# Patient Record
Sex: FEMALE | Race: WHITE | NOT HISPANIC OR LATINO
[De-identification: names, ages, dates, MRNs, and addresses within clinical notes are randomized per-mention and may not be internally consistent; named-entity substitution may affect disease eponyms.]

---

## 2021-03-02 RX ADMIN — Medication 2: at 22:02

## 2021-03-12 ENCOUNTER — TRANSCRIPTION ENCOUNTER (OUTPATIENT)
Age: 76
End: 2021-03-12

## 2021-03-13 ENCOUNTER — INPATIENT (INPATIENT)
Facility: HOSPITAL | Age: 76
LOS: 23 days | Discharge: NURSING, REHAB OR LONG-TERM CARE | DRG: 474 | End: 2021-04-06
Attending: SURGERY | Admitting: SURGERY
Payer: MEDICARE

## 2021-03-13 ENCOUNTER — RESULT REVIEW (OUTPATIENT)
Age: 76
End: 2021-03-13

## 2021-03-13 VITALS
OXYGEN SATURATION: 98 % | TEMPERATURE: 97 F | RESPIRATION RATE: 16 BRPM | SYSTOLIC BLOOD PRESSURE: 123 MMHG | WEIGHT: 100.97 LBS | DIASTOLIC BLOOD PRESSURE: 79 MMHG | HEIGHT: 62 IN | HEART RATE: 96 BPM
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
## 2021-03-13 DIAGNOSIS — R73.9 HYPERGLYCEMIA, UNSPECIFIED: ICD-10-CM

## 2021-03-13 DIAGNOSIS — Z93.3 COLOSTOMY STATUS: Chronic | ICD-10-CM

## 2021-03-13 DIAGNOSIS — L03.90 CELLULITIS, UNSPECIFIED: ICD-10-CM

## 2021-03-13 DIAGNOSIS — C18.9 MALIGNANT NEOPLASM OF COLON, UNSPECIFIED: ICD-10-CM

## 2021-03-13 LAB
ALBUMIN SERPL ELPH-MCNC: 2.7 G/DL — LOW (ref 3.3–5)
ALP SERPL-CCNC: 145 U/L — HIGH (ref 40–120)
ALT FLD-CCNC: 25 U/L — SIGNIFICANT CHANGE UP (ref 10–45)
ANION GAP SERPL CALC-SCNC: 18 MMOL/L — HIGH (ref 5–17)
ANION GAP SERPL CALC-SCNC: 19 MMOL/L — HIGH (ref 5–17)
ANION GAP SERPL CALC-SCNC: 25 MMOL/L — HIGH (ref 5–17)
ANISOCYTOSIS BLD QL: SLIGHT — SIGNIFICANT CHANGE UP
APPEARANCE UR: CLEAR — SIGNIFICANT CHANGE UP
APTT BLD: 30.4 SEC — SIGNIFICANT CHANGE UP (ref 27.5–35.5)
AST SERPL-CCNC: 17 U/L — SIGNIFICANT CHANGE UP (ref 10–40)
B-OH-BUTYR SERPL-SCNC: 6.4 MMOL/L — HIGH
BACTERIA # UR AUTO: SIGNIFICANT CHANGE UP /HPF
BASE EXCESS BLDV CALC-SCNC: -6.1 MMOL/L — SIGNIFICANT CHANGE UP
BASOPHILS # BLD AUTO: 0 K/UL — SIGNIFICANT CHANGE UP (ref 0–0.2)
BASOPHILS NFR BLD AUTO: 0 % — SIGNIFICANT CHANGE UP (ref 0–2)
BILIRUB SERPL-MCNC: 0.4 MG/DL — SIGNIFICANT CHANGE UP (ref 0.2–1.2)
BILIRUB UR-MCNC: ABNORMAL
BLD GP AB SCN SERPL QL: NEGATIVE — SIGNIFICANT CHANGE UP
BUN SERPL-MCNC: 15 MG/DL — SIGNIFICANT CHANGE UP (ref 7–23)
BUN SERPL-MCNC: 15 MG/DL — SIGNIFICANT CHANGE UP (ref 7–23)
BUN SERPL-MCNC: 18 MG/DL — SIGNIFICANT CHANGE UP (ref 7–23)
BURR CELLS BLD QL SMEAR: PRESENT — SIGNIFICANT CHANGE UP
CA-I SERPL-SCNC: 1.27 MMOL/L — SIGNIFICANT CHANGE UP (ref 1.12–1.3)
CALCIUM SERPL-MCNC: 8 MG/DL — LOW (ref 8.4–10.5)
CALCIUM SERPL-MCNC: 8.8 MG/DL — SIGNIFICANT CHANGE UP (ref 8.4–10.5)
CALCIUM SERPL-MCNC: 9.7 MG/DL — SIGNIFICANT CHANGE UP (ref 8.4–10.5)
CHLORIDE SERPL-SCNC: 100 MMOL/L — SIGNIFICANT CHANGE UP (ref 96–108)
CHLORIDE SERPL-SCNC: 102 MMOL/L — SIGNIFICANT CHANGE UP (ref 96–108)
CHLORIDE SERPL-SCNC: 94 MMOL/L — LOW (ref 96–108)
CK MB CFR SERPL CALC: 6.9 NG/ML — HIGH (ref 0–6.7)
CK SERPL-CCNC: 190 U/L — HIGH (ref 25–170)
CO2 SERPL-SCNC: 17 MMOL/L — LOW (ref 22–31)
CO2 SERPL-SCNC: 18 MMOL/L — LOW (ref 22–31)
CO2 SERPL-SCNC: 19 MMOL/L — LOW (ref 22–31)
COLOR SPEC: YELLOW — SIGNIFICANT CHANGE UP
CREAT SERPL-MCNC: 0.54 MG/DL — SIGNIFICANT CHANGE UP (ref 0.5–1.3)
CREAT SERPL-MCNC: 0.58 MG/DL — SIGNIFICANT CHANGE UP (ref 0.5–1.3)
CREAT SERPL-MCNC: 0.62 MG/DL — SIGNIFICANT CHANGE UP (ref 0.5–1.3)
DIFF PNL FLD: NEGATIVE — SIGNIFICANT CHANGE UP
EOSINOPHIL # BLD AUTO: 0 K/UL — SIGNIFICANT CHANGE UP (ref 0–0.5)
EOSINOPHIL NFR BLD AUTO: 0 % — SIGNIFICANT CHANGE UP (ref 0–6)
EPI CELLS # UR: SIGNIFICANT CHANGE UP /HPF (ref 0–5)
GAS PNL BLDV: 136 MMOL/L — LOW (ref 138–146)
GAS PNL BLDV: SIGNIFICANT CHANGE UP
GAS PNL BLDV: SIGNIFICANT CHANGE UP
GLUCOSE BLDC GLUCOMTR-MCNC: 312 MG/DL — HIGH (ref 70–99)
GLUCOSE BLDC GLUCOMTR-MCNC: 315 MG/DL — HIGH (ref 70–99)
GLUCOSE SERPL-MCNC: 263 MG/DL — HIGH (ref 70–99)
GLUCOSE SERPL-MCNC: 297 MG/DL — HIGH (ref 70–99)
GLUCOSE SERPL-MCNC: 391 MG/DL — HIGH (ref 70–99)
GLUCOSE UR QL: 500
GRAM STN FLD: SIGNIFICANT CHANGE UP
GRAM STN FLD: SIGNIFICANT CHANGE UP
HCO3 BLDV-SCNC: 20 MMOL/L — SIGNIFICANT CHANGE UP (ref 20–27)
HCT VFR BLD CALC: 43.1 % — SIGNIFICANT CHANGE UP (ref 34.5–45)
HGB BLD-MCNC: 14.2 G/DL — SIGNIFICANT CHANGE UP (ref 11.5–15.5)
INR BLD: 1.24 — HIGH (ref 0.88–1.16)
KETONES UR-MCNC: >=80 MG/DL
LACTATE SERPL-SCNC: 2 MMOL/L — SIGNIFICANT CHANGE UP (ref 0.5–2)
LEUKOCYTE ESTERASE UR-ACNC: NEGATIVE — SIGNIFICANT CHANGE UP
LYMPHOCYTES # BLD AUTO: 0.88 K/UL — LOW (ref 1–3.3)
LYMPHOCYTES # BLD AUTO: 3.4 % — LOW (ref 13–44)
MAGNESIUM SERPL-MCNC: 1.6 MG/DL — SIGNIFICANT CHANGE UP (ref 1.6–2.6)
MANUAL SMEAR VERIFICATION: SIGNIFICANT CHANGE UP
MCHC RBC-ENTMCNC: 29.8 PG — SIGNIFICANT CHANGE UP (ref 27–34)
MCHC RBC-ENTMCNC: 32.9 GM/DL — SIGNIFICANT CHANGE UP (ref 32–36)
MCV RBC AUTO: 90.5 FL — SIGNIFICANT CHANGE UP (ref 80–100)
MICROCYTES BLD QL: SLIGHT — SIGNIFICANT CHANGE UP
MONOCYTES # BLD AUTO: 1.11 K/UL — HIGH (ref 0–0.9)
MONOCYTES NFR BLD AUTO: 4.3 % — SIGNIFICANT CHANGE UP (ref 2–14)
NEUTROPHILS # BLD AUTO: 23.86 K/UL — HIGH (ref 1.8–7.4)
NEUTROPHILS NFR BLD AUTO: 92.3 % — HIGH (ref 43–77)
NITRITE UR-MCNC: NEGATIVE — SIGNIFICANT CHANGE UP
OVALOCYTES BLD QL SMEAR: SLIGHT — SIGNIFICANT CHANGE UP
PCO2 BLDV: 43 MMHG — SIGNIFICANT CHANGE UP (ref 41–51)
PH BLDV: 7.29 — LOW (ref 7.32–7.43)
PH UR: 5.5 — SIGNIFICANT CHANGE UP (ref 5–8)
PHOSPHATE SERPL-MCNC: 3.3 MG/DL — SIGNIFICANT CHANGE UP (ref 2.5–4.5)
PLAT MORPH BLD: ABNORMAL
PLATELET # BLD AUTO: 494 K/UL — HIGH (ref 150–400)
PO2 BLDV: 22 MMHG — SIGNIFICANT CHANGE UP
POIKILOCYTOSIS BLD QL AUTO: SLIGHT — SIGNIFICANT CHANGE UP
POTASSIUM BLDV-SCNC: 4 MMOL/L — SIGNIFICANT CHANGE UP (ref 3.5–4.9)
POTASSIUM SERPL-MCNC: 3.2 MMOL/L — LOW (ref 3.5–5.3)
POTASSIUM SERPL-MCNC: 3.7 MMOL/L — SIGNIFICANT CHANGE UP (ref 3.5–5.3)
POTASSIUM SERPL-MCNC: 4 MMOL/L — SIGNIFICANT CHANGE UP (ref 3.5–5.3)
POTASSIUM SERPL-SCNC: 3.2 MMOL/L — LOW (ref 3.5–5.3)
POTASSIUM SERPL-SCNC: 3.7 MMOL/L — SIGNIFICANT CHANGE UP (ref 3.5–5.3)
POTASSIUM SERPL-SCNC: 4 MMOL/L — SIGNIFICANT CHANGE UP (ref 3.5–5.3)
PROT SERPL-MCNC: 8.4 G/DL — HIGH (ref 6–8.3)
PROT UR-MCNC: ABNORMAL MG/DL
PROTHROM AB SERPL-ACNC: 14.7 SEC — HIGH (ref 10.6–13.6)
RBC # BLD: 4.76 M/UL — SIGNIFICANT CHANGE UP (ref 3.8–5.2)
RBC # FLD: 12.5 % — SIGNIFICANT CHANGE UP (ref 10.3–14.5)
RBC BLD AUTO: ABNORMAL
RBC CASTS # UR COMP ASSIST: < 5 /HPF — SIGNIFICANT CHANGE UP
RH IG SCN BLD-IMP: POSITIVE — SIGNIFICANT CHANGE UP
SAO2 % BLDV: 32 % — SIGNIFICANT CHANGE UP
SARS-COV-2 RNA SPEC QL NAA+PROBE: SIGNIFICANT CHANGE UP
SODIUM SERPL-SCNC: 137 MMOL/L — SIGNIFICANT CHANGE UP (ref 135–145)
SODIUM SERPL-SCNC: 137 MMOL/L — SIGNIFICANT CHANGE UP (ref 135–145)
SODIUM SERPL-SCNC: 138 MMOL/L — SIGNIFICANT CHANGE UP (ref 135–145)
SP GR SPEC: >=1.03 — SIGNIFICANT CHANGE UP (ref 1–1.03)
SPECIMEN SOURCE: SIGNIFICANT CHANGE UP
SPECIMEN SOURCE: SIGNIFICANT CHANGE UP
SPHEROCYTES BLD QL SMEAR: SLIGHT — SIGNIFICANT CHANGE UP
UROBILINOGEN FLD QL: 0.2 E.U./DL — SIGNIFICANT CHANGE UP
WBC # BLD: 25.85 K/UL — HIGH (ref 3.8–10.5)
WBC # FLD AUTO: 25.85 K/UL — HIGH (ref 3.8–10.5)
WBC UR QL: < 5 /HPF — SIGNIFICANT CHANGE UP

## 2021-03-13 PROCEDURE — 70450 CT HEAD/BRAIN W/O DYE: CPT | Mod: 26,MA

## 2021-03-13 PROCEDURE — 72125 CT NECK SPINE W/O DYE: CPT | Mod: 26,MA

## 2021-03-13 PROCEDURE — 73630 X-RAY EXAM OF FOOT: CPT | Mod: 26,50

## 2021-03-13 PROCEDURE — 71045 X-RAY EXAM CHEST 1 VIEW: CPT | Mod: 26

## 2021-03-13 PROCEDURE — 99291 CRITICAL CARE FIRST HOUR: CPT

## 2021-03-13 PROCEDURE — 71275 CT ANGIOGRAPHY CHEST: CPT | Mod: 26,MA

## 2021-03-13 PROCEDURE — 93010 ELECTROCARDIOGRAM REPORT: CPT

## 2021-03-13 PROCEDURE — 70486 CT MAXILLOFACIAL W/O DYE: CPT | Mod: 26,MA

## 2021-03-13 PROCEDURE — 99285 EMERGENCY DEPT VISIT HI MDM: CPT | Mod: 25

## 2021-03-13 PROCEDURE — 99222 1ST HOSP IP/OBS MODERATE 55: CPT | Mod: GC

## 2021-03-13 PROCEDURE — 88304 TISSUE EXAM BY PATHOLOGIST: CPT | Mod: 26

## 2021-03-13 PROCEDURE — 73701 CT LOWER EXTREMITY W/DYE: CPT | Mod: 26,RT

## 2021-03-13 PROCEDURE — 74177 CT ABD & PELVIS W/CONTRAST: CPT | Mod: 26,MA

## 2021-03-13 RX ORDER — HEPARIN SODIUM 5000 [USP'U]/ML
5000 INJECTION INTRAVENOUS; SUBCUTANEOUS EVERY 8 HOURS
Refills: 0 | Status: DISCONTINUED | OUTPATIENT
Start: 2021-03-13 | End: 2021-03-19

## 2021-03-13 RX ORDER — DEXTROSE 50 % IN WATER 50 %
25 SYRINGE (ML) INTRAVENOUS ONCE
Refills: 0 | Status: DISCONTINUED | OUTPATIENT
Start: 2021-03-13 | End: 2021-03-19

## 2021-03-13 RX ORDER — SODIUM CHLORIDE 9 MG/ML
1000 INJECTION INTRAMUSCULAR; INTRAVENOUS; SUBCUTANEOUS ONCE
Refills: 0 | Status: COMPLETED | OUTPATIENT
Start: 2021-03-13 | End: 2021-03-13

## 2021-03-13 RX ORDER — SODIUM CHLORIDE 9 MG/ML
1000 INJECTION, SOLUTION INTRAVENOUS
Refills: 0 | Status: DISCONTINUED | OUTPATIENT
Start: 2021-03-13 | End: 2021-03-19

## 2021-03-13 RX ORDER — SODIUM CHLORIDE 9 MG/ML
1000 INJECTION INTRAMUSCULAR; INTRAVENOUS; SUBCUTANEOUS
Refills: 0 | Status: DISCONTINUED | OUTPATIENT
Start: 2021-03-13 | End: 2021-03-14

## 2021-03-13 RX ORDER — INSULIN LISPRO 100/ML
VIAL (ML) SUBCUTANEOUS
Refills: 0 | Status: DISCONTINUED | OUTPATIENT
Start: 2021-03-13 | End: 2021-03-13

## 2021-03-13 RX ORDER — VANCOMYCIN HCL 1 G
VIAL (EA) INTRAVENOUS
Refills: 0 | Status: DISCONTINUED | OUTPATIENT
Start: 2021-03-13 | End: 2021-03-14

## 2021-03-13 RX ORDER — VANCOMYCIN HCL 1 G
750 VIAL (EA) INTRAVENOUS ONCE
Refills: 0 | Status: COMPLETED | OUTPATIENT
Start: 2021-03-13 | End: 2021-03-13

## 2021-03-13 RX ORDER — MAGNESIUM SULFATE 500 MG/ML
2 VIAL (ML) INJECTION ONCE
Refills: 0 | Status: COMPLETED | OUTPATIENT
Start: 2021-03-13 | End: 2021-03-13

## 2021-03-13 RX ORDER — INSULIN HUMAN 100 [IU]/ML
5 INJECTION, SOLUTION SUBCUTANEOUS
Qty: 100 | Refills: 0 | Status: DISCONTINUED | OUTPATIENT
Start: 2021-03-13 | End: 2021-03-14

## 2021-03-13 RX ORDER — PIPERACILLIN AND TAZOBACTAM 4; .5 G/20ML; G/20ML
3.38 INJECTION, POWDER, LYOPHILIZED, FOR SOLUTION INTRAVENOUS ONCE
Refills: 0 | Status: DISCONTINUED | OUTPATIENT
Start: 2021-03-13 | End: 2021-03-13

## 2021-03-13 RX ORDER — TETANUS TOXOID, REDUCED DIPHTHERIA TOXOID AND ACELLULAR PERTUSSIS VACCINE, ADSORBED 5; 2.5; 8; 8; 2.5 [IU]/.5ML; [IU]/.5ML; UG/.5ML; UG/.5ML; UG/.5ML
0.5 SUSPENSION INTRAMUSCULAR ONCE
Refills: 0 | Status: COMPLETED | OUTPATIENT
Start: 2021-03-13 | End: 2021-03-13

## 2021-03-13 RX ORDER — POTASSIUM CHLORIDE 20 MEQ
40 PACKET (EA) ORAL ONCE
Refills: 0 | Status: COMPLETED | OUTPATIENT
Start: 2021-03-13 | End: 2021-03-13

## 2021-03-13 RX ORDER — PIPERACILLIN AND TAZOBACTAM 4; .5 G/20ML; G/20ML
3.38 INJECTION, POWDER, LYOPHILIZED, FOR SOLUTION INTRAVENOUS EVERY 6 HOURS
Refills: 0 | Status: DISCONTINUED | OUTPATIENT
Start: 2021-03-13 | End: 2021-03-16

## 2021-03-13 RX ORDER — PIPERACILLIN AND TAZOBACTAM 4; .5 G/20ML; G/20ML
3.38 INJECTION, POWDER, LYOPHILIZED, FOR SOLUTION INTRAVENOUS ONCE
Refills: 0 | Status: COMPLETED | OUTPATIENT
Start: 2021-03-13 | End: 2021-03-13

## 2021-03-13 RX ORDER — DEXTROSE 50 % IN WATER 50 %
12.5 SYRINGE (ML) INTRAVENOUS ONCE
Refills: 0 | Status: DISCONTINUED | OUTPATIENT
Start: 2021-03-13 | End: 2021-03-19

## 2021-03-13 RX ORDER — INSULIN HUMAN 100 [IU]/ML
4 INJECTION, SOLUTION SUBCUTANEOUS ONCE
Refills: 0 | Status: COMPLETED | OUTPATIENT
Start: 2021-03-13 | End: 2021-03-13

## 2021-03-13 RX ORDER — GLUCAGON INJECTION, SOLUTION 0.5 MG/.1ML
1 INJECTION, SOLUTION SUBCUTANEOUS ONCE
Refills: 0 | Status: DISCONTINUED | OUTPATIENT
Start: 2021-03-13 | End: 2021-03-19

## 2021-03-13 RX ORDER — SODIUM CHLORIDE 9 MG/ML
1000 INJECTION, SOLUTION INTRAVENOUS
Refills: 0 | Status: DISCONTINUED | OUTPATIENT
Start: 2021-03-13 | End: 2021-03-14

## 2021-03-13 RX ORDER — CHLORHEXIDINE GLUCONATE 213 G/1000ML
1 SOLUTION TOPICAL
Refills: 0 | Status: DISCONTINUED | OUTPATIENT
Start: 2021-03-13 | End: 2021-03-15

## 2021-03-13 RX ORDER — DEXTROSE 50 % IN WATER 50 %
15 SYRINGE (ML) INTRAVENOUS ONCE
Refills: 0 | Status: DISCONTINUED | OUTPATIENT
Start: 2021-03-13 | End: 2021-03-19

## 2021-03-13 RX ADMIN — Medication 50 GRAM(S): at 20:46

## 2021-03-13 RX ADMIN — Medication 250 MILLIGRAM(S): at 14:20

## 2021-03-13 RX ADMIN — Medication 8: at 21:36

## 2021-03-13 RX ADMIN — CHLORHEXIDINE GLUCONATE 1 APPLICATION(S): 213 SOLUTION TOPICAL at 23:15

## 2021-03-13 RX ADMIN — PIPERACILLIN AND TAZOBACTAM 200 GRAM(S): 4; .5 INJECTION, POWDER, LYOPHILIZED, FOR SOLUTION INTRAVENOUS at 22:25

## 2021-03-13 RX ADMIN — INSULIN HUMAN 4 UNIT(S): 100 INJECTION, SOLUTION SUBCUTANEOUS at 14:45

## 2021-03-13 RX ADMIN — SODIUM CHLORIDE 1000 MILLILITER(S): 9 INJECTION INTRAMUSCULAR; INTRAVENOUS; SUBCUTANEOUS at 15:18

## 2021-03-13 RX ADMIN — SODIUM CHLORIDE 1000 MILLILITER(S): 9 INJECTION INTRAMUSCULAR; INTRAVENOUS; SUBCUTANEOUS at 13:41

## 2021-03-13 RX ADMIN — PIPERACILLIN AND TAZOBACTAM 200 GRAM(S): 4; .5 INJECTION, POWDER, LYOPHILIZED, FOR SOLUTION INTRAVENOUS at 13:41

## 2021-03-13 RX ADMIN — Medication 40 MILLIEQUIVALENT(S): at 20:46

## 2021-03-13 RX ADMIN — TETANUS TOXOID, REDUCED DIPHTHERIA TOXOID AND ACELLULAR PERTUSSIS VACCINE, ADSORBED 0.5 MILLILITER(S): 5; 2.5; 8; 8; 2.5 SUSPENSION INTRAMUSCULAR at 13:42

## 2021-03-13 NOTE — ED PROVIDER NOTE - PROGRESS NOTE DETAILS
vasc surgery to eval pt cleared to med floor by icu      740 pm called by RAD res - [facility] attg commented on concern for nec fasc in right foot- no overt gas in soft tissue//  vasc aware podiatry and gen surg aware -pt cleared to med floor by icu --    740 pm-- called by RAD res - SIMON attg commented on concern for nec fasc in right foot- no overt gas in soft tissue//  vasc aware podiatry and gen surg aware pt with borderline, but more likely DKA -- ICU not rec insulin drip at this time---but ok with fluids and prn insulin even in light of elevated BHB and mild acidosis-- pt cleared to med floor by ICU team --    740 pm-- called by RAD res - [facility] attg commented on concern for nec fasc in right foot- no overt gas in soft tissue//  vasc aware podiatry and gen surg aware

## 2021-03-13 NOTE — CONSULT NOTE ADULT - ASSESSMENT
75F with PMHx UC s/p colostomy (s/p multiple abdominal surgeries including colostomy bag in 2005) BIBEMS from home complaining of weakness; presenting likely in DKA with AMS, with bilateral lower extremity open wounds.    Plan:   - No soft tissue emphysema seen on b/l foot xrays  - F/U CT of b/l foot to help r/o gas  - IV ABx  - Agree with Vascular wound care recs: Applied Wet-to-dry dressings on the foot and leg b/l   - F/U deep wound cultures taken by Dr. Nelson in the ED   - Plan pending d/w Attending Dr. Painting  75F with PMHx UC s/p colostomy (s/p multiple abdominal surgeries including colostomy bag in 2005) BIBEMS from home complaining of weakness; presenting likely in DKA with AMS, with bilateral lower extremity open wounds. Of note, pt found to have a hx of DM2: A1C on admission is 16.1.     Plan:   - No soft tissue emphysema seen on b/l foot xrays  - Performed excisional debridement on the plantar aspect of the R foot down to and including the dermis. Subcutaneous tissue is now visible.   - Squeezed out about 5cc's of purulent drainage from the R foot post- debridement.  - F/U CT of b/l foot to help r/o gas  - IV ABx  - Agree with Vascular wound care recs: Applied Wet-to-dry dressings on the foot and leg b/l   - F/U deep wound cultures taken by Dr. Nelson in the ED   - Plan pending d/w Attending Dr. Painting

## 2021-03-13 NOTE — ED PROVIDER NOTE - CLINICAL SUMMARY MEDICAL DECISION MAKING FREE TEXT BOX
cellulitis bilat LE  no abscess   not DKA per ICU  no insulin drip 74 yo F with DM with concern for nec fasciitis right foot vs abscess  mild DKA rx with iv insulin gap closed CT R LE with concern for nec fasc  vasc and podiatry aware- will take pt to OR for washout and debridement- also ? abscess left mid leg

## 2021-03-13 NOTE — H&P ADULT - ASSESSMENT
75F with newly diagnosed DM presenting in DKA with bilateral lower extremity cellulitis and foot infection

## 2021-03-13 NOTE — ED PROVIDER NOTE - CARE PLAN
Principal Discharge DX:	Hyperglycemia  Secondary Diagnosis:	Cellulitis  Secondary Diagnosis:	Fall  Secondary Diagnosis:	Failure to thrive in adult   Principal Discharge DX:	Necrotizing fasciitis of lower leg  Secondary Diagnosis:	Cellulitis  Secondary Diagnosis:	Fall  Secondary Diagnosis:	Failure to thrive in adult   Principal Discharge DX:	Necrotizing fasciitis of lower leg  Secondary Diagnosis:	Cellulitis  Secondary Diagnosis:	Fall  Secondary Diagnosis:	Failure to thrive in adult  Secondary Diagnosis:	Hyperglycemia  Secondary Diagnosis:	DKA (diabetic ketoacidoses)

## 2021-03-13 NOTE — CONSULT NOTE ADULT - SUBJECTIVE AND OBJECTIVE BOX
Vascular Attending: Dr. Leung    HPI:  75F poor historian with PMHx of Ulcerative Colitis (s/p multiple abdominal surgeries including colostomy bag in 2005) BIBEMS from home complaining of weakness. As per EMS, was found in home covered in stool. Pt reports numerous falls and worsening bilateral lower extremity wounds over the past several months. Follows up with Dr. Basilio Claire (GI @ [facility]) but is noncompliant, was told to wear compression stockings for venous stasis. Pt does not report hx of DM or taking any meds. Lives at home without assistance. Ambulatory status unknown.     Vascular Surgery was consulted for bilateral open wounds in lower extremity, r/o necrotizing fasciitis    PAST MEDICAL & SURGICAL HISTORY:  Ulcerative Colitis s/p numerous abdominal surgeries and colostomy bag      MEDICATIONS  (STANDING):  vancomycin  IVPB 750 milliGRAM(s) IV Intermittent once    MEDICATIONS  (PRN):      Allergies    No Known Allergies    Intolerances        SOCIAL HISTORY:  Denies toxic habits    FAMILY HISTORY:      Vital Signs Last 24 Hrs  T(C): 36.3 (13 Mar 2021 12:46), Max: 36.3 (13 Mar 2021 12:46)  T(F): 97.3 (13 Mar 2021 12:46), Max: 97.3 (13 Mar 2021 12:46)  HR: 96 (13 Mar 2021 12:46) (96 - 96)  BP: 123/79 (13 Mar 2021 12:46) (123/79 - 123/79)  BP(mean): --  RR: 16 (13 Mar 2021 12:46) (16 - 16)  SpO2: 98% (13 Mar 2021 12:46) (98% - 98%)    PHYSICAL EXAM:    Constitutional: AAOx3, does not appear to be in acute distress; short responses to interview questions  Respiratory: CTA BL  Cardiovascular: S1 S2 RRR no MRG  Gastrointestinal: Soft, NTND. Colostomy bag site in place LLQ. Numerous well healed abdominal incisions noted    Extremities:   RLE - Significant swelling and erythema from mid-tibia distally. 3-4cm open wound noted over anterior ankle at joint line with significant purulence and drainage. Numerous weeping blisters noted over dorsum of foot. Dorsum of foot significantly erythematous and swollen, but compressible. Large open purulent blister extending from medial to lateral midfoot with communication across the plantar midfoot. Motor 5/5 TA/GS/EHL, SILT SPN/DPN/Sural/Saph/Tibial  LLE - 2+ pitting edema over lower leg/foot. Large 4-5cm open wound over anterior ankle joint line with significant purulence and drainage. Foot mildly swollen without erythema. Small 1-2cm patches of gangrenous ulcers noted on lateral ankle and midfoot. Motor 5/5 TA/GS/EHL, SILT SPN/DPN/Sural/Saph/Tibial    Vascular: RLE - 2+ femoral, 2+ PT, 1+ DP, triphasic DP signal on doppler  LLE - 2+ femoral, 2+ PT, 1+ DP, triphasic DP signal on doppler  RUE - 2+ radial  LUE - 2+ radial        LABS:                        14.2   25.85 )-----------( 494      ( 13 Mar 2021 13:30 )             43.1     03-13    138  |  94<L>  |  18  ----------------------------<  391<H>  4.0   |  19<L>  |  0.62    Ca    9.7      13 Mar 2021 13:30    TPro  8.4<H>  /  Alb  2.7<L>  /  TBili  0.4  /  DBili  x   /  AST  17  /  ALT  25  /  AlkPhos  145<H>  03-13    PT/INR - ( 13 Mar 2021 13:30 )   PT: 14.7 sec;   INR: 1.24          PTT - ( 13 Mar 2021 13:30 )  PTT:30.4 sec      RADIOLOGY & ADDITIONAL STUDIES

## 2021-03-13 NOTE — ED ADULT NURSE REASSESSMENT NOTE - NS ED NURSE REASSESS COMMENT FT1
hygiene care provide. Ostomy site cleansed and new bag placed. External Primofit placed, Pt educated on purpose and use. Vasculature consult at bedside.
Zakia Aman the pts cousin and power of  ([phone]) called. Pt gave verbal consent to give medical information over the phone. Ms. Newton lives in [place] at this time. Ms. Newton confirmed that ms. Mariscal lives alone. Per Ms. Newton "she is watched over by the land lord. He checks in on her to make sure everything is ok with her. Now that she is in the hospital The land lord is at her apartment cleaning because it is a mess in her apartment". Ms. Newton described a desire to start home care services and expressed her fear of the ms. Mariscal not being able to live at her apartment anymore. Ms. Newton unsure of Ms. Mariscal's medical hx stating "I know that she had irritable bowel syndrome and that is why they did multiple surgeries and there where a lot of complications. She has not been to a doctor for a long time because of fear of covid. I think she is prediabetic". Social work and MD Nelson made aware.

## 2021-03-13 NOTE — H&P ADULT - PROBLEM SELECTOR PLAN 1
- Presented with blood sugar 400+  - Elevated anion gap, lactate 2.0, BHB 6.4  - s/p 4u insulin in ED, IVF, Mag, K  - A1c found to be 16.1  - Endocrinology consult

## 2021-03-13 NOTE — H&P ADULT - PROBLEM SELECTOR PLAN 3
- Continue Vanc/Zosyn  - Grossly purulent bilateral cellulitis, R > L  - Significant drainage and open wounds on R ankle and plantar aspect of R foot  - CT suspicious for small foci of nec fasc in R plantar foot  - WBC 25  - S/p 1x vanco and 1x zosyn in ED  - Podiatry consult for I&D  - NPO for OR  - Pre-op labs

## 2021-03-13 NOTE — CONSULT NOTE ADULT - SUBJECTIVE AND OBJECTIVE BOX
Attending:    Patient is a 75y old  Female who presents with a chief complaint of frequent mechanical falls at home (13 Mar 2021 15:10)      HPI: 75F poor historian with PMHx of Ulcerative Colitis (s/p multiple abdominal surgeries including colostomy bag in 2005) BIBEMS from home complaining of weakness. As per EMS, was found in home covered in stool. Pt reports numerous falls and worsening bilateral lower extremity wounds over the past several months. Follows up with Dr. Basilio Claire (GI @ [facility]) but is noncompliant, was told to wear compression stockings for venous stasis. Pt does not report hx of DM or taking any meds. Lives at home without assistance. Ambulatory status unknown.     Vascular Surgery was consulted for bilateral open wounds in lower extremity, r/o necrotizing fasciitis. They then recommended the ED to consult Podiatry.     Review of systems negative except per HPI    PAST MEDICAL & SURGICAL HISTORY:    Home Medications:    Allergies    No Known Allergies    Intolerances      FAMILY HISTORY:    Social History:       LABS                        14.2   25.85 )-----------( 494      ( 13 Mar 2021 13:30 )             43.1     03-13    138  |  94<L>  |  18  ----------------------------<  391<H>  4.0   |  19<L>  |  0.62    Ca    9.7      13 Mar 2021 13:30    TPro  8.4<H>  /  Alb  2.7<L>  /  TBili  0.4  /  DBili  x   /  AST  17  /  ALT  25  /  AlkPhos  145<H>  03-13    PT/INR - ( 13 Mar 2021 13:30 )   PT: 14.7 sec;   INR: 1.24          PTT - ( 13 Mar 2021 13:30 )  PTT:30.4 sec    Vital Signs Last 24 Hrs  T(C): 36.8 (13 Mar 2021 15:26), Max: 36.9 (13 Mar 2021 13:47)  T(F): 98.3 (13 Mar 2021 15:26), Max: 98.5 (13 Mar 2021 13:47)  HR: 90 (13 Mar 2021 15:26) (90 - 96)  BP: 110/67 (13 Mar 2021 15:26) (110/67 - 133/89)  BP(mean): --  RR: 16 (13 Mar 2021 15:26) (16 - 16)  SpO2: 98% (13 Mar 2021 15:26) (98% - 98%)    PHYSICAL EXAM  General: NAD, AA0x3    Lower Extremity Focused:  Vasc: DP: 1/4 and PT 2/4 bilateral. RLE: Significant swelling and erythema from mid-tibia distally., LLE: 2+ pitting edema over lower leg/foot.   Derm: RLE - 3-4cm open wound noted over anterior ankle at joint line with significant purulence and drainage. Numerous weeping blisters noted over dorsum of foot. Dorsum of foot significantly erythematous and swollen, but compressible. Large open purulent blister extending from medial to lateral midfoot with communication across the plantar midfoot.   LLE - Large 4-5cm open wound over anterior ankle joint line with significant purulence and drainage. Foot mildly swollen without erythema. Small 1-2cm patches of gangrenous ulcers noted on lateral ankle and midfoot.  Neuro: Protective sensations intact b/l      RADIOLOGY                       Attending:    Patient is a 75y old  Female who presents with a chief complaint of frequent mechanical falls at home (13 Mar 2021 15:10)      HPI: 75F poor historian with PMHx of Ulcerative Colitis (s/p multiple abdominal surgeries including colostomy bag in 2005) BIBEMS from home complaining of weakness. As per EMS, was found in home covered in stool. Pt reports numerous falls and worsening bilateral lower extremity wounds over the past several months. Follows up with Dr. Basilio Claire (GI @ [facility]) but is noncompliant, was told to wear compression stockings for venous stasis. Pt does not report hx of DM or taking any meds. Lives at home without assistance. Ambulatory status unknown.     Vascular Surgery was consulted for bilateral open wounds in lower extremity, r/o necrotizing fasciitis. They then recommended the ED to consult Podiatry.     Review of systems negative except per HPI    PAST MEDICAL & SURGICAL HISTORY:    Home Medications:    Allergies    No Known Allergies    Intolerances      FAMILY HISTORY:    Social History:       LABS                        14.2   25.85 )-----------( 494      ( 13 Mar 2021 13:30 )             43.1     03-13    138  |  94<L>  |  18  ----------------------------<  391<H>  4.0   |  19<L>  |  0.62    Ca    9.7      13 Mar 2021 13:30    TPro  8.4<H>  /  Alb  2.7<L>  /  TBili  0.4  /  DBili  x   /  AST  17  /  ALT  25  /  AlkPhos  145<H>  03-13    PT/INR - ( 13 Mar 2021 13:30 )   PT: 14.7 sec;   INR: 1.24          PTT - ( 13 Mar 2021 13:30 )  PTT:30.4 sec    Vital Signs Last 24 Hrs  T(C): 36.8 (13 Mar 2021 15:26), Max: 36.9 (13 Mar 2021 13:47)  T(F): 98.3 (13 Mar 2021 15:26), Max: 98.5 (13 Mar 2021 13:47)  HR: 90 (13 Mar 2021 15:26) (90 - 96)  BP: 110/67 (13 Mar 2021 15:26) (110/67 - 133/89)  BP(mean): --  RR: 16 (13 Mar 2021 15:26) (16 - 16)  SpO2: 98% (13 Mar 2021 15:26) (98% - 98%)    PHYSICAL EXAM  General: NAD, AA0x3    Lower Extremity Focused:  Vasc: DP: 1/4 and PT 2/4 bilateral. RLE: Significant swelling and erythema from mid-tibia distally., LLE: 2+ pitting edema over lower leg/foot.   Derm: RLE - Extremely malodorous with purulent and sanguinous discharge. 3-4cm open wound noted over anterior ankle at joint line with significant purulence and drainage. Numerous weeping blisters noted over dorsum of foot. Dorsum of foot significantly erythematous and swollen, but compressible. Large open purulent blister extending from medial to lateral midfoot with communication across the plantar midfoot.   LLE - Large 4-5cm open wound over anterior ankle joint line with significant purulence and drainage. Foot mildly swollen without erythema. Small 1-2cm patches of gangrenous ulcers noted on lateral ankle and midfoot.  Neuro: Protective sensations intact b/l      RADIOLOGY                     Attending:    Patient is a 75y old  Female who presents with a chief complaint of frequent mechanical falls at home (13 Mar 2021 15:10)      HPI: 75F poor historian with PMHx of Ulcerative Colitis (s/p multiple abdominal surgeries including colostomy bag in 2005) BIBEMS from home complaining of weakness. As per EMS, was found in home covered in stool. Pt reports numerous falls and worsening bilateral lower extremity wounds over the past several months. Follows up with Dr. Basilio Claire (GI @ [facility]) but is noncompliant, was told to wear compression stockings for venous stasis. Pt does not report hx of DM or taking any meds. Lives at home without assistance. Ambulatory status unknown.     Vascular Surgery was consulted for bilateral open wounds in lower extremity, r/o necrotizing fasciitis. They then recommended the ED to consult Podiatry.     Review of systems negative except per HPI    PAST MEDICAL & SURGICAL HISTORY:    Home Medications:    Allergies    No Known Allergies    Intolerances      FAMILY HISTORY:    Social History:       LABS                        14.2   25.85 )-----------( 494      ( 13 Mar 2021 13:30 )             43.1     03-13    138  |  94<L>  |  18  ----------------------------<  391<H>  4.0   |  19<L>  |  0.62    Ca    9.7      13 Mar 2021 13:30    TPro  8.4<H>  /  Alb  2.7<L>  /  TBili  0.4  /  DBili  x   /  AST  17  /  ALT  25  /  AlkPhos  145<H>  03-13    PT/INR - ( 13 Mar 2021 13:30 )   PT: 14.7 sec;   INR: 1.24          PTT - ( 13 Mar 2021 13:30 )  PTT:30.4 sec    Vital Signs Last 24 Hrs  T(C): 36.8 (13 Mar 2021 15:26), Max: 36.9 (13 Mar 2021 13:47)  T(F): 98.3 (13 Mar 2021 15:26), Max: 98.5 (13 Mar 2021 13:47)  HR: 90 (13 Mar 2021 15:26) (90 - 96)  BP: 110/67 (13 Mar 2021 15:26) (110/67 - 133/89)  BP(mean): --  RR: 16 (13 Mar 2021 15:26) (16 - 16)  SpO2: 98% (13 Mar 2021 15:26) (98% - 98%)    PHYSICAL EXAM  General: NAD, AA0x3    Lower Extremity Focused:  Vasc: DP: 1/4 and PT 2/4 bilateral. RLE: Significant swelling and erythema from mid-tibia distally., LLE: 2+ pitting edema over lower leg/foot.   Derm: RLE - Extremely malodorous with purulent and sanguinous discharge. 3-4cm open wound noted over anterior ankle at joint line with significant purulence and drainage. Numerous weeping blisters noted over dorsum of foot. Dorsum of foot significantly erythematous and swollen, but compressible. Large open purulent blister extending from medial to lateral midfoot with communication across the plantar midfoot.   LLE - Large 4-5cm open wound over anterior ankle joint line with significant purulence and drainage. Foot mildly swollen without erythema. Small 1-2cm patches of gangrenous ulcers noted on lateral ankle and midfoot.  Neuro: Protective sensations diminshed b/l      RADIOLOGY

## 2021-03-13 NOTE — CONSULT NOTE ADULT - ASSESSMENT
75F with PMHx UC s/p colostomy, presenting likely in DKA with AMS, with bilateral lower extremity open wounds.  - Recommend STAT bilateral lower extremity/foot XR to evaluate for nec fasc  - Bilateral lower extremity duplexes  - IV ABx  - Pt with palpable pulses and good doppler signals in lower extremities  - Recommend podiatry consult  - Wet-to-dry dressings   75F with PMHx UC s/p colostomy, presenting likely in DKA with AMS, with bilateral lower extremity open wounds.  - Recommend STAT bilateral lower extremity/foot XR to evaluate for nec fasc  - Bilateral lower extremity duplexes  - IV ABx  - Pt with palpable pulses and good doppler signals in lower extremities  - Recommend podiatry consult  - Wet-to-dry dressings  - Pt presenting with glucose 400, in DKA with new dx of DM, likely will require MICU admission  - Plan discussed with senior resident and attending   75F with PMHx UC s/p colostomy, presenting likely in DKA with AMS, with bilateral lower extremity open wounds.  - Recommend STAT bilateral lower extremity/foot XR to evaluate for nec fasc  - Bilateral lower extremity duplexes  - IV ABx  - Pt with palpable pulses and good doppler signals in lower extremities  - Recommend podiatry consult  - Vascular will follow with daily Wet-to-dry dressings  - No acute surgical intervention at this time  - Pt presenting with glucose 400, in DKA with new dx of DM, likely will require MICU admission  - A1c 16.1%; elevated BHB  - Plan discussed with senior resident and attending

## 2021-03-13 NOTE — PROGRESS NOTE ADULT - ASSESSMENT
75F with PMHx UC s/p colostomy (s/p multiple abdominal surgeries including colostomy bag in 2005) BIBEMS from home complaining of weakness; presenting likely in DKA with AMS, with bilateral lower extremity open wounds. Of note, pt found to have a hx of DM2: A1C on admission is 16.1 and CT results indicate the following: Small areas of adjacent subcutaneous pockets of air are seen measuring up to 2.4 x 1.3 cm on plantar aspect of the foot and 10 x 5 mm on lateral aspect likely representing small foci of necrotizing fasciitis.  2. Intramuscular abscess collection extending into the deep spaces of the plantar aspect of right foot as described above.  3. Intramuscular abscess collection is seen in left lower leg within the anterior tibialis muscle.      Plan:   - Significant cellulitis bilaterally of foot and leg  - Performed excisional debridement at bedside on the plantar aspect of the R foot down to and including the dermis. Subcutaneous tissue is now visible.   - IV ABx  - Vascular will manage the symptoms/ wounds on the legs  - F/U deep wound cultures taken by Dr. Nelson in the ED   - Podiatry will take Pt to OR tonight of I&D  - Plan d/w Attending Dr. Ha

## 2021-03-13 NOTE — CONSULT NOTE ADULT - SUBJECTIVE AND OBJECTIVE BOX
Attending: Schwab    HPI: 75F PMHx UC, colon cancer s/p colectomy and chemotherapy presents after being found at home down, covered in stool, unclear who found her and called EMS. Per patient and EMS, has refused to leave the house for years. She describes worsening falls and leg wounds over the last few months. Currently complains of leg pain and thirstiness. Otherwise, denies chest pain, dyspnea, cough, nausea or vomiting. Originally seen in the ER by podiatry and vascular surgery, s/p bedside debridement by podiatry; medicine consulted for admission and [facility] bed assigned. CT scan completed and demonstrated concern for subcutaneous air.     GI - noncompliant: Basilio Claire at [facility]    PMH: as above  PSH: as above  Meds: no current medications  Allerg: NKDA  SH: lives alone  FH: father with colon cancer    T(C): 36.8 (03-13-21 @ 16:41), Max: 36.9 (03-13-21 @ 13:47)  HR: 86 (03-13-21 @ 16:41) (86 - 96)  BP: 103/53 (03-13-21 @ 16:41) (103/53 - 133/89)  RR: 16 (03-13-21 @ 16:41) (16 - 16)  SpO2: 98% (03-13-21 @ 16:41) (98% - 98%)    Physical Exam:  General: covered in feces, fatigued, slow to respond to questions  HEENT: MM dry  Pulmonary: nonlabored breathing, normal resp effort  Cardiovascular: RRR  Abdominal: soft, nontender, nondistended  Extremities: Severe pitting edema to the thighs bilaterally. Foul smelling and purulent R plantar, dorsal and lateral foot wounds. 2+ bilateral DP and PT pulses. No palpable fluid collection in calves or thighs bilaterally. Small ulcer on R shin without purulent drainage  Neuro: no focal deficits, slow to respond to questions    LABS:                        14.2   25.85 )-----------( 494      ( 13 Mar 2021 13:30 )             43.1     03-13    137  |  102  |  15  ----------------------------<  263<H>  3.2<L>   |  17<L>  |  0.54    Ca    8.0<L>      13 Mar 2021 17:29  Phos  3.0     03-13  Mg     1.4     03-13    TPro  8.4<H>  /  Alb  2.7<L>  /  TBili  0.4  /  DBili  x   /  AST  17  /  ALT  25  /  AlkPhos  145<H>  03-13    LIVER FUNCTIONS - ( 13 Mar 2021 13:30 )  Alb: 2.7 g/dL / Pro: 8.4 g/dL / ALK PHOS: 145 U/L / ALT: 25 U/L / AST: 17 U/L / GGT: x             RADIOLOGY & ADDITIONAL STUDIES:      < from: CT Lower Extremity w/ IV Cont, Bilateral (03.13.21 @ 17:11) >  Bones/joints: No significant osseous erosion seen on the study to suggest osteomyelitis.  Soft tissues: Mild subcutaneous edema in anterior bilateral upper legs. Moderate amount of  subcutaneous edema in bilateral lower legs. Significant subcutaneous edema in bilateral foot. Findings are consistent with cellulitis. Intramuscular hematoma is seen in lower anterior tibialis muscle measuring 10 x 4 mm in axial dimension and up to 9 cm in craniocaudal dimension. This finding is seen on series 4 images 163-190 and sagittal series 8, image 27. Multiple skin ulcerations are seen in right foot. Small area of subcutaneous pockets of air is seen adjacent to ulceration on plantar aspect of right foot measuring up to 11 mm in depth extending to the musculature. These findings measure up to 2.4 cm in AP dimension and 13 mmin transverse dimension. 10 x 5 mm focus of subcutaneous pockets of air is seen adjacent to right lateral ulceration near proximal 5th metatarsal bone. Intramuscular abscess collection is seen in plantar aspect of right foot within the flexor digitorum muscles measuring up to 15 mm in transverse dimension, 8 mm in greatest depth and 9 cm in greatest AP dimension extending from near the insertion of the calcaneus to proximal phalanges best seen on series 4 images 215-224 and sagittal image 65.    IMPRESSION:    1. Ulcerations are seen on plantar aspect of right foot and on lateral aspect adjacent to proximal 5th metatarsal bone. Small areas of adjacent subcutaneous pockets of air are seen measuring up to 2.4 x 1.3 cm on plantar aspect of the foot and 10 x 5 mm on lateral aspect likely representing small foci of necrotizing fasciitis.  2. Intramuscular abscess collection extending into the deep spaces of the plantar aspect of right foot as described above.  3. Intramuscular abscess collection is seen in left lower leg within the anterior tibialis muscle.  4. Significant cellulitis in bilateral lower legs as described above.    < end of copied text >    Assessment: 75F PMHx UC, colon cancer s/p colectomy, found down at home, was admitted for DKA and R diabetic foot wound. Subcutaneous air read on CT scan at site of ulcerations and likely represents ulcerations, NOT subcutaneous air and not necrotising fasciitis. LRINEC score 7 for elevated CRP, WBC and glucose; however, clinically suggests diabetic foot ulcer and not necrotising fasciitis. DKA improving after 4U regular insulin with closing anion gap.    NEURO: dilaudid PRN  CV: normotensive goals  RESP: room air  FENGI: CLD, NPO at midnight, NS  : Flowers  ID: vancomycin, Zosyn (3/13 - )  ENDO: endocrine consult, ISS, s/p 4U regular insulin  HEME: none  PPx: HSQ  LINES/WOUNDS: R foot ulcer    endocrine and cardiology consults  to OR in morning for operative debridement Attending: Schwab    HPI: 75F PMHx UC, colon cancer s/p colectomy with ostomy and chemotherapy presents after being found at home down, covered in stool, unclear who found her and called EMS. Per patient and EMS, has refused to leave the house for years. She describes worsening falls and leg wounds over the last few months. Currently complains of leg pain and thirstiness. Otherwise, denies chest pain, dyspnea, cough, nausea or vomiting. Originally seen in the ER by podiatry and vascular surgery, s/p bedside debridement by podiatry; medicine consulted for admission and [facility] bed assigned. CT scan completed and demonstrated concern for subcutaneous air.     GI - noncompliant: Basilio Claire at [facility]    PMH: as above  PSH: as above  Meds: no current medications  Allerg: NKDA  SH: lives alone  FH: father with colon cancer    T(C): 36.8 (03-13-21 @ 16:41), Max: 36.9 (03-13-21 @ 13:47)  HR: 86 (03-13-21 @ 16:41) (86 - 96)  BP: 103/53 (03-13-21 @ 16:41) (103/53 - 133/89)  RR: 16 (03-13-21 @ 16:41) (16 - 16)  SpO2: 98% (03-13-21 @ 16:41) (98% - 98%)    Physical Exam:  General: covered in feces, fatigued, slow to respond to questions  HEENT: MM dry  Pulmonary: nonlabored breathing, normal resp effort  Cardiovascular: RRR  Abdominal: soft, nontender, nondistended, ostomy pink and patent with stool in the bag  Extremities: Severe pitting edema to the thighs bilaterally. Foul smelling and purulent R plantar, dorsal and lateral foot wounds. 2+ bilateral DP and PT pulses. No palpable fluid collection in calves or thighs bilaterally. Small ulcer on R shin without purulent drainage  Neuro: no focal deficits, slow to respond to questions    LABS:                        14.2   25.85 )-----------( 494      ( 13 Mar 2021 13:30 )             43.1     03-13    137  |  102  |  15  ----------------------------<  263<H>  3.2<L>   |  17<L>  |  0.54    Ca    8.0<L>      13 Mar 2021 17:29  Phos  3.0     03-13  Mg     1.4     03-13    TPro  8.4<H>  /  Alb  2.7<L>  /  TBili  0.4  /  DBili  x   /  AST  17  /  ALT  25  /  AlkPhos  145<H>  03-13    LIVER FUNCTIONS - ( 13 Mar 2021 13:30 )  Alb: 2.7 g/dL / Pro: 8.4 g/dL / ALK PHOS: 145 U/L / ALT: 25 U/L / AST: 17 U/L / GGT: x             RADIOLOGY & ADDITIONAL STUDIES:      < from: CT Lower Extremity w/ IV Cont, Bilateral (03.13.21 @ 17:11) >  Bones/joints: No significant osseous erosion seen on the study to suggest osteomyelitis.  Soft tissues: Mild subcutaneous edema in anterior bilateral upper legs. Moderate amount of  subcutaneous edema in bilateral lower legs. Significant subcutaneous edema in bilateral foot. Findings are consistent with cellulitis. Intramuscular hematoma is seen in lower anterior tibialis muscle measuring 10 x 4 mm in axial dimension and up to 9 cm in craniocaudal dimension. This finding is seen on series 4 images 163-190 and sagittal series 8, image 27. Multiple skin ulcerations are seen in right foot. Small area of subcutaneous pockets of air is seen adjacent to ulceration on plantar aspect of right foot measuring up to 11 mm in depth extending to the musculature. These findings measure up to 2.4 cm in AP dimension and 13 mmin transverse dimension. 10 x 5 mm focus of subcutaneous pockets of air is seen adjacent to right lateral ulceration near proximal 5th metatarsal bone. Intramuscular abscess collection is seen in plantar aspect of right foot within the flexor digitorum muscles measuring up to 15 mm in transverse dimension, 8 mm in greatest depth and 9 cm in greatest AP dimension extending from near the insertion of the calcaneus to proximal phalanges best seen on series 4 images 215-224 and sagittal image 65.    IMPRESSION:    1. Ulcerations are seen on plantar aspect of right foot and on lateral aspect adjacent to proximal 5th metatarsal bone. Small areas of adjacent subcutaneous pockets of air are seen measuring up to 2.4 x 1.3 cm on plantar aspect of the foot and 10 x 5 mm on lateral aspect likely representing small foci of necrotizing fasciitis.  2. Intramuscular abscess collection extending into the deep spaces of the plantar aspect of right foot as described above.  3. Intramuscular abscess collection is seen in left lower leg within the anterior tibialis muscle.  4. Significant cellulitis in bilateral lower legs as described above.    < end of copied text >    Assessment: 75F PMHx UC, colon cancer s/p colectomy, found down at home, was admitted for DKA and R diabetic foot wound. Subcutaneous air read on CT scan at site of ulcerations and likely represents ulcerations, NOT subcutaneous air and not necrotising fasciitis. LRINEC score 7 for elevated CRP, WBC and glucose; however, clinically suggests diabetic foot ulcer and not necrotising fasciitis. DKA improving after 4U regular insulin with closing anion gap.    NEURO: dilaudid PRN  CV: normotensive goals  RESP: room air  FENGI: CLD, NPO at midnight, NS  : Flowers  ID: vancomycin, Zosyn (3/13 - )  ENDO: endocrine consult, ISS, s/p 4U regular insulin  HEME: none  PPx: HSQ  LINES/WOUNDS: R foot ulcer    endocrine and cardiology consults  to OR in morning for operative debridement Attending: Schwab    HPI: 75F PMHx UC, colon cancer s/p total abdominal colectomy with end ileostomy and chemotherapy presents after being found at home down, covered in stool, unclear who found her and called EMS. Per patient and EMS, has refused to leave the house for years. She describes worsening falls and leg wounds over the last few months. Currently complains of leg pain and thirstiness. Otherwise, denies chest pain, dyspnea, cough, nausea or vomiting. Originally seen in the ER by podiatry and vascular surgery, s/p bedside debridement by podiatry; medicine consulted for admission and [facility] bed assigned. CT scan completed and demonstrated concern for subcutaneous air.     GI - noncompliant: Basilio Claire at [facility]    PMH: as above  PSH: as above  Meds: no current medications  Allerg: NKDA  SH: lives alone  FH: father with colon cancer    T(C): 36.8 (03-13-21 @ 16:41), Max: 36.9 (03-13-21 @ 13:47)  HR: 86 (03-13-21 @ 16:41) (86 - 96)  BP: 103/53 (03-13-21 @ 16:41) (103/53 - 133/89)  RR: 16 (03-13-21 @ 16:41) (16 - 16)  SpO2: 98% (03-13-21 @ 16:41) (98% - 98%)    Physical Exam:  General: covered in feces, fatigued, slow to respond to questions  HEENT: MM dry  Pulmonary: nonlabored breathing, normal resp effort  Cardiovascular: RRR  Abdominal: soft, nontender, nondistended, ostomy pink and patent with stool in the bag  Extremities: Severe pitting edema to the thighs bilaterally. Foul smelling and purulent R plantar, dorsal and lateral foot wounds. 2+ bilateral DP and PT pulses. No palpable fluid collection in calves or thighs bilaterally. Small ulcer on R shin without purulent drainage  Neuro: no focal deficits, slow to respond to questions    LABS:                        14.2   25.85 )-----------( 494      ( 13 Mar 2021 13:30 )             43.1     03-13    137  |  102  |  15  ----------------------------<  263<H>  3.2<L>   |  17<L>  |  0.54    Ca    8.0<L>      13 Mar 2021 17:29  Phos  3.0     03-13  Mg     1.4     03-13    TPro  8.4<H>  /  Alb  2.7<L>  /  TBili  0.4  /  DBili  x   /  AST  17  /  ALT  25  /  AlkPhos  145<H>  03-13    LIVER FUNCTIONS - ( 13 Mar 2021 13:30 )  Alb: 2.7 g/dL / Pro: 8.4 g/dL / ALK PHOS: 145 U/L / ALT: 25 U/L / AST: 17 U/L / GGT: x             RADIOLOGY & ADDITIONAL STUDIES:      < from: CT Lower Extremity w/ IV Cont, Bilateral (03.13.21 @ 17:11) >  Bones/joints: No significant osseous erosion seen on the study to suggest osteomyelitis.  Soft tissues: Mild subcutaneous edema in anterior bilateral upper legs. Moderate amount of  subcutaneous edema in bilateral lower legs. Significant subcutaneous edema in bilateral foot. Findings are consistent with cellulitis. Intramuscular hematoma is seen in lower anterior tibialis muscle measuring 10 x 4 mm in axial dimension and up to 9 cm in craniocaudal dimension. This finding is seen on series 4 images 163-190 and sagittal series 8, image 27. Multiple skin ulcerations are seen in right foot. Small area of subcutaneous pockets of air is seen adjacent to ulceration on plantar aspect of right foot measuring up to 11 mm in depth extending to the musculature. These findings measure up to 2.4 cm in AP dimension and 13 mmin transverse dimension. 10 x 5 mm focus of subcutaneous pockets of air is seen adjacent to right lateral ulceration near proximal 5th metatarsal bone. Intramuscular abscess collection is seen in plantar aspect of right foot within the flexor digitorum muscles measuring up to 15 mm in transverse dimension, 8 mm in greatest depth and 9 cm in greatest AP dimension extending from near the insertion of the calcaneus to proximal phalanges best seen on series 4 images 215-224 and sagittal image 65.    IMPRESSION:    1. Ulcerations are seen on plantar aspect of right foot and on lateral aspect adjacent to proximal 5th metatarsal bone. Small areas of adjacent subcutaneous pockets of air are seen measuring up to 2.4 x 1.3 cm on plantar aspect of the foot and 10 x 5 mm on lateral aspect likely representing small foci of necrotizing fasciitis.  2. Intramuscular abscess collection extending into the deep spaces of the plantar aspect of right foot as described above.  3. Intramuscular abscess collection is seen in left lower leg within the anterior tibialis muscle.  4. Significant cellulitis in bilateral lower legs as described above.    < end of copied text >    Assessment: 75F PMHx UC, colon cancer s/p total abdominal colectomy with end ileostomy, found down at home, was admitted for DKA and R diabetic foot wound. Subcutaneous air read on CT scan at site of ulcerations and likely represents ulcerations, NOT subcutaneous air and not necrotising fasciitis. LRINEC score 7 for elevated CRP, WBC and glucose; however, clinically suggests diabetic foot ulcer and not necrotising fasciitis. DKA improving after 4U regular insulin with closing anion gap.    NEURO: dilaudid PRN  CV: normotensive goals  RESP: room air  FENGI: CLD, NPO at midnight, NS  : Flowers  ID: vancomycin, Zosyn (3/13 - )  ENDO: endocrine consult, ISS, s/p 4U regular insulin  HEME: none  PPx: HSQ  LINES/WOUNDS: R foot ulcer    endocrine and cardiology consults  to OR in morning for operative debridement Attending: Schwab    HPI: 75F PMHx UC, colon cancer s/p total abdominal colectomy with end ileostomy and chemotherapy presents after being found at home down covered in stool by her super, who called EMS. Per her cousin, she has been falling at home daily over the last week, and her cousin regularly asks her super to help her off the ground when she doesn't answer the phone. Per patient and EMS, has refused to leave the house for years. She describes worsening falls and leg wounds over the last few months. Currently complains of leg pain and thirstiness. Otherwise, denies chest pain, dyspnea, cough, nausea or vomiting. Originally seen in the ER by podiatry and vascular surgery, s/p bedside debridement by podiatry; medicine consulted for admission and regional medicine bed assigned. CT scan completed and demonstrated concern for subcutaneous air.     GI - noncompliant: Basilio Claire at [facility]    PMH: as above  PSH: as above  Meds: no current medications  Allerg: NKDA  SH: lives alone  FH: father with colon cancer    T(C): 36.8 (03-13-21 @ 16:41), Max: 36.9 (03-13-21 @ 13:47)  HR: 86 (03-13-21 @ 16:41) (86 - 96)  BP: 103/53 (03-13-21 @ 16:41) (103/53 - 133/89)  RR: 16 (03-13-21 @ 16:41) (16 - 16)  SpO2: 98% (03-13-21 @ 16:41) (98% - 98%)    Physical Exam:  General: covered in feces, fatigued, slow to respond to questions  HEENT: MM dry  Pulmonary: nonlabored breathing, normal resp effort  Cardiovascular: RRR  Abdominal: soft, nontender, nondistended, ostomy pink and patent with stool in the bag  Extremities: Severe pitting edema to the thighs bilaterally. Foul smelling and purulent R plantar, dorsal and lateral foot wounds. 2+ bilateral DP and PT pulses. No palpable fluid collection in calves or thighs bilaterally. Small ulcer on R shin without purulent drainage  Neuro: no focal deficits, slow to respond to questions    LABS:                        14.2   25.85 )-----------( 494      ( 13 Mar 2021 13:30 )             43.1     03-13    137  |  102  |  15  ----------------------------<  263<H>  3.2<L>   |  17<L>  |  0.54    Ca    8.0<L>      13 Mar 2021 17:29  Phos  3.0     03-13  Mg     1.4     03-13    TPro  8.4<H>  /  Alb  2.7<L>  /  TBili  0.4  /  DBili  x   /  AST  17  /  ALT  25  /  AlkPhos  145<H>  03-13    LIVER FUNCTIONS - ( 13 Mar 2021 13:30 )  Alb: 2.7 g/dL / Pro: 8.4 g/dL / ALK PHOS: 145 U/L / ALT: 25 U/L / AST: 17 U/L / GGT: x             RADIOLOGY & ADDITIONAL STUDIES:      < from: CT Lower Extremity w/ IV Cont, Bilateral (03.13.21 @ 17:11) >  Bones/joints: No significant osseous erosion seen on the study to suggest osteomyelitis.  Soft tissues: Mild subcutaneous edema in anterior bilateral upper legs. Moderate amount of  subcutaneous edema in bilateral lower legs. Significant subcutaneous edema in bilateral foot. Findings are consistent with cellulitis. Intramuscular hematoma is seen in lower anterior tibialis muscle measuring 10 x 4 mm in axial dimension and up to 9 cm in craniocaudal dimension. This finding is seen on series 4 images 163-190 and sagittal series 8, image 27. Multiple skin ulcerations are seen in right foot. Small area of subcutaneous pockets of air is seen adjacent to ulceration on plantar aspect of right foot measuring up to 11 mm in depth extending to the musculature. These findings measure up to 2.4 cm in AP dimension and 13 mmin transverse dimension. 10 x 5 mm focus of subcutaneous pockets of air is seen adjacent to right lateral ulceration near proximal 5th metatarsal bone. Intramuscular abscess collection is seen in plantar aspect of right foot within the flexor digitorum muscles measuring up to 15 mm in transverse dimension, 8 mm in greatest depth and 9 cm in greatest AP dimension extending from near the insertion of the calcaneus to proximal phalanges best seen on series 4 images 215-224 and sagittal image 65.    IMPRESSION:    1. Ulcerations are seen on plantar aspect of right foot and on lateral aspect adjacent to proximal 5th metatarsal bone. Small areas of adjacent subcutaneous pockets of air are seen measuring up to 2.4 x 1.3 cm on plantar aspect of the foot and 10 x 5 mm on lateral aspect likely representing small foci of necrotizing fasciitis.  2. Intramuscular abscess collection extending into the deep spaces of the plantar aspect of right foot as described above.  3. Intramuscular abscess collection is seen in left lower leg within the anterior tibialis muscle.  4. Significant cellulitis in bilateral lower legs as described above.    < end of copied text >    Assessment: 75F PMHx UC, colon cancer s/p total abdominal colectomy with end ileostomy, found down at home, was admitted for DKA and R diabetic foot wound. Subcutaneous air read on CT scan at site of ulcerations and likely represents ulcerations, NOT subcutaneous air and not necrotising fasciitis. LRINEC score 7 for elevated CRP, WBC and glucose; however, clinically suggests diabetic foot ulcer and not necrotising fasciitis. DKA improving after 4U regular insulin with closing anion gap.    NEURO: dilaudid PRN  CV: normotensive goals  RESP: room air  FENGI: CLD, NPO at midnight, NS  : Flowers  ID: vancomycin, Zosyn (3/13 - )  ENDO: endocrine consult, ISS, s/p 4U regular insulin  HEME: none  PPx: HSQ  LINES/WOUNDS: R foot ulcer    endocrine and cardiology consults  to OR in morning for operative debridement Attending: Schwab    HPI: 75F PMHx UC, colon cancer s/p total abdominal colectomy with end ileostomy and chemotherapy presents after being found at home down covered in stool by her super, who called EMS. Per her cousin, she has been falling at home daily over the last week, and her cousin regularly asks her super to help her off the ground when she doesn't answer the phone. Per patient and EMS, has refused to leave the house for years. She describes worsening falls and leg wounds over the last few months. Currently complains of leg pain and thirstiness. Otherwise, denies chest pain, dyspnea, cough, nausea or vomiting. Originally seen in the ER by podiatry and vascular surgery, s/p bedside debridement by podiatry; medicine consulted for admission and regional medicine bed assigned. CT scan completed and demonstrated concern for subcutaneous air.     GI - noncompliant: Basilio Claire at [facility]    PMH: as above  PSH: as above  Meds: no current medications  Allerg: NKDA  SH: lives alone  FH: father with colon cancer    T(C): 36.8 (03-13-21 @ 16:41), Max: 36.9 (03-13-21 @ 13:47)  HR: 86 (03-13-21 @ 16:41) (86 - 96)  BP: 103/53 (03-13-21 @ 16:41) (103/53 - 133/89)  RR: 16 (03-13-21 @ 16:41) (16 - 16)  SpO2: 98% (03-13-21 @ 16:41) (98% - 98%)    Physical Exam:  General: covered in feces, fatigued, slow to respond to questions  HEENT: MM dry  Pulmonary: nonlabored breathing, normal resp effort  Cardiovascular: RRR  Abdominal: soft, nontender, nondistended, ostomy pink and patent with stool in the bag  Extremities: Severe pitting edema to the thighs bilaterally. Foul smelling and purulent R plantar, dorsal and lateral foot wounds. 2+ bilateral DP and PT pulses. No palpable fluid collection in calves or thighs bilaterally. Small ulcer on R shin without purulent drainage  Neuro: no focal deficits, slow to respond to questions    LABS:                        14.2   25.85 )-----------( 494      ( 13 Mar 2021 13:30 )             43.1     03-13    137  |  102  |  15  ----------------------------<  263<H>  3.2<L>   |  17<L>  |  0.54    Ca    8.0<L>      13 Mar 2021 17:29  Phos  3.0     03-13  Mg     1.4     03-13    TPro  8.4<H>  /  Alb  2.7<L>  /  TBili  0.4  /  DBili  x   /  AST  17  /  ALT  25  /  AlkPhos  145<H>  03-13    LIVER FUNCTIONS - ( 13 Mar 2021 13:30 )  Alb: 2.7 g/dL / Pro: 8.4 g/dL / ALK PHOS: 145 U/L / ALT: 25 U/L / AST: 17 U/L / GGT: x             RADIOLOGY & ADDITIONAL STUDIES:      < from: CT Lower Extremity w/ IV Cont, Bilateral (03.13.21 @ 17:11) >  Bones/joints: No significant osseous erosion seen on the study to suggest osteomyelitis.  Soft tissues: Mild subcutaneous edema in anterior bilateral upper legs. Moderate amount of  subcutaneous edema in bilateral lower legs. Significant subcutaneous edema in bilateral foot. Findings are consistent with cellulitis. Intramuscular hematoma is seen in lower anterior tibialis muscle measuring 10 x 4 mm in axial dimension and up to 9 cm in craniocaudal dimension. This finding is seen on series 4 images 163-190 and sagittal series 8, image 27. Multiple skin ulcerations are seen in right foot. Small area of subcutaneous pockets of air is seen adjacent to ulceration on plantar aspect of right foot measuring up to 11 mm in depth extending to the musculature. These findings measure up to 2.4 cm in AP dimension and 13 mmin transverse dimension. 10 x 5 mm focus of subcutaneous pockets of air is seen adjacent to right lateral ulceration near proximal 5th metatarsal bone. Intramuscular abscess collection is seen in plantar aspect of right foot within the flexor digitorum muscles measuring up to 15 mm in transverse dimension, 8 mm in greatest depth and 9 cm in greatest AP dimension extending from near the insertion of the calcaneus to proximal phalanges best seen on series 4 images 215-224 and sagittal image 65.    IMPRESSION:    1. Ulcerations are seen on plantar aspect of right foot and on lateral aspect adjacent to proximal 5th metatarsal bone. Small areas of adjacent subcutaneous pockets of air are seen measuring up to 2.4 x 1.3 cm on plantar aspect of the foot and 10 x 5 mm on lateral aspect likely representing small foci of necrotizing fasciitis.  2. Intramuscular abscess collection extending into the deep spaces of the plantar aspect of right foot as described above.  3. Intramuscular abscess collection is seen in left lower leg within the anterior tibialis muscle.  4. Significant cellulitis in bilateral lower legs as described above.    < end of copied text >    Assessment: 75F PMHx UC, colon cancer s/p total abdominal colectomy with end ileostomy, found down at home, was admitted for DKA and R diabetic foot wound. Subcutaneous air read on CT scan at site of ulcerations and likely represents ulcerations, NOT subcutaneous air and not necrotising fasciitis. LRINEC score 7 for elevated CRP, WBC and glucose; however, clinically suggests diabetic foot ulcer and not necrotising fasciitis. DKA improving after 4U regular insulin with closing anion gap.    NEURO: dilaudid PRN  CV: normotensive goals  RESP: room air  FENGI: CLD, NPO at midnight, NS  : isotonic crystalloid, Flowers  ID: vancomycin, Zosyn (3/13 - ), panculture and surgical culture; OR for I&D and debridement  ENDO: endocrine consult, ISS, s/p 4U regular insulin  HEME: none  PPx: HSQ  LINES/WOUNDS: R foot ulcer    endocrine and cardiology consults  to OR in morning for operative debridement

## 2021-03-13 NOTE — ED ADULT TRIAGE NOTE - CHIEF COMPLAINT QUOTE
Pt BIBA from home CO Weakness.  Per EMS, pt has refused to leave home for several years, and pts home is noticeable unkempt.  Pt has known Hx of Colon CA with Colostomy.  EMS endorses wound to Sule[facility].  FSBG in field: 413.  Denies N/V/D, SOB, Fevers, CP and Dizziness.

## 2021-03-13 NOTE — H&P ADULT - NSHPPHYSICALEXAM_GEN_ALL_CORE
PHYSICAL EXAM:    Constitutional: AAOx3, does not appear to be in acute distress; short responses to interview questions  Respiratory: CTA BL  Cardiovascular: S1 S2 RRR no MRG  Gastrointestinal: Soft, NTND. Colostomy bag site in place LLQ. Numerous well healed abdominal incisions noted    Extremities:   RLE - Significant swelling and erythema from mid-tibia distally. 3-4cm open wound noted over anterior ankle at joint line with significant purulence and drainage. Numerous weeping blisters noted over dorsum of foot. Dorsum of foot significantly erythematous and swollen, but compressible. Large open purulent blister extending from medial to lateral midfoot with communication across the plantar midfoot. Motor 5/5 TA/GS/EHL, SILT SPN/DPN/Sural/Saph/Tibial  LLE - 2+ pitting edema over lower leg/foot. Large 4-5cm open wound over anterior ankle joint line with significant purulence and drainage. Foot mildly swollen without erythema. Small 1-2cm patches of gangrenous ulcers noted on lateral ankle and midfoot. Motor 5/5 TA/GS/EHL, SILT SPN/DPN/Sural/Saph/Tibial    Vascular: RLE - 2+ femoral, 2+ PT, 1+ DP, triphasic DP signal on doppler  LLE - 2+ femoral, 2+ PT, 1+ DP, triphasic DP signal on doppler  RUE - 2+ radial  LUE - 2+ radial

## 2021-03-13 NOTE — CONSULT NOTE ADULT - SUBJECTIVE AND OBJECTIVE BOX
INCOMPLETE NOTE  CCM SERVICE CONSULTATION NOTE    CC: Falls mechanical   HPI: Patient is a 74 yo F with hx UC and colon cancer s/p colectomy and chemotherapy, chronic venous stasis w/ chronic LE wounds who presents due to frequent falls over the past several weeks at home. She states that overall she has been more homebound, but recently started having more falls. She follows with her PCP Dr. Basilio Claire. Has an ostomy site in place, patient unable to state when this was placed. She states her father had colon cancer, and has passed away from it and she lives at home alone by herself. She denies any fevers, chills, prior diabetes history, nausea, vomiting. Denies loose output on ostomy site. Does state having drainage at her LE legs.    Chest xray without acute infiltrates, EKG with Qtc 510.   LE xray without free air  pulses located with doppler b/l LE  labs notable for wbc 25k, lactate 2.0, vbg pH 7.29. AG 25, pending beta hydroxy.  s/p vanc/zosyn 1 L NS and 4 units of regular insulin.     PMH:  chronic venous stasis  colon cancer  ulcerative colitis    PSH:  colon resection    FH:  father with colon cancer    SH:  denies tobacco use, illicit substances, or heavy alcohol use.      ALLERGIES:  NKDA    MEDICATIONS:  states taking no medications at home    ICU Vital Signs Last 24 Hrs  T(C): 36.3 (13 Mar 2021 12:46), Max: 36.3 (13 Mar 2021 12:46)  T(F): 97.3 (13 Mar 2021 12:46), Max: 97.3 (13 Mar 2021 12:46)  HR: 96 (13 Mar 2021 12:46) (96 - 96)  BP: 123/79 (13 Mar 2021 12:46) (123/79 - 123/79)  BP(mean): --  ABP: --  ABP(mean): --  RR: 16 (13 Mar 2021 12:46) (16 - 16)  SpO2: 98% (13 Mar 2021 12:46) (98% - 98%)    POCT Blood Glucose.: 358 mg/dL (13 Mar 2021 14:43)    PHYSICAL EXAM:  Constitutional: resting comfortably in bed, NAD  HEENT: NC/AT; PERRL, anicteric sclera; no oropharyngeal erythema or exudates; MMM  Neck: supple, no appreciable JVD  Respiratory: CTA B/L, no W/R/R; respirations appear non-labored, conversive in full sentences  Cardiovascular: +S1/S2, RRR  Gastrointestinal: abdomen soft, NT/ND  Extremities: WWP; no clubbing, cyanosis or edema  Vascular: 2+ radial, femoral, and DP/PT pulses B/L  Dermatologic: skin normal color and turgor; no visible rashes  Neurological: A/O times 3 to person, place, and time.     LABS:                        14.2   25.85 )-----------( 494      ( 13 Mar 2021 13:30 )             43.1     03-13    138  |  94<L>  |  18  ----------------------------<  391<H>  4.0   |  19<L>  |  0.62    Ca    9.7      13 Mar 2021 13:30    TPro  8.4<H>  /  Alb  2.7<L>  /  TBili  0.4  /  DBili  x   /  AST  17  /  ALT  25  /  AlkPhos  145<H>  03-13    PT/INR - ( 13 Mar 2021 13:30 )   PT: 14.7 sec;   INR: 1.24          PTT - ( 13 Mar 2021 13:30 )  PTT:30.4 sec  Lactate, Blood: 2.0 mmol/L (03-13-21 @ 13:38)    CARDIAC MARKERS ( 13 Mar 2021 14:39 )  x     / x     / 190 U/L / x     / 6.9 ng/mL  CARDIAC MARKERS ( 13 Mar 2021 13:30 )  x     / 0.01 ng/mL / 232 U/L / x     / x     INCOMPLETE NOTE  CCM SERVICE CONSULTATION NOTE    CC: Falls mechanical   HPI: Patient is a 76 yo F with hx UC and colon cancer s/p colectomy and chemotherapy, chronic venous stasis w/ chronic LE wounds who presents due to frequent falls over the past several weeks at home. She states that overall she has been more homebound, but recently started having more falls. She follows with her PCP Dr. Basilio Claire. Has an ostomy site in place, patient unable to state when this was placed. She states her father had colon cancer, and has passed away from it and she lives at home alone by herself. She denies any fevers, chills, prior diabetes history, nausea, vomiting. Denies loose output on ostomy site. Does state having drainage at her LE legs.    Chest xray without acute infiltrates, EKG with Qtc 510.   LE xray without free air  pulses located with doppler b/l LE  labs notable for wbc 25k, lactate 2.0, vbg pH 7.29. AG 25, pending beta hydroxy.  s/p vanc/zosyn 1 L NS and 4 units of regular insulin.     PMH:  chronic venous stasis  colon cancer  ulcerative colitis    PSH:  colon resection    FH:  father with colon cancer    SH:  denies tobacco use, illicit substances, or heavy alcohol use.    ALLERGIES:  NKDA    MEDICATIONS:  states taking no medications at home    ICU Vital Signs Last 24 Hrs  T(C): 36.3 (13 Mar 2021 12:46), Max: 36.3 (13 Mar 2021 12:46)  T(F): 97.3 (13 Mar 2021 12:46), Max: 97.3 (13 Mar 2021 12:46)  HR: 96 (13 Mar 2021 12:46) (96 - 96)  BP: 123/79 (13 Mar 2021 12:46) (123/79 - 123/79)  BP(mean): --  ABP: --  ABP(mean): --  RR: 16 (13 Mar 2021 12:46) (16 - 16)  SpO2: 98% (13 Mar 2021 12:46) (98% - 98%)    POCT Blood Glucose.: 358 mg/dL (13 Mar 2021 14:43)    PHYSICAL EXAM:  Constitutional: resting comfortably in bed, NAD  HEENT: NC/AT; PERRL, anicteric sclera; no oropharyngeal erythema or exudates; MMM  Neck: supple, no appreciable JVD  Respiratory: CTA B/L, no W/R/R; respirations appear non-labored, conversive in full sentences  Cardiovascular: +S1/S2, RRR  Gastrointestinal: abdomen soft, NT/ND  Extremities: WWP; no clubbing, cyanosis or edema  Vascular: 2+ radial, femoral, and DP/PT pulses B/L  Dermatologic: skin normal color and turgor; no visible rashes  Neurological: A/O times 2 to person and place, but unable to get sentences fully out  4/5 UE bilateral and LE    LABS:                        14.2   25.85 )-----------( 494      ( 13 Mar 2021 13:30 )             43.1     03-13    138  |  94<L>  |  18  ----------------------------<  391<H>  4.0   |  19<L>  |  0.62    Ca    9.7      13 Mar 2021 13:30    TPro  8.4<H>  /  Alb  2.7<L>  /  TBili  0.4  /  DBili  x   /  AST  17  /  ALT  25  /  AlkPhos  145<H>  03-13    PT/INR - ( 13 Mar 2021 13:30 )   PT: 14.7 sec;   INR: 1.24          PTT - ( 13 Mar 2021 13:30 )  PTT:30.4 sec  Lactate, Blood: 2.0 mmol/L (03-13-21 @ 13:38)    CARDIAC MARKERS ( 13 Mar 2021 14:39 )  x     / x     / 190 U/L / x     / 6.9 ng/mL  CARDIAC MARKERS ( 13 Mar 2021 13:30 )  x     / 0.01 ng/mL / 232 U/L / x     / x     INCOMPLETE NOTE  CCM SERVICE CONSULTATION NOTE    CC: Falls mechanical   HPI: Patient is a 74 yo F with hx UC and colon cancer s/p colectomy and chemotherapy, chronic venous stasis w/ chronic LE wounds who presents due to frequent falls over the past several weeks at home. She states that overall she has been more homebound, but recently started having more falls. She follows with her PCP Dr. Basilio Claire. Has an ostomy site in place, patient unable to state when this was placed. She states her father had colon cancer, and has passed away from it and she lives at home alone by herself. She denies any fevers, chills, prior diabetes history, nausea, vomiting. Denies loose output on ostomy site. Does state having drainage at her LE legs.    Chest xray without acute infiltrates, EKG with Qtc 510.   LE xray without free air  pulses located with doppler b/l LE  labs notable for wbc 25k, lactate 2.0, vbg pH 7.29. AG 25, pending beta hydroxy.  s/p vanc/zosyn 1 L NS and 4 units of regular insulin.     PMH:  chronic venous stasis  colon cancer  ulcerative colitis    PSH:  colon resection    FH:  father with colon cancer    SH:  denies tobacco use, illicit substances, or heavy alcohol use.    ALLERGIES:  NKDA    MEDICATIONS:  states taking no medications at home    ICU Vital Signs Last 24 Hrs  T(C): 36.3 (13 Mar 2021 12:46), Max: 36.3 (13 Mar 2021 12:46)  T(F): 97.3 (13 Mar 2021 12:46), Max: 97.3 (13 Mar 2021 12:46)  HR: 96 (13 Mar 2021 12:46) (96 - 96)  BP: 123/79 (13 Mar 2021 12:46) (123/79 - 123/79)  BP(mean): --  ABP: --  ABP(mean): --  RR: 16 (13 Mar 2021 12:46) (16 - 16)  SpO2: 98% (13 Mar 2021 12:46) (98% - 98%)    POCT Blood Glucose.: 358 mg/dL (13 Mar 2021 14:43)    PHYSICAL EXAM:  Constitutional: resting comfortably in bed, NAD  HEENT: NC/AT; PERRL, anicteric sclera; no oropharyngeal erythema or exudates; MMM  Neck: supple, no appreciable JVD  Respiratory: CTA B/L, no W/R/R; respirations appear non-labored, conversive in full sentences  Cardiovascular: +S1/S2, RRR  Gastrointestinal: abdomen soft, NT/ND  Extremities: WWP; no clubbing, cyanosis or edema  Vascular: 2+ radial, femoral, and DP/PT pulses B/L  Dermatologic: open 3 by 4 cm wounds on bilateral feet, with scant purulent material coming out  Neurological: A/O times 2 to person and place, but unable to get sentences fully out  4/5 UE bilateral and LE    LABS:                        14.2   25.85 )-----------( 494      ( 13 Mar 2021 13:30 )             43.1     03-13    138  |  94<L>  |  18  ----------------------------<  391<H>  4.0   |  19<L>  |  0.62    Ca    9.7      13 Mar 2021 13:30    TPro  8.4<H>  /  Alb  2.7<L>  /  TBili  0.4  /  DBili  x   /  AST  17  /  ALT  25  /  AlkPhos  145<H>  03-13    PT/INR - ( 13 Mar 2021 13:30 )   PT: 14.7 sec;   INR: 1.24          PTT - ( 13 Mar 2021 13:30 )  PTT:30.4 sec  Lactate, Blood: 2.0 mmol/L (03-13-21 @ 13:38)    CARDIAC MARKERS ( 13 Mar 2021 14:39 )  x     / x     / 190 U/L / x     / 6.9 ng/mL  CARDIAC MARKERS ( 13 Mar 2021 13:30 )  x     / 0.01 ng/mL / 232 U/L / x     / x

## 2021-03-13 NOTE — ED PROVIDER NOTE - MUSCULOSKELETAL [+], MLM
bilat LE edema erythema- - unable to palp pulses-- with fluctuance plantar aspect of right foot with serosang foul smelling drainage  also left do bilat LE edema erythema- - 2+ palp R and L DP/PT  pulses-- with fluctuance plantar aspect of right foot with serosang foul smelling drainage  also left do

## 2021-03-13 NOTE — ED PROVIDER NOTE - OBJECTIVE STATEMENT
76 yo F with hx UC -colostomy bibems after found in stool at home ? recent falls-  no prior hx known DM - no N/V  slight sob  tenderness at ostomy site  + mult falls and weakness over the past sev weeks   no prior known hx CVA MI pt not oriented to person place or time BLUNT  x 4  no focal weakness   noted extensive LE swelling - right leg and foot / drainage and erythema ion both lower ext 76 yo F lives alone with hx UC ? colon cancer. prior colectomy with-colostomy bibems after found in stool at home ? recent falls-  no prior hx known DM - no N/V  slight sob  tenderness at ostomy site  + mult falls and weakness over the past sev weeks   no prior known hx CVA MI pt not oriented to person place or time BLUNT  x 4  no focal weakness   noted extensive LE swelling - right leg and foot / drainage and erythema ion both lower ext

## 2021-03-13 NOTE — CONSULT NOTE ADULT - ASSESSMENT
Patient is a 76 yo F with hx UC and colon cancer s/p colectomy and chemotherapy, chronic venous stasis w/ chronic LE wounds who presents due to frequent falls over the past several weeks at home.     #R/O DKA  -currently vbg with pH of 7.29, AG 25, Glucose 350  -s/p 1 L NS and 4 units regular insulin  -please give another 1 L NS  -f/u u/a and beta hydroxybutyrate       #Cellulitis  -b/l lower extremity wounds with pus drainage  -podiatry/vascular consulted in ED plan for CT LE noncontrast  -continue with vancomycin and zosyn  Patient is a 76 yo F with hx UC and colon cancer s/p colectomy and chemotherapy, chronic venous stasis w/ chronic LE wounds who presents due to frequent falls over the past several weeks at home.     #R/O DKA  -currently vbg with pH of 7.29, AG 25, Glucose 350  -s/p 1 L NS and 4 units regular insulin  -please give another 1 L NS  -f/u u/a and beta hydroxybutyrate     #Toxic metabolic encephalopathy  -unable to fully get words out, w/ frequent falls CT head noncontrast ordered f/u results    #Cellulitis  -b/l lower extremity wounds with pus drainage  -podiatry/vascular consulted in ED plan for CT LE noncontrast  -continue with vancomycin and zosyn  Patient is a 74 yo F with hx UC and colon cancer s/p colectomy and chemotherapy, chronic venous stasis w/ chronic LE wounds who presents due to frequent falls over the past several weeks at home.     #DKA  -currently vbg with pH of 7.29, AG 25, Glucose 350  -s/p 1 L NS and 4 units regular insulin  -please give another 1 L NS  -start 0.45% NaCl 150 cc/hr for 4 hours, repeat BMP and reorder fluids based on algorithm  -f/u u/a   -beta hydroxybutyrate elevated  -consult endocrinology A1C 16    #Toxic metabolic encephalopathy  -unable to fully get words out, w/ frequent falls CT head noncontrast without acute findings  -CT PE negative, no cardiomegaly or consolidations    #Cellulitis  -b/l lower extremity wounds with pus drainage  -podiatry/vascular consulted in ED plan for CT LE noncontrast  -continue with vancomycin and zosyn   -CT LE without acute findings, no gas or signs of gangrene Patient is a 74 yo F with hx UC and colon cancer s/p colectomy and chemotherapy, chronic venous stasis w/ chronic LE wounds who presents due to frequent falls over the past several weeks at home.     #DKA  -currently vbg with pH of 7.29, AG 25, Glucose 350  -s/p 1 L NS and 4 units regular insulin  -please give another 1 L NS  -start 0.45% NaCl 150 cc/hr for 4 hours, repeat BMP and reorder fluids based on algorithm  -f/u u/a   -beta hydroxybutyrate elevated  -consult endocrinology A1C 16    #Toxic metabolic encephalopathy  -unable to fully get words out, w/ frequent falls CT head noncontrast without acute findings  -CT PE negative, no cardiomegaly or consolidations    #Cellulitis  -b/l lower extremity wounds with pus drainage  -podiatry/vascular consulted in ED plan for CT LE noncontrast  -continue with vancomycin and zosyn   -CT LE without acute findings, no gas or signs of gangrene    DISPO RMF discussed with attending Patient is a 76 yo F with hx UC and colon cancer s/p colectomy and chemotherapy, chronic venous stasis w/ chronic LE wounds who presents due to frequent falls over the past several weeks at home.     #DKA  -currently vbg with pH of 7.29, AG 25, Glucose 350  -s/p 1 L NS and 4 units regular insulin  -please give another 1 L NS  -start 0.45% NaCl 150 cc/hr for 4 hours, repeat BMP and reorder fluids based on algorithm  -f/u u/a   -beta hydroxybutyrate elevated  -consult endocrinology A1C 16    #Toxic metabolic encephalopathy  -unable to fully get words out, w/ frequent falls CT head noncontrast without acute findings  -CT PE negative, no cardiomegaly or consolidations    #Cellulitis  -b/l lower extremity wounds with pus drainage  -podiatry/vascular consulted in ED plan for CT LE noncontrast, shows necrotizing fasciitis   -continue with vancomycin and zosyn   -CT LE without acute findings, no gas or signs of gangrene    DISPO NOT medical icu please discuss with surgical icu or vascular team Patient is a 76 yo F with hx UC and colon cancer s/p colectomy and chemotherapy, chronic venous stasis w/ chronic LE wounds who presents due to frequent falls over the past several weeks at home.     #DKA  -currently vbg with pH of 7.29, AG 25, Glucose 350  -s/p 1 L NS and 4 units regular insulin  -please give another 1 L NS  -start 0.45% NaCl 150 cc/hr for 4 hours, repeat BMP and reorder fluids based on algorithm  -f/u u/a   -beta hydroxybutyrate elevated  -consult endocrinology A1C 16    #Toxic metabolic encephalopathy  -unable to fully get words out, w/ frequent falls CT head noncontrast without acute findings  -CT PE negative, no cardiomegaly or consolidations    #Necrotizing fasciitis  -b/l lower extremity wounds with pus drainage  -podiatry/vascular consulted in ED plan for CT LE noncontrast, shows necrotizing fasciitis   -continue with vancomycin and zosyn add clinda    DISPO SICU

## 2021-03-13 NOTE — ED ADULT NURSE NOTE - NSIMPLEMENTINTERV_GEN_ALL_ED
Implemented All Fall Risk Interventions:  [place] to call system. Call bell, personal items and telephone within reach. Instruct patient to call for assistance. Room bathroom lighting operational. Non-slip footwear when patient is off stretcher. Physically safe environment: no spills, clutter or unnecessary equipment. Stretcher in lowest position, wheels locked, appropriate side rails in place. Provide visual cue, wrist band, yellow gown, etc. Monitor gait and stability. Monitor for mental status changes and reorient to person, place, and time. Review medications for side effects contributing to fall risk. Reinforce activity limits and safety measures with patient and family.

## 2021-03-13 NOTE — PROGRESS NOTE ADULT - SUBJECTIVE AND OBJECTIVE BOX
PRE-OP NOTE    Pre-Op Diagnosis: Necrotizing Fasciitis  Planned Procedure: Incision and Drainage and debridement of all non- viable soft tissue and bone  Scheduled Date / Time: 03/13/2021- Class 1 (Emergency)  Indication: CT of R foot shows Small areas of adjacent subcutaneous pockets of air are seen measuring up to 2.4 x 1.3 cm on plantar aspect of the foot and 10 x 5 mm on lateral aspect likely representing small foci of necrotizing fasciitis.    Labs:                       14.2   25.85 )-----------( 494      ( 13 Mar 2021 13:30 )             43.1   03-13    137  |  100  |  15  ----------------------------<  297<H>  3.7   |  18<L>  |  0.58    Ca    8.8      13 Mar 2021 20:33  Phos  3.3     03-13  Mg     1.6     03-13    TPro  8.4<H>  /  Alb  2.7<L>  /  TBili  0.4  /  DBili  x   /  AST  17  /  ALT  25  /  AlkPhos  145<H>  03-13  LIVER FUNCTIONS - ( 13 Mar 2021 13:30 )  Alb: 2.7 g/dL / Pro: 8.4 g/dL / ALK PHOS: 145 U/L / ALT: 25 U/L / AST: 17 U/L / GGT: x           Vitals: Vital Signs Last 24 Hrs  T(C): 36.9 (13 Mar 2021 20:30), Max: 36.9 (13 Mar 2021 13:47)  T(F): 98.4 (13 Mar 2021 20:30), Max: 98.5 (13 Mar 2021 13:47)  HR: 85 (13 Mar 2021 20:30) (85 - 96)  BP: 112/68 (13 Mar 2021 20:30) (103/53 - 133/89)  BP(mean): --  RR: 16 (13 Mar 2021 20:30) (16 - 16)  SpO2: 98% (13 Mar 2021 20:30) (98% - 98%)  PE:   Official CXR reading: (On Chart)  Official EKG reading: (On Chart)  Type and Cross/Screen:   NPO after MN  Antibiotics:   Anesthesia evaluation (on chart)  Operative Consent (on chart)

## 2021-03-13 NOTE — ED PROVIDER NOTE - CRANIAL NERVE AND PUPILLARY EXAM
cranial nerves 2-12 intact/corneal reflex intact/central and peripheral vision intact/peripheral vision intact/extra-ocular movements intact/gag reflex intact

## 2021-03-13 NOTE — H&P ADULT - HISTORY OF PRESENT ILLNESS
Patient is a 74 yo F poor historian with hx UC and colon cancer s/p colectomy and chemotherapy, chronic venous stasis w/ chronic LE wounds who presents due to frequent falls over the past several weeks at home. She states that overall she has been more homebound, but recently started having more falls. She follows with her GI DrBrian Claire. Has an ostomy site in place, patient unable to state when this was placed. She states her father had colon cancer, and has passed away from it and she lives at home alone by herself. She denies any fevers, chills, prior diabetes history, nausea, vomiting. Denies loose output on ostomy site. Does state having drainage at her LE legs.    Chest xray without acute infiltrates, EKG with Qtc 510.   LE xray without free air  pulses located with doppler b/l LE  labs notable for wbc 25k, lactate 2.0, vbg pH 7.29. AG 25, A1c 16; Elevated BHB  s/p vanc/zosyn 1 L NS and 4 units of regular insulin.

## 2021-03-13 NOTE — ED PROVIDER NOTE - SKIN COLOR
kevin;at LE  with erythema edema STS  noted open wounds bilat --right foot planta aspect  with fluctuance  open wound with foul smelling drainage/normal for race kevin;at LE  with erythema edema STS  noted open wounds bilat --right foot planta aspect  with fluctuance palp right 2+ PT/DP/ triphasic signals   open wound with foul smelling drainage/normal for race

## 2021-03-13 NOTE — ED ADULT NURSE NOTE - OBJECTIVE STATEMENT
75Y Female A&OX3 BIBA C/O generalized weakness and "multiple recent falls". Pt poor historian unsure of medical hx and denies taking at home medications. Pt unkempt and soiled. Left ostomy noted cherry red in appearance. Pt reports ostomy plashed in 2005. "I have had multiple abdominal surgeries but im unsure for what reason". Ostomy unclean without a bag at time of assessment. Per pt "They usually deliver it to my room but the people at my apartment refused to bring the stuff to me" Pt reports falling multiple times the last week. Last fall yesterday unsure if she hit her head or loc. "I usually walk with a walker and am able to go to the store by myself because no one lives with me but over the last week I have been to week to". no deformities noted to the upper extremity, torso, ecchymoses noted to face (left lateral part of the nose). Pt reports a headache "like a pounding from not drinking enough". Multiple wounds noted to lower extremity. Oozing blood and puss. Pt reports numbness to lower extremity. Able to move toes and palpable lower extremity plus 1 pulses. Pt unsure of how long she has had th wounds. Denies sob, fever, chills, night sweats, n/v, nor dysuria. IAD noted to bilateral inner thighs. 75Y Female A&OX3 BIBA from home C/O generalized weakness and "multiple recent falls". Pt poor historian unsure of medical hx and denies taking at home medications. Pt unkempt and soiled. Left ostomy noted, cherry red in appearance. Pt reports ostomy placed in 2005. "I have had multiple abdominal surgeries but I am unsure for what reason". Ostomy unclean without a bag at time of assessment. Per pt "They usually deliver it to my room but the people at my apartment refused to bring the stuff to me" Pt reports falling multiple times the last week. Last fall "yesterday" unsure if she hit her head or loc. "I usually walk with a walker and I am able to go to the store by myself because no one lives with me but over the last week I have been too week to". no deformities noted to the upper extremity, nor torso, ecchymoses noted to face (left lateral part of the nose). Pt reports a headache "like a pounding from not drinking enough". Multiple wounds noted to lower extremity. side red and Oozing blood and puss. Pt reports numbness to lower extremity. Able to move toes and palpable lower extremity plus 1 pulses and auscultated with doppler. Pt unsure of how long she has had th wounds. Denies sob, fever, chills, night sweats, n/v, nor dysuria. IAD noted to bilateral inner thighs. Plus 1 pitting edema noted to bilateral lower extremity 75Y Female A&OX3 BIBA from home C/O generalized weakness and "multiple recent falls". Pt poor historian unsure of medical hx and denies taking at home medications. Pt unkempt and soiled. Left ostomy noted, cherry red in appearance, excoriation noted to entire circumstance, rigidity noted around stoma. Pt reports ostomy placed in 2005. "I have had multiple abdominal surgeries but I am unsure for what reason". Ostomy unclean without a bag at time of assessment. Per pt "They usually deliver it to my room but the people at my apartment refused to bring the stuff to me" Pt reports falling multiple times the last week. Last fall "yesterday" unsure if she hit her head or loc. "I usually walk with a walker and I am able to go to the store by myself because no one lives with me but over the last week I have been too week to". no deformities noted to the upper extremity, nor torso, ecchymoses noted to face (left lateral part of the nose). Pt reports a headache "like a pounding from not drinking enough". Multiple wounds noted to lower extremity. side red and Oozing blood and puss with purulent smell. Pt reports numbness to lower extremity. Able to move toes and palpable lower extremity plus 1 pulses and auscultated with doppler. Pt unsure of how long she has had th wounds. Denies sob, fever, chills, night sweats, n/v, nor dysuria. IAD noted to bilateral inner thighs. Plus 1 pitting edema noted to bilateral lower extremity

## 2021-03-14 ENCOUNTER — TRANSCRIPTION ENCOUNTER (OUTPATIENT)
Age: 76
End: 2021-03-14

## 2021-03-14 LAB
ANION GAP SERPL CALC-SCNC: 11 MMOL/L — SIGNIFICANT CHANGE UP (ref 5–17)
ANION GAP SERPL CALC-SCNC: 11 MMOL/L — SIGNIFICANT CHANGE UP (ref 5–17)
ANION GAP SERPL CALC-SCNC: 16 MMOL/L — SIGNIFICANT CHANGE UP (ref 5–17)
ANION GAP SERPL CALC-SCNC: 9 MMOL/L — SIGNIFICANT CHANGE UP (ref 5–17)
BASE EXCESS BLDV CALC-SCNC: -10 MMOL/L — SIGNIFICANT CHANGE UP
BUN SERPL-MCNC: 10 MG/DL — SIGNIFICANT CHANGE UP (ref 7–23)
BUN SERPL-MCNC: 11 MG/DL — SIGNIFICANT CHANGE UP (ref 7–23)
BUN SERPL-MCNC: 12 MG/DL — SIGNIFICANT CHANGE UP (ref 7–23)
BUN SERPL-MCNC: 14 MG/DL — SIGNIFICANT CHANGE UP (ref 7–23)
CA-I SERPL-SCNC: 1.19 MMOL/L — SIGNIFICANT CHANGE UP (ref 1.12–1.3)
CALCIUM SERPL-MCNC: 8.3 MG/DL — LOW (ref 8.4–10.5)
CALCIUM SERPL-MCNC: 8.3 MG/DL — LOW (ref 8.4–10.5)
CALCIUM SERPL-MCNC: 8.4 MG/DL — SIGNIFICANT CHANGE UP (ref 8.4–10.5)
CALCIUM SERPL-MCNC: 8.4 MG/DL — SIGNIFICANT CHANGE UP (ref 8.4–10.5)
CHLORIDE SERPL-SCNC: 102 MMOL/L — SIGNIFICANT CHANGE UP (ref 96–108)
CHLORIDE SERPL-SCNC: 103 MMOL/L — SIGNIFICANT CHANGE UP (ref 96–108)
CHLORIDE SERPL-SCNC: 105 MMOL/L — SIGNIFICANT CHANGE UP (ref 96–108)
CHLORIDE SERPL-SCNC: 106 MMOL/L — SIGNIFICANT CHANGE UP (ref 96–108)
CO2 SERPL-SCNC: 17 MMOL/L — LOW (ref 22–31)
CO2 SERPL-SCNC: 23 MMOL/L — SIGNIFICANT CHANGE UP (ref 22–31)
CO2 SERPL-SCNC: 23 MMOL/L — SIGNIFICANT CHANGE UP (ref 22–31)
CO2 SERPL-SCNC: 24 MMOL/L — SIGNIFICANT CHANGE UP (ref 22–31)
CREAT SERPL-MCNC: 0.5 MG/DL — SIGNIFICANT CHANGE UP (ref 0.5–1.3)
CREAT SERPL-MCNC: 0.52 MG/DL — SIGNIFICANT CHANGE UP (ref 0.5–1.3)
CREAT SERPL-MCNC: 0.59 MG/DL — SIGNIFICANT CHANGE UP (ref 0.5–1.3)
CREAT SERPL-MCNC: 0.64 MG/DL — SIGNIFICANT CHANGE UP (ref 0.5–1.3)
GAS PNL BLDV: 135 MMOL/L — LOW (ref 138–146)
GAS PNL BLDV: SIGNIFICANT CHANGE UP
GAS PNL BLDV: SIGNIFICANT CHANGE UP
GLUCOSE BLDC GLUCOMTR-MCNC: 118 MG/DL — HIGH (ref 70–99)
GLUCOSE BLDC GLUCOMTR-MCNC: 121 MG/DL — HIGH (ref 70–99)
GLUCOSE BLDC GLUCOMTR-MCNC: 134 MG/DL — HIGH (ref 70–99)
GLUCOSE BLDC GLUCOMTR-MCNC: 148 MG/DL — HIGH (ref 70–99)
GLUCOSE BLDC GLUCOMTR-MCNC: 150 MG/DL — HIGH (ref 70–99)
GLUCOSE BLDC GLUCOMTR-MCNC: 159 MG/DL — HIGH (ref 70–99)
GLUCOSE BLDC GLUCOMTR-MCNC: 159 MG/DL — HIGH (ref 70–99)
GLUCOSE BLDC GLUCOMTR-MCNC: 167 MG/DL — HIGH (ref 70–99)
GLUCOSE BLDC GLUCOMTR-MCNC: 170 MG/DL — HIGH (ref 70–99)
GLUCOSE BLDC GLUCOMTR-MCNC: 189 MG/DL — HIGH (ref 70–99)
GLUCOSE BLDC GLUCOMTR-MCNC: 199 MG/DL — HIGH (ref 70–99)
GLUCOSE BLDC GLUCOMTR-MCNC: 224 MG/DL — HIGH (ref 70–99)
GLUCOSE BLDC GLUCOMTR-MCNC: 235 MG/DL — HIGH (ref 70–99)
GLUCOSE BLDC GLUCOMTR-MCNC: 252 MG/DL — HIGH (ref 70–99)
GLUCOSE SERPL-MCNC: 124 MG/DL — HIGH (ref 70–99)
GLUCOSE SERPL-MCNC: 152 MG/DL — HIGH (ref 70–99)
GLUCOSE SERPL-MCNC: 209 MG/DL — HIGH (ref 70–99)
GLUCOSE SERPL-MCNC: 248 MG/DL — HIGH (ref 70–99)
GRAM STN FLD: SIGNIFICANT CHANGE UP
HCO3 BLDV-SCNC: 16 MMOL/L — LOW (ref 20–27)
HCT VFR BLD CALC: 34.7 % — SIGNIFICANT CHANGE UP (ref 34.5–45)
HCT VFR BLD CALC: 38.3 % — SIGNIFICANT CHANGE UP (ref 34.5–45)
HCV AB S/CO SERPL IA: 0.11 S/CO — SIGNIFICANT CHANGE UP
HCV AB SERPL-IMP: SIGNIFICANT CHANGE UP
HGB BLD-MCNC: 11.6 G/DL — SIGNIFICANT CHANGE UP (ref 11.5–15.5)
HGB BLD-MCNC: 12.3 G/DL — SIGNIFICANT CHANGE UP (ref 11.5–15.5)
LACTATE SERPL-SCNC: 1.8 MMOL/L — SIGNIFICANT CHANGE UP (ref 0.5–2)
LACTATE SERPL-SCNC: 1.9 MMOL/L — SIGNIFICANT CHANGE UP (ref 0.5–2)
MAGNESIUM SERPL-MCNC: 1.3 MG/DL — LOW (ref 1.6–2.6)
MAGNESIUM SERPL-MCNC: 1.4 MG/DL — LOW (ref 1.6–2.6)
MAGNESIUM SERPL-MCNC: 2.1 MG/DL — SIGNIFICANT CHANGE UP (ref 1.6–2.6)
MAGNESIUM SERPL-MCNC: 2.5 MG/DL — SIGNIFICANT CHANGE UP (ref 1.6–2.6)
MCHC RBC-ENTMCNC: 29.2 PG — SIGNIFICANT CHANGE UP (ref 27–34)
MCHC RBC-ENTMCNC: 29.6 PG — SIGNIFICANT CHANGE UP (ref 27–34)
MCHC RBC-ENTMCNC: 32.1 GM/DL — SIGNIFICANT CHANGE UP (ref 32–36)
MCHC RBC-ENTMCNC: 33.4 GM/DL — SIGNIFICANT CHANGE UP (ref 32–36)
MCV RBC AUTO: 88.5 FL — SIGNIFICANT CHANGE UP (ref 80–100)
MCV RBC AUTO: 91 FL — SIGNIFICANT CHANGE UP (ref 80–100)
NRBC # BLD: 0 /100 WBCS — SIGNIFICANT CHANGE UP (ref 0–0)
NRBC # BLD: 0 /100 WBCS — SIGNIFICANT CHANGE UP (ref 0–0)
PCO2 BLDV: 36 MMHG — LOW (ref 41–51)
PH BLDV: 7.27 — LOW (ref 7.32–7.43)
PHOSPHATE SERPL-MCNC: 2.4 MG/DL — LOW (ref 2.5–4.5)
PHOSPHATE SERPL-MCNC: 2.5 MG/DL — SIGNIFICANT CHANGE UP (ref 2.5–4.5)
PHOSPHATE SERPL-MCNC: 3.1 MG/DL — SIGNIFICANT CHANGE UP (ref 2.5–4.5)
PHOSPHATE SERPL-MCNC: 4.4 MG/DL — SIGNIFICANT CHANGE UP (ref 2.5–4.5)
PLATELET # BLD AUTO: 383 K/UL — SIGNIFICANT CHANGE UP (ref 150–400)
PLATELET # BLD AUTO: 403 K/UL — HIGH (ref 150–400)
PO2 BLDV: 90 MMHG — SIGNIFICANT CHANGE UP
POTASSIUM BLDV-SCNC: 3.1 MMOL/L — LOW (ref 3.5–4.9)
POTASSIUM SERPL-MCNC: 3 MMOL/L — LOW (ref 3.5–5.3)
POTASSIUM SERPL-MCNC: 3.1 MMOL/L — LOW (ref 3.5–5.3)
POTASSIUM SERPL-MCNC: 3.3 MMOL/L — LOW (ref 3.5–5.3)
POTASSIUM SERPL-MCNC: 4 MMOL/L — SIGNIFICANT CHANGE UP (ref 3.5–5.3)
POTASSIUM SERPL-SCNC: 3 MMOL/L — LOW (ref 3.5–5.3)
POTASSIUM SERPL-SCNC: 3.1 MMOL/L — LOW (ref 3.5–5.3)
POTASSIUM SERPL-SCNC: 3.3 MMOL/L — LOW (ref 3.5–5.3)
POTASSIUM SERPL-SCNC: 4 MMOL/L — SIGNIFICANT CHANGE UP (ref 3.5–5.3)
RBC # BLD: 3.92 M/UL — SIGNIFICANT CHANGE UP (ref 3.8–5.2)
RBC # BLD: 4.21 M/UL — SIGNIFICANT CHANGE UP (ref 3.8–5.2)
RBC # FLD: 12.6 % — SIGNIFICANT CHANGE UP (ref 10.3–14.5)
RBC # FLD: 12.7 % — SIGNIFICANT CHANGE UP (ref 10.3–14.5)
SAO2 % BLDV: 96 % — SIGNIFICANT CHANGE UP
SODIUM SERPL-SCNC: 136 MMOL/L — SIGNIFICANT CHANGE UP (ref 135–145)
SODIUM SERPL-SCNC: 136 MMOL/L — SIGNIFICANT CHANGE UP (ref 135–145)
SODIUM SERPL-SCNC: 139 MMOL/L — SIGNIFICANT CHANGE UP (ref 135–145)
SODIUM SERPL-SCNC: 139 MMOL/L — SIGNIFICANT CHANGE UP (ref 135–145)
SPECIMEN SOURCE: SIGNIFICANT CHANGE UP
WBC # BLD: 19.05 K/UL — HIGH (ref 3.8–10.5)
WBC # BLD: 20.05 K/UL — HIGH (ref 3.8–10.5)
WBC # FLD AUTO: 19.05 K/UL — HIGH (ref 3.8–10.5)
WBC # FLD AUTO: 20.05 K/UL — HIGH (ref 3.8–10.5)

## 2021-03-14 PROCEDURE — 99233 SBSQ HOSP IP/OBS HIGH 50: CPT | Mod: GC

## 2021-03-14 PROCEDURE — 99291 CRITICAL CARE FIRST HOUR: CPT

## 2021-03-14 PROCEDURE — 99234 HOSP IP/OBS SM DT SF/LOW 45: CPT | Mod: 57,GC

## 2021-03-14 RX ORDER — ACETAMINOPHEN 500 MG
750 TABLET ORAL ONCE
Refills: 0 | Status: DISCONTINUED | OUTPATIENT
Start: 2021-03-14 | End: 2021-03-16

## 2021-03-14 RX ORDER — POTASSIUM PHOSPHATE, MONOBASIC POTASSIUM PHOSPHATE, DIBASIC 236; 224 MG/ML; MG/ML
30 INJECTION, SOLUTION INTRAVENOUS ONCE
Refills: 0 | Status: COMPLETED | OUTPATIENT
Start: 2021-03-14 | End: 2021-03-14

## 2021-03-14 RX ORDER — SODIUM,POTASSIUM PHOSPHATES 278-250MG
1 POWDER IN PACKET (EA) ORAL
Refills: 0 | Status: COMPLETED | OUTPATIENT
Start: 2021-03-14 | End: 2021-03-14

## 2021-03-14 RX ORDER — HYDROMORPHONE HYDROCHLORIDE 2 MG/ML
1 INJECTION INTRAMUSCULAR; INTRAVENOUS; SUBCUTANEOUS EVERY 6 HOURS
Refills: 0 | Status: DISCONTINUED | OUTPATIENT
Start: 2021-03-14 | End: 2021-03-14

## 2021-03-14 RX ORDER — HYDROMORPHONE HYDROCHLORIDE 2 MG/ML
0.5 INJECTION INTRAMUSCULAR; INTRAVENOUS; SUBCUTANEOUS EVERY 6 HOURS
Refills: 0 | Status: DISCONTINUED | OUTPATIENT
Start: 2021-03-14 | End: 2021-03-14

## 2021-03-14 RX ORDER — ACETAMINOPHEN 500 MG
1000 TABLET ORAL ONCE
Refills: 0 | Status: DISCONTINUED | OUTPATIENT
Start: 2021-03-14 | End: 2021-03-14

## 2021-03-14 RX ORDER — POTASSIUM CHLORIDE 20 MEQ
40 PACKET (EA) ORAL
Refills: 0 | Status: COMPLETED | OUTPATIENT
Start: 2021-03-14 | End: 2021-03-14

## 2021-03-14 RX ORDER — INSULIN GLARGINE 100 [IU]/ML
8 INJECTION, SOLUTION SUBCUTANEOUS AT BEDTIME
Refills: 0 | Status: DISCONTINUED | OUTPATIENT
Start: 2021-03-14 | End: 2021-03-15

## 2021-03-14 RX ORDER — INSULIN LISPRO 100/ML
4 VIAL (ML) SUBCUTANEOUS
Refills: 0 | Status: DISCONTINUED | OUTPATIENT
Start: 2021-03-14 | End: 2021-03-15

## 2021-03-14 RX ORDER — SODIUM CHLORIDE 9 MG/ML
1000 INJECTION, SOLUTION INTRAVENOUS
Refills: 0 | Status: DISCONTINUED | OUTPATIENT
Start: 2021-03-14 | End: 2021-03-15

## 2021-03-14 RX ORDER — POTASSIUM CHLORIDE 20 MEQ
40 PACKET (EA) ORAL ONCE
Refills: 0 | Status: DISCONTINUED | OUTPATIENT
Start: 2021-03-14 | End: 2021-03-14

## 2021-03-14 RX ORDER — POTASSIUM CHLORIDE 20 MEQ
10 PACKET (EA) ORAL
Refills: 0 | Status: COMPLETED | OUTPATIENT
Start: 2021-03-14 | End: 2021-03-14

## 2021-03-14 RX ORDER — INSULIN LISPRO 100/ML
VIAL (ML) SUBCUTANEOUS
Refills: 0 | Status: DISCONTINUED | OUTPATIENT
Start: 2021-03-14 | End: 2021-03-19

## 2021-03-14 RX ORDER — VANCOMYCIN HCL 1 G
750 VIAL (EA) INTRAVENOUS EVERY 12 HOURS
Refills: 0 | Status: DISCONTINUED | OUTPATIENT
Start: 2021-03-14 | End: 2021-03-16

## 2021-03-14 RX ORDER — HYDROMORPHONE HYDROCHLORIDE 2 MG/ML
0.5 INJECTION INTRAMUSCULAR; INTRAVENOUS; SUBCUTANEOUS EVERY 8 HOURS
Refills: 0 | Status: DISCONTINUED | OUTPATIENT
Start: 2021-03-14 | End: 2021-03-16

## 2021-03-14 RX ORDER — ONDANSETRON 8 MG/1
4 TABLET, FILM COATED ORAL EVERY 6 HOURS
Refills: 0 | Status: DISCONTINUED | OUTPATIENT
Start: 2021-03-14 | End: 2021-03-16

## 2021-03-14 RX ORDER — MAGNESIUM SULFATE 500 MG/ML
2 VIAL (ML) INJECTION
Refills: 0 | Status: COMPLETED | OUTPATIENT
Start: 2021-03-14 | End: 2021-03-14

## 2021-03-14 RX ADMIN — Medication 100 MILLIEQUIVALENT(S): at 09:27

## 2021-03-14 RX ADMIN — Medication 250 MILLIGRAM(S): at 14:45

## 2021-03-14 RX ADMIN — Medication 50 GRAM(S): at 05:51

## 2021-03-14 RX ADMIN — PIPERACILLIN AND TAZOBACTAM 200 GRAM(S): 4; .5 INJECTION, POWDER, LYOPHILIZED, FOR SOLUTION INTRAVENOUS at 17:30

## 2021-03-14 RX ADMIN — Medication 100 MILLIEQUIVALENT(S): at 07:42

## 2021-03-14 RX ADMIN — HYDROMORPHONE HYDROCHLORIDE 1 MILLIGRAM(S): 2 INJECTION INTRAMUSCULAR; INTRAVENOUS; SUBCUTANEOUS at 06:22

## 2021-03-14 RX ADMIN — Medication 250 MILLIGRAM(S): at 03:20

## 2021-03-14 RX ADMIN — HEPARIN SODIUM 5000 UNIT(S): 5000 INJECTION INTRAVENOUS; SUBCUTANEOUS at 14:45

## 2021-03-14 RX ADMIN — POTASSIUM PHOSPHATE, MONOBASIC POTASSIUM PHOSPHATE, DIBASIC 83.33 MILLIMOLE(S): 236; 224 INJECTION, SOLUTION INTRAVENOUS at 11:50

## 2021-03-14 RX ADMIN — HEPARIN SODIUM 5000 UNIT(S): 5000 INJECTION INTRAVENOUS; SUBCUTANEOUS at 05:52

## 2021-03-14 RX ADMIN — HEPARIN SODIUM 5000 UNIT(S): 5000 INJECTION INTRAVENOUS; SUBCUTANEOUS at 22:14

## 2021-03-14 RX ADMIN — SODIUM CHLORIDE 100 MILLILITER(S): 9 INJECTION, SOLUTION INTRAVENOUS at 00:24

## 2021-03-14 RX ADMIN — INSULIN HUMAN 5 UNIT(S)/HR: 100 INJECTION, SOLUTION SUBCUTANEOUS at 00:24

## 2021-03-14 RX ADMIN — CHLORHEXIDINE GLUCONATE 1 APPLICATION(S): 213 SOLUTION TOPICAL at 07:21

## 2021-03-14 RX ADMIN — SODIUM CHLORIDE 100 MILLILITER(S): 9 INJECTION, SOLUTION INTRAVENOUS at 11:48

## 2021-03-14 RX ADMIN — HYDROMORPHONE HYDROCHLORIDE 1 MILLIGRAM(S): 2 INJECTION INTRAMUSCULAR; INTRAVENOUS; SUBCUTANEOUS at 05:52

## 2021-03-14 RX ADMIN — PIPERACILLIN AND TAZOBACTAM 200 GRAM(S): 4; .5 INJECTION, POWDER, LYOPHILIZED, FOR SOLUTION INTRAVENOUS at 05:52

## 2021-03-14 RX ADMIN — Medication 4: at 17:30

## 2021-03-14 RX ADMIN — Medication 50 GRAM(S): at 09:26

## 2021-03-14 RX ADMIN — Medication 4 UNIT(S): at 17:30

## 2021-03-14 RX ADMIN — Medication 100 MILLIEQUIVALENT(S): at 05:52

## 2021-03-14 RX ADMIN — PIPERACILLIN AND TAZOBACTAM 200 GRAM(S): 4; .5 INJECTION, POWDER, LYOPHILIZED, FOR SOLUTION INTRAVENOUS at 11:49

## 2021-03-14 RX ADMIN — INSULIN GLARGINE 8 UNIT(S): 100 INJECTION, SOLUTION SUBCUTANEOUS at 22:15

## 2021-03-14 RX ADMIN — PIPERACILLIN AND TAZOBACTAM 200 GRAM(S): 4; .5 INJECTION, POWDER, LYOPHILIZED, FOR SOLUTION INTRAVENOUS at 22:55

## 2021-03-14 RX ADMIN — Medication 4: at 22:17

## 2021-03-14 RX ADMIN — SODIUM CHLORIDE 100 MILLILITER(S): 9 INJECTION, SOLUTION INTRAVENOUS at 23:02

## 2021-03-14 NOTE — PROGRESS NOTE ADULT - SUBJECTIVE AND OBJECTIVE BOX
Subjective: seen and examined with Dr. Leung and team  Has dementia.     ROS:   Denies Headache, blurred vision, Chest Pain, SOB, Abdominal pain, nausea or vomiting     Social   piperacillin/tazobactam IVPB.. 3.375  vancomycin  IVPB 750  heparin   Injectable 5000  piperacillin/tazobactam IVPB.. 3.375  vancomycin  IVPB 750      Allergies    No Known Allergies    Intolerances        Vital Signs Last 24 Hrs  T(C): 36.7 (14 Mar 2021 14:13), Max: 36.9 (13 Mar 2021 20:30)  T(F): 98 (14 Mar 2021 14:13), Max: 98.4 (13 Mar 2021 20:30)  HR: 76 (14 Mar 2021 16:00) (76 - 94)  BP: 115/54 (14 Mar 2021 16:00) (100/52 - 123/57)  BP(mean): 77 (14 Mar 2021 16:00) (72 - 80)  RR: 14 (14 Mar 2021 16:00) (11 - 24)  SpO2: 96% (14 Mar 2021 16:00) (90% - 100%)  I&O's Summary    13 Mar 2021 06:01  -  14 Mar 2021 07:00  --------------------------------------------------------  IN: 1381 mL / OUT: 505 mL / NET: 876 mL    14 Mar 2021 07:01  -  14 Mar 2021 17:18  --------------------------------------------------------  IN: 1679.8 mL / OUT: 275 mL / NET: 1404.8 mL        Physical Exam:   General: NAD  HEENT: NC/AT, EOMI, PERRLA, normal hearing, no oral lesions, neck supple w/o LAD  Pulmonary: Nonlabored breathing, no respiratory distress, CTA-B  Cardiovascular: NSR, no murmurs  Abdominal: soft, NT/ND, +BS, no organomegaly  Extremities: R foot: Dorsal aspect wound still has some necrotic base an fibrin slough, some purulent material was coming from the top wound on squeezing around the shin of tibia, dressing change done by vascular team, left foot in dressing  Neuro: A/O x3, CNs II-XII grossly intact, normal motor/sensation, no focal deficits  Pulses: palpable distal pulses      LABS:                        11.6   20.05 )-----------( 383      ( 14 Mar 2021 06:26 )             34.7     03-14    139  |  106  |  11  ----------------------------<  124<H>  3.3<L>   |  24  |  0.59    Ca    8.4      14 Mar 2021 13:24  Phos  2.5     03-14  Mg     2.5     03-14    TPro  8.4<H>  /  Alb  2.7<L>  /  TBili  0.4  /  DBili  x   /  AST  17  /  ALT  25  /  AlkPhos  145<H>  03-13    PT/INR - ( 13 Mar 2021 13:30 )   PT: 14.7 sec;   INR: 1.24          PTT - ( 13 Mar 2021 13:30 )  PTT:30.4 sec    Radiology and Additional Studies:      Assessment and Plan:   · Assessment	  75F PMHx UC, colon cancer s/p total abdominal colectomy with end ileostomy, found down at home, was admitted for DKA and R diabetic foot wound, s/p operative debridement by podiatry (3/14).    According to the patient she doesn't want amputation, her living will be faxed on 3/14/2021 around 1 PM    Continue daily dressing change  Podiatry is following  f/u wound cultures  remaining plan per primary team

## 2021-03-14 NOTE — PROGRESS NOTE ADULT - ASSESSMENT
75F with PMHx UC s/p colostomy (s/p multiple abdominal surgeries including colostomy bag in 2005) BIBEMS from home complaining of weakness; presenting likely in DKA with AMS, with bilateral lower extremity open wounds. Of note, pt found to have a hx of DM2: A1C on admission is 16.1. Pt is now s/p I&D w/ washout and debridement of non- viable soft tissue.       Plan:   - Significant cellulitis bilaterally of foot and leg  - Performed I&D, washout and excisional debridement of non- viable soft tissue    - IV ABx  - Vascular will manage the symptoms/ wounds on the legs- they decided to hold off on surgical management for now  - F/U deep wound cultures taken in the OR  - Podiatry will continue to follow for management of the foot b/l   - Plan d/w Attending Dr. Ha

## 2021-03-14 NOTE — CONSULT NOTE ADULT - SUBJECTIVE AND OBJECTIVE BOX
HPI:  Patient is a 76 yo F poor historian with hx UC and colon cancer s/p colectomy and chemotherapy, chronic venous stasis w/ chronic LE wounds who presents due to frequent falls over the past several weeks at home. She states that overall she has been more homebound, but recently started having more falls. She follows with her GI DrBrian Claire. Has an ostomy site in place, patient unable to state when this was placed. She states her father had colon cancer, and has passed away from it and she lives at home alone by herself. She denies any fevers, chills, prior diabetes history, nausea, vomiting. Denies loose output on ostomy site. Does state having drainage at her LE legs.    Chest xray without acute infiltrates, EKG with Qtc 510.   LE xray without free air  pulses located with doppler b/l LE  labs notable for wbc 25k, lactate 2.0, vbg pH 7.29. AG 25, A1c 16; Elevated BHB  s/p vanc/zosyn 1 L NS and 4 units of regular insulin.  (13 Mar 2021 20:39)    Pt sp OR last night for debridement of right lower extremity, started on insulin infusion and dextrose.  Insulin requirements reviewed  Pt NPO at time of visit  Pt unable to provide additional history at time of visit.   unknown home meds for DM    PMH & Surgical Hx:  H/O ulcerative colitis  Colon cancer  Necrotizing erysipelas  Necrotizing fasciitis  Cellulitis  Fall  Status post Jag procedure    FH: unable to obtain  DM:  Thyroid:  Autoimmune:  Other:    SH: per chart, denies, lives alone,   Smoking:  Etoh:  Recreational Drugs:  Social Life:    Current Meds:  acetaminophen  IVPB .. 750 milliGRAM(s) IV Intermittent once PRN  chlorhexidine 4% Liquid 1 Application(s) Topical <User Schedule>  dextrose 40% Gel 15 Gram(s) Oral once  dextrose 5% + lactated ringers. 1000 milliLiter(s) IV Continuous <Continuous>  dextrose 5%. 1000 milliLiter(s) IV Continuous <Continuous>  dextrose 5%. 1000 milliLiter(s) IV Continuous <Continuous>  dextrose 50% Injectable 25 Gram(s) IV Push once  dextrose 50% Injectable 12.5 Gram(s) IV Push once  dextrose 50% Injectable 25 Gram(s) IV Push once  glucagon  Injectable 1 milliGRAM(s) IntraMuscular once  heparin   Injectable 5000 Unit(s) SubCutaneous every 8 hours  HYDROmorphone  Injectable 0.5 milliGRAM(s) IV Push every 8 hours PRN  insulin glargine Injectable (LANTUS) 8 Unit(s) SubCutaneous at bedtime  insulin lispro (ADMELOG) corrective regimen sliding scale   SubCutaneous Before meals and at bedtime  insulin lispro Injectable (ADMELOG) 4 Unit(s) SubCutaneous three times a day before meals  ondansetron Injectable 4 milliGRAM(s) IV Push every 6 hours PRN  piperacillin/tazobactam IVPB.. 3.375 Gram(s) IV Intermittent every 6 hours  vancomycin  IVPB 750 milliGRAM(s) IV Intermittent every 12 hours      Allergies:  No Known Allergies      ROS:  Denies the following except as indicated.    General: weight loss/weight gain, decreased appetite, fatigue  Eyes: Blurry vision, double vision, visual changes  ENT: Throat pain, changes in voice,   CV: palpitations, SOB, CP, cough  GI: NVD, difficulty swallowing, abdominal pain  : polyuria, dysuria  Endo: abnormal menses, temperature intolerance, decreased libido  MSK: weakness, joint pain  Skin: rash, dryness, diaphoresis  Heme: Easy bruising,bleeding  Neuro: HA, dizziness, lightheadedness, numbness tingling  Psych: Anxiety, Depression    Vital Signs Last 24 Hrs  T(C): 37 (14 Mar 2021 21:50), Max: 37 (14 Mar 2021 21:50)  T(F): 98.6 (14 Mar 2021 21:50), Max: 98.6 (14 Mar 2021 21:50)  HR: 88 (14 Mar 2021 22:00) (76 - 95)  BP: 120/57 (14 Mar 2021 22:00) (94/48 - 127/58)  BP(mean): 82 (14 Mar 2021 22:00) (68 - 83)  RR: 14 (14 Mar 2021 22:00) (11 - 24)  SpO2: 94% (14 Mar 2021 22:00) (90% - 100%)  Height (cm): 157.5 (03-13 @ 12:46)  Weight (kg): 75 (03-14 @ 14:02)  BMI (kg/m2): 30.2 (03-14 @ 14:02)    Diet:  Diet, Soft:   Consistent Carbohydrate Evening Snack (CSTCHOSN) (03-14-21 @ 17:27) [Active]      Constitutional: wn/wd in NAD.   HEENT: NCAT, MMM, OP clear, EOMI, , no proptosis or lid retraction  Neck: no thyromegaly or palpable thyroid nodules   Respiratory: lungs CTAB.  Cardiovascular: regular rhythm, normal S1 and S2, no audible murmurs  GI: soft, NT/ND, no masses/HSM appreciated.  Neurology: no tremors, DTR 2+  Skin: no visible rashes/lesions  Psychiatric: AAO x 3, normal affect/mood.  Ext: radial pulses intact, DP pulses intact, extremities warm, no cyanosis, clubbing or edema.       LABS:                        11.6   20.05 )-----------( 383      ( 14 Mar 2021 06:26 )             34.7     03-14    136  |  102  |  10  ----------------------------<  209<H>  4.0   |  23  |  0.64    Ca    8.3<L>      14 Mar 2021 19:13  Phos  4.4     03-14  Mg     2.1     03-14    TPro  8.4<H>  /  Alb  2.7<L>  /  TBili  0.4  /  DBili  x   /  AST  17  /  ALT  25  /  AlkPhos  145<H>  03-13    PT/INR - ( 13 Mar 2021 13:30 )   PT: 14.7 sec;   INR: 1.24          PTT - ( 13 Mar 2021 13:30 )  PTT:30.4 sec  Urinalysis Basic - ( 13 Mar 2021 18:17 )    Color: Yellow / Appearance: Clear / SG: >=1.030 / pH: x  Gluc: x / Ketone: >=80 mg/dL  / Bili: Small / Urobili: 0.2 E.U./dL   Blood: x / Protein: Trace mg/dL / Nitrite: NEGATIVE   Leuk Esterase: NEGATIVE / RBC: < 5 /HPF / WBC < 5 /HPF   Sq Epi: x / Non Sq Epi: 0-5 /HPF / Bacteria: None /HPF                A1C with Estimated Average Glucose Result: 16.1 (03-13 @ 13:30)    CAPILLARY BLOOD GLUCOSE      POCT Blood Glucose.: 235 mg/dL (14 Mar 2021 22:12)  POCT Blood Glucose.: 224 mg/dL (14 Mar 2021 17:22)  POCT Blood Glucose.: 121 mg/dL (14 Mar 2021 15:05)  POCT Blood Glucose.: 118 mg/dL (14 Mar 2021 13:01)  POCT Blood Glucose.: 148 mg/dL (14 Mar 2021 10:57)  POCT Blood Glucose.: 150 mg/dL (14 Mar 2021 10:04)  POCT Blood Glucose.: 170 mg/dL (14 Mar 2021 09:02)  POCT Blood Glucose.: 159 mg/dL (14 Mar 2021 08:09)  POCT Blood Glucose.: 159 mg/dL (14 Mar 2021 07:14)  POCT Blood Glucose.: 134 mg/dL (14 Mar 2021 06:09)  POCT Blood Glucose.: 167 mg/dL (14 Mar 2021 05:05)  POCT Blood Glucose.: 199 mg/dL (14 Mar 2021 04:05)  POCT Blood Glucose.: 189 mg/dL (14 Mar 2021 01:09)  POCT Blood Glucose.: 252 mg/dL (14 Mar 2021 00:08)

## 2021-03-14 NOTE — PROGRESS NOTE ADULT - SUBJECTIVE AND OBJECTIVE BOX
Patient is a 75y old  Female who presents with a chief complaint of Weakness (14 Mar 2021 10:27)      INTERVAL HPI/ OVERNIGHT EVENTS: Pt seen and examined at bedside. Pt was very fatigued during today's dressings changes. She admits to pain in her R foot. She was sleeping throughout the encounter today and therefore a proper ROS could not be obtained.       LABS                        11.6   20.05 )-----------( 383      ( 14 Mar 2021 06:26 )             34.7     03-14    139  |  106  |  11  ----------------------------<  124<H>  3.3<L>   |  24  |  0.59    Ca    8.4      14 Mar 2021 13:24  Phos  2.5     03-14  Mg     2.5     03-14    TPro  8.4<H>  /  Alb  2.7<L>  /  TBili  0.4  /  DBili  x   /  AST  17  /  ALT  25  /  AlkPhos  145<H>  03-13    PT/INR - ( 13 Mar 2021 13:30 )   PT: 14.7 sec;   INR: 1.24          PTT - ( 13 Mar 2021 13:30 )  PTT:30.4 sec    ICU Vital Signs Last 24 Hrs  T(C): 36.7 (14 Mar 2021 08:56), Max: 36.9 (13 Mar 2021 20:30)  T(F): 98 (14 Mar 2021 08:56), Max: 98.4 (13 Mar 2021 20:30)  HR: 79 (14 Mar 2021 14:00) (76 - 94)  BP: 113/52 (14 Mar 2021 14:00) (100/52 - 123/57)  BP(mean): 75 (14 Mar 2021 14:00) (72 - 80)  ABP: --  ABP(mean): --  RR: 17 (14 Mar 2021 14:00) (11 - 24)  SpO2: 94% (14 Mar 2021 14:00) (90% - 100%)      RADIOLOGY  < from: CT Lower Extremity w/ IV Cont, Bilateral (03.13.21 @ 17:11) >  RIGHT LOWER EXTREMITY:    Diffuse cellulitis.    Findings concerning for necrotizing infection within the plantar subcutaneous fat of the midfoot. Additional deep plantar wound along the lateral plantar aspect of the fifth tarsometatarsal articulation.    Peripherally enhancing fluid collection within the medial plantar musculature extending from the hindfoot to the distal metatarsals concerning for abscess.    No CT evidence of osteomyelitis.    Left LOWER EXTREMITY:    Diffusecellulitis. No subcutaneous air.    No CT evidence of osteolysis.    Fluid collection within the anterior extensor musculature at the level of the tibial plafond concerning for abscess.    < end of copied text >      MICROBIOLOGY    PHYSICAL EXAM  Lower Extremity Focused  Vasc: DP: 1/4 and PT 2/4 bilateral. RLE: Significant swelling and erythema from mid-tibia distally., LLE: 2+ pitting edema over lower leg/foot.   Derm: RLE - Sanguinous discharge. 3-4cm open wound noted over anterior ankle at joint line with significant purulence and drainage. Incision sites made at the 1st and 4th interspace and is packed with plain packing. Surgical incision made at the plantar aspect is also packed with plain packing.  LLE - Large 4-5cm open wound over anterior ankle joint line with significant purulence and drainage. Foot mildly swollen without erythema. Small 1-2cm patches of gangrenous ulcers noted on lateral ankle and midfoot.  Neuro: Protective sensations intact b/l

## 2021-03-14 NOTE — PROGRESS NOTE ADULT - SUBJECTIVE AND OBJECTIVE BOX
POST OP NOTE    INES CALDERON  MRN-[id number]    Procedure: Incision and drainage and washout of Right foot with debridement of all non- viable soft tissue and bone  Surgeon: Dr. Wendy Ha  Assistants: Dr. Jakob العراقي     Patient tolerated procedure well without incident.  Patient transferred to SICU in stable condition.       PE / Post Op Check:       GEN: NAD, AAOx3, resting comfortably with pain controlled      LE Focused: CFT shows adequate perfusion b/l with no signs of ischemic compromise.  No strikethrough is appreciated on surgical dressings.  Dressings were dry, clean, and intact.  No neuromuscular deficits appreciated.

## 2021-03-14 NOTE — PROGRESS NOTE ADULT - ASSESSMENT
75F with PMHx UC s/p colostomy (s/p multiple abdominal surgeries including colostomy bag in 2005) BIBEMS from home complaining of weakness; presenting likely in DKA with AMS, with bilateral lower extremity open wounds. Of note, pt found to have a hx of DM2: A1C on admission is 16.1. Pt is now s/p I&D w/ washout and debridement of non- viable soft tissue.     Plan:   - Significant cellulitis bilaterally of foot and leg  - Performed I&D, washout and excisional debridement of non- viable soft tissue on 3/13/21  - IV ABx  - Vascular will manage the symptoms/ wounds on the L leg and ankle- they decided to hold off on surgical management for now  - F/U deep wound cultures taken in the OR  - Podiatry will continue to follow for management of the foot b/l   - Plan d/w Attending Dr. Ha

## 2021-03-14 NOTE — PROGRESS NOTE ADULT - ASSESSMENT
75F PMHx UC, colon cancer s/p total abdominal colectomy with end ileostomy, found down at home, was admitted for DKA and R diabetic foot wound, s/p operative debridement by podiatry (3/14).    According to the patient she doesn't want amputation, her living will be faxed on 3/14/2021 around 1 PM    NEURO: tylenol, dilaudid PRN (decreased dose)  CV: Stable, obtain EKG and echo  RESP: NC  FENGI: NPO, D5LR@100/hr, in no surgery plan will giver diet but will give Lantus before  : Kristie, BMP at noon, replace K and mag, goal   ID: vancomycin, Zosyn (3/13 - ) WBC trending up 20 from 19  ENDO: insulin drip, q 2 hr FSS, s/p 4U regular insulin in ED, FU with endocrine regarding further recs (new diagnosed DM)  HEME: Stable  PPx: HSQ

## 2021-03-14 NOTE — PROGRESS NOTE ADULT - SUBJECTIVE AND OBJECTIVE BOX
24 hr events:    ON: anion gap closed, gave 4g mag and 3 runs of K at 4am  3/13: 4U regular insulin, HgbA1c 16. started on insulin drip per protocol. per endocrine, likely starvation and DKA, started D5LR@100 and insulin drip at 5U/hr. cards unable to echo prior to OR. operative debridement, connection from dorsal to plantar aspect of foot, 50cc pus drained. cousin updated, will fax over living will on 03/14/2021    SUBJECTIVE:    The patient was a bit drowsy possible because of Dilaudid, still complaining of some pain in R foot    MEDICATIONS  (STANDING):  chlorhexidine 4% Liquid 1 Application(s) Topical <User Schedule>  dextrose 40% Gel 15 Gram(s) Oral once  dextrose 5% + lactated ringers. 1000 milliLiter(s) (100 mL/Hr) IV Continuous <Continuous>  dextrose 5%. 1000 milliLiter(s) (100 mL/Hr) IV Continuous <Continuous>  dextrose 5%. 1000 milliLiter(s) (50 mL/Hr) IV Continuous <Continuous>  dextrose 50% Injectable 25 Gram(s) IV Push once  dextrose 50% Injectable 12.5 Gram(s) IV Push once  dextrose 50% Injectable 25 Gram(s) IV Push once  glucagon  Injectable 1 milliGRAM(s) IntraMuscular once  heparin   Injectable 5000 Unit(s) SubCutaneous every 8 hours  insulin regular Infusion 5 Unit(s)/Hr (5 mL/Hr) IV Continuous <Continuous>  piperacillin/tazobactam IVPB.. 3.375 Gram(s) IV Intermittent every 6 hours  vancomycin  IVPB 750 milliGRAM(s) IV Intermittent every 12 hours    MEDICATIONS  (PRN):  acetaminophen  IVPB .. 750 milliGRAM(s) IV Intermittent once PRN Temp greater or equal to 38C (100.4F), Mild Pain (1 - 3), Moderate Pain (4 - 6)  HYDROmorphone  Injectable 0.5 milliGRAM(s) IV Push every 8 hours PRN Severe Pain (7 - 10)  ondansetron Injectable 4 milliGRAM(s) IV Push every 6 hours PRN Nausea and/or Vomiting        ICU Vital Signs Last 24 Hrs  T(C): 36.7 (14 Mar 2021 08:56), Max: 36.9 (13 Mar 2021 13:47)  T(F): 98 (14 Mar 2021 08:56), Max: 98.5 (13 Mar 2021 13:47)  HR: 84 (14 Mar 2021 08:00) (84 - 96)  BP: 100/52 (14 Mar 2021 08:00) (100/52 - 133/89)  BP(mean): 72 (14 Mar 2021 08:00) (72 - 80)  ABP: --  ABP(mean): --  RR: 14 (14 Mar 2021 08:00) (11 - 20)  SpO2: 90% (14 Mar 2021 08:00) (90% - 100%)      Physical Exam:  General: NAD  HEENT: NC/AT, EOMI, PERRLA, normal hearing, no oral lesions, neck supple w/o LAD  Pulmonary: Nonlabored breathing, no respiratory distress, CTA-B  Cardiovascular: NSR, no murmurs  Abdominal: soft, NT/ND, +BS, no organomegaly  Extremities: R foot: Dorsal aspect wound still has some necrotic base an fibrin slough, some purulent material was coming from the top wound on squeezing around the shin of tibia, dressing change done by vascular team, left foot in dressing  Neuro: A/O x3, CNs II-XII grossly intact, normal motor/sensation, no focal deficits  Pulses: palpable distal pulses    Lines/tubes/drains:          I&O's Summary    13 Mar 2021 06:01  -  14 Mar 2021 07:00  --------------------------------------------------------  IN: 1381 mL / OUT: 505 mL / NET: 876 mL        LABS:                        11.6   20.05 )-----------( 383      ( 14 Mar 2021 06:26 )             34.7     03-14    139  |  105  |  12  ----------------------------<  152<H>  3.0<L>   |  23  |  0.50    Ca    8.3<L>      14 Mar 2021 06:26  Phos  2.4     03-14  Mg     1.3     03-14    TPro  8.4<H>  /  Alb  2.7<L>  /  TBili  0.4  /  DBili  x   /  AST  17  /  ALT  25  /  AlkPhos  145<H>  03-13    PT/INR - ( 13 Mar 2021 13:30 )   PT: 14.7 sec;   INR: 1.24          PTT - ( 13 Mar 2021 13:30 )  PTT:30.4 sec  Urinalysis Basic - ( 13 Mar 2021 18:17 )    Color: Yellow / Appearance: Clear / SG: >=1.030 / pH: x  Gluc: x / Ketone: >=80 mg/dL  / Bili: Small / Urobili: 0.2 E.U./dL   Blood: x / Protein: Trace mg/dL / Nitrite: NEGATIVE   Leuk Esterase: NEGATIVE / RBC: < 5 /HPF / WBC < 5 /HPF   Sq Epi: x / Non Sq Epi: 0-5 /HPF / Bacteria: None /HPF      CAPILLARY BLOOD GLUCOSE      POCT Blood Glucose.: 170 mg/dL (14 Mar 2021 09:02)  POCT Blood Glucose.: 159 mg/dL (14 Mar 2021 08:09)  POCT Blood Glucose.: 159 mg/dL (14 Mar 2021 07:14)  POCT Blood Glucose.: 134 mg/dL (14 Mar 2021 06:09)  POCT Blood Glucose.: 167 mg/dL (14 Mar 2021 05:05)  POCT Blood Glucose.: 199 mg/dL (14 Mar 2021 04:05)  POCT Blood Glucose.: 189 mg/dL (14 Mar 2021 01:09)  POCT Blood Glucose.: 252 mg/dL (14 Mar 2021 00:08)  POCT Blood Glucose.: 315 mg/dL (13 Mar 2021 22:33)  POCT Blood Glucose.: 312 mg/dL (13 Mar 2021 21:25)  POCT Blood Glucose.: 234 mg/dL (13 Mar 2021 16:28)  POCT Blood Glucose.: 358 mg/dL (13 Mar 2021 14:43)    LIVER FUNCTIONS - ( 13 Mar 2021 13:30 )  Alb: 2.7 g/dL / Pro: 8.4 g/dL / ALK PHOS: 145 U/L / ALT: 25 U/L / AST: 17 U/L / GGT: x             Cultures:Culture Results:   No growth to date (03-13 @ 18:31)  Culture Results:   No growth at 12 hours (03-13 @ 15:09)  Culture Results:   No growth at 12 hours (03-13 @ 15:08)      RADIOLOGY & ADDITIONAL STUDIES:     DISPLAY PLAN FREE TEXT

## 2021-03-15 ENCOUNTER — RESULT REVIEW (OUTPATIENT)
Age: 76
End: 2021-03-15

## 2021-03-15 LAB
-  AMPICILLIN/SULBACTAM: SIGNIFICANT CHANGE UP
-  AMPICILLIN: SIGNIFICANT CHANGE UP
-  CEFAZOLIN: SIGNIFICANT CHANGE UP
-  CEFAZOLIN: SIGNIFICANT CHANGE UP
-  CEFEPIME: SIGNIFICANT CHANGE UP
-  CEFTRIAXONE: SIGNIFICANT CHANGE UP
-  CIPROFLOXACIN: SIGNIFICANT CHANGE UP
-  CLINDAMYCIN: SIGNIFICANT CHANGE UP
-  ERTAPENEM: SIGNIFICANT CHANGE UP
-  ERYTHROMYCIN: SIGNIFICANT CHANGE UP
-  GENTAMICIN: SIGNIFICANT CHANGE UP
-  LINEZOLID: SIGNIFICANT CHANGE UP
-  OXACILLIN: SIGNIFICANT CHANGE UP
-  PIPERACILLIN/TAZOBACTAM: SIGNIFICANT CHANGE UP
-  RIFAMPIN: SIGNIFICANT CHANGE UP
-  TOBRAMYCIN: SIGNIFICANT CHANGE UP
-  TRIMETHOPRIM/SULFAMETHOXAZOLE: SIGNIFICANT CHANGE UP
-  TRIMETHOPRIM/SULFAMETHOXAZOLE: SIGNIFICANT CHANGE UP
-  VANCOMYCIN: SIGNIFICANT CHANGE UP
ANION GAP SERPL CALC-SCNC: 10 MMOL/L — SIGNIFICANT CHANGE UP (ref 5–17)
ANION GAP SERPL CALC-SCNC: 8 MMOL/L — SIGNIFICANT CHANGE UP (ref 5–17)
APPEARANCE UR: ABNORMAL
BILIRUB UR-MCNC: NEGATIVE — SIGNIFICANT CHANGE UP
BLD GP AB SCN SERPL QL: NEGATIVE — SIGNIFICANT CHANGE UP
BUN SERPL-MCNC: 7 MG/DL — SIGNIFICANT CHANGE UP (ref 7–23)
BUN SERPL-MCNC: 8 MG/DL — SIGNIFICANT CHANGE UP (ref 7–23)
C PEPTIDE SERPL-MCNC: 1.7 NG/ML — SIGNIFICANT CHANGE UP (ref 1.1–4.4)
CALCIUM SERPL-MCNC: 7.4 MG/DL — LOW (ref 8.4–10.5)
CALCIUM SERPL-MCNC: 7.7 MG/DL — LOW (ref 8.4–10.5)
CHLORIDE SERPL-SCNC: 103 MMOL/L — SIGNIFICANT CHANGE UP (ref 96–108)
CHLORIDE SERPL-SCNC: 104 MMOL/L — SIGNIFICANT CHANGE UP (ref 96–108)
CHOLEST SERPL-MCNC: 83 MG/DL — SIGNIFICANT CHANGE UP
CO2 SERPL-SCNC: 23 MMOL/L — SIGNIFICANT CHANGE UP (ref 22–31)
CO2 SERPL-SCNC: 24 MMOL/L — SIGNIFICANT CHANGE UP (ref 22–31)
COLOR SPEC: YELLOW — SIGNIFICANT CHANGE UP
COMMENT - URINE: SIGNIFICANT CHANGE UP
CREAT SERPL-MCNC: 0.59 MG/DL — SIGNIFICANT CHANGE UP (ref 0.5–1.3)
CREAT SERPL-MCNC: 0.6 MG/DL — SIGNIFICANT CHANGE UP (ref 0.5–1.3)
CULTURE RESULTS: NO GROWTH — SIGNIFICANT CHANGE UP
DIFF PNL FLD: ABNORMAL
EPI CELLS # UR: SIGNIFICANT CHANGE UP /HPF (ref 0–5)
GLUCOSE BLDC GLUCOMTR-MCNC: 199 MG/DL — HIGH (ref 70–99)
GLUCOSE BLDC GLUCOMTR-MCNC: 203 MG/DL — HIGH (ref 70–99)
GLUCOSE BLDC GLUCOMTR-MCNC: 218 MG/DL — HIGH (ref 70–99)
GLUCOSE BLDC GLUCOMTR-MCNC: 245 MG/DL — HIGH (ref 70–99)
GLUCOSE SERPL-MCNC: 196 MG/DL — HIGH (ref 70–99)
GLUCOSE SERPL-MCNC: 294 MG/DL — HIGH (ref 70–99)
GLUCOSE UR QL: 250
HCT VFR BLD CALC: 29.8 % — LOW (ref 34.5–45)
HCT VFR BLD CALC: 32.7 % — LOW (ref 34.5–45)
HDLC SERPL-MCNC: 28 MG/DL — LOW
HGB BLD-MCNC: 10.8 G/DL — LOW (ref 11.5–15.5)
HGB BLD-MCNC: 9.9 G/DL — LOW (ref 11.5–15.5)
KETONES UR-MCNC: NEGATIVE — SIGNIFICANT CHANGE UP
LEUKOCYTE ESTERASE UR-ACNC: NEGATIVE — SIGNIFICANT CHANGE UP
LIPID PNL WITH DIRECT LDL SERPL: 37 MG/DL — SIGNIFICANT CHANGE UP
MAGNESIUM SERPL-MCNC: 1.7 MG/DL — SIGNIFICANT CHANGE UP (ref 1.6–2.6)
MAGNESIUM SERPL-MCNC: 2 MG/DL — SIGNIFICANT CHANGE UP (ref 1.6–2.6)
MCHC RBC-ENTMCNC: 29.3 PG — SIGNIFICANT CHANGE UP (ref 27–34)
MCHC RBC-ENTMCNC: 29.7 PG — SIGNIFICANT CHANGE UP (ref 27–34)
MCHC RBC-ENTMCNC: 33 GM/DL — SIGNIFICANT CHANGE UP (ref 32–36)
MCHC RBC-ENTMCNC: 33.2 GM/DL — SIGNIFICANT CHANGE UP (ref 32–36)
MCV RBC AUTO: 88.9 FL — SIGNIFICANT CHANGE UP (ref 80–100)
MCV RBC AUTO: 89.5 FL — SIGNIFICANT CHANGE UP (ref 80–100)
METHOD TYPE: SIGNIFICANT CHANGE UP
METHOD TYPE: SIGNIFICANT CHANGE UP
NITRITE UR-MCNC: NEGATIVE — SIGNIFICANT CHANGE UP
NON HDL CHOLESTEROL: 55 MG/DL — SIGNIFICANT CHANGE UP
NRBC # BLD: 0 /100 WBCS — SIGNIFICANT CHANGE UP (ref 0–0)
NRBC # BLD: 0 /100 WBCS — SIGNIFICANT CHANGE UP (ref 0–0)
PH UR: 5.5 — SIGNIFICANT CHANGE UP (ref 5–8)
PHOSPHATE SERPL-MCNC: 2.6 MG/DL — SIGNIFICANT CHANGE UP (ref 2.5–4.5)
PHOSPHATE SERPL-MCNC: 2.8 MG/DL — SIGNIFICANT CHANGE UP (ref 2.5–4.5)
PLATELET # BLD AUTO: 333 K/UL — SIGNIFICANT CHANGE UP (ref 150–400)
PLATELET # BLD AUTO: 367 K/UL — SIGNIFICANT CHANGE UP (ref 150–400)
POTASSIUM SERPL-MCNC: 3.7 MMOL/L — SIGNIFICANT CHANGE UP (ref 3.5–5.3)
POTASSIUM SERPL-MCNC: 4.3 MMOL/L — SIGNIFICANT CHANGE UP (ref 3.5–5.3)
POTASSIUM SERPL-SCNC: 3.7 MMOL/L — SIGNIFICANT CHANGE UP (ref 3.5–5.3)
POTASSIUM SERPL-SCNC: 4.3 MMOL/L — SIGNIFICANT CHANGE UP (ref 3.5–5.3)
PROT UR-MCNC: 30 MG/DL
RBC # BLD: 3.33 M/UL — LOW (ref 3.8–5.2)
RBC # BLD: 3.68 M/UL — LOW (ref 3.8–5.2)
RBC # FLD: 12.7 % — SIGNIFICANT CHANGE UP (ref 10.3–14.5)
RBC # FLD: 12.8 % — SIGNIFICANT CHANGE UP (ref 10.3–14.5)
RBC CASTS # UR COMP ASSIST: ABNORMAL /HPF
RH IG SCN BLD-IMP: POSITIVE — SIGNIFICANT CHANGE UP
SARS-COV-2 RNA SPEC QL NAA+PROBE: SIGNIFICANT CHANGE UP
SODIUM SERPL-SCNC: 136 MMOL/L — SIGNIFICANT CHANGE UP (ref 135–145)
SODIUM SERPL-SCNC: 136 MMOL/L — SIGNIFICANT CHANGE UP (ref 135–145)
SP GR SPEC: >=1.03 — SIGNIFICANT CHANGE UP (ref 1–1.03)
SPECIMEN SOURCE: SIGNIFICANT CHANGE UP
TRIGL SERPL-MCNC: 89 MG/DL — SIGNIFICANT CHANGE UP
URATE CRY FLD QL MICRO: ABNORMAL /HPF
UROBILINOGEN FLD QL: 0.2 E.U./DL — SIGNIFICANT CHANGE UP
VANCOMYCIN TROUGH SERPL-MCNC: 10.4 UG/ML — SIGNIFICANT CHANGE UP (ref 10–20)
WBC # BLD: 12.09 K/UL — HIGH (ref 3.8–10.5)
WBC # BLD: 15.05 K/UL — HIGH (ref 3.8–10.5)
WBC # FLD AUTO: 12.09 K/UL — HIGH (ref 3.8–10.5)
WBC # FLD AUTO: 15.05 K/UL — HIGH (ref 3.8–10.5)
WBC UR QL: < 5 /HPF — SIGNIFICANT CHANGE UP

## 2021-03-15 PROCEDURE — 27882 AMPUTATION OF LOWER LEG: CPT | Mod: RT,GC

## 2021-03-15 PROCEDURE — 99231 SBSQ HOSP IP/OBS SF/LOW 25: CPT | Mod: GC

## 2021-03-15 PROCEDURE — 71045 X-RAY EXAM CHEST 1 VIEW: CPT | Mod: 26

## 2021-03-15 PROCEDURE — 10061 I&D ABSCESS COMP/MULTIPLE: CPT | Mod: GC

## 2021-03-15 PROCEDURE — 88305 TISSUE EXAM BY PATHOLOGIST: CPT | Mod: 26

## 2021-03-15 PROCEDURE — 99233 SBSQ HOSP IP/OBS HIGH 50: CPT | Mod: GC

## 2021-03-15 PROCEDURE — 88311 DECALCIFY TISSUE: CPT | Mod: 26

## 2021-03-15 PROCEDURE — 93306 TTE W/DOPPLER COMPLETE: CPT | Mod: 26

## 2021-03-15 RX ORDER — ASCORBIC ACID 60 MG
500 TABLET,CHEWABLE ORAL DAILY
Refills: 0 | Status: DISCONTINUED | OUTPATIENT
Start: 2021-03-16 | End: 2021-03-16

## 2021-03-15 RX ORDER — LIDOCAINE HCL 20 MG/ML
10 VIAL (ML) INJECTION ONCE
Refills: 0 | Status: COMPLETED | OUTPATIENT
Start: 2021-03-15 | End: 2021-03-15

## 2021-03-15 RX ORDER — INSULIN GLARGINE 100 [IU]/ML
14 INJECTION, SOLUTION SUBCUTANEOUS AT BEDTIME
Refills: 0 | Status: DISCONTINUED | OUTPATIENT
Start: 2021-03-15 | End: 2021-03-16

## 2021-03-15 RX ORDER — SODIUM,POTASSIUM PHOSPHATES 278-250MG
1 POWDER IN PACKET (EA) ORAL ONCE
Refills: 0 | Status: COMPLETED | OUTPATIENT
Start: 2021-03-15 | End: 2021-03-15

## 2021-03-15 RX ORDER — INSULIN LISPRO 100/ML
6 VIAL (ML) SUBCUTANEOUS
Refills: 0 | Status: DISCONTINUED | OUTPATIENT
Start: 2021-03-15 | End: 2021-03-15

## 2021-03-15 RX ORDER — MAGNESIUM SULFATE 500 MG/ML
2 VIAL (ML) INJECTION ONCE
Refills: 0 | Status: COMPLETED | OUTPATIENT
Start: 2021-03-15 | End: 2021-03-15

## 2021-03-15 RX ORDER — POTASSIUM PHOSPHATE, MONOBASIC POTASSIUM PHOSPHATE, DIBASIC 236; 224 MG/ML; MG/ML
15 INJECTION, SOLUTION INTRAVENOUS ONCE
Refills: 0 | Status: COMPLETED | OUTPATIENT
Start: 2021-03-15 | End: 2021-03-15

## 2021-03-15 RX ORDER — SODIUM CHLORIDE 9 MG/ML
1000 INJECTION, SOLUTION INTRAVENOUS
Refills: 0 | Status: DISCONTINUED | OUTPATIENT
Start: 2021-03-15 | End: 2021-03-16

## 2021-03-15 RX ADMIN — HEPARIN SODIUM 5000 UNIT(S): 5000 INJECTION INTRAVENOUS; SUBCUTANEOUS at 05:51

## 2021-03-15 RX ADMIN — Medication 250 MILLIGRAM(S): at 02:48

## 2021-03-15 RX ADMIN — PIPERACILLIN AND TAZOBACTAM 200 GRAM(S): 4; .5 INJECTION, POWDER, LYOPHILIZED, FOR SOLUTION INTRAVENOUS at 04:55

## 2021-03-15 RX ADMIN — POTASSIUM PHOSPHATE, MONOBASIC POTASSIUM PHOSPHATE, DIBASIC 62.5 MILLIMOLE(S): 236; 224 INJECTION, SOLUTION INTRAVENOUS at 19:19

## 2021-03-15 RX ADMIN — Medication 250 MILLIGRAM(S): at 17:15

## 2021-03-15 RX ADMIN — Medication 2: at 17:33

## 2021-03-15 RX ADMIN — PIPERACILLIN AND TAZOBACTAM 200 GRAM(S): 4; .5 INJECTION, POWDER, LYOPHILIZED, FOR SOLUTION INTRAVENOUS at 17:14

## 2021-03-15 RX ADMIN — INSULIN GLARGINE 14 UNIT(S): 100 INJECTION, SOLUTION SUBCUTANEOUS at 22:02

## 2021-03-15 RX ADMIN — PIPERACILLIN AND TAZOBACTAM 200 GRAM(S): 4; .5 INJECTION, POWDER, LYOPHILIZED, FOR SOLUTION INTRAVENOUS at 10:28

## 2021-03-15 RX ADMIN — PIPERACILLIN AND TAZOBACTAM 200 GRAM(S): 4; .5 INJECTION, POWDER, LYOPHILIZED, FOR SOLUTION INTRAVENOUS at 22:00

## 2021-03-15 RX ADMIN — Medication 4: at 06:58

## 2021-03-15 RX ADMIN — Medication 50 GRAM(S): at 06:55

## 2021-03-15 RX ADMIN — HYDROMORPHONE HYDROCHLORIDE 0.5 MILLIGRAM(S): 2 INJECTION INTRAMUSCULAR; INTRAVENOUS; SUBCUTANEOUS at 17:45

## 2021-03-15 RX ADMIN — HEPARIN SODIUM 5000 UNIT(S): 5000 INJECTION INTRAVENOUS; SUBCUTANEOUS at 22:00

## 2021-03-15 RX ADMIN — Medication 4: at 11:18

## 2021-03-15 RX ADMIN — Medication 5 MILLILITER(S): at 02:49

## 2021-03-15 RX ADMIN — SODIUM CHLORIDE 75 MILLILITER(S): 9 INJECTION, SOLUTION INTRAVENOUS at 13:31

## 2021-03-15 RX ADMIN — Medication 4: at 22:01

## 2021-03-15 RX ADMIN — HYDROMORPHONE HYDROCHLORIDE 0.5 MILLIGRAM(S): 2 INJECTION INTRAMUSCULAR; INTRAVENOUS; SUBCUTANEOUS at 17:30

## 2021-03-15 RX ADMIN — Medication 1 PACKET(S): at 07:04

## 2021-03-15 NOTE — PROGRESS NOTE ADULT - ASSESSMENT
75F with PMHx UC s/p colostomy (s/p multiple abdominal surgeries including colostomy bag in 2005) BIBEMS from home complaining of weakness; presenting likely in DKA with AMS, with bilateral lower extremity open wounds. Of note, pt found to have a hx of DM2: A1C on admission is 16.1. Pt is now s/p I&D w/ washout and debridement of non- viable soft tissue.     Plan:   - Significant cellulitis bilaterally of foot and leg  - Performed I&D, washout and excisional debridement of non- viable soft tissue on 3/13/21  - C/W IV ABx  - Vascular will manage the symptoms/ wounds on the L leg and ankle- Per discussion with Chief, they are considering a BKA   - Requesting vascular team to not remove the packing in the R foot  - Podiatry will continue to follow for management of the R foot  - Plan d/w Attending Dr. Ha   75F with PMHx UC s/p colostomy (s/p multiple abdominal surgeries including colostomy bag in 2005) BIBEMS from home complaining of weakness; presenting likely in DKA with AMS, with bilateral lower extremity open wounds. Of note, pt found to have a hx of DM2: A1C on admission is 16.1. Pt is now s/p I&D w/ washout and debridement of non- viable soft tissue.     Plan:   - Significant cellulitis bilaterally of foot and leg  - Performed I&D, washout and excisional debridement of non- viable soft tissue on 3/13/21  - C/W IV ABx  - Vascular will manage the symptoms/ wounds on the L leg and ankle- Per discussion with Chief, they are considering a BKA   - Requesting vascular team to not remove the packing in the R foot  - Podiatry will continue to follow for management of the R foot  - Plan d/w Attending Dr. Ha    Amendment  Dr. Ha spoke with Dr. Leung and was told that the patient will be taken to the OR today for a BKA of the R leg.  -Podiatry is now signing off on this patient as the vascular team is now managing the patient.

## 2021-03-15 NOTE — DIETITIAN INITIAL EVALUATION ADULT. - OTHER INFO
74 yo F poor historian with hx UC and colon cancer s/p colectomy and chemotherapy, chronic venous stasis with chronic LE wounds who presents due to frequent falls over the past several weeks at home. She states that overall she has been more homebound, but recently started having more falls. Found to be in DKA with bilateral lower extremity cellulitis and foot infection.  A1c found to be 16.1,  with blood sugar 400+ O/A. Pt s/p Deep incision and drainage of multiple areas of foot with debridement of all non- viable soft tissue and bone 3/14. Possible Plan for BKA Pending - pt does not want, living will pending.   Pt visited in SICU, pt having a hard time staying awake, per EMR is lethargic. Pt is NPO at this time, however noted to have passed bedside dys screen. Denies issues swallowing PTA, however reports issues chewing, noted to be missing several teeth. Asking for soft food when diet ordered. When asked about wt hx reports gaining wt 2/2 eating "too many sweets." NKFA. Further info not provided by pt in regards to PO intake/wt hx PTA. No pain. Ketan 11. 1+ BL leg edema. BM 3/14. No pressure ulcers.   Please see below for nutritions recommendations. Recs made with team.

## 2021-03-15 NOTE — PROGRESS NOTE ADULT - SUBJECTIVE AND OBJECTIVE BOX
24 hr events:    3/15: change standing lispro to 6u;   O/N: UA sent for complaints of pain around Flowers, UA neg, ordered lidocaine gel. D/C'ed IVF    SUBJECTIVE: Pt was seen and examined bedside. Noted to have significant memory difficulty. No complaints at this time.      PAST MEDICAL & SURGICAL HISTORY:  H/O ulcerative colitis    Colon cancer    Status post Jag procedure    Colostomy present      No Known Allergies    MEDICATIONS  (STANDING):  dextrose 40% Gel 15 Gram(s) Oral once  dextrose 5%. 1000 milliLiter(s) (100 mL/Hr) IV Continuous <Continuous>  dextrose 5%. 1000 milliLiter(s) (50 mL/Hr) IV Continuous <Continuous>  dextrose 50% Injectable 25 Gram(s) IV Push once  dextrose 50% Injectable 12.5 Gram(s) IV Push once  dextrose 50% Injectable 25 Gram(s) IV Push once  glucagon  Injectable 1 milliGRAM(s) IntraMuscular once  heparin   Injectable 5000 Unit(s) SubCutaneous every 8 hours  insulin glargine Injectable (LANTUS) 8 Unit(s) SubCutaneous at bedtime  insulin lispro (ADMELOG) corrective regimen sliding scale   SubCutaneous Before meals and at bedtime  insulin lispro Injectable (ADMELOG) 6 Unit(s) SubCutaneous three times a day before meals  magnesium sulfate  IVPB 2 Gram(s) IV Intermittent once  piperacillin/tazobactam IVPB.. 3.375 Gram(s) IV Intermittent every 6 hours  potassium phosphate / sodium phosphate Powder (PHOS-NaK) 1 Packet(s) Oral once  vancomycin  IVPB 750 milliGRAM(s) IV Intermittent every 12 hours    MEDICATIONS  (PRN):  acetaminophen  IVPB .. 750 milliGRAM(s) IV Intermittent once PRN Temp greater or equal to 38C (100.4F), Mild Pain (1 - 3), Moderate Pain (4 - 6)  HYDROmorphone  Injectable 0.5 milliGRAM(s) IV Push every 8 hours PRN Severe Pain (7 - 10)  ondansetron Injectable 4 milliGRAM(s) IV Push every 6 hours PRN Nausea and/or Vomiting      Objective    ICU Vital Signs Last 24 Hrs  T(C): 37.6 (15 Mar 2021 04:39), Max: 37.6 (15 Mar 2021 04:39)  T(F): 99.6 (15 Mar 2021 04:39), Max: 99.6 (15 Mar 2021 04:39)  HR: 91 (15 Mar 2021 06:00) (76 - 95)  BP: 129/69 (15 Mar 2021 06:00) (94/48 - 129/69)  BP(mean): 89 (15 Mar 2021 06:00) (68 - 89)  RR: 35 (15 Mar 2021 06:00) (13 - 35)  SpO2: 92% (15 Mar 2021 06:00) (90% - 97%)        Physical Exam:  General: NAD  HEENT: NC/AT  Pulmonary: Nonlabored breathing, no respiratory distress, CTA-B  Cardiovascular: NSR, no murmurs  Abdominal: soft, NT/ND, +BS, no organomegaly.   Extremities: B/l dressing in place.   R FOOT: 3-4 cm open wound on anterior ankle joint line. Plantar incision with packing.  L FOOT: 4-5 cm open wound on anterior joint line. Mild swelling. Small patches of gangrenous ulcerations on lateral ankle and midfoot.  Neuro: A/O x1-2, ormal motor/sensation, no focal deficits    Lines/tubes/drains:                              10.8   15.05 )-----------( 367      ( 15 Mar 2021 04:51 )             32.7     03-15    136  |  103  |  8   ----------------------------<  294<H>  4.3   |  23  |  0.60    Ca    7.7<L>      15 Mar 2021 04:51  Phos  2.6     03-15  Mg     1.7     03-15    TPro  8.4<H>  /  Alb  2.7<L>  /  TBili  0.4  /  DBili  x   /  AST  17  /  ALT  25  /  AlkPhos  145<H>  03-13      I&O's Summary    13 Mar 2021 06:01  -  14 Mar 2021 07:00  --------------------------------------------------------  IN: 1381 mL / OUT: 505 mL / NET: 876 mL    14 Mar 2021 07:01  -  15 Mar 2021 06:42  --------------------------------------------------------  IN: 3229.8 mL / OUT: 1030 mL / NET: 2199.8 mL

## 2021-03-15 NOTE — PROGRESS NOTE ADULT - ASSESSMENT
75F PMHx UC, colon cancer s/p total abdominal colectomy with end ileostomy, found down at home, was admitted for DKA and R diabetic foot wound, s/p operative debridement by podiatry (3/14).    According to the patient she doesn't want amputation, her living will be faxed on 3/14/2021 around 1 PM    NEURO: tylenol, dilaudid PRN (decreased dose)  CV: Stable, obtain EKG and echo  RESP: KALI MERRITTI: NPO  : Kristie, BMP at noon, replace K and mag, goal   ID: vancomycin, Zosyn (3/13 - ) WBC trending down (20->15), f/u vanc trough 11am today, f/u OR Cx (Staph aureus, strep intermedius, morganella morganii, Ecoli), BCx/UCx 3/13 NGTD  ENDO: Lantus 8U, Lispro 6U  HEME: Stable  PPx: HSQ

## 2021-03-15 NOTE — PROGRESS NOTE ADULT - SUBJECTIVE AND OBJECTIVE BOX
24 hr events: NAEON. Afebrile, VSS.    SUBJECTIVE: Patient assessed by . Laying in bed in NAD. C/o     Vital Signs Last 24 Hrs  T(C): 37.6 (15 Mar 2021 04:39), Max: 37.6 (15 Mar 2021 04:39)  T(F): 99.6 (15 Mar 2021 04:39), Max: 99.6 (15 Mar 2021 04:39)  HR: 87 (15 Mar 2021 04:00) (76 - 95)  BP: 101/52 (15 Mar 2021 04:00) (94/48 - 127/58)  BP(mean): 74 (15 Mar 2021 04:00) (68 - 83)  RR: 25 (15 Mar 2021 04:00) (13 - 25)  SpO2: 91% (15 Mar 2021 04:00) (90% - 97%)      Physical Exam:  General: NAD  Pulmonary: Nonlabored breathing, no respiratory distress  Cardiovascular: NSR  Abdominal: soft, NT/ND, no organomegaly  Extremities: WWP, normal strength, no clubbing/cyanosis/edema  Neuro: A/O x3, no focal deficits, normal sensation  Pulses:     Pulses:   Right:  FEM [ ]2+ [ ]1+ [ ]doppler    POP [ ]2+ [ ]1+ [ ]doppler  DP [ ]2+ [ ]1+ [ ]doppler  PT[ ]2+ [ ]1+ [ ]doppler    Left:  FEM [ ]2+ [ ]1+ [ ]doppler  POP [ ]2+ [ ]1+ [ ]doppler  DP [ ]2+ [ ]1+ [ ]doppler  PT [ ]2+ [ ]1+ [ ]doppler      Lines/drains/tubes:    I&O's Summary    13 Mar 2021 06:01  -  14 Mar 2021 07:00  --------------------------------------------------------  IN: 1381 mL / OUT: 505 mL / NET: 876 mL    14 Mar 2021 07:01  -  15 Mar 2021 05:26  --------------------------------------------------------  IN: 3229.8 mL / OUT: 870 mL / NET: 2359.8 mL        LABS:                        10.8   15.05 )-----------( 367      ( 15 Mar 2021 04:51 )             32.7     03-15    136  |  103  |  8   ----------------------------<  x   4.3   |  23  |  0.60    Ca    8.3<L>      14 Mar 2021 19:13  Phos  2.6     03-15  Mg     1.7     03-15    TPro  8.4<H>  /  Alb  2.7<L>  /  TBili  0.4  /  DBili  x   /  AST  17  /  ALT  25  /  AlkPhos  145<H>  03-13    PT/INR - ( 13 Mar 2021 13:30 )   PT: 14.7 sec;   INR: 1.24          PTT - ( 13 Mar 2021 13:30 )  PTT:30.4 sec  Urinalysis Basic - ( 15 Mar 2021 01:32 )    Color: Yellow / Appearance: SL Cloudy / SG: >=1.030 / pH: x  Gluc: x / Ketone: NEGATIVE  / Bili: Negative / Urobili: 0.2 E.U./dL   Blood: x / Protein: 30 mg/dL / Nitrite: NEGATIVE   Leuk Esterase: NEGATIVE / RBC: Many /HPF / WBC < 5 /HPF   Sq Epi: x / Non Sq Epi: 0-5 /HPF / Bacteria: x      CAPILLARY BLOOD GLUCOSE      POCT Blood Glucose.: 235 mg/dL (14 Mar 2021 22:12)  POCT Blood Glucose.: 224 mg/dL (14 Mar 2021 17:22)  POCT Blood Glucose.: 121 mg/dL (14 Mar 2021 15:05)  POCT Blood Glucose.: 118 mg/dL (14 Mar 2021 13:01)  POCT Blood Glucose.: 148 mg/dL (14 Mar 2021 10:57)  POCT Blood Glucose.: 150 mg/dL (14 Mar 2021 10:04)  POCT Blood Glucose.: 170 mg/dL (14 Mar 2021 09:02)  POCT Blood Glucose.: 159 mg/dL (14 Mar 2021 08:09)  POCT Blood Glucose.: 159 mg/dL (14 Mar 2021 07:14)  POCT Blood Glucose.: 134 mg/dL (14 Mar 2021 06:09)    LIVER FUNCTIONS - ( 13 Mar 2021 13:30 )  Alb: 2.7 g/dL / Pro: 8.4 g/dL / ALK PHOS: 145 U/L / ALT: 25 U/L / AST: 17 U/L / GGT: x             RADIOLOGY & ADDITIONAL STUDIES:     24 hr events: UA sent for complaints of pain around Flowers, UA neg, ordered lidocaine gel. D/C'ed IVF    SUBJECTIVE: Patient assessed by Team 3. Laying in bed in NAD. C/o pain in R foot    Vital Signs Last 24 Hrs  T(C): 37.6 (15 Mar 2021 04:39), Max: 37.6 (15 Mar 2021 04:39)  T(F): 99.6 (15 Mar 2021 04:39), Max: 99.6 (15 Mar 2021 04:39)  HR: 87 (15 Mar 2021 04:00) (76 - 95)  BP: 101/52 (15 Mar 2021 04:00) (94/48 - 127/58)  BP(mean): 74 (15 Mar 2021 04:00) (68 - 83)  RR: 25 (15 Mar 2021 04:00) (13 - 25)  SpO2: 91% (15 Mar 2021 04:00) (90% - 97%)      Physical Exam:   General: NAD  Pulmonary: Nonlabored breathing, no respiratory distress,   Cardiovascular: NSR, no murmurs  Abdominal: soft, NT/ND, +BS, no organomegaly  Extremities: R foot: Purulent material coming out from dorsal and plantar aspect from drainage sites. Foul smelling. L foot dorsal ankle with 3x1 cm wound with pus exudate.  Neuro: A/O x3, CNs II-XII grossly intact, normal motor/sensation, no focal deficits        Lines/drains/tubes:    I&O's Summary    13 Mar 2021 06:01  -  14 Mar 2021 07:00  --------------------------------------------------------  IN: 1381 mL / OUT: 505 mL / NET: 876 mL    14 Mar 2021 07:01  -  15 Mar 2021 05:26  --------------------------------------------------------  IN: 3229.8 mL / OUT: 870 mL / NET: 2359.8 mL        LABS:                        10.8   15.05 )-----------( 367      ( 15 Mar 2021 04:51 )             32.7     03-15    136  |  103  |  8   ----------------------------<  x   4.3   |  23  |  0.60    Ca    8.3<L>      14 Mar 2021 19:13  Phos  2.6     03-15  Mg     1.7     03-15    TPro  8.4<H>  /  Alb  2.7<L>  /  TBili  0.4  /  DBili  x   /  AST  17  /  ALT  25  /  AlkPhos  145<H>  03-13    PT/INR - ( 13 Mar 2021 13:30 )   PT: 14.7 sec;   INR: 1.24          PTT - ( 13 Mar 2021 13:30 )  PTT:30.4 sec  Urinalysis Basic - ( 15 Mar 2021 01:32 )    Color: Yellow / Appearance: SL Cloudy / SG: >=1.030 / pH: x  Gluc: x / Ketone: NEGATIVE  / Bili: Negative / Urobili: 0.2 E.U./dL   Blood: x / Protein: 30 mg/dL / Nitrite: NEGATIVE   Leuk Esterase: NEGATIVE / RBC: Many /HPF / WBC < 5 /HPF   Sq Epi: x / Non Sq Epi: 0-5 /HPF / Bacteria: x      CAPILLARY BLOOD GLUCOSE      POCT Blood Glucose.: 235 mg/dL (14 Mar 2021 22:12)  POCT Blood Glucose.: 224 mg/dL (14 Mar 2021 17:22)  POCT Blood Glucose.: 121 mg/dL (14 Mar 2021 15:05)  POCT Blood Glucose.: 118 mg/dL (14 Mar 2021 13:01)  POCT Blood Glucose.: 148 mg/dL (14 Mar 2021 10:57)  POCT Blood Glucose.: 150 mg/dL (14 Mar 2021 10:04)  POCT Blood Glucose.: 170 mg/dL (14 Mar 2021 09:02)  POCT Blood Glucose.: 159 mg/dL (14 Mar 2021 08:09)  POCT Blood Glucose.: 159 mg/dL (14 Mar 2021 07:14)  POCT Blood Glucose.: 134 mg/dL (14 Mar 2021 06:09)    LIVER FUNCTIONS - ( 13 Mar 2021 13:30 )  Alb: 2.7 g/dL / Pro: 8.4 g/dL / ALK PHOS: 145 U/L / ALT: 25 U/L / AST: 17 U/L / GGT: x             RADIOLOGY & ADDITIONAL STUDIES:

## 2021-03-15 NOTE — DIETITIAN INITIAL EVALUATION ADULT. - ADD RECOMMEND
1. Monitor PO intake/appetite, GI distress, diet tolerance, labs, weights.  2. Honor pt food preferences as able.  3. MVI daily, Vit C 500mg/day. 4. RD to remain available for additional nutrition interventions as needed.

## 2021-03-15 NOTE — PROGRESS NOTE ADULT - SUBJECTIVE AND OBJECTIVE BOX
Vascular Surgery Post Op Check    Procedure: R Giulia TIWARIA, LLE I&D  Surgeon: Madhu    Pt comfortable in bed, complaining of some pain in her RLE and LLE but tolerable.  Denies CP, SOB, N/V, numbness/tingling     Vital Signs Last 24 Hrs  T(C): 37.2 (03-15-21 @ 12:59), Max: 37.2 (03-15-21 @ 12:59)  T(F): 98.9 (03-15-21 @ 12:59), Max: 98.9 (03-15-21 @ 12:59)  HR: 76 (03-15-21 @ 17:15) (76 - 87)  BP: 124/61 (03-15-21 @ 17:15) (93/55 - 140/64)  BP(mean): 88 (03-15-21 @ 17:15) (70 - 92)  RR: 17 (03-15-21 @ 17:15) (17 - 28)  SpO2: 97% (03-15-21 @ 17:15) (91% - 97%)  AVSS    General: Pt Alert and oriented, NAD  Bilateral lower extremity ACE Wrap DSG C/D/I  SILT in LLE and RLE  Motor: EHL/FHL/TA/GS 5/5 LLE, able to flex/extend knee on RLE    A/P: 75yFemale POD#0 s/p R Giulia ROMAN and LLE I&D by Dr. Leung on 3/15  - Stable  - Pain Control  - DVT ppx: HSQ  - Post op abx: Continue Vanco/Zosyn  - Possible return to OR later this week for closure of R BKA    Vascular [id number]

## 2021-03-15 NOTE — DIETITIAN INITIAL EVALUATION ADULT. - PERTINENT MEDS FT
heparin vanco zosyn  pre meal insulin   Lantus at bedtime   corrective insulin pre meals and bedtime  ANGIE Guillory

## 2021-03-15 NOTE — PROGRESS NOTE ADULT - SUBJECTIVE AND OBJECTIVE BOX
Patient is a 75y old  Female who presents with a chief complaint of Weakness (15 Mar 2021 07:45)      INTERVAL HPI/ OVERNIGHT EVENTS: No overnight events reported. Pt seen and examined at bedside. Pt continues to have problems with her memory and keeps repeating the same questions. During this encounter vascular resident was present and was told that the Podiatry team is only managing the R foot and that the vascular team is managing the Left leg and ankle. Vascular resident indicated that there is purulent drainage coming down from the leg through the ulcer site at the anterior aspect of the ankle and our team clearly explained that we cannot do a debridement of the leg and that our area of focus is on the foot and ankle. Furthermore, Vascular team's chief was requested to tell her team to not pull the packing from the R foot s/p washout and debridement, because it increases the likelihood of re-infection.     Pt denies any N/V/F/Chills/SOB.        LABS                        10.8   15.05 )-----------( 367      ( 15 Mar 2021 04:51 )             32.7     03-15    136  |  103  |  8   ----------------------------<  294<H>  4.3   |  23  |  0.60    Ca    7.7<L>      15 Mar 2021 04:51  Phos  2.6     03-15  Mg     1.7     03-15    TPro  8.4<H>  /  Alb  2.7<L>  /  TBili  0.4  /  DBili  x   /  AST  17  /  ALT  25  /  AlkPhos  145<H>  03-13    PT/INR - ( 13 Mar 2021 13:30 )   PT: 14.7 sec;   INR: 1.24          PTT - ( 13 Mar 2021 13:30 )  PTT:30.4 sec    ICU Vital Signs Last 24 Hrs  T(C): 37.6 (15 Mar 2021 04:39), Max: 37.6 (15 Mar 2021 04:39)  T(F): 99.6 (15 Mar 2021 04:39), Max: 99.6 (15 Mar 2021 04:39)  HR: 88 (15 Mar 2021 09:00) (76 - 95)  BP: 144/62 (15 Mar 2021 09:00) (94/48 - 144/62)  BP(mean): 89 (15 Mar 2021 09:00) (68 - 89)  ABP: --  ABP(mean): --  RR: 19 (15 Mar 2021 09:00) (13 - 35)  SpO2: 93% (15 Mar 2021 09:00) (89% - 97%)      RADIOLOGY  < from: CT Lower Extremity w/ IV Cont, Bilateral (03.13.21 @ 17:11) >    RIGHT LOWER EXTREMITY:    Diffuse cellulitis.    Findings concerning for necrotizing infection within the plantar subcutaneous fat of the midfoot. Additional deep plantar wound along the lateral plantar aspect of the fifth tarsometatarsal articulation.    Peripherally enhancing fluid collection within the medial plantar musculature extending from the hindfoot to the distal metatarsals concerning for abscess.    No CT evidence of osteomyelitis.    Left LOWER EXTREMITY:    Diffusecellulitis. No subcutaneous air.    No CT evidence of osteolysis.    Fluid collection within the anterior extensor musculature at the level of the tibial plafond concerning for abscess.    < end of copied text >      MICROBIOLOGY  Gram Stain:   Moderate Gram positive cocci in pairs, chains and clusters  Few White blood cells (03.14.21 @ 07:52)    Gram Stain:   Few Gram Positive Cocci in Pairs and Chains  Rare Gram Positive Rods  Rare Gram Negative Rods  Rare White blood cells (03.13.21 @ 14:14)      PHYSICAL EXAM  Lower Extremity Focused  Vasc: DP: 1/4 and PT 2/4 bilateral. RLE: Significant swelling and erythema from mid-tibia distally., LLE: 2+ pitting edema over lower leg/foot.   Derm: RLE - Sanguinous discharge. 3-4cm open wound noted over anterior ankle at joint line with significant purulence and drainage. Incision sites made at the 1st and 4th interspace and is packed with plain packing. Surgical incision made at the plantar aspect is also packed with plain packing.  LLE - Large 4-5cm open wound over anterior ankle joint line with significant purulence and drainage. Foot mildly swollen without erythema. Small 1-2cm patches of gangrenous ulcers noted on lateral ankle and midfoot.  Neuro: Protective sensations intact b/l

## 2021-03-15 NOTE — PROGRESS NOTE ADULT - SUBJECTIVE AND OBJECTIVE BOX
INTERVAL HPI/OVERNIGHT EVENTS:    Patient is a 75y old  Female who presents with a chief complaint of Weakness (15 Mar 2021 13:29)  Pt was seen and examined at he bedside. NPO for BKA right side today.   FSG & Insulin received:    Yesterday:  pre-dinner fs  nutritional lispro 4  units + 4  units lispro SS  bedtime fs  lantus 8  units + 4   units lispro SS    Today:  pre-breakfast fs    4  units lispro SS  pre-lunch fs   4  units lispro SS    Pt reports the following symptoms:    CONSTITUTIONAL:  Negative fever or chills  CARDIOVASCULAR:  Negative for chest pain or palpitations  RESPIRATORY:  Negative for cough, wheezing, or SOB   GASTROINTESTINAL:  Negative for nausea, vomiting  GENITOURINARY:  Negative frequency, urgency or dysuria  NEUROLOGIC:  No headach    MEDICATIONS  (STANDING):  dextrose 40% Gel 15 Gram(s) Oral once  dextrose 5%. 1000 milliLiter(s) (50 mL/Hr) IV Continuous <Continuous>  dextrose 5%. 1000 milliLiter(s) (100 mL/Hr) IV Continuous <Continuous>  dextrose 50% Injectable 25 Gram(s) IV Push once  dextrose 50% Injectable 12.5 Gram(s) IV Push once  dextrose 50% Injectable 25 Gram(s) IV Push once  glucagon  Injectable 1 milliGRAM(s) IntraMuscular once  heparin   Injectable 5000 Unit(s) SubCutaneous every 8 hours  insulin glargine Injectable (LANTUS) 14 Unit(s) SubCutaneous at bedtime  insulin lispro (ADMELOG) corrective regimen sliding scale   SubCutaneous Before meals and at bedtime  lactated ringers. 1000 milliLiter(s) (75 mL/Hr) IV Continuous <Continuous>  piperacillin/tazobactam IVPB.. 3.375 Gram(s) IV Intermittent every 6 hours  potassium phosphate IVPB 15 milliMole(s) IV Intermittent once  vancomycin  IVPB 750 milliGRAM(s) IV Intermittent every 12 hours    MEDICATIONS  (PRN):  acetaminophen  IVPB .. 750 milliGRAM(s) IV Intermittent once PRN Temp greater or equal to 38C (100.4F), Mild Pain (1 - 3), Moderate Pain (4 - 6)  HYDROmorphone  Injectable 0.5 milliGRAM(s) IV Push every 8 hours PRN Severe Pain (7 - 10)  ondansetron Injectable 4 milliGRAM(s) IV Push every 6 hours PRN Nausea and/or Vomiting      Past medical history reviewed  Family history reviewed  Social history reviewed    PHYSICAL EXAM  Vital Signs Last 24 Hrs  T(C): 37.2 (15 Mar 2021 12:59), Max: 37.8 (15 Mar 2021 09:00)  T(F): 98.9 (15 Mar 2021 12:59), Max: 100 (15 Mar 2021 09:00)  HR: 76 (15 Mar 2021 17:15) (76 - 95)  BP: 124/61 (15 Mar 2021 17:15) (93/55 - 144/62)  BP(mean): 88 (15 Mar 2021 17:15) (70 - 92)  RR: 17 (15 Mar 2021 17:15) (14 - 35)  SpO2: 97% (15 Mar 2021 17:15) (89% - 97%)    Constitutional:  in NAD, AOx1  HEENT: no proptosis or lid retraction  Neck: no thyromegaly or palpable thyroid nodules   Respiratory: lungs CTAB.  Cardiovascular: regular rhythm, normal S1 and S2  GI: soft, NT/ND, no masses/HSM appreciated.  R FOOT: 3-4 cm open wound on anterior ankle joint line. Plantar incision with packing.  L FOOT: 4-5 cm open wound on anterior joint line. Mild swelling. Small patches of gangrenous ulcerations on lateral ankle and midfoot.    LABS:                        9.9    12.09 )-----------( 333      ( 15 Mar 2021 16:54 )             29.8     03-15    136  |  104  |  7   ----------------------------<  196<H>  3.7   |  24  |  0.59    Ca    7.4<L>      15 Mar 2021 16:54  Phos  2.8     03-15  Mg     2.0     03-15        Urinalysis Basic - ( 15 Mar 2021 01:32 )    Color: Yellow / Appearance: SL Cloudy / SG: >=1.030 / pH: x  Gluc: x / Ketone: NEGATIVE  / Bili: Negative / Urobili: 0.2 E.U./dL   Blood: x / Protein: 30 mg/dL / Nitrite: NEGATIVE   Leuk Esterase: NEGATIVE / RBC: Many /HPF / WBC < 5 /HPF   Sq Epi: x / Non Sq Epi: 0-5 /HPF / Bacteria: x          HbA1C: 16.1 % ( @ 13:30)      CAPILLARY BLOOD GLUCOSE      POCT Blood Glucose.: 199 mg/dL (15 Mar 2021 17:18)  POCT Blood Glucose.: 203 mg/dL (15 Mar 2021 10:36)  POCT Blood Glucose.: 245 mg/dL (15 Mar 2021 05:48)  POCT Blood Glucose.: 235 mg/dL (14 Mar 2021 22:12)      Cholesterol, Serum: 83 mg/dL (03-15-21 @ 04:51)  HDL Cholesterol, Serum: 28 mg/dL (03-15-21 @ 04:51)  Triglycerides, Serum: 89 mg/dL (03-15-21 @ 04:51)    A/P: 75F with PMHx UC s/p colostomy (s/p multiple abdominal surgeries including colostomy bag in ) BIBEMS from home complaining of weakness; presenting likely in DKA with AMS, with bilateral lower extremity open wounds. Of note, pt found to have a hx of DM2: A1C on admission is 16.1. Pt is now s/p I&D w/ washout and debridement of non- viable soft tissue.     1.  DM:   A1c:1.1%  wt:75 jg  cr:0.6, GFR:89  likely insulinopenic  NPO   lantus 14 at bedtime  Continue lispro moderate dose scale with meals and at bedtime.   Continue consistent carb diet  FSG Goal 100-180    case seen and discussed with Dr. Yee and updated primary team

## 2021-03-15 NOTE — DIETITIAN INITIAL EVALUATION ADULT. - OTHER CALCULATIONS
IBW used to calculate energy needs due to pt's current body weight exceeding 120% of IBW; adjusted for age, Skin s/p I&D; will adjust for AMP PRN

## 2021-03-15 NOTE — DIETITIAN INITIAL EVALUATION ADULT. - PERSON TAUGHT/METHOD
Attempted to provide DM diet education, pt lethargic, not feasible - handout from NCM left at bedside./patient instructed

## 2021-03-15 NOTE — DIETITIAN INITIAL EVALUATION ADULT. - DIET TYPE
Diet to be advanced in 24-48hrs as feasible: Consistent Carbohydrate (+ evening snack Pending BG/POCT based on Night time Insulin order), soft diet

## 2021-03-15 NOTE — PROGRESS NOTE ADULT - ASSESSMENT
75F PMHx UC, colon cancer s/p total abdominal colectomy with end ileostomy, found down at home, was admitted for DKA and R diabetic foot wound, s/p operative debridement by podiatry (3/14).    According to the patient she doesn't want amputation, her living will be faxed on 3/14/2021 around 1 PM    Continue daily dressing change  Podiatry is following  f/u wound cultures  remaining plan per primary team   75F PMHx UC, colon cancer s/p total abdominal colectomy with end ileostomy, found down at home, was admitted for DKA and R diabetic foot wound, s/p operative debridement by podiatry (3/14). According to the patient she doesn't want amputation, her living will be faxed on 3/14/2021 around 1 PM    Plan  - F/u Echo  - Cont IV ABx  - F/u Podiatry  - F/u final cultures  - Continue daily dressing change  - Remaining plan per SICU   75F PMHx UC, colon cancer s/p total abdominal colectomy with end ileostomy, found down at home, was admitted for DKA and R diabetic foot wound, s/p operative debridement by podiatry (3/14). According to the patient she doesn't want amputation, her living will be faxed on 3/14/2021 around 1 PM    Plan  - F/u Echo  - Cont IV ABx  - F/u Podiatry  - F/u final cultures  - Continue daily dressing change  - Will need I&D of L foot by podiatry today  - Remaining plan per SICU

## 2021-03-16 LAB
-  AMPICILLIN/SULBACTAM: SIGNIFICANT CHANGE UP
-  AMPICILLIN: SIGNIFICANT CHANGE UP
-  CEFAZOLIN: SIGNIFICANT CHANGE UP
-  CEFAZOLIN: SIGNIFICANT CHANGE UP
-  CEFTRIAXONE: SIGNIFICANT CHANGE UP
-  CIPROFLOXACIN: SIGNIFICANT CHANGE UP
-  CLINDAMYCIN: SIGNIFICANT CHANGE UP
-  ERTAPENEM: SIGNIFICANT CHANGE UP
-  ERYTHROMYCIN: SIGNIFICANT CHANGE UP
-  GENTAMICIN: SIGNIFICANT CHANGE UP
-  LEVOFLOXACIN: SIGNIFICANT CHANGE UP
-  LEVOFLOXACIN: SIGNIFICANT CHANGE UP
-  LINEZOLID: SIGNIFICANT CHANGE UP
-  OXACILLIN: SIGNIFICANT CHANGE UP
-  PIPERACILLIN/TAZOBACTAM: SIGNIFICANT CHANGE UP
-  RIFAMPIN: SIGNIFICANT CHANGE UP
-  TOBRAMYCIN: SIGNIFICANT CHANGE UP
-  TRIMETHOPRIM/SULFAMETHOXAZOLE: SIGNIFICANT CHANGE UP
-  TRIMETHOPRIM/SULFAMETHOXAZOLE: SIGNIFICANT CHANGE UP
-  VANCOMYCIN: SIGNIFICANT CHANGE UP
ANION GAP SERPL CALC-SCNC: 7 MMOL/L — SIGNIFICANT CHANGE UP (ref 5–17)
BUN SERPL-MCNC: 6 MG/DL — LOW (ref 7–23)
CALCIUM SERPL-MCNC: 7.3 MG/DL — LOW (ref 8.4–10.5)
CHLORIDE SERPL-SCNC: 106 MMOL/L — SIGNIFICANT CHANGE UP (ref 96–108)
CO2 SERPL-SCNC: 26 MMOL/L — SIGNIFICANT CHANGE UP (ref 22–31)
CREAT SERPL-MCNC: 0.55 MG/DL — SIGNIFICANT CHANGE UP (ref 0.5–1.3)
CULTURE RESULTS: SIGNIFICANT CHANGE UP
GLUCOSE BLDC GLUCOMTR-MCNC: 100 MG/DL — HIGH (ref 70–99)
GLUCOSE BLDC GLUCOMTR-MCNC: 111 MG/DL — HIGH (ref 70–99)
GLUCOSE BLDC GLUCOMTR-MCNC: 141 MG/DL — HIGH (ref 70–99)
GLUCOSE BLDC GLUCOMTR-MCNC: 212 MG/DL — HIGH (ref 70–99)
GLUCOSE BLDC GLUCOMTR-MCNC: 221 MG/DL — HIGH (ref 70–99)
GLUCOSE SERPL-MCNC: 106 MG/DL — HIGH (ref 70–99)
HCT VFR BLD CALC: 30.3 % — LOW (ref 34.5–45)
HGB BLD-MCNC: 9.7 G/DL — LOW (ref 11.5–15.5)
MAGNESIUM SERPL-MCNC: 1.9 MG/DL — SIGNIFICANT CHANGE UP (ref 1.6–2.6)
MCHC RBC-ENTMCNC: 29.2 PG — SIGNIFICANT CHANGE UP (ref 27–34)
MCHC RBC-ENTMCNC: 32 GM/DL — SIGNIFICANT CHANGE UP (ref 32–36)
MCV RBC AUTO: 91.3 FL — SIGNIFICANT CHANGE UP (ref 80–100)
METHOD TYPE: SIGNIFICANT CHANGE UP
NRBC # BLD: 0 /100 WBCS — SIGNIFICANT CHANGE UP (ref 0–0)
ORGANISM # SPEC MICROSCOPIC CNT: SIGNIFICANT CHANGE UP
PHOSPHATE SERPL-MCNC: 3.7 MG/DL — SIGNIFICANT CHANGE UP (ref 2.5–4.5)
PLATELET # BLD AUTO: 333 K/UL — SIGNIFICANT CHANGE UP (ref 150–400)
POTASSIUM SERPL-MCNC: 3.8 MMOL/L — SIGNIFICANT CHANGE UP (ref 3.5–5.3)
POTASSIUM SERPL-SCNC: 3.8 MMOL/L — SIGNIFICANT CHANGE UP (ref 3.5–5.3)
RBC # BLD: 3.32 M/UL — LOW (ref 3.8–5.2)
RBC # FLD: 13 % — SIGNIFICANT CHANGE UP (ref 10.3–14.5)
SODIUM SERPL-SCNC: 139 MMOL/L — SIGNIFICANT CHANGE UP (ref 135–145)
SPECIMEN SOURCE: SIGNIFICANT CHANGE UP
WBC # BLD: 7.65 K/UL — SIGNIFICANT CHANGE UP (ref 3.8–10.5)
WBC # FLD AUTO: 7.65 K/UL — SIGNIFICANT CHANGE UP (ref 3.8–10.5)

## 2021-03-16 PROCEDURE — 99232 SBSQ HOSP IP/OBS MODERATE 35: CPT | Mod: GC

## 2021-03-16 PROCEDURE — 71045 X-RAY EXAM CHEST 1 VIEW: CPT | Mod: 26

## 2021-03-16 PROCEDURE — 99222 1ST HOSP IP/OBS MODERATE 55: CPT

## 2021-03-16 PROCEDURE — 99233 SBSQ HOSP IP/OBS HIGH 50: CPT | Mod: GC

## 2021-03-16 RX ORDER — SODIUM CHLORIDE 9 MG/ML
500 INJECTION, SOLUTION INTRAVENOUS ONCE
Refills: 0 | Status: DISCONTINUED | OUTPATIENT
Start: 2021-03-16 | End: 2021-03-16

## 2021-03-16 RX ORDER — VANCOMYCIN HCL 1 G
250 VIAL (EA) INTRAVENOUS ONCE
Refills: 0 | Status: COMPLETED | OUTPATIENT
Start: 2021-03-16 | End: 2021-03-16

## 2021-03-16 RX ORDER — INSULIN GLARGINE 100 [IU]/ML
10 INJECTION, SOLUTION SUBCUTANEOUS AT BEDTIME
Refills: 0 | Status: DISCONTINUED | OUTPATIENT
Start: 2021-03-16 | End: 2021-03-17

## 2021-03-16 RX ORDER — OXYCODONE AND ACETAMINOPHEN 5; 325 MG/1; MG/1
1 TABLET ORAL EVERY 4 HOURS
Refills: 0 | Status: DISCONTINUED | OUTPATIENT
Start: 2021-03-16 | End: 2021-03-19

## 2021-03-16 RX ORDER — VANCOMYCIN HCL 1 G
1000 VIAL (EA) INTRAVENOUS
Refills: 0 | Status: DISCONTINUED | OUTPATIENT
Start: 2021-03-16 | End: 2021-03-16

## 2021-03-16 RX ORDER — MULTIVIT-MIN/FERROUS GLUCONATE 9 MG/15 ML
15 LIQUID (ML) ORAL DAILY
Refills: 0 | Status: DISCONTINUED | OUTPATIENT
Start: 2021-03-16 | End: 2021-03-19

## 2021-03-16 RX ORDER — ACETAMINOPHEN 500 MG
750 TABLET ORAL EVERY 6 HOURS
Refills: 0 | Status: DISCONTINUED | OUTPATIENT
Start: 2021-03-16 | End: 2021-03-16

## 2021-03-16 RX ORDER — OXYCODONE HYDROCHLORIDE 5 MG/1
5 TABLET ORAL EVERY 4 HOURS
Refills: 0 | Status: DISCONTINUED | OUTPATIENT
Start: 2021-03-16 | End: 2021-03-16

## 2021-03-16 RX ORDER — SENNA PLUS 8.6 MG/1
2 TABLET ORAL AT BEDTIME
Refills: 0 | Status: DISCONTINUED | OUTPATIENT
Start: 2021-03-16 | End: 2021-03-19

## 2021-03-16 RX ORDER — HYDROMORPHONE HYDROCHLORIDE 2 MG/ML
0.5 INJECTION INTRAMUSCULAR; INTRAVENOUS; SUBCUTANEOUS ONCE
Refills: 0 | Status: DISCONTINUED | OUTPATIENT
Start: 2021-03-16 | End: 2021-03-16

## 2021-03-16 RX ORDER — MAGNESIUM SULFATE 500 MG/ML
1 VIAL (ML) INJECTION ONCE
Refills: 0 | Status: COMPLETED | OUTPATIENT
Start: 2021-03-16 | End: 2021-03-16

## 2021-03-16 RX ORDER — POTASSIUM CHLORIDE 20 MEQ
20 PACKET (EA) ORAL ONCE
Refills: 0 | Status: COMPLETED | OUTPATIENT
Start: 2021-03-16 | End: 2021-03-16

## 2021-03-16 RX ORDER — HYDROMORPHONE HYDROCHLORIDE 2 MG/ML
0.5 INJECTION INTRAMUSCULAR; INTRAVENOUS; SUBCUTANEOUS EVERY 4 HOURS
Refills: 0 | Status: DISCONTINUED | OUTPATIENT
Start: 2021-03-16 | End: 2021-03-16

## 2021-03-16 RX ORDER — ACETAMINOPHEN 500 MG
650 TABLET ORAL EVERY 6 HOURS
Refills: 0 | Status: DISCONTINUED | OUTPATIENT
Start: 2021-03-16 | End: 2021-03-19

## 2021-03-16 RX ORDER — ASCORBIC ACID 60 MG
500 TABLET,CHEWABLE ORAL DAILY
Refills: 0 | Status: DISCONTINUED | OUTPATIENT
Start: 2021-03-16 | End: 2021-03-19

## 2021-03-16 RX ORDER — NAFCILLIN 10 G/100ML
2 INJECTION, POWDER, FOR SOLUTION INTRAVENOUS ONCE
Refills: 0 | Status: COMPLETED | OUTPATIENT
Start: 2021-03-16 | End: 2021-03-16

## 2021-03-16 RX ORDER — NAFCILLIN 10 G/100ML
INJECTION, POWDER, FOR SOLUTION INTRAVENOUS
Refills: 0 | Status: DISCONTINUED | OUTPATIENT
Start: 2021-03-16 | End: 2021-03-19

## 2021-03-16 RX ORDER — NAFCILLIN 10 G/100ML
2 INJECTION, POWDER, FOR SOLUTION INTRAVENOUS EVERY 4 HOURS
Refills: 0 | Status: DISCONTINUED | OUTPATIENT
Start: 2021-03-16 | End: 2021-03-19

## 2021-03-16 RX ADMIN — SODIUM CHLORIDE 75 MILLILITER(S): 9 INJECTION, SOLUTION INTRAVENOUS at 06:38

## 2021-03-16 RX ADMIN — PIPERACILLIN AND TAZOBACTAM 200 GRAM(S): 4; .5 INJECTION, POWDER, LYOPHILIZED, FOR SOLUTION INTRAVENOUS at 06:00

## 2021-03-16 RX ADMIN — NAFCILLIN 200 GRAM(S): 10 INJECTION, POWDER, FOR SOLUTION INTRAVENOUS at 17:44

## 2021-03-16 RX ADMIN — HEPARIN SODIUM 5000 UNIT(S): 5000 INJECTION INTRAVENOUS; SUBCUTANEOUS at 14:04

## 2021-03-16 RX ADMIN — Medication 500 MILLIGRAM(S): at 15:52

## 2021-03-16 RX ADMIN — INSULIN GLARGINE 10 UNIT(S): 100 INJECTION, SOLUTION SUBCUTANEOUS at 21:51

## 2021-03-16 RX ADMIN — HYDROMORPHONE HYDROCHLORIDE 0.5 MILLIGRAM(S): 2 INJECTION INTRAMUSCULAR; INTRAVENOUS; SUBCUTANEOUS at 08:00

## 2021-03-16 RX ADMIN — Medication 4: at 17:21

## 2021-03-16 RX ADMIN — HYDROMORPHONE HYDROCHLORIDE 0.5 MILLIGRAM(S): 2 INJECTION INTRAMUSCULAR; INTRAVENOUS; SUBCUTANEOUS at 18:53

## 2021-03-16 RX ADMIN — Medication 20 MILLIEQUIVALENT(S): at 11:00

## 2021-03-16 RX ADMIN — HEPARIN SODIUM 5000 UNIT(S): 5000 INJECTION INTRAVENOUS; SUBCUTANEOUS at 06:36

## 2021-03-16 RX ADMIN — Medication 4: at 21:50

## 2021-03-16 RX ADMIN — Medication 100 GRAM(S): at 09:39

## 2021-03-16 RX ADMIN — HYDROMORPHONE HYDROCHLORIDE 0.5 MILLIGRAM(S): 2 INJECTION INTRAMUSCULAR; INTRAVENOUS; SUBCUTANEOUS at 19:13

## 2021-03-16 RX ADMIN — Medication 250 MILLIGRAM(S): at 05:00

## 2021-03-16 RX ADMIN — Medication 15 MILLILITER(S): at 21:51

## 2021-03-16 RX ADMIN — NAFCILLIN 200 GRAM(S): 10 INJECTION, POWDER, FOR SOLUTION INTRAVENOUS at 21:52

## 2021-03-16 RX ADMIN — PIPERACILLIN AND TAZOBACTAM 200 GRAM(S): 4; .5 INJECTION, POWDER, LYOPHILIZED, FOR SOLUTION INTRAVENOUS at 11:00

## 2021-03-16 RX ADMIN — HYDROMORPHONE HYDROCHLORIDE 0.5 MILLIGRAM(S): 2 INJECTION INTRAMUSCULAR; INTRAVENOUS; SUBCUTANEOUS at 07:29

## 2021-03-16 RX ADMIN — Medication 100 MILLIGRAM(S): at 10:26

## 2021-03-16 RX ADMIN — OXYCODONE AND ACETAMINOPHEN 1 TABLET(S): 5; 325 TABLET ORAL at 16:52

## 2021-03-16 RX ADMIN — SENNA PLUS 2 TABLET(S): 8.6 TABLET ORAL at 21:50

## 2021-03-16 RX ADMIN — OXYCODONE AND ACETAMINOPHEN 1 TABLET(S): 5; 325 TABLET ORAL at 15:52

## 2021-03-16 NOTE — CONSULT NOTE ADULT - ASSESSMENT
Patient is a 74yo F poor historian PMH uncontrolled DMT2, UC and colon cancer s/p colectomy and chemotherapy, chronic venous stasis w/ chronic LE wounds who presents due to frequent falls over the past several weeks at home. Admitted for DKA and b/l LE cellulits/abscess with c/f RLE necrotizing infection. She was started on Vanc/Zosyn and is s/p a R guillotine BKA and I&D of left ankle with vascular surgery on 3/15. Left foot surgical swab (3/13) growing staph aureus, strept intermedius, strept constellatus, bifidobacterium breve.       Recommendations:      Patient is a 76yo F poor historian PMH uncontrolled DMT2, UC and colon cancer s/p colectomy and chemotherapy, chronic venous stasis w/ chronic LE wounds who presents due to frequent falls over the past several weeks at home. Admitted for DKA and b/l LE cellulits/abscess with c/f RLE necrotizing infection. She was started on Vanc/Zosyn and is s/p a R guillotine BKA and I&D of left ankle with vascular surgery on 3/15. LLE w/ tendonitis/myositis, no concern for OM. Left foot surgical swab (3/13) growing staph aureus, strept intermedius, strept constellatus, bifidobacterium breve.       Recommendations:   -Stop Vancomycin and Zosyn   -Start Nafcillin 2g q4hr for 2 weeks   -3/13 Bcxs NGTD     For Discharge:   - Place Midline   - Discharge patient with Nafcillin 2g q4hr  - Duration of antibiotics is 2 weeks (3/15-3/28)  - Weekly labs: CMP, CBC, ESR, CRP faxed to ID office at [phone]  - Patient to follow up with Dr. medeiros in 2 weeks ([address] [place] [address], [phone]), ID office will call patient to schedule     Plans discussed with ID attending Dr. Medeiros. ID Team 1 to sign off.

## 2021-03-16 NOTE — PROGRESS NOTE ADULT - SUBJECTIVE AND OBJECTIVE BOX
INTERVAL HPI/OVERNIGHT EVENTS:    Patient is a 75y old  Female who presents with a chief complaint of Weakness (16 Mar 2021 12:29)  Pt was seen and examined at he bedside. AOX1-2.   She cant remember what she had for breakfast.   S/p BKA right side 3/15.     FSG & Insulin received:      Pt reports the following symptoms:    CONSTITUTIONAL:  Negative fever or chills  CARDIOVASCULAR:  Negative for chest pain or palpitations  RESPIRATORY:  Negative for cough, wheezing, or SOB   GASTROINTESTINAL:  Negative for nausea, vomiting  GENITOURINARY:  Negative frequency, urgency or dysuria  NEUROLOGIC:  No     MEDICATIONS  (STANDING):  ascorbic acid Syrup 500 milliGRAM(s) Oral daily  dextrose 40% Gel 15 Gram(s) Oral once  dextrose 5%. 1000 milliLiter(s) (100 mL/Hr) IV Continuous <Continuous>  dextrose 5%. 1000 milliLiter(s) (50 mL/Hr) IV Continuous <Continuous>  dextrose 50% Injectable 25 Gram(s) IV Push once  dextrose 50% Injectable 12.5 Gram(s) IV Push once  dextrose 50% Injectable 25 Gram(s) IV Push once  glucagon  Injectable 1 milliGRAM(s) IntraMuscular once  heparin   Injectable 5000 Unit(s) SubCutaneous every 8 hours  insulin glargine Injectable (LANTUS) 14 Unit(s) SubCutaneous at bedtime  insulin lispro (ADMELOG) corrective regimen sliding scale   SubCutaneous Before meals and at bedtime  multivitamin  Chewable 1 Tablet(s) Oral daily  piperacillin/tazobactam IVPB.. 3.375 Gram(s) IV Intermittent every 6 hours  vancomycin  IVPB 1000 milliGRAM(s) IV Intermittent <User Schedule>    MEDICATIONS  (PRN):  acetaminophen    Suspension .. 750 milliGRAM(s) Oral every 6 hours PRN Temp greater or equal to 38C (100.4F), Mild Pain (1 - 3)  ondansetron Injectable 4 milliGRAM(s) IV Push every 6 hours PRN Nausea and/or Vomiting  oxyCODONE    Solution 5 milliGRAM(s) Oral every 4 hours PRN Moderate Pain (4 - 6)      Past medical history reviewed  Family history reviewed  Social history reviewed    PHYSICAL EXAM  Vital Signs Last 24 Hrs  T(C): 36.7 (16 Mar 2021 10:14), Max: 37.2 (15 Mar 2021 17:15)  T(F): 98.1 (16 Mar 2021 10:14), Max: 99 (15 Mar 2021 17:15)  HR: 70 (16 Mar 2021 12:00) (61 - 87)  BP: 141/62 (16 Mar 2021 12:00) (95/54 - 158/68)  BP(mean): 89 (16 Mar 2021 12:00) (73 - 98)  RR: 12 (16 Mar 2021 12:00) (11 - 28)  SpO2: 98% (16 Mar 2021 12:00) (91% - 100%)      Constitutional:  in NAD, AOx1  HEENT: no proptosis or lid retraction  Neck: no thyromegaly or palpable thyroid nodules   Respiratory: lungs CTAB.  Cardiovascular: regular rhythm, normal S1 and S2  GI: soft, NT/ND, no masses/HSM appreciated.  EXT: right BKA    LABS:                        9.7    7.65  )-----------( 333      ( 16 Mar 2021 06:25 )             30.3     03-16    139  |  106  |  6<L>  ----------------------------<  106<H>  3.8   |  26  |  0.55    Ca    7.3<L>      16 Mar 2021 06:25  Phos  3.7     03-16  Mg     1.9     03-16        Urinalysis Basic - ( 15 Mar 2021 01:32 )    Color: Yellow / Appearance: SL Cloudy / SG: >=1.030 / pH: x  Gluc: x / Ketone: NEGATIVE  / Bili: Negative / Urobili: 0.2 E.U./dL   Blood: x / Protein: 30 mg/dL / Nitrite: NEGATIVE   Leuk Esterase: NEGATIVE / RBC: Many /HPF / WBC < 5 /HPF   Sq Epi: x / Non Sq Epi: 0-5 /HPF / Bacteria: x          HbA1C: 16.1 % (03-13 @ 13:30)      CAPILLARY BLOOD GLUCOSE      POCT Blood Glucose.: 141 mg/dL (16 Mar 2021 10:46)  POCT Blood Glucose.: 100 mg/dL (16 Mar 2021 06:32)  POCT Blood Glucose.: 111 mg/dL (15 Mar 2021 23:59)  POCT Blood Glucose.: 218 mg/dL (15 Mar 2021 21:07)  POCT Blood Glucose.: 199 mg/dL (15 Mar 2021 17:18)      Cholesterol, Serum: 83 mg/dL (03-15-21 @ 04:51)  HDL Cholesterol, Serum: 28 mg/dL (03-15-21 @ 04:51)  Triglycerides, Serum: 89 mg/dL (03-15-21 @ 04:51)    A/P: 75F with PMHx UC s/p colostomy (s/p multiple abdominal surgeries including colostomy bag in 2005) BIBEMS from home complaining of weakness; presenting likely in DKA with AMS, with bilateral lower extremity open wounds. Of note, pt found to have a hx of DM2: A1C on admission is 16.1. Pt is now s/p I&D w/ washout and debridement of non- viable soft tissue.     1.  DM:   wt:75 KG  cr:0.6, GFR:89  likely insulinopenic  lantus  at bedtime  Continue lispro moderate dose scale with meals and at bedtime.   Continue consistent carb diet  FSG Goal 100-180    case seen and discussed with   and updated primary team      INTERVAL HPI/OVERNIGHT EVENTS:    Patient is a 75y old  Female who presents with a chief complaint of Weakness (16 Mar 2021 12:29)  Pt was seen and examined at he bedside. AOX1-2.   She cant remember what she had for breakfast.   S/p BKA right side 3/15.     FSG & Insulin received:      Pt reports the following symptoms:    CONSTITUTIONAL:  Negative fever or chills  CARDIOVASCULAR:  Negative for chest pain or palpitations  RESPIRATORY:  Negative for cough, wheezing, or SOB   GASTROINTESTINAL:  Negative for nausea, vomiting  GENITOURINARY:  Negative frequency, urgency or dysuria      MEDICATIONS  (STANDING):  ascorbic acid Syrup 500 milliGRAM(s) Oral daily  dextrose 40% Gel 15 Gram(s) Oral once  dextrose 5%. 1000 milliLiter(s) (100 mL/Hr) IV Continuous <Continuous>  dextrose 5%. 1000 milliLiter(s) (50 mL/Hr) IV Continuous <Continuous>  dextrose 50% Injectable 25 Gram(s) IV Push once  dextrose 50% Injectable 12.5 Gram(s) IV Push once  dextrose 50% Injectable 25 Gram(s) IV Push once  glucagon  Injectable 1 milliGRAM(s) IntraMuscular once  heparin   Injectable 5000 Unit(s) SubCutaneous every 8 hours  insulin glargine Injectable (LANTUS) 14 Unit(s) SubCutaneous at bedtime  insulin lispro (ADMELOG) corrective regimen sliding scale   SubCutaneous Before meals and at bedtime  multivitamin  Chewable 1 Tablet(s) Oral daily  piperacillin/tazobactam IVPB.. 3.375 Gram(s) IV Intermittent every 6 hours  vancomycin  IVPB 1000 milliGRAM(s) IV Intermittent <User Schedule>    MEDICATIONS  (PRN):  acetaminophen    Suspension .. 750 milliGRAM(s) Oral every 6 hours PRN Temp greater or equal to 38C (100.4F), Mild Pain (1 - 3)  ondansetron Injectable 4 milliGRAM(s) IV Push every 6 hours PRN Nausea and/or Vomiting  oxyCODONE    Solution 5 milliGRAM(s) Oral every 4 hours PRN Moderate Pain (4 - 6)      Past medical history reviewed  Family history reviewed  Social history reviewed    PHYSICAL EXAM  Vital Signs Last 24 Hrs  T(C): 36.7 (16 Mar 2021 10:14), Max: 37.2 (15 Mar 2021 17:15)  T(F): 98.1 (16 Mar 2021 10:14), Max: 99 (15 Mar 2021 17:15)  HR: 70 (16 Mar 2021 12:00) (61 - 87)  BP: 141/62 (16 Mar 2021 12:00) (95/54 - 158/68)  BP(mean): 89 (16 Mar 2021 12:00) (73 - 98)  RR: 12 (16 Mar 2021 12:00) (11 - 28)  SpO2: 98% (16 Mar 2021 12:00) (91% - 100%)      Constitutional:  in NAD, AOx2  HEENT: no proptosis or lid retraction  Neck: no thyromegaly or palpable thyroid nodules   Respiratory: lungs CTAB.  Cardiovascular: regular rhythm, normal S1 and S2  GI: soft, NT/ND, no masses/HSM appreciated.  EXT: right BKA    LABS:                        9.7    7.65  )-----------( 333      ( 16 Mar 2021 06:25 )             30.3     03-16    139  |  106  |  6<L>  ----------------------------<  106<H>  3.8   |  26  |  0.55    Ca    7.3<L>      16 Mar 2021 06:25  Phos  3.7     03-16  Mg     1.9     03-16        Urinalysis Basic - ( 15 Mar 2021 01:32 )    Color: Yellow / Appearance: SL Cloudy / SG: >=1.030 / pH: x  Gluc: x / Ketone: NEGATIVE  / Bili: Negative / Urobili: 0.2 E.U./dL   Blood: x / Protein: 30 mg/dL / Nitrite: NEGATIVE   Leuk Esterase: NEGATIVE / RBC: Many /HPF / WBC < 5 /HPF   Sq Epi: x / Non Sq Epi: 0-5 /HPF / Bacteria: x          HbA1C: 16.1 % (03-13 @ 13:30)      CAPILLARY BLOOD GLUCOSE      POCT Blood Glucose.: 141 mg/dL (16 Mar 2021 10:46)  POCT Blood Glucose.: 100 mg/dL (16 Mar 2021 06:32)  POCT Blood Glucose.: 111 mg/dL (15 Mar 2021 23:59)  POCT Blood Glucose.: 218 mg/dL (15 Mar 2021 21:07)  POCT Blood Glucose.: 199 mg/dL (15 Mar 2021 17:18)      Cholesterol, Serum: 83 mg/dL (03-15-21 @ 04:51)  HDL Cholesterol, Serum: 28 mg/dL (03-15-21 @ 04:51)  Triglycerides, Serum: 89 mg/dL (03-15-21 @ 04:51)    A/P: 75F with PMHx UC s/p colostomy (s/p multiple abdominal surgeries including colostomy bag in 2005) BIBEMS from home complaining of weakness; presenting likely in DKA with AMS, with bilateral lower extremity open wounds. Of note, pt found to have a hx of DM2: A1C on admission is 16.1. Pt is now s/p I&D w/ washout and debridement of non- viable soft tissue.     1.  DM:   wt:75 KG  cr:0.6, GFR:89  likely insulinopenic  lantus 10 at bedtime  Continue lispro moderate dose scale with meals and at bedtime.   Continue consistent carb diet  FSG Goal 100-180    case seen and discussed with Dr. Yee and updated primary team

## 2021-03-16 NOTE — PROGRESS NOTE ADULT - SUBJECTIVE AND OBJECTIVE BOX
S: Pt reports feeling well this morning, having slept well. She is not in pain. No HA/Cp/SOB.     Vitals: ICU Vital Signs Last 24 Hrs  T(C): 36.3 (16 Mar 2021 04:26), Max: 37.8 (15 Mar 2021 09:00)  T(F): 97.4 (16 Mar 2021 04:26), Max: 100 (15 Mar 2021 09:00)  HR: 74 (16 Mar 2021 06:00) (61 - 88)  BP: 154/67 (16 Mar 2021 06:00) (93/55 - 158/68)  BP(mean): 97 (16 Mar 2021 06:00) (70 - 98)  ABP: --  ABP(mean): --  RR: 16 (16 Mar 2021 06:00) (11 - 28)  SpO2: 99% (16 Mar 2021 06:00) (91% - 100%)            I&O:  I&O's Detail    15 Mar 2021 07:01  -  16 Mar 2021 07:00  --------------------------------------------------------  IN:    IV PiggyBack: 250 mL    IV PiggyBack: 325 mL    Lactated Ringers: 1350 mL  Total IN: 1925 mL    OUT:    Colostomy (mL): 125 mL    Indwelling Catheter - Urethral (mL): 1281 mL  Total OUT: 1406 mL    Total NET: 519 mL            CAPILLARY BLOOD GLUCOSE      POCT Blood Glucose.: 100 mg/dL (16 Mar 2021 06:32)  POCT Blood Glucose.: 111 mg/dL (15 Mar 2021 23:59)  POCT Blood Glucose.: 218 mg/dL (15 Mar 2021 21:07)  POCT Blood Glucose.: 199 mg/dL (15 Mar 2021 17:18)  POCT Blood Glucose.: 203 mg/dL (15 Mar 2021 10:36)      Labs:                         9.7    7.65  )-----------( 333      ( 16 Mar 2021 06:25 )             30.3   03-16    139  |  106  |  x   ----------------------------<  x   3.8   |  26  |  0.55    Ca    7.4<L>      15 Mar 2021 16:54  Phos  3.7     03-16  Mg     1.9     03-16    Urinalysis Basic - ( 15 Mar 2021 01:32 )    Color: Yellow / Appearance: SL Cloudy / SG: >=1.030 / pH: x  Gluc: x / Ketone: NEGATIVE  / Bili: Negative / Urobili: 0.2 E.U./dL   Blood: x / Protein: 30 mg/dL / Nitrite: NEGATIVE   Leuk Esterase: NEGATIVE / RBC: Many /HPF / WBC < 5 /HPF   Sq Epi: x / Non Sq Epi: 0-5 /HPF / Bacteria: x          Electrolytes repleated to goals as follows: Potassium 4, Phosphorus 3 and Magnesium 2 where appropriate and necessary    Exam:  Neuro: A&Ox3, NAD, grossly neuro intact  HEENT: PERRL < EOMI, MMM  Neck: Supple  CV: RRR, no MRGs  Pulm: CTAB, no wheezes, rales, or rhonchi  Abd: ND, Soft, NT, no rebound or guarding  : Flowers in place  Ext: No edema peripherally or centrally  Vasc: Extremities warm to palpation, 2+ pulses - radial and PT  MSK: no joint swelling  Skin: no rash  Psych: affect appropriate

## 2021-03-16 NOTE — PROGRESS NOTE ADULT - ASSESSMENT
75F PMHx UC, colon cancer s/p total abdominal colectomy with end ileostomy, found down at home, was admitted for DKA and R diabetic foot wound, s/p operative debridement by podiatry 3/14/21 and R foot guillotine amputation and L foot I&D by vasc surg 3/15/21.    Plan  - Cont IV ABx  - F/u final cultures  - Continue daily dressing change  - Cont diet  - Remaining plan per SICU   75F PMHx UC, colon cancer s/p total abdominal colectomy with end ileostomy, found down at home, was admitted for DKA and R diabetic foot wound, s/p operative debridement by podiatry 3/14/21 and R foot guillotine amputation and L foot I&D by vasc surg 3/15/21.    Plan  - Cont IV ABx  - ID consult  - Regular diet  - Step Down  - BKA closure 3/19/21  - F/u final cultures  - Continue daily dressing change

## 2021-03-16 NOTE — CONSULT NOTE ADULT - SUBJECTIVE AND OBJECTIVE BOX
Consultation Requested by: Surgery     Patient is a 75y old  Female who presents with a chief complaint of Weakness (16 Mar 2021 13:01)    HPI:  Patient is a 74 yo F poor historian with hx UC and colon cancer s/p colectomy and chemotherapy, chronic venous stasis w/ chronic LE wounds who presents due to frequent falls over the past several weeks at home. She states that overall she has been more homebound, but recently started having more falls. She follows with her GI DrBrian Claire. Has an ostomy site in place, patient unable to state when this was placed. She states her father had colon cancer, and has passed away from it and she lives at home alone by herself. She denies any fevers, chills, prior diabetes history, nausea, vomiting. Denies loose output on ostomy site. Does state having drainage at her LE legs.    Chest xray without acute infiltrates, EKG with Qtc 510.   LE xray without free air  pulses located with doppler b/l LE  labs notable for wbc 25k, lactate 2.0, vbg pH 7.29. AG 25, A1c 16; Elevated BHB  s/p vanc/zosyn 1 L NS and 4 units of regular insulin.  (13 Mar 2021 20:39)    Pt admitted for DKA w/ b/l LE cellulitis and foot infection. Started on Vanc/Zosyn. CT LE IV contrast showing diffuse b/l cellulitis, b/l abscesses, RLE c/f necrotizing infection, no concern for OM. Patient taken to the OR for an I&D and debridement of R foot with podiatry on 3/14 and 3/15 for a R guillotine BKA, I&D of left ankle with vascular surgery. ID consulted for antibiotic management.     At the bedside pt reports some pain in the LLE. 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        REVIEW OF SYSTEMS  All review of systems negative, except for those marked:  General:		[] Abnormal:  	[] Night Sweats		[] Fever		[] Weight Loss  Pulmonary/Cough:	[] Abnormal:  Cardiac/Chest Pain:	[] Abnormal:  Gastrointestinal:   	[] Abnormal:  Eyes:			        [] Abnormal:  ENT:		         	[] Abnormal:  Dysuria:		                [] Abnormal:  Musculoskeletal	:	[] Abnormal:  Endocrine:		        [] Abnormal:  Lymph Nodes:		[] Abnormal:  Headache:		        [] Abnormal:  Skin:			        [] Abnormal:  Allergy/Immune:       	[] Abnormal:  Psychiatric:		        [] Abnormal:  [] All other review of systems negative  [] Unable to obtain (explain):    Recent Ill Contacts:	[x] No	[] Yes:  Recent Travel History:	[x] No	[] Yes:  Recent Animal/Insect Exposure/Tick Bites:	[x] No	[] Yes:    Allergies    No Known Allergies    Intolerances      Antimicrobials:  nafcillin  IVPB      nafcillin  IVPB 2 Gram(s) IV Intermittent every 4 hours      Other Medications:  acetaminophen   Tablet .. 650 milliGRAM(s) Oral every 6 hours PRN  ascorbic acid 500 milliGRAM(s) Oral daily  dextrose 40% Gel 15 Gram(s) Oral once  dextrose 5%. 1000 milliLiter(s) IV Continuous <Continuous>  dextrose 5%. 1000 milliLiter(s) IV Continuous <Continuous>  dextrose 50% Injectable 25 Gram(s) IV Push once  dextrose 50% Injectable 12.5 Gram(s) IV Push once  dextrose 50% Injectable 25 Gram(s) IV Push once  glucagon  Injectable 1 milliGRAM(s) IntraMuscular once  heparin   Injectable 5000 Unit(s) SubCutaneous every 8 hours  insulin glargine Injectable (LANTUS) 10 Unit(s) SubCutaneous at bedtime  insulin lispro (ADMELOG) corrective regimen sliding scale   SubCutaneous Before meals and at bedtime  multivitamin/minerals/iron Oral Solution (CENTRUM) 15 milliLiter(s) Oral daily  oxycodone    5 mG/acetaminophen 325 mG 1 Tablet(s) Oral every 4 hours PRN  senna 2 Tablet(s) Oral at bedtime      FAMILY HISTORY:  Family hx of colon cancer      PAST MEDICAL & SURGICAL HISTORY:  H/O ulcerative colitis    Colon cancer    Status post Jag procedure    Colostomy present      SOCIAL HISTORY:    IMMUNIZATIONS  [] Up to Date		[] Not Up to Date:  Recent Immunizations:	[] No	[] Yes:    Daily     Daily   Head Circumference:  Vital Signs Last 24 Hrs  T(C): 36.5 (16 Mar 2021 18:30), Max: 36.7 (16 Mar 2021 10:14)  T(F): 97.7 (16 Mar 2021 18:30), Max: 98.1 (16 Mar 2021 10:14)  HR: 88 (16 Mar 2021 14:44) (61 - 88)  BP: 115/55 (16 Mar 2021 14:44) (95/55 - 158/68)  BP(mean): 89 (16 Mar 2021 14:00) (73 - 98)  RR: 16 (16 Mar 2021 14:44) (11 - 16)  SpO2: 96% (16 Mar 2021 14:44) (91% - 100%)    PHYSICAL EXAM  VITAL SIGNS:  T(C): 36.5 (03-16-21 @ 18:30), Max: 36.7 (03-16-21 @ 10:14)  T(F): 97.7 (03-16-21 @ 18:30), Max: 98.1 (03-16-21 @ 10:14)  HR: 88 (03-16-21 @ 14:44) (61 - 88)  BP: 115/55 (03-16-21 @ 14:44) (95/55 - 158/68)  BP(mean): 89 (03-16-21 @ 14:00) (73 - 98)  RR: 16 (03-16-21 @ 14:44) (11 - 16)  SpO2: 96% (03-16-21 @ 14:44) (91% - 100%)  Wt(kg): --    PHYSICAL EXAM:    Constitutional: WDWN resting comfortably in bed; NAD  Head: NC/AT  Eyes: PERRL, EOMI, anicteric sclera  ENT: no nasal discharge; uvula midline, no oropharyngeal erythema or exudates; MMM  Neck: supple   Respiratory: CTA B/L; no W/R/R, no retractions  Cardiac: +S1/S2; RRR; no M/R/G; PMI non-displaced  Gastrointestinal: abdomen soft, NT/ND; no rebound or guarding; +BSx4  Extremities: WWP, R BKA (wrapped), L ankle with ulcer, drain in place (re-wrapped after)   Vascular: 2+ radial, femoral, 1+ DP/PT pulses LLE   Neurologic: AAOx3; CNII-XII grossly intact; no focal deficits  Psychiatric: affect and characteristics of appearance, verbalizations, behaviors are appropriate          Respiratory Support:		[x] No	[] Yes:  Vasoactive medication infusion:	[x] No	[] Yes:  Venous catheters:		[] No	[x] Yes:  Bladder catheter:		        [x] No	[] Yes:  Other catheters or tubes:	[x] No	[] Yes:    Lab Results:                        9.7    7.65  )-----------( 333      ( 16 Mar 2021 06:25 )             30.3     03-16    139  |  106  |  6<L>  ----------------------------<  106<H>  3.8   |  26  |  0.55    Ca    7.3<L>      16 Mar 2021 06:25  Phos  3.7     03-16  Mg     1.9     03-16          Urinalysis Basic - ( 15 Mar 2021 01:32 )    Color: Yellow / Appearance: SL Cloudy / SG: >=1.030 / pH: x  Gluc: x / Ketone: NEGATIVE  / Bili: Negative / Urobili: 0.2 E.U./dL   Blood: x / Protein: 30 mg/dL / Nitrite: NEGATIVE   Leuk Esterase: NEGATIVE / RBC: Many /HPF / WBC < 5 /HPF   Sq Epi: x / Non Sq Epi: 0-5 /HPF / Bacteria: x        MICROBIOLOGY  [] Pathology slides reviewed and/or discussed with pathologist  [] Microbiology findings discussed with microbiologist or slides reviewed  [] Images erviewed with radiologist  [] Case discussed with an attending physician in addition to the patient's primary physician  [] Records, reports from outside [facility] reviewed    [] Patient requires continued monitoring for:  [] Total time spent by attending physician: __ minutes, excluding procedure time. Consultation Requested by: Surgery     Patient is a 75y old  Female who presents with a chief complaint of Weakness (16 Mar 2021 13:01)    HPI:  Patient is a 76 yo F poor historian with hx UC and colon cancer s/p colectomy and chemotherapy, chronic venous stasis w/ chronic LE wounds who presents due to frequent falls over the past several weeks at home. She states that overall she has been more homebound, but recently started having more falls. She follows with her GI DrBrian Claire. Has an ostomy site in place, patient unable to state when this was placed. She states her father had colon cancer, and has passed away from it and she lives at home alone by herself. She denies any fevers, chills, prior diabetes history, nausea, vomiting. Denies loose output on ostomy site. Does state having drainage at her LE legs.    Chest xray without acute infiltrates, EKG with Qtc 510.   LE xray without free air  pulses located with doppler b/l LE  labs notable for wbc 25k, lactate 2.0, vbg pH 7.29. AG 25, A1c 16; Elevated BHB  s/p vanc/zosyn 1 L NS and 4 units of regular insulin.  (13 Mar 2021 20:39)    Pt admitted for DKA w/ b/l LE cellulitis and foot infection. Started on Vanc/Zosyn. CT LE IV contrast showing diffuse b/l cellulitis, b/l abscesses, RLE c/f necrotizing infection, no concern for OM. Patient taken to the OR for an I&D and debridement of R foot with podiatry on 3/14 and 3/15 for a R guillotine BKA, I&D of left ankle with vascular surgery. ID consulted for antibiotic management.     At the bedside pt reports some pain in the LLE. 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        REVIEW OF SYSTEMS  All review of systems negative, except for those marked:  General:		[] Abnormal:  	[] Night Sweats		[] Fever		[] Weight Loss  Pulmonary/Cough:	[] Abnormal:  Cardiac/Chest Pain:	[] Abnormal:  Gastrointestinal:   	[] Abnormal:  Eyes:			        [] Abnormal:  ENT:		         	[] Abnormal:  Dysuria:		                [] Abnormal:  Musculoskeletal	:	[] Abnormal:  Endocrine:		        [] Abnormal:  Lymph Nodes:		[] Abnormal:  Headache:		        [] Abnormal:  Skin:			        [] Abnormal:  Allergy/Immune:       	[] Abnormal:  Psychiatric:		        [] Abnormal:  [] All other review of systems negative  [] Unable to obtain (explain):    Recent Ill Contacts:	[x] No	[] Yes:  Recent Travel History:	[x] No	[] Yes:  Recent Animal/Insect Exposure/Tick Bites:	[x] No	[] Yes:    Allergies    No Known Allergies    Intolerances      Antimicrobials:  nafcillin  IVPB      nafcillin  IVPB 2 Gram(s) IV Intermittent every 4 hours      Other Medications:  acetaminophen   Tablet .. 650 milliGRAM(s) Oral every 6 hours PRN  ascorbic acid 500 milliGRAM(s) Oral daily  dextrose 40% Gel 15 Gram(s) Oral once  dextrose 5%. 1000 milliLiter(s) IV Continuous <Continuous>  dextrose 5%. 1000 milliLiter(s) IV Continuous <Continuous>  dextrose 50% Injectable 25 Gram(s) IV Push once  dextrose 50% Injectable 12.5 Gram(s) IV Push once  dextrose 50% Injectable 25 Gram(s) IV Push once  glucagon  Injectable 1 milliGRAM(s) IntraMuscular once  heparin   Injectable 5000 Unit(s) SubCutaneous every 8 hours  insulin glargine Injectable (LANTUS) 10 Unit(s) SubCutaneous at bedtime  insulin lispro (ADMELOG) corrective regimen sliding scale   SubCutaneous Before meals and at bedtime  multivitamin/minerals/iron Oral Solution (CENTRUM) 15 milliLiter(s) Oral daily  oxycodone    5 mG/acetaminophen 325 mG 1 Tablet(s) Oral every 4 hours PRN  senna 2 Tablet(s) Oral at bedtime      FAMILY HISTORY:  Family hx of colon cancer      PAST MEDICAL & SURGICAL HISTORY:  H/O ulcerative colitis    Colon cancer    Status post Jag procedure    Colostomy present      SOCIAL HISTORY:    IMMUNIZATIONS  [] Up to Date		[] Not Up to Date:  Recent Immunizations:	[] No	[] Yes:    Daily     Daily   Head Circumference:  Vital Signs Last 24 Hrs  T(C): 36.5 (16 Mar 2021 18:30), Max: 36.7 (16 Mar 2021 10:14)  T(F): 97.7 (16 Mar 2021 18:30), Max: 98.1 (16 Mar 2021 10:14)  HR: 88 (16 Mar 2021 14:44) (61 - 88)  BP: 115/55 (16 Mar 2021 14:44) (95/55 - 158/68)  BP(mean): 89 (16 Mar 2021 14:00) (73 - 98)  RR: 16 (16 Mar 2021 14:44) (11 - 16)  SpO2: 96% (16 Mar 2021 14:44) (91% - 100%)    PHYSICAL EXAM  VITAL SIGNS:  T(C): 36.5 (03-16-21 @ 18:30), Max: 36.7 (03-16-21 @ 10:14)  T(F): 97.7 (03-16-21 @ 18:30), Max: 98.1 (03-16-21 @ 10:14)  HR: 88 (03-16-21 @ 14:44) (61 - 88)  BP: 115/55 (03-16-21 @ 14:44) (95/55 - 158/68)  BP(mean): 89 (03-16-21 @ 14:00) (73 - 98)  RR: 16 (03-16-21 @ 14:44) (11 - 16)  SpO2: 96% (03-16-21 @ 14:44) (91% - 100%)  Wt(kg): --    PHYSICAL EXAM:    Constitutional: WDWN resting comfortably in bed; NAD  Head: NC/AT  Eyes: PERRL, EOMI, anicteric sclera  ENT: no nasal discharge; uvula midline, no oropharyngeal erythema or exudates; MMM  Neck: supple   Respiratory: CTA B/L; no W/R/R, no retractions  Cardiac: +S1/S2; RRR; no M/R/G; PMI non-displaced  Gastrointestinal: abdomen soft, NT/ND; no rebound or guarding; +BSx4  Extremities: WWP, R BKA (wrapped), L ankle with ulcer, drain in place (re-wrapped after)   Vascular: 2+ radial, femoral, 1+ DP/PT pulses LLE   Neurologic: AAOx3; CNII-XII grossly intact; no focal deficits  Psychiatric: affect and characteristics of appearance, verbalizations, behaviors are appropriate          Culture - Surgical Swab (collected 14 Mar 2021 07:52)  Source: .Surgical Swab right foot  Gram Stain (14 Mar 2021 21:45):    Moderate Gram positive cocci in pairs, chains and clusters    Few White blood cells  Preliminary Report (15 Mar 2021 12:20):    Rare Staphylococcus epidermidis    Few Streptococcus constellatus    Few Streptococcus intermedius    Culture in progress      Respiratory Support:		[x] No	[] Yes:  Vasoactive medication infusion:	[x] No	[] Yes:  Venous catheters:		[] No	[x] Yes:  Bladder catheter:		        [x] No	[] Yes:  Other catheters or tubes:	[x] No	[] Yes:    Lab Results:                        9.7    7.65  )-----------( 333      ( 16 Mar 2021 06:25 )             30.3     03-16    139  |  106  |  6<L>  ----------------------------<  106<H>  3.8   |  26  |  0.55    Ca    7.3<L>      16 Mar 2021 06:25  Phos  3.7     03-16  Mg     1.9     03-16          Urinalysis Basic - ( 15 Mar 2021 01:32 )    Color: Yellow / Appearance: SL Cloudy / SG: >=1.030 / pH: x  Gluc: x / Ketone: NEGATIVE  / Bili: Negative / Urobili: 0.2 E.U./dL   Blood: x / Protein: 30 mg/dL / Nitrite: NEGATIVE   Leuk Esterase: NEGATIVE / RBC: Many /HPF / WBC < 5 /HPF   Sq Epi: x / Non Sq Epi: 0-5 /HPF / Bacteria: x

## 2021-03-16 NOTE — PROGRESS NOTE ADULT - SUBJECTIVE AND OBJECTIVE BOX
24 hr events: NAEON. Afebrile, VSS.    SUBJECTIVE: Patient assessed by . Laying in bed in NAD. C/o     Vital Signs Last 24 Hrs  T(C): 36.3 (16 Mar 2021 04:26), Max: 37.8 (15 Mar 2021 09:00)  T(F): 97.4 (16 Mar 2021 04:26), Max: 100 (15 Mar 2021 09:00)  HR: 65 (16 Mar 2021 05:00) (61 - 91)  BP: 134/62 (16 Mar 2021 05:00) (93/55 - 158/68)  BP(mean): 89 (16 Mar 2021 05:00) (70 - 98)  RR: 14 (16 Mar 2021 05:00) (11 - 35)  SpO2: 97% (16 Mar 2021 05:00) (89% - 100%)      Physical Exam:  General: NAD  Pulmonary: Nonlabored breathing, no respiratory distress  Cardiovascular: NSR  Abdominal: soft, NT/ND, no organomegaly  Extremities: WWP, normal strength, no clubbing/cyanosis/edema  Neuro: A/O x3, no focal deficits, normal sensation  Pulses:     Pulses:   Right:  FEM [ ]2+ [ ]1+ [ ]doppler    POP [ ]2+ [ ]1+ [ ]doppler  DP [ ]2+ [ ]1+ [ ]doppler  PT[ ]2+ [ ]1+ [ ]doppler    Left:  FEM [ ]2+ [ ]1+ [ ]doppler  POP [ ]2+ [ ]1+ [ ]doppler  DP [ ]2+ [ ]1+ [ ]doppler  PT [ ]2+ [ ]1+ [ ]doppler      Lines/drains/tubes:    I&O's Summary    14 Mar 2021 07:01  -  15 Mar 2021 07:00  --------------------------------------------------------  IN: 3229.8 mL / OUT: 1030 mL / NET: 2199.8 mL    15 Mar 2021 07:01  -  16 Mar 2021 05:39  --------------------------------------------------------  IN: 1550 mL / OUT: 1206 mL / NET: 344 mL        LABS:                        9.9    12.09 )-----------( 333      ( 15 Mar 2021 16:54 )             29.8     03-15    136  |  104  |  7   ----------------------------<  196<H>  3.7   |  24  |  0.59    Ca    7.4<L>      15 Mar 2021 16:54  Phos  2.8     03-15  Mg     2.0     03-15        Urinalysis Basic - ( 15 Mar 2021 01:32 )    Color: Yellow / Appearance: SL Cloudy / SG: >=1.030 / pH: x  Gluc: x / Ketone: NEGATIVE  / Bili: Negative / Urobili: 0.2 E.U./dL   Blood: x / Protein: 30 mg/dL / Nitrite: NEGATIVE   Leuk Esterase: NEGATIVE / RBC: Many /HPF / WBC < 5 /HPF   Sq Epi: x / Non Sq Epi: 0-5 /HPF / Bacteria: x      CAPILLARY BLOOD GLUCOSE      POCT Blood Glucose.: 111 mg/dL (15 Mar 2021 23:59)  POCT Blood Glucose.: 218 mg/dL (15 Mar 2021 21:07)  POCT Blood Glucose.: 199 mg/dL (15 Mar 2021 17:18)  POCT Blood Glucose.: 203 mg/dL (15 Mar 2021 10:36)  POCT Blood Glucose.: 245 mg/dL (15 Mar 2021 05:48)        RADIOLOGY & ADDITIONAL STUDIES:     24 hr events: NAEON. Afebrile, VSS. Received 500 cc LR for low u/o.    SUBJECTIVE: Patient assessed by Team 3. Laying in bed in NAD. No major complaints. Pain well controlled.     Vital Signs Last 24 Hrs  T(C): 36.3 (16 Mar 2021 04:26), Max: 37.8 (15 Mar 2021 09:00)  T(F): 97.4 (16 Mar 2021 04:26), Max: 100 (15 Mar 2021 09:00)  HR: 65 (16 Mar 2021 05:00) (61 - 91)  BP: 134/62 (16 Mar 2021 05:00) (93/55 - 158/68)  BP(mean): 89 (16 Mar 2021 05:00) (70 - 98)  RR: 14 (16 Mar 2021 05:00) (11 - 35)  SpO2: 97% (16 Mar 2021 05:00) (89% - 100%)      Physical Exam:  General: NAD  Pulmonary: Nonlabored breathing, no respiratory distress  Cardiovascular: NSR  Abdominal: soft, NT/ND, no organomegaly  Extremities: RLE with amputation site clean, mildly bloody, without cellulitis, fibrinous exudate or pus. L ankle with health I&D site, visible granulation tissue and penrose drain in place.    Lines/drains/tubes:    I&O's Summary    14 Mar 2021 07:01  -  15 Mar 2021 07:00  --------------------------------------------------------  IN: 3229.8 mL / OUT: 1030 mL / NET: 2199.8 mL    15 Mar 2021 07:01  -  16 Mar 2021 05:39  --------------------------------------------------------  IN: 1550 mL / OUT: 1206 mL / NET: 344 mL        LABS:                        9.9    12.09 )-----------( 333      ( 15 Mar 2021 16:54 )             29.8     03-15    136  |  104  |  7   ----------------------------<  196<H>  3.7   |  24  |  0.59    Ca    7.4<L>      15 Mar 2021 16:54  Phos  2.8     03-15  Mg     2.0     03-15        Urinalysis Basic - ( 15 Mar 2021 01:32 )    Color: Yellow / Appearance: SL Cloudy / SG: >=1.030 / pH: x  Gluc: x / Ketone: NEGATIVE  / Bili: Negative / Urobili: 0.2 E.U./dL   Blood: x / Protein: 30 mg/dL / Nitrite: NEGATIVE   Leuk Esterase: NEGATIVE / RBC: Many /HPF / WBC < 5 /HPF   Sq Epi: x / Non Sq Epi: 0-5 /HPF / Bacteria: x      CAPILLARY BLOOD GLUCOSE      POCT Blood Glucose.: 111 mg/dL (15 Mar 2021 23:59)  POCT Blood Glucose.: 218 mg/dL (15 Mar 2021 21:07)  POCT Blood Glucose.: 199 mg/dL (15 Mar 2021 17:18)  POCT Blood Glucose.: 203 mg/dL (15 Mar 2021 10:36)  POCT Blood Glucose.: 245 mg/dL (15 Mar 2021 05:48)        RADIOLOGY & ADDITIONAL STUDIES:

## 2021-03-16 NOTE — CONSULT NOTE ADULT - ATTENDING COMMENTS
A/P 75yFemale with hx of DM presenting for management of lower extremity cellulitis, remains w out source control, moderate hyperglycemia    1.  DM:   likely insulinopenic  lantus 8 at bedtime, lispro 4 units tid with meals.   Continue lispro moderate dose scale with meals and at bedtime.   Continue consistent carb diet  FSG Goal 100-180    2.  Hyperlipidemia - goal LDL < 70  check lipid profile  consider statin      Pt is advised to follow up with me at discharge by calling .      Eliane Delgadillo MD, PhD  Endocrinology  [address]  [place] [address]  [phone] Tel  [phone] Fax  [email or web address]
I saw and evaluated the patient on the above date of service and discussed the care with the resident. I agree with the findings and plan as documented in the note above except for the followinF h/o UC and colon ca s/p colectomy and chemo, chronic venostasis w/ chronic LE wound p/w b/l lower extremity cellulitis and abscesses and new diagnosis of DM on 3/13. CT LE showed necrotizing infection but no e/o OM. She underwent I&D and washout of R foot with debridement of non-viable tissue/bones. She then underwent guillotine R BKA on 3/15, and I&D of L anterior ankle purulent wound with drain placement. ID was consulted for abx rec.  Exam notable for R BKA, L anterior ankle with open wound with penrose drain, ttp, no drainage, mild discoloration of skin  Lab notable for   3/13 L foot WCx: MSSA, strep intermedius, strep constellatus, bifidobacterium breve  3/13 R foot WCx: morganella morganii, E coli, strep intermedius, strep constellatus, MSSA  BCx neg  3/14 R foot staph epi, strep constellatus, strep intermedius    B/l leg cellulitis and necrotizing infection with abscess.  R leg s/p BKA and thus no residual infection.  L foot s/p drainage and debridement of necrotic tissue.  Per surgery, patient has extensive tenosynovitis and myositis, but no e/o OM.  Strep intermedius and constellatus are S to CTX and amp.  Bifidobacterium was b-lactamase negative.  In this setting, nafcillin will cover all organisms isolated from L WCx (strep, MSSA, and bifidobacterium).    - stop vanc/zosyn  - start nafcillin 2g IV q4h  - will need total 14 days of IV abx to treat extensive soft tissue infection involving muscles and tendon   - see detail instruction for Dr. Gomes's note      Thank you for your consult.  Please re-consult us or call us with questions.    Tessy Medeiros MD, MS  Infectious disease attending  Work cell [phone]
Hyperglycemia likely due to type II diabetes, improving anion gap and glucose with IV fluids.  Ct suggesting necrotizing fasciitis, would recommend surgical and  SICU evaluation for further management.

## 2021-03-16 NOTE — PROGRESS NOTE ADULT - ASSESSMENT
75F PMHx UC, colon cancer s/p total abdominal colectomy with end ileostomy, found down at home, was admitted for DKA and R diabetic foot wound, s/p operative debridement by podiatry (3/14). Patient does not want an amputation, which is only curative treatment for bilateral lower extremity infection.     NEURO: tylenol, dilaudid PRN   CV: Stable, TTE- LVEF>55%, nl RV/LV size/function  RESP: NC  FEN/GI: CLD, LR@75/hr, multivite, vit C  : Flowers  ID: B/L LE infection- Vanco/Zosyn (3/13--) staph epidermids/strep constellatus/strep intermedius--foot wound  ENDO: s/p insulin gtt; Lantus 14u qHS, mod SSI; endo following   HEME: Stable  Wounds/lines/drains: PIV's, b/l foot wounds WTD dressing by podiatry  PPx: HSQ 75F PMHx UC, colon cancer s/p total abdominal colectomy with end ileostomy, found down at home, was admitted for DKA and R diabetic foot wound, s/p operative debridement by podiatry (3/14). Patient does not want an amputation, which is only curative treatment for bilateral lower extremity infection.     NEURO: tylenol, dilaudid PRN   CV: Stable, TTE- LVEF>55%, nl RV/LV size/function  RESP: NC  FEN/GI: CCD, multivite, vit C  : TOV  ID: B/L LE infection- Vanco/Zosyn (3/13--) staph epidermids/strep constellatus/strep intermedius--foot wound  ENDO: s/p insulin gtt; Lantus 14u qHS, mod SSI; endo following   HEME: Stable  Wounds/lines/drains: PIV's, b/l foot wounds WTD dressing by podiatry  PPx: HSQ

## 2021-03-17 ENCOUNTER — TRANSCRIPTION ENCOUNTER (OUTPATIENT)
Age: 76
End: 2021-03-17

## 2021-03-17 LAB
-  CEFAZOLIN: SIGNIFICANT CHANGE UP
-  CLINDAMYCIN: SIGNIFICANT CHANGE UP
-  ERYTHROMYCIN: SIGNIFICANT CHANGE UP
-  LINEZOLID: SIGNIFICANT CHANGE UP
-  OXACILLIN: SIGNIFICANT CHANGE UP
-  RIFAMPIN: SIGNIFICANT CHANGE UP
-  TRIMETHOPRIM/SULFAMETHOXAZOLE: SIGNIFICANT CHANGE UP
-  VANCOMYCIN: SIGNIFICANT CHANGE UP
ANION GAP SERPL CALC-SCNC: 7 MMOL/L — SIGNIFICANT CHANGE UP (ref 5–17)
BUN SERPL-MCNC: 5 MG/DL — LOW (ref 7–23)
CALCIUM SERPL-MCNC: 7.5 MG/DL — LOW (ref 8.4–10.5)
CHLORIDE SERPL-SCNC: 105 MMOL/L — SIGNIFICANT CHANGE UP (ref 96–108)
CO2 SERPL-SCNC: 27 MMOL/L — SIGNIFICANT CHANGE UP (ref 22–31)
CREAT SERPL-MCNC: 0.62 MG/DL — SIGNIFICANT CHANGE UP (ref 0.5–1.3)
GLUCOSE BLDC GLUCOMTR-MCNC: 142 MG/DL — HIGH (ref 70–99)
GLUCOSE BLDC GLUCOMTR-MCNC: 166 MG/DL — HIGH (ref 70–99)
GLUCOSE BLDC GLUCOMTR-MCNC: 231 MG/DL — HIGH (ref 70–99)
GLUCOSE BLDC GLUCOMTR-MCNC: 271 MG/DL — HIGH (ref 70–99)
GLUCOSE SERPL-MCNC: 137 MG/DL — HIGH (ref 70–99)
HCT VFR BLD CALC: 31.1 % — LOW (ref 34.5–45)
HGB BLD-MCNC: 10.1 G/DL — LOW (ref 11.5–15.5)
MAGNESIUM SERPL-MCNC: 1.7 MG/DL — SIGNIFICANT CHANGE UP (ref 1.6–2.6)
MCHC RBC-ENTMCNC: 29.5 PG — SIGNIFICANT CHANGE UP (ref 27–34)
MCHC RBC-ENTMCNC: 32.5 GM/DL — SIGNIFICANT CHANGE UP (ref 32–36)
MCV RBC AUTO: 90.9 FL — SIGNIFICANT CHANGE UP (ref 80–100)
METHOD TYPE: SIGNIFICANT CHANGE UP
NRBC # BLD: 0 /100 WBCS — SIGNIFICANT CHANGE UP (ref 0–0)
PHOSPHATE SERPL-MCNC: 2.5 MG/DL — SIGNIFICANT CHANGE UP (ref 2.5–4.5)
PLATELET # BLD AUTO: 336 K/UL — SIGNIFICANT CHANGE UP (ref 150–400)
POTASSIUM SERPL-MCNC: 4 MMOL/L — SIGNIFICANT CHANGE UP (ref 3.5–5.3)
POTASSIUM SERPL-SCNC: 4 MMOL/L — SIGNIFICANT CHANGE UP (ref 3.5–5.3)
RBC # BLD: 3.42 M/UL — LOW (ref 3.8–5.2)
RBC # FLD: 13.1 % — SIGNIFICANT CHANGE UP (ref 10.3–14.5)
SODIUM SERPL-SCNC: 139 MMOL/L — SIGNIFICANT CHANGE UP (ref 135–145)
WBC # BLD: 7.29 K/UL — SIGNIFICANT CHANGE UP (ref 3.8–10.5)
WBC # FLD AUTO: 7.29 K/UL — SIGNIFICANT CHANGE UP (ref 3.8–10.5)

## 2021-03-17 PROCEDURE — 99232 SBSQ HOSP IP/OBS MODERATE 35: CPT | Mod: GC

## 2021-03-17 PROCEDURE — 99223 1ST HOSP IP/OBS HIGH 75: CPT

## 2021-03-17 RX ORDER — INSULIN LISPRO 100/ML
6 VIAL (ML) SUBCUTANEOUS
Refills: 0 | Status: DISCONTINUED | OUTPATIENT
Start: 2021-03-17 | End: 2021-03-19

## 2021-03-17 RX ORDER — DIPHENHYDRAMINE HYDROCHLORIDE AND LIDOCAINE HYDROCHLORIDE AND ALUMINUM HYDROXIDE AND MAGNESIUM HYDRO
10 KIT DAILY
Refills: 0 | Status: DISCONTINUED | OUTPATIENT
Start: 2021-03-17 | End: 2021-03-19

## 2021-03-17 RX ORDER — MAGNESIUM OXIDE 400 MG ORAL TABLET 241.3 MG
400 TABLET ORAL ONCE
Refills: 0 | Status: COMPLETED | OUTPATIENT
Start: 2021-03-17 | End: 2021-03-17

## 2021-03-17 RX ORDER — INSULIN GLARGINE 100 [IU]/ML
15 INJECTION, SOLUTION SUBCUTANEOUS AT BEDTIME
Refills: 0 | Status: DISCONTINUED | OUTPATIENT
Start: 2021-03-17 | End: 2021-03-18

## 2021-03-17 RX ADMIN — NAFCILLIN 200 GRAM(S): 10 INJECTION, POWDER, FOR SOLUTION INTRAVENOUS at 14:45

## 2021-03-17 RX ADMIN — DIPHENHYDRAMINE HYDROCHLORIDE AND LIDOCAINE HYDROCHLORIDE AND ALUMINUM HYDROXIDE AND MAGNESIUM HYDRO 10 MILLILITER(S): KIT at 18:25

## 2021-03-17 RX ADMIN — OXYCODONE AND ACETAMINOPHEN 1 TABLET(S): 5; 325 TABLET ORAL at 10:48

## 2021-03-17 RX ADMIN — HEPARIN SODIUM 5000 UNIT(S): 5000 INJECTION INTRAVENOUS; SUBCUTANEOUS at 15:30

## 2021-03-17 RX ADMIN — MAGNESIUM OXIDE 400 MG ORAL TABLET 400 MILLIGRAM(S): 241.3 TABLET ORAL at 09:12

## 2021-03-17 RX ADMIN — OXYCODONE AND ACETAMINOPHEN 1 TABLET(S): 5; 325 TABLET ORAL at 06:31

## 2021-03-17 RX ADMIN — OXYCODONE AND ACETAMINOPHEN 1 TABLET(S): 5; 325 TABLET ORAL at 17:05

## 2021-03-17 RX ADMIN — Medication 500 MILLIGRAM(S): at 09:48

## 2021-03-17 RX ADMIN — NAFCILLIN 200 GRAM(S): 10 INJECTION, POWDER, FOR SOLUTION INTRAVENOUS at 09:47

## 2021-03-17 RX ADMIN — INSULIN GLARGINE 15 UNIT(S): 100 INJECTION, SOLUTION SUBCUTANEOUS at 22:25

## 2021-03-17 RX ADMIN — SENNA PLUS 2 TABLET(S): 8.6 TABLET ORAL at 22:26

## 2021-03-17 RX ADMIN — OXYCODONE AND ACETAMINOPHEN 1 TABLET(S): 5; 325 TABLET ORAL at 09:48

## 2021-03-17 RX ADMIN — OXYCODONE AND ACETAMINOPHEN 1 TABLET(S): 5; 325 TABLET ORAL at 05:31

## 2021-03-17 RX ADMIN — Medication 15 MILLILITER(S): at 09:48

## 2021-03-17 RX ADMIN — NAFCILLIN 200 GRAM(S): 10 INJECTION, POWDER, FOR SOLUTION INTRAVENOUS at 22:26

## 2021-03-17 RX ADMIN — HEPARIN SODIUM 5000 UNIT(S): 5000 INJECTION INTRAVENOUS; SUBCUTANEOUS at 23:30

## 2021-03-17 RX ADMIN — HEPARIN SODIUM 5000 UNIT(S): 5000 INJECTION INTRAVENOUS; SUBCUTANEOUS at 00:22

## 2021-03-17 RX ADMIN — NAFCILLIN 200 GRAM(S): 10 INJECTION, POWDER, FOR SOLUTION INTRAVENOUS at 18:25

## 2021-03-17 RX ADMIN — Medication 2: at 22:25

## 2021-03-17 RX ADMIN — NAFCILLIN 200 GRAM(S): 10 INJECTION, POWDER, FOR SOLUTION INTRAVENOUS at 05:32

## 2021-03-17 RX ADMIN — HEPARIN SODIUM 5000 UNIT(S): 5000 INJECTION INTRAVENOUS; SUBCUTANEOUS at 07:09

## 2021-03-17 RX ADMIN — Medication 6: at 12:30

## 2021-03-17 RX ADMIN — Medication 4: at 15:30

## 2021-03-17 RX ADMIN — OXYCODONE AND ACETAMINOPHEN 1 TABLET(S): 5; 325 TABLET ORAL at 16:05

## 2021-03-17 RX ADMIN — NAFCILLIN 200 GRAM(S): 10 INJECTION, POWDER, FOR SOLUTION INTRAVENOUS at 01:39

## 2021-03-17 RX ADMIN — Medication 6 UNIT(S): at 18:56

## 2021-03-17 NOTE — DISCHARGE NOTE PROVIDER - NSDCCPTREATMENT_GEN_ALL_CORE_FT
PRINCIPAL PROCEDURE  Procedure: Amputation below knee  Findings and Treatment:       SECONDARY PROCEDURE  Procedure: Deep incision and drainage of multiple areas of foot  Findings and Treatment:

## 2021-03-17 NOTE — CONSULT NOTE ADULT - CONSULT REQUESTED DATE/TIME
13-Mar-2021 16:03
13-Mar-2021 19:50
13-Mar-2021 15:11
13-Mar-2021 20:40
16-Mar-2021 12:29
17-Mar-2021 10:09
13-Mar-2021 14:12

## 2021-03-17 NOTE — DISCHARGE NOTE PROVIDER - CARE PROVIDER_API CALL
Harper Leung)  Surgery; Vascular Surgery  [address] [place] [address]  Phone: [phone]  Fax: [phone]  Follow Up Time: 1 week   Harper Leung)  Surgery; Vascular Surgery  [address], [address] [place] [address]  Phone: [phone]  Fax: [phone]  Follow Up Time: 1 week    Stef Hartmna)  Internal Medicine  [address] [place] [address]  Phone: [phone]  Fax: [phone]  Follow Up Time: 1 week   Harper Leung)  Surgery; Vascular Surgery  [address] [place] [address]  Phone: [phone]  Fax: [phone]  Scheduled Appointment: 04/02/2021 10:00 AM    Stef Hartman)  Internal Medicine  [address] [place] [address]  Phone: [phone]  Fax: [phone]  Follow Up Time: 1 week   Harper Leung)  Surgery; Vascular Surgery  [address] [place] [address]  Phone: [phone]  Fax: [phone]  Scheduled Appointment: 04/12/2021 09:30 AM    Stef Hartman)  Internal Medicine  [address] [place] [address]  Phone: [phone]  Fax: [phone]  Follow Up Time: 1 week

## 2021-03-17 NOTE — CONSULT NOTE ADULT - ASSESSMENT
In summary, this is a 74yo woman, poor historian, with a PMH of UC and colon cancer s/p colectomy and chemotherapy and chronic venous stasis w/chronic LE wounds who presented w/frequent falls and subsequently found to have bilateral lower extremity cellulitis and abscesses c/b septic shock and DK with subsequent imaging c/f RLE necrotizing fascitis.  She was admitted to the SICU and underwent a R-foot I&D on 3/14 with Podiatry and R-BKA w/L-foot I&D on 3/15 with Vascular.  She was transferred to telemetry on 3/16, pending BKA closure on 3/19.     #BLE Cellulitis c/b Septic Shock and Nec Fasc s/p R-BKA  -- plan for OR to close BKA incision on 3/19; will remain in-house 5 days post-op  -- ID recommending Nafcillin x 2 weeks w/labs sent to Dr. Medeiros  -- will need f/u with Dr. Medeiros upon discharge  -- wound care per primary team   -- Percocet 1 tab q4h PRN and Tylenol 650mg q6h PRN w/Senna  -- PT as able     #Newly Diagnosed IDDM2 c/b DKA and SSTI  -- likely risk factor for infection and sepsis   -- Endo following, appreciate their assistance  -- on Lantus 10U QHS and mISS    #UC c/b Colon Ca s/p Ostomy  -- no active issues     #Apthous Ulcer  -- R-inner lower lip  -- can offer topical analgesic such as Oragel or Lidocaine     #Acute Blood Loss Anemia   -- likely from multiple trips to the OR and BKA  -- currently stable and will continue to monitor     #Diet - Carb Controlled / Dysphagia 1   #DVT PPx - SQH  #Dispo - likely [facility]; should be medically ready by 3/24    Bailee Cottrell  Attending Hospitalist  [phone]

## 2021-03-17 NOTE — PROGRESS NOTE ADULT - SUBJECTIVE AND OBJECTIVE BOX
INTERVAL HPI/OVERNIGHT EVENTS: Signed off yesterday, however will continue to follow as left wound cx from 3/13 growing another organism. Will need to await speciation and susceptibilities for final Abx recs.     OVERNIGHT: No overnight events.  SUBJECTIVE: Patient seen and examined at bedside. Reports right stump numbness/pain. Denies fevers, chills, HA, chest pain, sob, nausea, vomiting, abdominal pain, diarrhea, constipation, dysuria.      REVIEW OF SYSTEMS:    CONSTITUTIONAL: No weakness, fevers or chills  EYES/ENT: No visual changes;  No vertigo or throat pain   NECK: No pain or stiffness  RESPIRATORY: No cough, wheezing, hemoptysis; No shortness of breath  CARDIOVASCULAR: No chest pain or palpitations  GASTROINTESTINAL: No abdominal or epigastric pain. No nausea, vomiting, or hematemesis; No diarrhea or constipation. No melena or hematochezia.  GENITOURINARY: No dysuria, frequency or hematuria  NEUROLOGICAL: No numbness or weakness  SKIN: No itching, burning, rashes, or lesions   MSK: no joint pain, no joint swelling  All other review of systems is negative unless indicated above.      Allergies    No Known Allergies    Intolerances        ANTIBIOTICS/RELEVANT:  antimicrobials  nafcillin  IVPB      nafcillin  IVPB 2 Gram(s) IV Intermittent every 4 hours    immunologic:    OTHER:  acetaminophen   Tablet .. 650 milliGRAM(s) Oral every 6 hours PRN  ascorbic acid 500 milliGRAM(s) Oral daily  dextrose 40% Gel 15 Gram(s) Oral once  dextrose 5%. 1000 milliLiter(s) IV Continuous <Continuous>  dextrose 5%. 1000 milliLiter(s) IV Continuous <Continuous>  dextrose 50% Injectable 25 Gram(s) IV Push once  dextrose 50% Injectable 12.5 Gram(s) IV Push once  dextrose 50% Injectable 25 Gram(s) IV Push once  glucagon  Injectable 1 milliGRAM(s) IntraMuscular once  heparin   Injectable 5000 Unit(s) SubCutaneous every 8 hours  insulin glargine Injectable (LANTUS) 10 Unit(s) SubCutaneous at bedtime  insulin lispro (ADMELOG) corrective regimen sliding scale   SubCutaneous Before meals and at bedtime  multivitamin/minerals/iron Oral Solution (CENTRUM) 15 milliLiter(s) Oral daily  oxycodone    5 mG/acetaminophen 325 mG 1 Tablet(s) Oral every 4 hours PRN  senna 2 Tablet(s) Oral at bedtime      Objective:  Vital Signs Last 24 Hrs  T(C): 37.2 (17 Mar 2021 09:42), Max: 37.2 (17 Mar 2021 09:42)  T(F): 99 (17 Mar 2021 09:42), Max: 99 (17 Mar 2021 09:42)  HR: 86 (17 Mar 2021 08:30) (63 - 88)  BP: 111/55 (17 Mar 2021 08:30) (111/55 - 136/62)  BP(mean): 89 (17 Mar 2021 05:35) (77 - 89)  RR: 18 (17 Mar 2021 08:30) (14 - 18)  SpO2: 96% (17 Mar 2021 08:30) (96% - 99%)      PHYSICAL EXAM:  Constitutional: WDWN resting comfortably in bed; NAD  Head: NC/AT  Eyes: PERRL, EOMI, anicteric sclera  ENT: no nasal discharge; uvula midline, no oropharyngeal erythema or exudates; MMM  Neck: supple   Respiratory: CTA B/L; no W/R/R, no retractions  Cardiac: +S1/S2; RRR; no M/R/G; PMI non-displaced  Gastrointestinal: abdomen soft, NT/ND; no rebound or guarding; +BSx4  Extremities: WWP, S/p R BKA, RLE/LLE with dressing c/d/i   Vascular: 2+ radial, femoral, 1+ DP/PT pulses LLE   Neurologic: AAOx3; CNII-XII grossly intact; no focal deficits  Psychiatric: affect and characteristics of appearance, verbalizations, behaviors are appropriate        LABS:                        10.1   7.29  )-----------( 336      ( 17 Mar 2021 07:11 )             31.1     03-17    139  |  105  |  5<L>  ----------------------------<  137<H>  4.0   |  27  |  0.62    Ca    7.5<L>      17 Mar 2021 07:11  Phos  2.5     03-17  Mg     1.7     03-17            MICROBIOLOGY:            RECENT CULTURES:  03-14 @ 07:52  .Surgical Swab right foot  Staphylococcus epidermidis  Staphylococcus epidermidis  MARCE    Rare Staphylococcus epidermidis  Few Streptococcus constellatus .... See previous culture for sensitivity.  Few Streptococcus intermedius .... See previous culture for sensitivity.  Culture in progress  --  03-13 @ 18:31  .Urine Catheterized  --  --  --    No growth  --  03-13 @ 15:09  .Blood Blood  --  --  --    No growth at 3 days.  --  03-13 @ 15:08  .Blood Blood  --  --  --    No growth at 3 days.  --  03-13 @ 14:14  .Surgical Swab right foot wound  Morganella morganii  Escherichia coli  Streptococcus intermedius  Streptococcus intermedius  Staphylococcus aureus  Streptococcus constellatus  Streptococcus constellatus  Morganella morganii  MARCE    Rare Morganella morganii  Rare Escherichia coli  Moderate Streptococcus intermedius  Few Streptococcus constellatus  Few Staphylococcus aureus  Rare Candida krusei  Numerous Prevotella species ... Beta lactamase positive  --      RADIOLOGY & ADDITIONAL STUDIES:

## 2021-03-17 NOTE — CONSULT NOTE ADULT - SUBJECTIVE AND OBJECTIVE BOX
Vascular Co-Management Initial Consult Note    HPI:  This is a 74yo woman, poor historian, with a PMH of UC and colon cancer s/p colectomy and chemotherapy and chronic venous stasis w/chronic LE wounds who presented w/frequent falls and subsequently found to have bilateral lower extremity cellulitis and abscesses c/b septic shock and DK with subsequent imaging c/f RLE necrotizing fascitis.  She was admitted to the SICU and underwent a R-foot I&D on 3/14 with Podiatry and R-BKA w/L-foot I&D on 3/15 with Vascular.  She was transferred to telemetry on 3/16.     This morning she reports feeling "okay" other than some numbness at the site fo her BKA.  Denies any HA, SOB, CP, abdominal pain and lower urinary tract symptoms.  She is tolerating PO and +flatus.  Hasn't really mobilized out of bed.  Remainder of 12-point ROS otherwise negative.     PAST MEDICAL & SURGICAL HISTORY:  -- H/O ulcerative colitis  -- Colon cancer  -- Status post Jag procedure  -- Colostomy present    Home Medications: None but patient is a poor historian.     Allergies: No Known Allergies    FAMILY HISTORY:  -- Family hx of colon cancer    Social History:  -- Lives alone at home w/hoarding habits.   -- Does not have family in the [facility] area.   -- Denies toxic habits x2 - EtOH, tobacco, illicits     CURRENT MEDICATIONS:   acetaminophen   Tablet .. 650 milliGRAM(s) Oral every 6 hours PRN  ascorbic acid 500 milliGRAM(s) Oral daily  dextrose 40% Gel 15 Gram(s) Oral once  dextrose 5%. 1000 milliLiter(s) IV Continuous <Continuous>  dextrose 5%. 1000 milliLiter(s) IV Continuous <Continuous>  dextrose 50% Injectable 25 Gram(s) IV Push once  dextrose 50% Injectable 12.5 Gram(s) IV Push once  dextrose 50% Injectable 25 Gram(s) IV Push once  glucagon  Injectable 1 milliGRAM(s) IntraMuscular once  heparin   Injectable 5000 Unit(s) SubCutaneous every 8 hours  insulin glargine Injectable (LANTUS) 10 Unit(s) SubCutaneous at bedtime  insulin lispro (ADMELOG) corrective regimen sliding scale   SubCutaneous Before meals and at bedtime  multivitamin/minerals/iron Oral Solution (CENTRUM) 15 milliLiter(s) Oral daily  nafcillin  IVPB      nafcillin  IVPB 2 Gram(s) IV Intermittent every 4 hours  oxycodone    5 mG/acetaminophen 325 mG 1 Tablet(s) Oral every 4 hours PRN  senna 2 Tablet(s) Oral at bedtime    VITAL SIGNS, INS/OUTS (last 24 hours):  Vital Signs Last 24 Hrs  T(C): 37.2 (17 Mar 2021 09:42), Max: 37.2 (17 Mar 2021 09:42)  T(F): 99 (17 Mar 2021 09:42), Max: 99 (17 Mar 2021 09:42)  HR: 65 (17 Mar 2021 05:35) (63 - 88)  BP: 130/62 (17 Mar 2021 05:35) (112/55 - 141/62)  BP(mean): 89 (17 Mar 2021 05:35) (77 - 90)  RR: 18 (17 Mar 2021 05:35) (11 - 18)  SpO2: 96% (17 Mar 2021 05:35) (92% - 99%)  I&O's Summary    16 Mar 2021 07:01  -  17 Mar 2021 07:00  --------------------------------------------------------  IN: 940 mL / OUT: 1160 mL / NET: -220 mL    17 Mar 2021 07:01  -  17 Mar 2021 10:09  --------------------------------------------------------  IN: 120 mL / OUT: 0 mL / NET: 120 mL    EXAM:  Gen: NAD, sitting upright in bed  HEENT: NCAT, MMM, R-lower inner lip +canker sore  CV: RRR, no m/g/r appreciated  Pulm: CTA B, no w/r/r; no increase in WOB  Abd: normoactive BS, soft, NTND  Ext: LLE with kerlex/ace bandage dressing to knee, R-BKA w/kerlex and ace bandage  Neuro: AOx1-2, able to move BLE but ROM limited 2/2 dressing w/flexion at the knees  Psych: pleasant and can engage in conversation somewhat    BASIC LABS:                        10.1   7.29  )-----------( 336      ( 17 Mar 2021 07:11 )             31.1     03-17    139  |  105  |  5<L>  ----------------------------<  137<H>  4.0   |  27  |  0.62    Ca    7.5<L>      17 Mar 2021 07:11  Phos  2.5     03-17  Mg     1.7     03-17    CAPILLARY BLOOD GLUCOSE  POCT Blood Glucose.: 142 mg/dL (17 Mar 2021 06:56)  POCT Blood Glucose.: 221 mg/dL (16 Mar 2021 21:11)  POCT Blood Glucose.: 212 mg/dL (16 Mar 2021 16:22)  POCT Blood Glucose.: 141 mg/dL (16 Mar 2021 10:46)    MICRODATA:  -- Surgical cultures (3/14) - multiple staph species    IMAGING:  -- No new imaging.

## 2021-03-17 NOTE — DISCHARGE NOTE PROVIDER - HOSPITAL COURSE
74 yo F poor historian with hx UC and colon cancer s/p colectomy and chemotherapy, chronic venous stasis w/ chronic LE wounds who presents due to frequent falls over the past several weeks at home. She states that overall she has been more homebound, but recently started having more falls. She follows with her GI DrBrian Claire. Has an ostomy site in place, patient unable to state when this was placed. She states her father had colon cancer, and has passed away from it and she lives at home alone by herself. She denies any fevers, chills, prior diabetes history, nausea, vomiting. Denies loose output on ostomy site. Does state having drainage at her LE legs.    During hosptial course    3/17:   3/16: removed santos Passed TOV. advanced diet (puree d/t no teeth).  SDU to telemetry, Lantus 10UI per endocrine. DC'd Vanc and Zosyn. Per ID, started IV Nafcillin.  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  3/13: HgbA1c 16. started on insulin drip per protocol. per endocrine, likely starvation and DKA.    On day of discharge patient was stable to be d/c'd to     76 yo F poor historian with hx UC and colon cancer s/p colectomy and chemotherapy, chronic venous stasis w/ chronic LE wounds who presents due to frequent falls over the past several weeks at home. She states that overall she has been more homebound, but recently started having more falls. She follows with her GI DrBrina Claire. Has an ostomy site in place, patient unable to state when this was placed. She states her father had colon cancer, and has passed away from it and she lives at home alone by herself. She denies any fevers, chills, prior diabetes history, nausea, vomiting. Denies loose output on ostomy site. Does state having drainage at her LE legs.    During hosptial course      3/22: penrose drain of left foot removed   3/21: hypotensive when working with PT, given 500ml NS bolus with improvement  3/19: OR for BKA closure  3/17: retained 650cc urine, straight cath x1, Passed   3/16: removed santos Passed TOV. advanced diet (puree d/t no teeth).  SDU to telemetry, Lantus 10UI per endocrine. DC'd Vanc and Zosyn. Per ID, started IV Nafcillin.  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  3/13: HgbA1c 16. started on insulin drip per protocol. per endocrine, likely starvation and DKA.    On day of discharge patient was stable to be d/c'd to [facility] on Nafcillin IV end date 3/29.   76 yo F poor historian with hx UC and colon cancer s/p colectomy and chemotherapy, chronic venous stasis w/ chronic LE wounds who presents due to frequent falls over the past several weeks at home. She states that overall she has been more homebound, but recently started having more falls. She follows with her GI DrBrian Claire. Has an ostomy site in place, patient unable to state when this was placed. She states her father had colon cancer, and has passed away from it and she lives at home alone by herself. She denies any fevers, chills, prior diabetes history, nausea, vomiting. Denies loose output on ostomy site. Does state having drainage at her LE legs.    During hosptial course    3/24: dressing changed, RLE stump incision C/D/I, NITO removed.  3/22: penrose drain of left foot removed   3/21: hypotensive when working with PT, given 500ml NS bolus with improvement  3/19: OR for BKA closure  3/17: retained 650cc urine, straight cath x1, Passed   3/16: removed santos Passed TOV. advanced diet (puree d/t no teeth).  SDU to telemetry, Lantus 10UI per endocrine. DC'd Vanc and Zosyn. Per ID, started IV Nafcillin.  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  3/13: HgbA1c 16. started on insulin drip per protocol. per endocrine, likely starvation and DKA.    On day of discharge patient was stable to be d/c'd to [facility] on Nafcillin IV end date 3/29.   76 yo F poor historian with hx UC and colon cancer s/p colectomy and chemotherapy, chronic venous stasis w/ chronic LE wounds who presented due to frequent falls over the past several weeks at home. She stated that overall she has been more homebound, but recently started having more falls. She follows with her GI DrBrian Claire. Patient has an ostomy, unable to state when this was placed. She stated her father had colon cancer, and has passed away from it and she lives at home alone by herself. Does state having drainage from her legs. During her hospital course: on admission 3/13 HgbA1c 16 and she was started on insulin drip per protocol. Per endocrine, likely starvation and DKA. On 3/14 she underwent operative debridement of R foot by podiatry; bedside dysphagia screen passed, started diet; stopped insulin gtt. On 3/15 she has an ECHO TTE- LVEF>55%, nl RV/LV size and function; and underwent right guillotine BKA/left ankle I&D with penrose. She tolerated the procedure well. On 3/19/21 she underwent right BKA closure, without complications. On 3/22 the penrose as removed. Both wound have been healing well. On 3/29/21 she was noted to have some bloody drainage from right BKA stump and few stitched were removed to drain the hematoma and wound was packed. Her diabetes was managed with ISS and lantus 11u and lispro 8u premeal. Infectious disease followed the patient throughout the course and based on wound cx their final recommendation was Unasyn IV until 4/12/21. Patient refused PICC line and accepting [facility] agreed to take patient with an IV to complete her course. Patient is medically stable and cleared for discharge per her physician. However patient refusing to be discharged and wants to complete her course of ABX in house. Patient given 24 hour notice and will appeal this decision.   74 yo F poor historian with hx UC and colon cancer s/p colectomy and chemotherapy, chronic venous stasis w/ chronic LE wounds who presented due to frequent falls over the past several weeks at home. She stated that overall she has been more homebound, but recently started having more falls. She follows with her GI DrBrian Claire. Patient has an ostomy, unable to state when this was placed. She stated her father had colon cancer, and has passed away from it and she lives at home alone by herself. Does state having drainage from her legs. During her hospital course: on admission 3/13 HgbA1c 16 and she was started on insulin drip per protocol. Per endocrine, likely starvation and DKA. On 3/14 she underwent operative debridement of R foot by podiatry; bedside dysphagia screen passed, started diet; stopped insulin gtt. On 3/15 she has an ECHO TTE- LVEF>55%, nl RV/LV size and function; and underwent right guillotine BKA/left ankle I&D with penrose. She tolerated the procedure well. On 3/19/21 she underwent right BKA closure, without complications. On 3/22 the penrose as removed. On 3/29/21 she was noted to have some bloody and purulent drainage from right BKA stump with low grade fever and leukocytosis- subsequently the alternating suture were removed to allow complete drainage of residual hematoma and infection and wound was packed, antibiotics were started based on culture data from site with assistance from infectious disease, and patient had complete resolution of bleeding and purulence at site and complete resolution of leukocytosis and fever.  Infectious disease followed the patient throughout the course and based on most recent wound cx their final recommendation was Unasyn IV until 4/12/21. Patient refused PICC line and accepting [facility] agreed to take patient with an IV to complete her course. Patient is medically stable and cleared for discharge per her physician. However patient refusing to be discharged and wants to complete her course of ABX in house. Patient given 24 hour notice and will appeal this decision.    #BLE Cellulitis c/b Septic Shock and Nec Fasc s/p R-BKA, recent new cellulitis at site  -- Appreciate ID recs- plan for 2 weeks IV unisyn until 4/12, which can be provided at rehab via peripheral line. ID Dr. Hartman will followup weekly labs (CBC, BMP) while on antibiotics and followup patient in the office to ensure infection healing on antibiotics   -- Wound site clean now s/p antibiotics, adequate blood flow, no plan for further surgical management. Dr. Leung will followup patient in the office within 1-2 weeks to monitor status of the wound and ensure healing well on antibiotics  -- Wound care recs per vascular team- apply wet-to-dry dressings on L foot and RLE wound daily   -- Percocet 1 tab q4h PRN and Tylenol 650mg q6h PRN w/Senna for pain control    #Newly Diagnosed IDDM2 c/b DKA and SSTI  -- Lantus 11 and 8u lispro pre-meal     #UC c/b Colon Ca s/p Ostomy  -- no active issues, ostomy outpt wnl    #Apthous Ulcer  --topical analgesic lidocaine cream     #Acute Blood Loss Anemia, resolved now    -- likely from multiple trips to the OR and BKA  -- Stable Hg for several days, no further bleeding from the wound site     #Diet - Carb Controlled / Dysphagia 1 - patient cleared earlier in visit by swallow team for diet and tolerating well   #DVT px- SQH   #Dispo- given debility and falls hx, physical therapy recommended physical therapy at subacute rehab ([facility]), patient accepted to [facility].        76 yo F poor historian with hx UC and colon cancer s/p colectomy and chemotherapy, chronic venous stasis w/ chronic LE wounds who presented due to frequent falls over the past several weeks at home. She stated that overall she has been more homebound, but recently started having more falls. She follows with her GI DrBrian Claire. Patient has an ostomy, unable to state when this was placed. She stated her father had colon cancer, and has passed away from it and she lives at home alone by herself. Does state having drainage from her legs. During her hospital course: on admission 3/13 HgbA1c 16 and she was started on insulin drip per protocol. Per endocrine, likely starvation and DKA. On 3/14 she underwent operative debridement of R foot by podiatry; bedside dysphagia screen passed, started diet; stopped insulin gtt. On 3/15 she has an ECHO TTE- LVEF>55%, nl RV/LV size and function; and underwent right guillotine BKA/left ankle I&D with penrose. She tolerated the procedure well. On 3/19/21 she underwent right BKA closure, without complications. On 3/22 the penrose as removed. On 3/29/21 she was noted to have some bloody and purulent drainage from right BKA stump with low grade fever and leukocytosis- subsequently the alternating suture were removed to allow complete drainage of residual hematoma and infection and wound was packed, antibiotics were started based on culture data from site with assistance from infectious disease, and patient had complete resolution of bleeding and purulence at site and complete resolution of leukocytosis and fever.  Infectious disease followed the patient throughout the course and based on most recent wound cx their final recommendation was Unasyn IV until 4/12/21. Patient refused PICC line and accepting [facility] agreed to take patient with an IV to complete her course. Patient is medically stable and cleared for discharge per her physician. However patient refusing to be discharged and wants to complete her course of ABX in house. Patient given 24 hour notice and will appeal this decision.    #BLE Cellulitis c/b Septic Shock and Nec Fasc s/p R-BKA, recent new cellulitis at site  -- Appreciate ID recs- plan for 2 weeks IV Unasyn 3g every 6 hours until 4/12, which can be provided at rehab via peripheral line. ID Dr. Hartman will followup weekly labs (CBC, BMP) while on antibiotics and followup patient in the office to ensure infection healing on antibiotics   -- Wound site clean now s/p antibiotics, adequate blood flow, no plan for further surgical management. Dr. Leung will followup patient in the office within 1-2 weeks to monitor status of the wound and ensure healing well on antibiotics  -- Wound care recs per vascular team- right BKA stump packed with saline wet to dry dressing and left foot with wet to dry gauze   -- Percocet 1 tab q4h PRN and Tylenol 650mg q6h PRN w/Senna for pain control    #Newly Diagnosed IDDM2 c/b DKA and SSTI  -- Lantus 11 and 8u lispro pre-meal     #UC c/b Colon Ca s/p Ostomy  -- no active issues, ostomy outpt wnl    #Apthous Ulcer  --topical analgesic lidocaine cream     #Acute Blood Loss Anemia, resolved now    -- likely from multiple trips to the OR and BKA  -- Stable Hg for several days, no further bleeding from the wound site     #Diet - Carb Controlled / Dysphagia 1 - patient cleared earlier in visit by swallow team for diet and tolerating well   #DVT px- SQH   #Dispo- given debility and falls hx, physical therapy recommended physical therapy at subacute rehab ([facility]), patient accepted to [facility].        76 yo F poor historian with hx UC and colon cancer s/p colectomy and chemotherapy, chronic venous stasis w/ chronic LE wounds who presented due to frequent falls over the past several weeks at home. She stated that overall she has been more homebound, but recently started having more falls. She follows with her GI DrBrian Claire. Patient has an ostomy, unable to state when this was placed. She stated her father had colon cancer, and has passed away from it and she lives at home alone by herself. Does state having drainage from her legs. During her hospital course: on admission 3/13 HgbA1c 16 and she was started on insulin drip per protocol. Per endocrine, likely starvation and DKA. On 3/14 she underwent operative debridement of R foot by podiatry; bedside dysphagia screen passed, started diet; stopped insulin gtt. On 3/15 she has an ECHO TTE- LVEF>55%, nl RV/LV size and function; and underwent right guillotine BKA/left ankle I&D with penrose. She tolerated the procedure well. On 3/19/21 she underwent right BKA closure, without complications. On 3/22 the penrose as removed. On 3/29/21 she was noted to have some bloody and purulent drainage from right BKA stump with low grade fever and leukocytosis- subsequently the alternating suture were removed to allow complete drainage of residual hematoma and infection and wound was packed, antibiotics were started based on culture data from site with assistance from infectious disease, and patient had complete resolution of bleeding and purulence at site and complete resolution of leukocytosis and fever.  Infectious disease followed the patient throughout the course and based on most recent wound cx their final recommendation was Unasyn IV until 4/12/21. Patient refused PICC line and accepting [facility] agreed to take patient with an IV to complete her course. Patient is medically stable and cleared for discharge per her physician. However patient refusing to be discharged and wants to complete her course of ABX in house. Patient given 24 hour notice and will appeal this decision.    #BLE Cellulitis c/b Septic Shock and Nec Fasc s/p R-BKA, recent new cellulitis at site  -- Appreciate ID recs- plan for 2 weeks IV Unasyn 3g every 6 hours until 4/12, which can be provided at rehab via peripheral line. ID Dr. Hartman will followup weekly labs (CBC, BMP) while on antibiotics and followup patient in the office to ensure infection healing on antibiotics   -- Wound site clean now s/p antibiotics, adequate blood flow, no plan for further surgical management. Dr. Leung will followup patient in the office within 1-2 weeks to monitor status of the wound and ensure healing well on antibiotics  -- Wound care recs per vascular team- right BKA stump packed with saline wet to dry dressing and left foot with wet to dry gauze   -- Percocet 1 tab q4h PRN and Tylenol 650mg q6h PRN w/Senna for pain control    #Newly Diagnosed IDDM2 c/b DKA and SSTI  -- Lantus 10 and 7u lispro pre-meal     #UC c/b Colon Ca s/p Ostomy  -- no active issues, ostomy outpt wnl    #Apthous Ulcer  --topical analgesic lidocaine cream     #Acute Blood Loss Anemia, resolved now    -- likely from multiple trips to the OR and BKA  -- Stable Hg for several days, no further bleeding from the wound site     #Diet - Carb Controlled / Dysphagia 1 - patient cleared earlier in visit by swallow team for diet and tolerating well   #DVT px- SQH   #Dispo- given debility and falls hx, physical therapy recommended physical therapy at subacute rehab ([facility]), patient accepted to [facility].        74 yo F poor historian with hx UC and colon cancer s/p colectomy and chemotherapy, chronic venous stasis w/ chronic LE wounds who presented due to frequent falls over the past several weeks at home. She stated that overall she has been more homebound, but recently started having more falls. She follows with her GI DrBrian Claire. Patient has an ostomy, unable to state when this was placed. She stated her father had colon cancer, and has passed away from it and she lives at home alone by herself. Does state having drainage from her legs. During her hospital course: on admission 3/13 HgbA1c 16 and she was started on insulin drip per protocol. Per endocrine, likely starvation and DKA. On 3/14 she underwent operative debridement of R foot by podiatry; bedside dysphagia screen passed, started diet; stopped insulin gtt. On 3/15 she has an ECHO TTE- LVEF>55%, nl RV/LV size and function; and underwent right guillotine BKA/left ankle I&D with penrose. She tolerated the procedure well. On 3/19/21 she underwent right BKA closure, without complications. On 3/22 the penrose as removed. On 3/29/21 she was noted to have some bloody and purulent drainage from right BKA stump with low grade fever and leukocytosis- subsequently the alternating suture were removed to allow complete drainage of residual hematoma and infection and wound was packed, antibiotics were started based on culture data from site with assistance from infectious disease, and patient had complete resolution of bleeding and purulence at site and complete resolution of leukocytosis and fever.  Infectious disease followed the patient throughout the course and based on most recent wound cx their final recommendation was Unasyn IV until 4/12/21. Patient refused PICC line and accepting [facility] agreed to take patient with an IV to continue additional 4 days of IV antibiotics (4/8/21) with completion of course with augmentin from (4/8/21-4/12/21). Patient is medically stable and cleared for discharge per her physician. However patient refusing to be discharged and wants to complete her course of ABX in house. Patient given 24 hour notice which was appealed and denied on 4/1/21 and 4/3/21.     #BLE Cellulitis c/b Septic Shock and Nec Fasc s/p R-BKA, recent new cellulitis at site  -- Appreciate ID recs- plan for 2 weeks IV Unasyn 3g every 6 hours until 4/8 which can be provided at rehab via peripheral line. Patient is to continue augmentin 875mg q12h from 4/8/21-4/12/21. ID Dr. Hartman will followup weekly labs (CBC, BMP) while on antibiotics and followup patient in the office to ensure infection healing on antibiotics   -- Wound site clean now s/p antibiotics, adequate blood flow, no plan for further surgical management. Dr. Leung will followup patient in the office within 1-2 weeks to monitor status of the wound and ensure healing well on antibiotics  -- Wound care recs per vascular team- right BKA stump treated with Santyl daily to lateral open wound, packed with saline wet to dry dressing and left foot with wet to dry gauze   -- Percocet 1 tab q4h PRN and Tylenol 650mg q6h PRN w/Senna for pain control    #Newly Diagnosed IDDM2 c/b DKA and SSTI  -- Lantus 10 and 7u lispro pre-meal     #UC c/b Colon Ca s/p Ostomy  -- no active issues, ostomy outpt wnl    #Apthous Ulcer  --topical analgesic lidocaine cream     #Acute Blood Loss Anemia, resolved now    -- likely from multiple trips to the OR and BKA  -- Stable Hg for several days, no further bleeding from the wound site     #Diet - Carb Controlled / Dysphagia 1 - patient cleared earlier in visit by swallow team for diet and tolerating well   #DVT px- SQH   #Dispo- given debility and falls hx, physical therapy recommended physical therapy at subacute rehab ([facility]), patient accepted to [facility].        74 yo F poor historian with hx UC and colon cancer s/p colectomy and chemotherapy, chronic venous stasis w/ chronic LE wounds who presented due to frequent falls over the past several weeks at home. She stated that overall she has been more homebound, but recently started having more falls. She follows with her GI DrBrian Claire. Patient has an ostomy, unable to state when this was placed. She stated her father had colon cancer, and has passed away from it and she lives at home alone by herself. Does state having drainage from her legs. During her hospital course: on admission 3/13 HgbA1c 16 and she was started on insulin drip per protocol. Per endocrine, likely starvation and DKA. On 3/14 she underwent operative debridement of R foot by podiatry; bedside dysphagia screen passed, started diet; stopped insulin gtt. On 3/15 she has an ECHO TTE- LVEF>55%, nl RV/LV size and function; and underwent right guillotine BKA/left ankle I&D with penrose. She tolerated the procedure well. On 3/19/21 she underwent right BKA closure, without complications. On 3/22 the penrose as removed. On 3/29/21 she was noted to have some bloody and purulent drainage from right BKA stump with low grade fever and leukocytosis- subsequently the alternating suture were removed to allow complete drainage of residual hematoma and infection and wound was packed, antibiotics were started based on culture data from site with assistance from infectious disease, and patient had complete resolution of bleeding and purulence at site and complete resolution of leukocytosis and fever. Infectious disease followed the patient throughout the course and based on most recent wound cx their final recommendation was to continue unasyn through 4/12/21. Patient refused PICC line. Final wound culture was susceptible to augmentin and patient was discharged on augmentin BID through 4/12/21. Patient is medically stable and cleared for discharge per her physician. However patient refusing to be discharged and wants to complete her course of ABX in house. Patient given 24 hour notice which was appealed and denied on 4/1/21 and 4/3/21.     #BLE Cellulitis c/b Septic Shock and Nec Fasc s/p R-BKA, recent new cellulitis at site  -- Appreciate ID recs- plan for 2 weeks IV Unasyn 3g every 6 hours until 4/12/21- completed unasyn until 4/6/21. Patient is to continue augmentin 875mg q12h from 4/6/21-4/12/21. ID Dr. Hartman will followup weekly labs (CBC, BMP) while on antibiotics and followup patient in the office to ensure infection healing on antibiotics   -- Wound site clean now s/p antibiotics, adequate blood flow, no plan for further surgical management. Dr. Leugn will followup patient in the office within 1-2 weeks to monitor status of the wound and ensure healing well on antibiotics  -- Wound care recs per vascular team- right BKA stump treated with Santyl daily to lateral open wound, packed with saline wet to dry dressing and left foot with wet to dry gauze   -- Percocet 1 tab q4h PRN and Tylenol 650mg q6h PRN w/Senna for pain control    #Newly Diagnosed IDDM2 c/b DKA and SSTI  -- Lantus 10 and 7u lispro pre-meal     #UC c/b Colon Ca s/p Ostomy  -- no active issues, ostomy outpt wnl    #Apthous Ulcer  --topical analgesic lidocaine cream     #Acute Blood Loss Anemia, resolved now    -- likely from multiple trips to the OR and BKA  -- Stable Hg for several days, no further bleeding from the wound site     #Diet - Carb Controlled / Dysphagia 1 - patient cleared earlier in visit by swallow team for diet and tolerating well   #DVT px- SQH   #Dispo- given debility and falls hx, physical therapy recommended physical therapy at subacute rehab ([facility]), patient accepted to [facility].        74 yo F poor historian with hx UC and colon cancer s/p colectomy and chemotherapy, chronic venous stasis w/ chronic LE wounds who presented due to frequent falls over the past several weeks at home. She stated that overall she has been more homebound, but recently started having more falls. She follows with her GI DrBrian Claire. Patient has an ostomy, unable to state when this was placed. She stated her father had colon cancer, and has passed away from it and she lives at home alone by herself. Does state having drainage from her legs. During her hospital course: on admission 3/13 HgbA1c 16 and she was started on insulin drip per protocol. Per endocrine, likely starvation and DKA. On 3/14 she underwent operative debridement of R foot by podiatry; bedside dysphagia screen passed, started diet; stopped insulin gtt. On 3/15 she has an ECHO TTE- LVEF>55%, nl RV/LV size and function; and underwent right guillotine BKA/left ankle I&D with penrose. She tolerated the procedure well. On 3/19/21 she underwent right BKA closure, without complications. On 3/22 the penrose as removed. On 3/29/21 she was noted to have some bloody and purulent drainage from right BKA stump with low grade fever and leukocytosis- subsequently the alternating suture were removed to allow complete drainage of residual hematoma and infection and wound was packed, antibiotics were started based on culture data from site with assistance from infectious disease, and patient had complete resolution of bleeding and purulence at site and complete resolution of leukocytosis and fever. Infectious disease followed the patient throughout the course and based on most recent wound cx their final recommendation was to continue unasyn through 4/12/21. Patient refused PICC line. Final wound culture was susceptible to augmentin and patient was discharged on augmentin BID through 4/12/21. Patient is medically stable and cleared for discharge per her physician. However patient refusing to be discharged and wants to complete her course of ABX in house. Patient given 24 hour notice which was appealed and denied on 4/1/21 and 4/3/21.     #BLE Cellulitis c/b Septic Shock and Nec Fasc s/p R-BKA, recent new cellulitis at site  -- Appreciate ID recs- plan for 2 weeks IV Unasyn 3g every 6 hours until 4/12/21- completed unasyn until 4/6/21. Patient is to continue augmentin 875mg q12h from 4/6/21-4/12/21. ID Dr. Hartman will followup weekly labs (CBC, BMP) while on antibiotics and followup patient in the office to ensure infection healing on antibiotics   -- Wound site clean now s/p antibiotics, adequate blood flow, no plan for further surgical management. Dr. Leung will followup patient in the office within 1-2 weeks to monitor status of the wound and ensure healing well on antibiotics  -- Wound care recs per vascular team- right BKA stump treated with Santyl daily to lateral open wound, packed with saline wet to dry dressing and left foot with wet to dry gauze   -- Percocet 1 tab q4h PRN and Tylenol 650mg q6h PRN w/Senna for pain control    #Newly Diagnosed IDDM2 c/b DKA and SSTI  -- Will c/w Metformin 500mg BID for the next 3 days (4/6-4/9)  -- Will c/w Metformin 1000mg BID on 4/10/21  -- Will c/w Januvia 100mg daily before breakfast  -- Will c/w with sliding scale insulin    #UC c/b Colon Ca s/p Ostomy  -- no active issues, ostomy outpt wnl    #Apthous Ulcer  --topical analgesic lidocaine cream     #Acute Blood Loss Anemia, resolved now    -- likely from multiple trips to the OR and BKA  -- Stable Hg for several days, no further bleeding from the wound site     #Diet - Carb Controlled / Dysphagia 1 - patient cleared earlier in visit by swallow team for diet and tolerating well   #DVT px- SQH   #Dispo- given debility and falls hx, physical therapy recommended physical therapy at subacute rehab ([facility]), patient accepted to [facility].

## 2021-03-17 NOTE — PROGRESS NOTE ADULT - SUBJECTIVE AND OBJECTIVE BOX
INTERVAL HPI/OVERNIGHT EVENTS:    Patient is a 75y old  Female who presents with a chief complaint of Weakness (17 Mar 2021 11:15)  Pt was seen and examined at he bedside. AOX-2.   She reports she didn't have  breakfast.   As per nurse she ate breakfast and good appetite.   S/p BKA right side 3/15.     FSG & Insulin received:    Yesterday:  pre-dinner fs   4  units lispro SS  bedtime fs  lantus 10  units +  4  units lispro SS    Today:  pre-breakfast fs  2   units lispro SS  pre-lunch fs    6 units lispro SS      Pt reports the following symptoms:    CONSTITUTIONAL:  Negative fever or chills  CARDIOVASCULAR:  Negative for chest pain or palpitations  RESPIRATORY:  Negative for cough, wheezing, or SOB   GASTROINTESTINAL:  Negative for nausea, vomiting  GENITOURINARY:  Negative frequency, urgency or dysuria        MEDICATIONS  (STANDING):  ascorbic acid 500 milliGRAM(s) Oral daily  dextrose 40% Gel 15 Gram(s) Oral once  dextrose 5%. 1000 milliLiter(s) (100 mL/Hr) IV Continuous <Continuous>  dextrose 5%. 1000 milliLiter(s) (50 mL/Hr) IV Continuous <Continuous>  dextrose 50% Injectable 25 Gram(s) IV Push once  dextrose 50% Injectable 12.5 Gram(s) IV Push once  dextrose 50% Injectable 25 Gram(s) IV Push once  FIRST- Mouthwash  BLM 10 milliLiter(s) Swish and Spit daily  glucagon  Injectable 1 milliGRAM(s) IntraMuscular once  heparin   Injectable 5000 Unit(s) SubCutaneous every 8 hours  insulin glargine Injectable (LANTUS) 15 Unit(s) SubCutaneous at bedtime  insulin lispro (ADMELOG) corrective regimen sliding scale   SubCutaneous Before meals and at bedtime  insulin lispro Injectable (ADMELOG) 6 Unit(s) SubCutaneous three times a day before meals  multivitamin/minerals/iron Oral Solution (CENTRUM) 15 milliLiter(s) Oral daily  nafcillin  IVPB      nafcillin  IVPB 2 Gram(s) IV Intermittent every 4 hours  senna 2 Tablet(s) Oral at bedtime    MEDICATIONS  (PRN):  acetaminophen   Tablet .. 650 milliGRAM(s) Oral every 6 hours PRN Moderate Pain (4 - 6)  oxycodone    5 mG/acetaminophen 325 mG 1 Tablet(s) Oral every 4 hours PRN Severe Pain (7 - 10)      Past medical history reviewed  Family history reviewed  Social history reviewed    PHYSICAL EXAM  Vital Signs Last 24 Hrs  T(C): 36.2 (17 Mar 2021 13:03), Max: 37.2 (17 Mar 2021 09:42)  T(F): 97.1 (17 Mar 2021 13:03), Max: 99 (17 Mar 2021 09:42)  HR: 87 (17 Mar 2021 14:09) (65 - 87)  BP: 149/55 (17 Mar 2021 14:09) (111/55 - 149/55)  BP(mean): 89 (17 Mar 2021 05:35) (77 - 89)  RR: 16 (17 Mar 2021 14:09) (16 - 18)  SpO2: 96% (17 Mar 2021 14:09) (96% - 98%)    Constitutional:  in NAD, AOx2  HEENT: no proptosis or lid retraction  Neck: no thyromegaly or palpable thyroid nodules   Respiratory: lungs CTAB.  Cardiovascular: regular rhythm, normal S1 and S2  GI: soft, NT/ND, no masses/HSM appreciated.  EXT: right BKA    LABS:                        10.1   7.29  )-----------( 336      ( 17 Mar 2021 07:11 )             31.1         139  |  105  |  5<L>  ----------------------------<  137<H>  4.0   |  27  |  0.62    Ca    7.5<L>      17 Mar 2021 07:11  Phos  2.5       Mg     1.7                   HbA1C: 16.1 % ( @ 13:30)      CAPILLARY BLOOD GLUCOSE      POCT Blood Glucose.: 231 mg/dL (17 Mar 2021 15:19)  POCT Blood Glucose.: 271 mg/dL (17 Mar 2021 11:48)  POCT Blood Glucose.: 142 mg/dL (17 Mar 2021 06:56)  POCT Blood Glucose.: 221 mg/dL (16 Mar 2021 21:11)      Cholesterol, Serum: 83 mg/dL (03-15-21 @ 04:51)  HDL Cholesterol, Serum: 28 mg/dL (03-15-21 @ 04:51)  Triglycerides, Serum: 89 mg/dL (03-15-21 @ 04:51)    A/P: 75F with PMHx UC s/p colostomy (s/p multiple abdominal surgeries including colostomy bag in ) BIBEMS from home complaining of weakness; presenting likely in DKA with AMS, with bilateral lower extremity open wounds. Of note, pt found to have a hx of DM2: A1C on admission is 16.1. Pt is now s/p I&D w/ washout and debridement of non- viable soft tissue.     1.  DM:   wt:75 KG  cr:0.6, GFR:89  likely insulinopenic  lantus 15 at bedtime  pleas start 6 U Lispro premeals TID   Continue lispro moderate dose scale with meals and at bedtime.   Continue consistent carb diet  FSG Goal 100-180    case seen and discussed with Dr. Yee and updated primary team

## 2021-03-17 NOTE — CONSULT NOTE ADULT - CONSULT REASON
Abx management
r/o DKA, cellulitis
DKA, foot ulcer
Post-Op Examination / Co-Management
r/o Necrotizing Fasciitis
Bilateral lower extremity wounds
DM

## 2021-03-17 NOTE — PROGRESS NOTE ADULT - ASSESSMENT
Patient is a 74yo F poor historian PMH uncontrolled DMT2, UC and colon cancer s/p colectomy and chemotherapy, chronic venous stasis w/ chronic LE wounds who presents due to frequent falls over the past several weeks at home. Admitted for DKA and b/l LE cellulits/abscess with c/f RLE necrotizing infection. She was started on Vanc/Zosyn and is s/p a R guillotine BKA and I&D of left ankle with vascular surgery on 3/15. LLE w/ tendonitis/myositis, no concern for OM. Left foot surgical swab (3/13) growing staph aureus, strept intermedius, strept constellatus, bifidobacterium breve; awaiting final speciation       Recommendations:   -Awaiting speciation and susceptibilities of left foot cx from 3/13 for final Abx recs   -C/w Nafcillin 2g q4hr   -3/13 Bcxs NGTD         Plans discussed with ID attending Dr. Medeiros. ID Team 1 will continue to follow.

## 2021-03-17 NOTE — DISCHARGE NOTE PROVIDER - NSDCFUADDINST_GEN_ALL_CORE_FT
FOLLOW UP: Dr. Leung in 1 week. Your appointment has been made for _______. Call the office at  with any questions.    WOUND CARE: You may shower; soap and water over incision sites. Do not scrub. Pat dry when done.   Right lower extremity-  Left lower extremity-     ACTIVITY: Ambulate as tolerated, but no heavy lifting (>10lbs) or strenuous exercise.    DIET: You may resume consistent carb diet.     Call the office if you experience increasing pain, redness, swelling or drainage from incision sites/wounds, or temperature >101.4F.     NEW MEDICATIONS:     ADDITIONAL FOLLOW UP AFTER DISCHARGE:    DISCHARGE DESTINATION:  FOLLOW UP: Dr. Leung in 1 week. Your appointment has been made for _______. Call the office at  with any questions.    WOUND CARE: You may shower; soap and water over incision sites. Do not scrub. Pat dry when done.   Right lower extremity-  Left lower extremity-     ACTIVITY: Ambulate as tolerated, but no heavy lifting (>10lbs) or strenuous exercise.    DIET: You may resume consistent carb diet.     Call the office if you experience increasing pain, redness, swelling or drainage from incision sites/wounds, or temperature >101.4F.     NEW MEDICATIONS: Nafcillin IV end date 3/29.    ADDITIONAL FOLLOW UP AFTER DISCHARGE:  Follow up with your primary care physician in 1 week    DISCHARGE DESTINATION:  FOLLOW UP: Dr. Leung in 1 week. Call the office at  to schedule a follow up appointment and with any questions.    WOUND CARE: You may shower; soap and water over incision sites. Do not scrub. Pat dry when done.   Right lower extremity- xeroform on incision then dry kerlix and ace wrap daily.  Left lower extremity- wet to dry dressing with kerlix daily.    ACTIVITY: Ambulate as tolerated, but no heavy lifting (>10lbs) or strenuous exercise.    DIET: You may resume consistent carb diet.     Call the office if you experience increasing pain, redness, swelling or drainage from incision sites/wounds, or temperature >101.4F.     NEW MEDICATIONS: Nafcillin IV end date 3/29.    ADDITIONAL FOLLOW UP AFTER DISCHARGE:  Follow up with your primary care physician in 1 week. We scheduled you to see a doctor from [facility] on [address] [place] [address] on the 2nd floor.  They will be calling you to confirm your appointment.    Follow up with Infectious disease- Dr. Hartman in 1 week since you have been on long term antibiotics.    Continue to follow with your primary care MD or your endocrinologist for your newly diagnosed diabetes.  Follow a heart healthy diabetic diet. If you check your fingerstick glucose at home, call your MD if it is greater than 250mg/dL on 2 occasions or less than 100mg/dL on 2 occasions. Know signs of low blood sugar, such as: dizziness, shakiness, sweating, confusion, hunger, nervousness-drink 4 ounces apple juice if occurs and call your doctor. Know early signs of high blood sugar, such as: frequent urination, increased thirst, blurry vision, fatigue, headache - call your doctor if this occurs. Follow with other practitioners to care for your diabetes, such as ophthamologist and podiatrist.     DISCHARGE DESTINATION: [facility] FOLLOW UP: Dr. Leung in 1 week. Call the office at  to schedule a follow up appointment and with any questions.    WOUND CARE: You may shower; soap and water over incision sites. Do not scrub. Pat dry when done.   Right lower extremity- xeroform on incision then dry kerlix and ace wrap daily.  Left lower extremity- wet to dry dressing with kerlix daily.    ACTIVITY: Ambulate as tolerated, but no heavy lifting (>10lbs) or strenuous exercise.    DIET: You may resume consistent carb diet.     Call the office if you experience increasing pain, redness, swelling or drainage from incision sites/wounds, or temperature >101.4F.     NEW MEDICATIONS: Nafcillin IV end date 3/29.    ADDITIONAL FOLLOW UP AFTER DISCHARGE:  Follow up with your primary care physician in 1 week. We scheduled you to see a doctor from [facility] on [address] [place] [address] on the 2nd floor.  They will be calling you to confirm your appointment.    Follow up with Infectious disease- Dr. Hartman in 1 week since you have been on long term antibiotics.    Continue to follow with your primary care MD or your endocrinologist for your newly diagnosed diabetes.  Follow a heart healthy diabetic diet. If you check your fingerstick glucose at home, call your MD if it is greater than 250mg/dL on 2 occasions or less than 100mg/dL on 2 occasions. Know signs of low blood sugar, such as: dizziness, shakiness, sweating, confusion, hunger, nervousness-drink 4 ounces apple juice if occurs and call your doctor. Know early signs of high blood sugar, such as: frequent urination, increased thirst, blurry vision, fatigue, headache - call your doctor if this occurs. Follow with other practitioners to care for your diabetes, such as ophthamologist and podiatrist.     DISCHARGE DESTINATION: [facility] FOLLOW UP: Dr. Leung in 1 week. Your appointment has been made for 4/2/21 at 10am call the office at  with any questions.    WOUND CARE: You may shower; soap and water over incision sites. Do not scrub. Pat dry when done.   Right lower extremity- xeroform on incision then dry kerlix and ace wrap daily.  Left lower extremity- wet to dry dressing with kerlix daily.    ACTIVITY: Ambulate as tolerated, but no heavy lifting (>10lbs) or strenuous exercise.    DIET: You may resume consistent carb diet.     Call the office if you experience increasing pain, redness, swelling or drainage from incision sites/wounds, or temperature >101.4F.     NEW MEDICATIONS: Nafcillin IV end date 3/29.    ADDITIONAL FOLLOW UP AFTER DISCHARGE:  Follow up with your primary care physician in 1 week. We scheduled you to see a doctor from [facility] on [address] [place] [address] on the 2nd floor.  They will be calling you to confirm your appointment.    Follow up with Infectious disease- Dr. Hartman in 1 week since you have been on long term antibiotics.    Continue to follow with your primary care MD or your endocrinologist for your newly diagnosed diabetes.  Follow a heart healthy diabetic diet. If you check your fingerstick glucose at home, call your MD if it is greater than 250mg/dL on 2 occasions or less than 100mg/dL on 2 occasions. Know signs of low blood sugar, such as: dizziness, shakiness, sweating, confusion, hunger, nervousness-drink 4 ounces apple juice if occurs and call your doctor. Know early signs of high blood sugar, such as: frequent urination, increased thirst, blurry vision, fatigue, headache - call your doctor if this occurs. Follow with other practitioners to care for your diabetes, such as ophthamologist and podiatrist.     DISCHARGE DESTINATION: [facility] FOLLOW UP: Dr. Leung in 1 week. Please call the office at  to schedule your appointment.    WOUND CARE: You may shower; soap and water over incision sites. Do not scrub. Pat dry when done.   Right BKA stump: please pack lateral aspect of incision with dampen gauze with saline solution. Cover with dry gauze and kerlix wrap daily, apply xeroform to incision site then dry kerlix and ace wrap daily.  Left lower extremity- wet to dry dressing with kerlix daily.    ACTIVITY: Ambulate as tolerated, non weight bearing on right stump, weight bearing as tolerated on left foot, no heavy lifting (>10lbs) or strenuous exercise. May work with PT.    DIET: You may resume consistent carb diet.     Call the office if you experience increasing pain, redness, swelling or drainage from incision sites/wounds, or temperature >101.4F.     NEW MEDICATIONS: Unasyn IV end date 4/12    ADDITIONAL FOLLOW UP AFTER DISCHARGE:  Follow up with your primary care physician in 1 week. We scheduled you to see a doctor from [facility] on [address] on the 2nd floor.  They will be calling you to confirm your appointment.    Follow up with Infectious disease- Dr. Hartman in 1 week since you have been on long term antibiotics.    Continue to follow with your primary care MD or your endocrinologist for your newly diagnosed diabetes.  Follow a heart healthy diabetic diet. If you check your fingerstick glucose at home, call your MD if it is greater than 250mg/dL on 2 occasions or less than 100mg/dL on 2 occasions. Know signs of low blood sugar, such as: dizziness, shakiness, sweating, confusion, hunger, nervousness-drink 4 ounces apple juice if occurs and call your doctor. Know early signs of high blood sugar, such as: frequent urination, increased thirst, blurry vision, fatigue, headache - call your doctor if this occurs. Follow with other practitioners to care for your diabetes, such as ophthamologist and podiatrist.     DISCHARGE DESTINATION: [facility] FOLLOW UP: Dr. Leung in 1 week. Please call the office at  to schedule your appointment.    WOUND CARE: You may shower; soap and water over incision sites. Do not scrub. Pat dry when done.   Right BKA stump: please apply santyl to lateral incision and pack lateral aspect of incision with dampen gauze with saline solution. Cover with dry gauze and kerlix wrap daily, apply xeroform to incision site then dry kerlix and ace wrap daily.  Left lower extremity- wet to dry dressing with kerlix daily.    ACTIVITY: Ambulate as tolerated, non weight bearing on right stump, weight bearing as tolerated on left foot, no heavy lifting (>10lbs) or strenuous exercise. May work with PT.    DIET: You may resume consistent carb diet.     Call the office if you experience increasing pain, redness, swelling or drainage from incision sites/wounds, or temperature >101.4F.     NEW MEDICATIONS: Unasyn IV end date 4/8  Augmentin 875mg q12h 4/9-4/12    ADDITIONAL FOLLOW UP AFTER DISCHARGE:  Follow up with your primary care physician in 1 week. We scheduled you to see a doctor from [facility] on [address] [place] [address] on the 2nd floor.  They will be calling you to confirm your appointment.    Follow up with Infectious disease- Dr. Hartman in 1 week since you have been on long term antibiotics.    Continue to follow with your primary care MD or your endocrinologist for your newly diagnosed diabetes.  Follow a heart healthy diabetic diet. If you check your fingerstick glucose at home, call your MD if it is greater than 250mg/dL on 2 occasions or less than 100mg/dL on 2 occasions. Know signs of low blood sugar, such as: dizziness, shakiness, sweating, confusion, hunger, nervousness-drink 4 ounces apple juice if occurs and call your doctor. Know early signs of high blood sugar, such as: frequent urination, increased thirst, blurry vision, fatigue, headache - call your doctor if this occurs. Follow with other practitioners to care for your diabetes, such as ophthamologist and podiatrist.     DISCHARGE DESTINATION: [facility] FOLLOW UP: Dr. Leung in 1 week. Your appointment is on 4/12/2021 at 9:30am. Please call the office at  with any questions    WOUND CARE: You may shower; soap and water over incision sites. Do not scrub. Pat dry when done.   Right BKA stump: please apply santyl to lateral incision and pack lateral aspect of incision with dampen gauze with saline solution. Cover with dry gauze and kerlix wrap daily, apply xeroform to incision site then dry kerlix and ace wrap daily.  Left lower extremity- wet to dry dressing with kerlix daily.    ACTIVITY: Ambulate as tolerated, non weight bearing on right stump, weight bearing as tolerated on left foot, no heavy lifting (>10lbs) or strenuous exercise. May work with PT.    DIET: You may resume consistent carb diet.     Call the office if you experience increasing pain, redness, swelling or drainage from incision sites/wounds, or temperature >101.4F.     NEW MEDICATIONS: Unasyn IV end date 4/8  Augmentin 875mg q12h 4/9-4/12    ADDITIONAL FOLLOW UP AFTER DISCHARGE:  Follow up with your primary care physician in 1 week. We scheduled you to see a doctor from [facility] on [address] [place] [address] on the 2nd floor.  They will be calling you to confirm your appointment.    Follow up with Infectious disease- Dr. Hartman in 1 week since you have been on long term antibiotics.    Continue to follow with your primary care MD or your endocrinologist for your newly diagnosed diabetes.  Follow a heart healthy diabetic diet. If you check your fingerstick glucose at home, call your MD if it is greater than 250mg/dL on 2 occasions or less than 100mg/dL on 2 occasions. Know signs of low blood sugar, such as: dizziness, shakiness, sweating, confusion, hunger, nervousness-drink 4 ounces apple juice if occurs and call your doctor. Know early signs of high blood sugar, such as: frequent urination, increased thirst, blurry vision, fatigue, headache - call your doctor if this occurs. Follow with other practitioners to care for your diabetes, such as ophthamologist and podiatrist.     DISCHARGE DESTINATION: [facility] FOLLOW UP: Dr. Leung in 1 week. Your appointment is on 4/12/2021 at 9:30am. Please call the office at  with any questions    WOUND CARE: You may shower; soap and water over incision sites. Do not scrub. Pat dry when done.   Right BKA stump: please apply santyl to lateral incision and pack lateral aspect of incision with dampen gauze with saline solution. Cover with dry gauze and kerlix wrap daily, apply xeroform to incision site then dry kerlix and ace wrap daily.  Left lower extremity- wet to dry dressing with kerlix daily.    Ostomy care: clean Stoma with water. Apply Fresh Coloplast  Sensura 04791 Ostomy Patch to Stoma. Empty Ostomy bag as needed. Change ostomy bag daily or as needed.     ACTIVITY: Ambulate as tolerated, non weight bearing on right stump, weight bearing as tolerated on left foot, no heavy lifting (>10lbs) or strenuous exercise. May work with PT.    DIET: You may resume consistent carb diet.     Call the office if you experience increasing pain, redness, swelling or drainage from incision sites/wounds, or temperature >101.4F.     NEW MEDICATIONS: Unasyn IV end date 4/8  Augmentin 875mg q12h 4/9-4/12    ADDITIONAL FOLLOW UP AFTER DISCHARGE:  Follow up with your primary care physician in 1 week. We scheduled you to see a doctor from [facility] on [address] [place] [address] on the 2nd floor.  They will be calling you to confirm your appointment.    Follow up with Infectious disease- Dr. Hartman in 1 week since you have been on long term antibiotics.    Continue to follow with your primary care MD or your endocrinologist for your newly diagnosed diabetes.  Follow a heart healthy diabetic diet. If you check your fingerstick glucose at home, call your MD if it is greater than 250mg/dL on 2 occasions or less than 100mg/dL on 2 occasions. Know signs of low blood sugar, such as: dizziness, shakiness, sweating, confusion, hunger, nervousness-drink 4 ounces apple juice if occurs and call your doctor. Know early signs of high blood sugar, such as: frequent urination, increased thirst, blurry vision, fatigue, headache - call your doctor if this occurs. Follow with other practitioners to care for your diabetes, such as ophthamologist and podiatrist.     DISCHARGE DESTINATION: [facility] FOLLOW UP: Dr. Leung in 1 week. Your appointment is on 4/12/2021 at 9:30am. Please call the office at  with any questions    WOUND CARE: You may shower; soap and water over incision sites. Do not scrub. Pat dry when done.   Right BKA stump: please apply santyl to lateral incision and pack lateral aspect of incision with dampen gauze with saline solution. Cover with dry gauze and kerlix wrap daily, apply xeroform to incision site then dry kerlix and ace wrap daily.  Left lower extremity- wet to dry dressing with kerlix daily.    Ostomy care: clean Stoma with water. Apply Fresh Coloplast  Sensura 74980 Ostomy Patch to Stoma. Empty Ostomy bag as needed. Change ostomy bag daily or as needed.   Cut to fit 3/8''-3''(10-76mm)     ACTIVITY: Ambulate as tolerated, non weight bearing on right stump, weight bearing as tolerated on left foot, no heavy lifting (>10lbs) or strenuous exercise. May work with PT.    DIET: You may resume consistent carb diet.     Call the office if you experience increasing pain, redness, swelling or drainage from incision sites/wounds, or temperature >101.4F.     NEW MEDICATIONS: Unasyn IV end date 4/8  Augmentin 875mg q12h 4/9-4/12    ADDITIONAL FOLLOW UP AFTER DISCHARGE:  Follow up with your primary care physician in 1 week. We scheduled you to see a doctor from [facility] on [address] [place] [address] on the 2nd floor.  They will be calling you to confirm your appointment.    Follow up with Infectious disease- Dr. Hartman in 1 week since you have been on long term antibiotics.    Continue to follow with your primary care MD or your endocrinologist for your newly diagnosed diabetes.  Follow a heart healthy diabetic diet. If you check your fingerstick glucose at home, call your MD if it is greater than 250mg/dL on 2 occasions or less than 100mg/dL on 2 occasions. Know signs of low blood sugar, such as: dizziness, shakiness, sweating, confusion, hunger, nervousness-drink 4 ounces apple juice if occurs and call your doctor. Know early signs of high blood sugar, such as: frequent urination, increased thirst, blurry vision, fatigue, headache - call your doctor if this occurs. Follow with other practitioners to care for your diabetes, such as ophthamologist and podiatrist.     DISCHARGE DESTINATION: [facility]

## 2021-03-17 NOTE — PROGRESS NOTE ADULT - SUBJECTIVE AND OBJECTIVE BOX
24hr Events:  O/N: Passed TOV, VSS  3/16: Seen by endocrine, SDU to telemetry, Lantus 10UI per endocrine. DC'd Vanc and Zosyn. Per ID, started IV Nafcillin            Assessment/Plan;  75F PMHx UC, colon cancer s/p total abdominal colectomy with end ileostomy, found down at home, was admitted for DKA and R diabetic foot wound, s/p operative debridement by podiatry 3/14/21 and R foot guillotine amputation and L foot I&D by vasc surg 3/15/21.    NEURO: tylenol, dilaudid PRN   CV: Stable, TTE- LVEF>55%, nl RV/LV size/function  RESP: NC  FEN/GI: CCD, multivite, vit C  : voiding  ID: B/L LE infection- Nafcillin- staph epidermids/strep constellatus/strep intermedius--foot wound  ENDO: s/p insulin gtt; Lantus 14u qHS, mod SSI; endo following   HEME: Stable  Wounds/lines/drains: PIV's, b/l foot wounds WTD dressing by podiatry  PPx: HSQ     24hr Events:  O/N: Passed TOV, VSS  3/16: Seen by endocrine, SDU to telemetry, Lantus 10UI per endocrine. DC'd Vanc and Zosyn. Per ID, started IV Nafcillin      Patient is a 75y old  Female who presents with a chief complaint of Weakness (17 Mar 2021 05:27)          MEDICATIONS  (STANDING):  ascorbic acid 500 milliGRAM(s) Oral daily  dextrose 40% Gel 15 Gram(s) Oral once  dextrose 5%. 1000 milliLiter(s) (100 mL/Hr) IV Continuous <Continuous>  dextrose 5%. 1000 milliLiter(s) (50 mL/Hr) IV Continuous <Continuous>  dextrose 50% Injectable 25 Gram(s) IV Push once  dextrose 50% Injectable 12.5 Gram(s) IV Push once  dextrose 50% Injectable 25 Gram(s) IV Push once  glucagon  Injectable 1 milliGRAM(s) IntraMuscular once  heparin   Injectable 5000 Unit(s) SubCutaneous every 8 hours  insulin glargine Injectable (LANTUS) 10 Unit(s) SubCutaneous at bedtime  insulin lispro (ADMELOG) corrective regimen sliding scale   SubCutaneous Before meals and at bedtime  multivitamin/minerals/iron Oral Solution (CENTRUM) 15 milliLiter(s) Oral daily  nafcillin  IVPB      nafcillin  IVPB 2 Gram(s) IV Intermittent every 4 hours  senna 2 Tablet(s) Oral at bedtime    MEDICATIONS  (PRN):  acetaminophen   Tablet .. 650 milliGRAM(s) Oral every 6 hours PRN Moderate Pain (4 - 6)  oxycodone    5 mG/acetaminophen 325 mG 1 Tablet(s) Oral every 4 hours PRN Severe Pain (7 - 10)      Allergies    No Known Allergies    Intolerances          Vital Signs Last 24 Hrs  T(C): 36.7 (17 Mar 2021 06:39), Max: 36.7 (16 Mar 2021 10:14)  T(F): 98.1 (17 Mar 2021 06:39), Max: 98.1 (16 Mar 2021 10:14)  HR: 65 (17 Mar 2021 05:35) (63 - 88)  BP: 130/62 (17 Mar 2021 05:35) (112/55 - 145/63)  BP(mean): 89 (17 Mar 2021 05:35) (77 - 90)  RR: 18 (17 Mar 2021 05:35) (11 - 18)  SpO2: 96% (17 Mar 2021 05:35) (92% - 99%)  CAPILLARY BLOOD GLUCOSE      POCT Blood Glucose.: 142 mg/dL (17 Mar 2021 06:56)  POCT Blood Glucose.: 221 mg/dL (16 Mar 2021 21:11)  POCT Blood Glucose.: 212 mg/dL (16 Mar 2021 16:22)  POCT Blood Glucose.: 141 mg/dL (16 Mar 2021 10:46)      03-16 @ 07:01  -  03-17 @ 07:00  --------------------------------------------------------  IN: 940 mL / OUT: 1160 mL / NET: -220 mL        Physical Exam:    Daily     Daily   General:  Well appearing, NAD  CV:  RRR  Lungs:  nonlabored breathing  Abdomen:  Soft, non-tender, no distended  Extremities: RLE with amputation site clean, mildly bloody, without cellulitis, fibrinous exudate or pus. L ankle with health I&D site, visible granulation tissue and penrose drain in place.  Neuro:  AAOx3    LABS:                        9.7    7.65  )-----------( 333      ( 16 Mar 2021 06:25 )             30.3     03-16    139  |  106  |  6<L>  ----------------------------<  106<H>  3.8   |  26  |  0.55    Ca    7.3<L>      16 Mar 2021 06:25  Phos  3.7     03-16  Mg     1.9     03-16    ---------------------------------------------------------------------------  PLEASE CHECK WHEN PRESENT:     [  ] Heart Failure     [  ] Acute     [  ] Acute on Chronic     [  ] Chronic  -------------------------------------------------------------------     [  ]Diastolic [HFpEF]     [  ]Systolic [HFrEF]     [  ]Combined [HFpEF & HFrEF]     [  ] afib     [  ] hypertensive heart disease     [  ]Other:  -------------------------------------------------------------------  [ ] Respiratory failure  [ ] Acute cor pulmonale  [ ] Asthma/COPD Exacerbation  [ ] Pleural effusion  [ ] Aspiration pneumonia  -------------------------------------------------------------------  [  ]ANTONY     [  ]ATN     [  ]Reneal Medullary Necrosis     [  ]Renal Cortical Necrosis     [  ]Other Pathological Lesions:    [  ]CKD 1  [  ]CKD 2  [  ]CKD 3  [  ]CKD 4  [  ]CKD 5  [  ]Other  -------------------------------------------------------------------  [  ]Diabetes  [  ] Diabetic PVD Ulcer  [  ] Neuropathic ulcer to DM  [  ] Diabetes with Nephropathy  [  ] Osteomyelitis due to diabetes  [x ] Hyperglycemia  [ ] Hypoglycemia  --------------------------------------------------------------------  [  ]Malnutrition: See Nutrition Note  [  ]Cachexia  [  ]Other:   [  ]Supplement Ordered:  [  ]Morbid Obesity (BMI >=40]  ---------------------------------------------------------------------  [ ] Sepsis/severe sepsis/septic shock  [ ] UTI  [ ] Pneumonia  -----------------------------------------------------------------------  [ ] Acidosis/alkalosis  [ ] Fluid overload  [x ] Hypokalemia  [ ] Hyperkalemia  [x ] Hypomagnesemia  [x ] Hypophosphatemia  [ ] Hyperphosphatemia  [ ] Hyponatremia  [ ] Hypernatremia  ------------------------------------------------------------------------  [ ] Acute blood loss anemia  [ ] Post op blood loss anemia  [ ] Iron deficiency anemia  [ ] Anemia due to chronic disease  [ ] Hypercoagulable state  [x ] Leukocytosis  ----------------------------------------------------------------------  [ ] Cerebral infarction  [ ] Transient ischemia attack  [ ] Encephalopathy      Assessment/Plan;  75F PMHx UC, colon cancer s/p total abdominal colectomy with end ileostomy, found down at home, was admitted for DKA and R diabetic foot wound, s/p operative debridement by podiatry 3/14/21 and R foot guillotine amputation and L foot I&D by vasc surg 3/15/21.    #R foot wound and Lt foot wound  s/p R foot operative debridement by podiatry 3/14 s/p R guilletine BKA, L ankle I&D with penrose drain 3/15  -pain control  -Nafcillin 3/16-3/29 f/u ID recs  -plan for BKA closure 3/19  -local wound care  -f/u labs    #DKA  -f/u endo recs  -ISS  -lantus 10  -f/u hgb A1C    Diet: consistent carb  PPx: HSQ  Dispo: TBD

## 2021-03-17 NOTE — DISCHARGE NOTE PROVIDER - CARE PROVIDERS DIRECT ADDRESSES
[email or web address]

## 2021-03-17 NOTE — DISCHARGE NOTE PROVIDER - PROVIDER TOKENS
PROVIDER:[TOKEN:[[id number]],FOLLOWUP:[1 week]] PROVIDER:[TOKEN:[[id number]],FOLLOWUP:[1 week]],PROVIDER:[TOKEN:[[id number]],FOLLOWUP:[1 week]] PROVIDER:[TOKEN:[[id number]],SCHEDULEDAPPT:[04/02/2021],SCHEDULEDAPPTTIME:[10:00 AM]],PROVIDER:[TOKEN:[[id number]],FOLLOWUP:[1 week]] PROVIDER:[TOKEN:[[id number]],SCHEDULEDAPPT:[04/12/2021],SCHEDULEDAPPTTIME:[09:30 AM]],PROVIDER:[TOKEN:[[id number]],FOLLOWUP:[1 week]]

## 2021-03-17 NOTE — DISCHARGE NOTE PROVIDER - NSDCFUSCHEDAPPT_GEN_ALL_CORE_FT
INES CALDERON ; 03/30/2021 ; [facility] [address] INES CALDERON ; 03/30/2021 ; [facility] [address]  INES CALDERON ; 04/02/2021 ; [facility] [address] INES CALDERON ; 04/02/2021 ; [facility] [address] INES CALDERON ; 04/12/2021 ; [facility] S[facility] [address]

## 2021-03-17 NOTE — DISCHARGE NOTE PROVIDER - NSDCMRMEDTOKEN_GEN_ALL_CORE_FT
acetaminophen 325 mg oral tablet: 2 tab(s) orally every 6 hours, As needed, Mild Pain (1 - 3)  insulin glargine: 14 unit(s) subcutaneous once a day (at bedtime)  Insulin Lispro KwikPen 100 units/mL injectable solution: 10  injectable 3 times a day (before meals)  nafcillin: 2 gram(s) intravenous every 4 hours  oxycodone-acetaminophen 5 mg-325 mg oral tablet: 1 tab(s) orally every 6 hours, As needed, Moderate Pain (4 - 6)  oxycodone-acetaminophen 5 mg-325 mg oral tablet: 2 tab(s) orally every 6 hours, As needed, Severe Pain (7 - 10)  senna oral tablet: 2 tab(s) orally once a day (at bedtime)   acetaminophen 325 mg oral tablet: 2 tab(s) orally every 6 hours, As needed, Mild Pain (1 - 3)  insulin glargine: 14 unit(s) subcutaneous once a day (at bedtime)  Insulin Lispro KwikPen 100 units/mL injectable solution: 8  injectable 3 times a day (before meals)  nafcillin: 2 gram(s) intravenous every 4 hours  oxycodone-acetaminophen 5 mg-325 mg oral tablet: 1 tab(s) orally every 6 hours, As needed, Moderate Pain (4 - 6)  oxycodone-acetaminophen 5 mg-325 mg oral tablet: 2 tab(s) orally every 6 hours, As needed, Severe Pain (7 - 10)  senna oral tablet: 2 tab(s) orally once a day (at bedtime)   acetaminophen 325 mg oral tablet: 2 tab(s) orally every 6 hours, As needed, Mild Pain (1 - 3)  ampicillin-sulbactam: 3 gram(s) intravenous every 6 hours end date 4/12  ascorbic acid 500 mg oral tablet: 1 tab(s) orally once a day  Aspirin Low Dose 81 mg oral tablet, chewable: 1 tab(s) orally once a day  insulin glargine: 11 unit(s) subcutaneous once a day (at bedtime)  Insulin Lispro KwikPen 100 units/mL injectable solution: 8  injectable 3 times a day (before meals)  Multiple Vitamins oral tablet, chewable: 1 tab(s) orally once a day  oxycodone-acetaminophen 5 mg-325 mg oral tablet: 1 tab(s) orally every 6 hours, As needed, Moderate Pain (4 - 6)  oxycodone-acetaminophen 5 mg-325 mg oral tablet: 2 tab(s) orally every 6 hours, As needed, Severe Pain (7 - 10)  senna oral tablet: 2 tab(s) orally once a day (at bedtime)   acetaminophen 325 mg oral tablet: 2 tab(s) orally every 6 hours, As needed, Mild Pain (1 - 3)  ampicillin-sulbactam: 3 gram(s) intravenous every 6 hours end date 4/12  ascorbic acid 500 mg oral tablet: 1 tab(s) orally once a day  Aspirin Low Dose 81 mg oral tablet, chewable: 1 tab(s) orally once a day  insulin glargine: 10 unit(s) subcutaneous once a day (at bedtime)  Insulin Lispro KwikPen 100 units/mL injectable solution: 7  injectable 3 times a day (before meals)  Multiple Vitamins oral tablet, chewable: 1 tab(s) orally once a day  oxycodone-acetaminophen 5 mg-325 mg oral tablet: 1 tab(s) orally every 6 hours, As needed, Moderate Pain (4 - 6)  oxycodone-acetaminophen 5 mg-325 mg oral tablet: 2 tab(s) orally every 6 hours, As needed, Severe Pain (7 - 10)  senna oral tablet: 2 tab(s) orally once a day (at bedtime)   acetaminophen 325 mg oral tablet: 2 tab(s) orally every 6 hours, As needed, Mild Pain (1 - 3)  ampicillin-sulbactam: 3 gram(s) intravenous every 6 hours end date 4/12  ascorbic acid 500 mg oral tablet: 1 tab(s) orally once a day  Aspirin Low Dose 81 mg oral tablet, chewable: 1 tab(s) orally once a day  Augmentin 875 mg-125 mg oral tablet: 1 tab(s) orally 2 times a day   collagenase 250 units/g topical ointment: 1 application topically once a day  Dextrose 5% in Water intravenous solution: 1000 milliliter(s) intravenous   Dextrose 5% in Water intravenous solution: 1000 milliliter(s) intravenous   glucose 40% oral gel:  orally   glucose 50% intravenous solution: 50 milliliter(s) intravenous once  glucose 50% intravenous solution: 25 milliliter(s) intravenous once  glucose 50% intravenous solution: 50 milliliter(s) intravenous once  Multiple Vitamins oral tablet, chewable: 1 tab(s) orally once a day  oxycodone-acetaminophen 5 mg-325 mg oral tablet: 1 tab(s) orally every 6 hours, As needed, Moderate Pain (4 - 6)  oxycodone-acetaminophen 5 mg-325 mg oral tablet: 2 tab(s) orally every 6 hours, As needed, Severe Pain (7 - 10)  senna oral tablet: 2 tab(s) orally once a day (at bedtime)   acetaminophen 325 mg oral tablet: 2 tab(s) orally every 6 hours, As needed, Mild Pain (1 - 3)  ascorbic acid 500 mg oral tablet: 1 tab(s) orally once a day  Aspirin Low Dose 81 mg oral tablet, chewable: 1 tab(s) orally once a day  Augmentin 875 mg-125 mg oral tablet: 1 tab(s) orally 2 times a day   collagenase 250 units/g topical ointment: 1 application topically once a day  Dextrose 5% in Water intravenous solution: 1000 milliliter(s) intravenous   Dextrose 5% in Water intravenous solution: 1000 milliliter(s) intravenous   glucose 40% oral gel:  orally   glucose 50% intravenous solution: 50 milliliter(s) intravenous once  glucose 50% intravenous solution: 25 milliliter(s) intravenous once  glucose 50% intravenous solution: 50 milliliter(s) intravenous once  Januvia 100 mg oral tablet: 1 tab(s) orally once a day   metFORMIN 1000 mg oral tablet: 1 tab(s) orally 2 times a day   metFORMIN 500 mg oral tablet: 1 tab(s) orally 2 times a day   Multiple Vitamins oral tablet, chewable: 1 tab(s) orally once a day  oxycodone-acetaminophen 5 mg-325 mg oral tablet: 1 tab(s) orally every 6 hours, As needed, Moderate Pain (4 - 6)  oxycodone-acetaminophen 5 mg-325 mg oral tablet: 2 tab(s) orally every 6 hours, As needed, Severe Pain (7 - 10)  senna oral tablet: 2 tab(s) orally once a day (at bedtime)

## 2021-03-17 NOTE — DISCHARGE NOTE PROVIDER - NSDCCPCAREPLAN_GEN_ALL_CORE_FT
PRINCIPAL DISCHARGE DIAGNOSIS  Diagnosis: Necrotizing fasciitis of lower leg  Assessment and Plan of Treatment:       SECONDARY DISCHARGE DIAGNOSES  Diagnosis: Hypophosphatemia  Assessment and Plan of Treatment:     Diagnosis: Hypomagnesemia  Assessment and Plan of Treatment:     Diagnosis: Hypokalemia  Assessment and Plan of Treatment:     Diagnosis: Leukocytosis  Assessment and Plan of Treatment:     Diagnosis: DKA (diabetic ketoacidoses)  Assessment and Plan of Treatment:     Diagnosis: Hyperglycemia  Assessment and Plan of Treatment: Hyperglycemia    Diagnosis: Failure to thrive in adult  Assessment and Plan of Treatment:     Diagnosis: Fall  Assessment and Plan of Treatment:     Diagnosis: Cellulitis  Assessment and Plan of Treatment:      PRINCIPAL DISCHARGE DIAGNOSIS  Diagnosis: Necrotizing fasciitis of lower leg  Assessment and Plan of Treatment:       SECONDARY DISCHARGE DIAGNOSES  Diagnosis: Acute blood loss anemia  Assessment and Plan of Treatment:     Diagnosis: Ulcerative colitis  Assessment and Plan of Treatment:     Diagnosis: Diabetes mellitus, new onset  Assessment and Plan of Treatment:     Diagnosis: Septic shock  Assessment and Plan of Treatment:     Diagnosis: Hypophosphatemia  Assessment and Plan of Treatment:     Diagnosis: Hypomagnesemia  Assessment and Plan of Treatment:     Diagnosis: Hypokalemia  Assessment and Plan of Treatment:     Diagnosis: Leukocytosis  Assessment and Plan of Treatment:     Diagnosis: DKA (diabetic ketoacidoses)  Assessment and Plan of Treatment:     Diagnosis: Hyperglycemia  Assessment and Plan of Treatment: Hyperglycemia    Diagnosis: Failure to thrive in adult  Assessment and Plan of Treatment:     Diagnosis: Fall  Assessment and Plan of Treatment:     Diagnosis: Cellulitis  Assessment and Plan of Treatment:      PRINCIPAL DISCHARGE DIAGNOSIS  Diagnosis: Necrotizing fasciitis of lower leg  Assessment and Plan of Treatment: Treated with BKA and resolved.      SECONDARY DISCHARGE DIAGNOSES  Diagnosis: Acute blood loss anemia  Assessment and Plan of Treatment: Repeat post op remains stable. No therapy needed.    Diagnosis: Ulcerative colitis  Assessment and Plan of Treatment: Not active. No therapy needed.    Diagnosis: Diabetes mellitus, new onset  Assessment and Plan of Treatment: Lantus 14 units at bedtime. Premeal Lispro 8 units TID.  Correctional Lispro Moderate scale with meals and bedtime.  Diet: consistent carbs. FSG Goal: 100 - 180.    Diagnosis: Septic shock  Assessment and Plan of Treatment: s/p BKA to control source of sepsis and resuscitated in SICU, IV ABx and resolved.    Diagnosis: Hypophosphatemia  Assessment and Plan of Treatment: Supplemented and resolved.    Diagnosis: Hypomagnesemia  Assessment and Plan of Treatment: Supplemented and resolved.    Diagnosis: Hypokalemia  Assessment and Plan of Treatment: Supplemented and resolved.    Diagnosis: Leukocytosis  Assessment and Plan of Treatment: Sepsis resolved with treatment. Antibiotic course to be completed 3/29/21. Can receive Nafcilin through peripheral IV at [facility].    Diagnosis: DKA (diabetic ketoacidoses)  Assessment and Plan of Treatment: Treated and resolved.    Diagnosis: Hyperglycemia  Assessment and Plan of Treatment: Treated and resolved. Lantus 14 units at bedtime. Premeal Lispro 8 units TID.  Correctional Lispro Moderate scale with meals and bedtime.  Diet: consistent carbs. FSG Goal: 100 - 180.    Diagnosis: Failure to thrive in adult  Assessment and Plan of Treatment: Improving.  Sepsis resolved. Diabetes controlled. Diet improved.    Diagnosis: Fall  Assessment and Plan of Treatment: Physical therapy. [facility]    Diagnosis: Cellulitis  Assessment and Plan of Treatment: Treated and resolved.     PRINCIPAL DISCHARGE DIAGNOSIS  Diagnosis: Necrotizing fasciitis of lower leg  Assessment and Plan of Treatment: Treated with BKA and resolved.      SECONDARY DISCHARGE DIAGNOSES  Diagnosis: Cellulitis  Assessment and Plan of Treatment: Treated and resolved.    Diagnosis: Fall  Assessment and Plan of Treatment: Physical therapy. [facility]    Diagnosis: Failure to thrive in adult  Assessment and Plan of Treatment: Improving.  Sepsis resolved. Diabetes controlled. Diet improved.    Diagnosis: Hyperglycemia  Assessment and Plan of Treatment: Treated and resolved. Lantus 11 units at bedtime. Premeal Lispro 8 units TID.  Correctional Lispro Moderate scale with meals and bedtime.  Diet: consistent carbs. FSG Goal: 100 - 180.    Diagnosis: DKA (diabetic ketoacidoses)  Assessment and Plan of Treatment: Treated and resolved.    Diagnosis: Leukocytosis  Assessment and Plan of Treatment: Sepsis resolved with treatment. Antibiotic course to be completed 3/29/21. Can receive Nafcilin through peripheral IV at [facility].    Diagnosis: Hypokalemia  Assessment and Plan of Treatment: Supplemented and resolved.    Diagnosis: Hypomagnesemia  Assessment and Plan of Treatment: Supplemented and resolved.    Diagnosis: Hypophosphatemia  Assessment and Plan of Treatment: Supplemented and resolved.    Diagnosis: Septic shock  Assessment and Plan of Treatment: s/p BKA to control source of sepsis and resuscitated in SICU, IV ABx and resolved.    Diagnosis: Diabetes mellitus, new onset  Assessment and Plan of Treatment: Lantus 14 units at bedtime. Premeal Lispro 8 units TID.  Correctional Lispro Moderate scale with meals and bedtime.  Diet: consistent carbs. FSG Goal: 100 - 180.    Diagnosis: Ulcerative colitis  Assessment and Plan of Treatment: Not active. No therapy needed.    Diagnosis: Acute blood loss anemia  Assessment and Plan of Treatment: Repeat post op remains stable. No therapy needed.

## 2021-03-18 ENCOUNTER — TRANSCRIPTION ENCOUNTER (OUTPATIENT)
Age: 76
End: 2021-03-18

## 2021-03-18 LAB
ANION GAP SERPL CALC-SCNC: 9 MMOL/L — SIGNIFICANT CHANGE UP (ref 5–17)
ANION GAP SERPL CALC-SCNC: 9 MMOL/L — SIGNIFICANT CHANGE UP (ref 5–17)
BLD GP AB SCN SERPL QL: NEGATIVE — SIGNIFICANT CHANGE UP
BUN SERPL-MCNC: 5 MG/DL — LOW (ref 7–23)
BUN SERPL-MCNC: 6 MG/DL — LOW (ref 7–23)
CALCIUM SERPL-MCNC: 7.2 MG/DL — LOW (ref 8.4–10.5)
CALCIUM SERPL-MCNC: 7.2 MG/DL — LOW (ref 8.4–10.5)
CHLORIDE SERPL-SCNC: 102 MMOL/L — SIGNIFICANT CHANGE UP (ref 96–108)
CHLORIDE SERPL-SCNC: 99 MMOL/L — SIGNIFICANT CHANGE UP (ref 96–108)
CO2 SERPL-SCNC: 28 MMOL/L — SIGNIFICANT CHANGE UP (ref 22–31)
CO2 SERPL-SCNC: 28 MMOL/L — SIGNIFICANT CHANGE UP (ref 22–31)
CREAT SERPL-MCNC: 0.58 MG/DL — SIGNIFICANT CHANGE UP (ref 0.5–1.3)
CREAT SERPL-MCNC: 0.6 MG/DL — SIGNIFICANT CHANGE UP (ref 0.5–1.3)
CULTURE RESULTS: SIGNIFICANT CHANGE UP
GLUCOSE BLDC GLUCOMTR-MCNC: 166 MG/DL — HIGH (ref 70–99)
GLUCOSE BLDC GLUCOMTR-MCNC: 182 MG/DL — HIGH (ref 70–99)
GLUCOSE BLDC GLUCOMTR-MCNC: 199 MG/DL — HIGH (ref 70–99)
GLUCOSE BLDC GLUCOMTR-MCNC: 90 MG/DL — SIGNIFICANT CHANGE UP (ref 70–99)
GLUCOSE SERPL-MCNC: 107 MG/DL — HIGH (ref 70–99)
GLUCOSE SERPL-MCNC: 260 MG/DL — HIGH (ref 70–99)
HCT VFR BLD CALC: 30.5 % — LOW (ref 34.5–45)
HGB BLD-MCNC: 10 G/DL — LOW (ref 11.5–15.5)
MAGNESIUM SERPL-MCNC: 1.5 MG/DL — LOW (ref 1.6–2.6)
MAGNESIUM SERPL-MCNC: 1.8 MG/DL — SIGNIFICANT CHANGE UP (ref 1.6–2.6)
MCHC RBC-ENTMCNC: 29.7 PG — SIGNIFICANT CHANGE UP (ref 27–34)
MCHC RBC-ENTMCNC: 32.8 GM/DL — SIGNIFICANT CHANGE UP (ref 32–36)
MCV RBC AUTO: 90.5 FL — SIGNIFICANT CHANGE UP (ref 80–100)
NRBC # BLD: 0 /100 WBCS — SIGNIFICANT CHANGE UP (ref 0–0)
ORGANISM # SPEC MICROSCOPIC CNT: SIGNIFICANT CHANGE UP
PHOSPHATE SERPL-MCNC: 3.1 MG/DL — SIGNIFICANT CHANGE UP (ref 2.5–4.5)
PLATELET # BLD AUTO: 298 K/UL — SIGNIFICANT CHANGE UP (ref 150–400)
POTASSIUM SERPL-MCNC: 3.1 MMOL/L — LOW (ref 3.5–5.3)
POTASSIUM SERPL-MCNC: 3.7 MMOL/L — SIGNIFICANT CHANGE UP (ref 3.5–5.3)
POTASSIUM SERPL-SCNC: 3.1 MMOL/L — LOW (ref 3.5–5.3)
POTASSIUM SERPL-SCNC: 3.7 MMOL/L — SIGNIFICANT CHANGE UP (ref 3.5–5.3)
RBC # BLD: 3.37 M/UL — LOW (ref 3.8–5.2)
RBC # FLD: 13.2 % — SIGNIFICANT CHANGE UP (ref 10.3–14.5)
RH IG SCN BLD-IMP: POSITIVE — SIGNIFICANT CHANGE UP
SARS-COV-2 RNA SPEC QL NAA+PROBE: NEGATIVE — SIGNIFICANT CHANGE UP
SODIUM SERPL-SCNC: 136 MMOL/L — SIGNIFICANT CHANGE UP (ref 135–145)
SODIUM SERPL-SCNC: 139 MMOL/L — SIGNIFICANT CHANGE UP (ref 135–145)
SPECIMEN SOURCE: SIGNIFICANT CHANGE UP
WBC # BLD: 7.18 K/UL — SIGNIFICANT CHANGE UP (ref 3.8–10.5)
WBC # FLD AUTO: 7.18 K/UL — SIGNIFICANT CHANGE UP (ref 3.8–10.5)

## 2021-03-18 PROCEDURE — 99232 SBSQ HOSP IP/OBS MODERATE 35: CPT

## 2021-03-18 PROCEDURE — 99232 SBSQ HOSP IP/OBS MODERATE 35: CPT | Mod: GC

## 2021-03-18 PROCEDURE — 99233 SBSQ HOSP IP/OBS HIGH 50: CPT | Mod: GC

## 2021-03-18 RX ORDER — SODIUM CHLORIDE 9 MG/ML
1000 INJECTION INTRAMUSCULAR; INTRAVENOUS; SUBCUTANEOUS
Refills: 0 | Status: DISCONTINUED | OUTPATIENT
Start: 2021-03-18 | End: 2021-03-19

## 2021-03-18 RX ORDER — INSULIN GLARGINE 100 [IU]/ML
7 INJECTION, SOLUTION SUBCUTANEOUS AT BEDTIME
Refills: 0 | Status: DISCONTINUED | OUTPATIENT
Start: 2021-03-18 | End: 2021-03-18

## 2021-03-18 RX ORDER — POTASSIUM CHLORIDE 20 MEQ
10 PACKET (EA) ORAL
Refills: 0 | Status: COMPLETED | OUTPATIENT
Start: 2021-03-18 | End: 2021-03-18

## 2021-03-18 RX ORDER — POTASSIUM CHLORIDE 20 MEQ
40 PACKET (EA) ORAL EVERY 4 HOURS
Refills: 0 | Status: DISCONTINUED | OUTPATIENT
Start: 2021-03-18 | End: 2021-03-18

## 2021-03-18 RX ORDER — MAGNESIUM SULFATE 500 MG/ML
2 VIAL (ML) INJECTION ONCE
Refills: 0 | Status: COMPLETED | OUTPATIENT
Start: 2021-03-18 | End: 2021-03-18

## 2021-03-18 RX ORDER — INSULIN GLARGINE 100 [IU]/ML
10 INJECTION, SOLUTION SUBCUTANEOUS AT BEDTIME
Refills: 0 | Status: DISCONTINUED | OUTPATIENT
Start: 2021-03-18 | End: 2021-03-19

## 2021-03-18 RX ADMIN — OXYCODONE AND ACETAMINOPHEN 1 TABLET(S): 5; 325 TABLET ORAL at 04:11

## 2021-03-18 RX ADMIN — OXYCODONE AND ACETAMINOPHEN 1 TABLET(S): 5; 325 TABLET ORAL at 05:46

## 2021-03-18 RX ADMIN — OXYCODONE AND ACETAMINOPHEN 1 TABLET(S): 5; 325 TABLET ORAL at 11:55

## 2021-03-18 RX ADMIN — Medication 6 UNIT(S): at 13:22

## 2021-03-18 RX ADMIN — OXYCODONE AND ACETAMINOPHEN 1 TABLET(S): 5; 325 TABLET ORAL at 12:55

## 2021-03-18 RX ADMIN — Medication 100 MILLIEQUIVALENT(S): at 13:53

## 2021-03-18 RX ADMIN — Medication 40 MILLIEQUIVALENT(S): at 10:24

## 2021-03-18 RX ADMIN — Medication 2: at 22:05

## 2021-03-18 RX ADMIN — DIPHENHYDRAMINE HYDROCHLORIDE AND LIDOCAINE HYDROCHLORIDE AND ALUMINUM HYDROXIDE AND MAGNESIUM HYDRO 10 MILLILITER(S): KIT at 10:25

## 2021-03-18 RX ADMIN — Medication 6 UNIT(S): at 07:20

## 2021-03-18 RX ADMIN — Medication 6 UNIT(S): at 18:19

## 2021-03-18 RX ADMIN — INSULIN GLARGINE 10 UNIT(S): 100 INJECTION, SOLUTION SUBCUTANEOUS at 22:05

## 2021-03-18 RX ADMIN — SENNA PLUS 2 TABLET(S): 8.6 TABLET ORAL at 22:05

## 2021-03-18 RX ADMIN — Medication 650 MILLIGRAM(S): at 21:52

## 2021-03-18 RX ADMIN — NAFCILLIN 200 GRAM(S): 10 INJECTION, POWDER, FOR SOLUTION INTRAVENOUS at 10:24

## 2021-03-18 RX ADMIN — NAFCILLIN 200 GRAM(S): 10 INJECTION, POWDER, FOR SOLUTION INTRAVENOUS at 13:22

## 2021-03-18 RX ADMIN — NAFCILLIN 200 GRAM(S): 10 INJECTION, POWDER, FOR SOLUTION INTRAVENOUS at 06:57

## 2021-03-18 RX ADMIN — Medication 100 MILLIEQUIVALENT(S): at 15:06

## 2021-03-18 RX ADMIN — HEPARIN SODIUM 5000 UNIT(S): 5000 INJECTION INTRAVENOUS; SUBCUTANEOUS at 06:57

## 2021-03-18 RX ADMIN — Medication 2: at 16:46

## 2021-03-18 RX ADMIN — Medication 100 MILLIEQUIVALENT(S): at 16:16

## 2021-03-18 RX ADMIN — Medication 2: at 11:55

## 2021-03-18 RX ADMIN — OXYCODONE AND ACETAMINOPHEN 1 TABLET(S): 5; 325 TABLET ORAL at 00:01

## 2021-03-18 RX ADMIN — OXYCODONE AND ACETAMINOPHEN 1 TABLET(S): 5; 325 TABLET ORAL at 01:01

## 2021-03-18 RX ADMIN — NAFCILLIN 200 GRAM(S): 10 INJECTION, POWDER, FOR SOLUTION INTRAVENOUS at 22:04

## 2021-03-18 RX ADMIN — OXYCODONE AND ACETAMINOPHEN 1 TABLET(S): 5; 325 TABLET ORAL at 16:47

## 2021-03-18 RX ADMIN — NAFCILLIN 200 GRAM(S): 10 INJECTION, POWDER, FOR SOLUTION INTRAVENOUS at 18:19

## 2021-03-18 RX ADMIN — Medication 500 MILLIGRAM(S): at 10:25

## 2021-03-18 RX ADMIN — Medication 15 MILLILITER(S): at 10:26

## 2021-03-18 RX ADMIN — OXYCODONE AND ACETAMINOPHEN 1 TABLET(S): 5; 325 TABLET ORAL at 17:47

## 2021-03-18 RX ADMIN — NAFCILLIN 200 GRAM(S): 10 INJECTION, POWDER, FOR SOLUTION INTRAVENOUS at 02:02

## 2021-03-18 RX ADMIN — Medication 50 GRAM(S): at 10:27

## 2021-03-18 RX ADMIN — HEPARIN SODIUM 5000 UNIT(S): 5000 INJECTION INTRAVENOUS; SUBCUTANEOUS at 16:46

## 2021-03-18 RX ADMIN — Medication 650 MILLIGRAM(S): at 20:52

## 2021-03-18 NOTE — PROGRESS NOTE ADULT - SUBJECTIVE AND OBJECTIVE BOX
INTERVAL HPI/OVERNIGHT EVENTS:    Patient is a 75y old  Female who presents with a chief complaint of Weakness (18 Mar 2021 11:29)  Pt was seen and examined at he bedside. AOX-2. complains of right foot pain. NPO after MN.   K:3.1, KCL given.   good appetite. planned for OR tmr.   S/p BKA right side 3/15.     FSG & Insulin received:    Yesterday:  pre-dinner fs  6+ 4  units lispro SS  bedtime fs  lantus 15  units +  2  units lispro SS    Today:  pre-breakfast fs  6   units lispro   pre-lunch fs    6+ 2 units lispro SS      Pt reports the following symptoms:    CONSTITUTIONAL:  Negative fever or chills  CARDIOVASCULAR:  Negative for chest pain or palpitations  RESPIRATORY:  Negative for cough, wheezing, or SOB   GASTROINTESTINAL:  Negative for nausea, vomiting  GENITOURINARY:  Negative frequency, urgency or dysuria        MEDICATIONS  (STANDING):  ascorbic acid 500 milliGRAM(s) Oral daily  dextrose 40% Gel 15 Gram(s) Oral once  dextrose 5%. 1000 milliLiter(s) (100 mL/Hr) IV Continuous <Continuous>  dextrose 5%. 1000 milliLiter(s) (50 mL/Hr) IV Continuous <Continuous>  dextrose 50% Injectable 25 Gram(s) IV Push once  dextrose 50% Injectable 12.5 Gram(s) IV Push once  dextrose 50% Injectable 25 Gram(s) IV Push once  FIRST- Mouthwash  BLM 10 milliLiter(s) Swish and Spit daily  glucagon  Injectable 1 milliGRAM(s) IntraMuscular once  heparin   Injectable 5000 Unit(s) SubCutaneous every 8 hours  insulin glargine Injectable (LANTUS) 7 Unit(s) SubCutaneous at bedtime  insulin lispro (ADMELOG) corrective regimen sliding scale   SubCutaneous Before meals and at bedtime  insulin lispro Injectable (ADMELOG) 6 Unit(s) SubCutaneous three times a day before meals  multivitamin/minerals/iron Oral Solution (CENTRUM) 15 milliLiter(s) Oral daily  nafcillin  IVPB      nafcillin  IVPB 2 Gram(s) IV Intermittent every 4 hours  potassium chloride  10 mEq/100 mL IVPB 10 milliEquivalent(s) IV Intermittent every 1 hour  senna 2 Tablet(s) Oral at bedtime    MEDICATIONS  (PRN):  acetaminophen   Tablet .. 650 milliGRAM(s) Oral every 6 hours PRN Moderate Pain (4 - 6)  oxycodone    5 mG/acetaminophen 325 mG 1 Tablet(s) Oral every 4 hours PRN Severe Pain (7 - 10)      Past medical history reviewed  Family history reviewed  Social history reviewed    PHYSICAL EXAM  Vital Signs Last 24 Hrs  T(C): 36.7 (18 Mar 2021 09:08), Max: 36.9 (17 Mar 2021 22:00)  T(F): 98.1 (18 Mar 2021 09:08), Max: 98.5 (18 Mar 2021 05:33)  HR: 84 (18 Mar 2021 13:16) (71 - 87)  BP: 129/60 (18 Mar 2021 13:16) (121/58 - 149/55)  BP(mean): 87 (18 Mar 2021 06:49) (84 - 94)  RR: 16 (18 Mar 2021 13:16) (16 - 18)  SpO2: 96% (18 Mar 2021 13:16) (94% - 96%)  Constitutional:  in NAD, AOx2  HEENT: no proptosis or lid retraction  Neck: no thyromegaly or palpable thyroid nodules   Respiratory: lungs CTAB.  Cardiovascular: regular rhythm, normal S1 and S2  GI: soft, NT/ND, no masses/HSM appreciated.  EXT: right BKA    LABS:                        10.0   7.18  )-----------( 298      ( 18 Mar 2021 08:04 )             30.5     03-18    139  |  102  |  5<L>  ----------------------------<  107<H>  3.1<L>   |  28  |  0.60    Ca    7.2<L>      18 Mar 2021 08:04  Phos  3.1     03-18  Mg     1.5     03-18              HbA1C: 16.1 % (13 @ 13:30)      CAPILLARY BLOOD GLUCOSE      POCT Blood Glucose.: 182 mg/dL (18 Mar 2021 11:36)  POCT Blood Glucose.: 90 mg/dL (18 Mar 2021 06:36)  POCT Blood Glucose.: 166 mg/dL (17 Mar 2021 21:36)  POCT Blood Glucose.: 231 mg/dL (17 Mar 2021 15:19)  A/P: 75F with PMHx UC s/p colostomy (s/p multiple abdominal surgeries including colostomy bag in ) BIBEMS from home complaining of weakness; presenting likely in DKA with AMS, with bilateral lower extremity open wounds. Of note, pt found to have a hx of DM2: A1C on admission is 16.1. Pt is now s/p I&D w/ washout and debridement of non- viable soft tissue.     1.  DM:   wt:75 KG  cr:0.6, GFR:89  likely insulinopenic  lantus  at bedtime  pleas start U Lispro premeals TID   Continue lispro moderate dose scale with meals and at bedtime.   Continue consistent carb diet  FSG Goal 100-180    case seen and discussed with Dr. Yee and updated primary team            INTERVAL HPI/OVERNIGHT EVENTS:    Patient is a 75y old  Female who presents with a chief complaint of Weakness (18 Mar 2021 11:29)  Pt was seen and examined at he bedside. AOX-2. complains of right foot pain. NPO after MN.   K:3.1, KCL given.   good appetite. planned for OR tmr.   S/p BKA right side 3/15.     FSG & Insulin received:    Yesterday:  pre-dinner fs  6+ 4  units lispro SS  bedtime fs  lantus 15  units +  2  units lispro SS    Today:  pre-breakfast fs  6   units lispro   pre-lunch fs    6+ 2 units lispro SS      Pt reports the following symptoms:    CONSTITUTIONAL:  Negative fever or chills  CARDIOVASCULAR:  Negative for chest pain or palpitations  RESPIRATORY:  Negative for cough, wheezing, or SOB   GASTROINTESTINAL:  Negative for nausea, vomiting  GENITOURINARY:  Negative frequency, urgency or dysuria        MEDICATIONS  (STANDING):  ascorbic acid 500 milliGRAM(s) Oral daily  dextrose 40% Gel 15 Gram(s) Oral once  dextrose 5%. 1000 milliLiter(s) (100 mL/Hr) IV Continuous <Continuous>  dextrose 5%. 1000 milliLiter(s) (50 mL/Hr) IV Continuous <Continuous>  dextrose 50% Injectable 25 Gram(s) IV Push once  dextrose 50% Injectable 12.5 Gram(s) IV Push once  dextrose 50% Injectable 25 Gram(s) IV Push once  FIRST- Mouthwash  BLM 10 milliLiter(s) Swish and Spit daily  glucagon  Injectable 1 milliGRAM(s) IntraMuscular once  heparin   Injectable 5000 Unit(s) SubCutaneous every 8 hours  insulin glargine Injectable (LANTUS) 7 Unit(s) SubCutaneous at bedtime  insulin lispro (ADMELOG) corrective regimen sliding scale   SubCutaneous Before meals and at bedtime  insulin lispro Injectable (ADMELOG) 6 Unit(s) SubCutaneous three times a day before meals  multivitamin/minerals/iron Oral Solution (CENTRUM) 15 milliLiter(s) Oral daily  nafcillin  IVPB      nafcillin  IVPB 2 Gram(s) IV Intermittent every 4 hours  potassium chloride  10 mEq/100 mL IVPB 10 milliEquivalent(s) IV Intermittent every 1 hour  senna 2 Tablet(s) Oral at bedtime    MEDICATIONS  (PRN):  acetaminophen   Tablet .. 650 milliGRAM(s) Oral every 6 hours PRN Moderate Pain (4 - 6)  oxycodone    5 mG/acetaminophen 325 mG 1 Tablet(s) Oral every 4 hours PRN Severe Pain (7 - 10)      Past medical history reviewed  Family history reviewed  Social history reviewed    PHYSICAL EXAM  Vital Signs Last 24 Hrs  T(C): 36.7 (18 Mar 2021 09:08), Max: 36.9 (17 Mar 2021 22:00)  T(F): 98.1 (18 Mar 2021 09:08), Max: 98.5 (18 Mar 2021 05:33)  HR: 84 (18 Mar 2021 13:16) (71 - 87)  BP: 129/60 (18 Mar 2021 13:16) (121/58 - 149/55)  BP(mean): 87 (18 Mar 2021 06:49) (84 - 94)  RR: 16 (18 Mar 2021 13:16) (16 - 18)  SpO2: 96% (18 Mar 2021 13:16) (94% - 96%)  Constitutional:  in NAD, AOx2  HEENT: no proptosis or lid retraction  Neck: no thyromegaly or palpable thyroid nodules   Respiratory: lungs CTAB.  Cardiovascular: regular rhythm, normal S1 and S2  GI: soft, NT/ND, no masses/HSM appreciated.  EXT: right BKA    LABS:                        10.0   7.18  )-----------( 298      ( 18 Mar 2021 08:04 )             30.5     03-18    139  |  102  |  5<L>  ----------------------------<  107<H>  3.1<L>   |  28  |  0.60    Ca    7.2<L>      18 Mar 2021 08:04  Phos  3.1     03-18  Mg     1.5     03-18              HbA1C: 16.1 % (13 @ 13:30)      CAPILLARY BLOOD GLUCOSE      POCT Blood Glucose.: 182 mg/dL (18 Mar 2021 11:36)  POCT Blood Glucose.: 90 mg/dL (18 Mar 2021 06:36)  POCT Blood Glucose.: 166 mg/dL (17 Mar 2021 21:36)  POCT Blood Glucose.: 231 mg/dL (17 Mar 2021 15:19)  A/P: 75F with PMHx UC s/p colostomy (s/p multiple abdominal surgeries including colostomy bag in ) BIBEMS from home complaining of weakness; presenting likely in DKA with AMS, with bilateral lower extremity open wounds. Of note, pt found to have a hx of DM2: A1C on admission is 16.1. Pt is now s/p I&D w/ washout and debridement of non- viable soft tissue.     1.  DM:   wt:75 KG  cr:0.6, GFR:89  likely insulinopenic  lantus 10 u at bedtime  pleas c/w 6 U Lispro premeals TID   Continue lispro moderate dose scale with meals and at bedtime.   Continue consistent carb diet  FSG Goal 100-180    case seen and discussed with Dr. Yee and updated primary team

## 2021-03-18 NOTE — PROGRESS NOTE ADULT - SUBJECTIVE AND OBJECTIVE BOX
OVERNIGHT: No overnight events.  SUBJECTIVE: Patient seen and examined at bedside. Patient nervous that she has to be taken back to OR tomorrow. Denies fevers, chills, HA, chest pain, sob, nausea, vomiting, abdominal pain, diarrhea, constipation, dysuria.      REVIEW OF SYSTEMS:    CONSTITUTIONAL: No weakness, fevers or chills  EYES/ENT: No visual changes;  No vertigo or throat pain   NECK: No pain or stiffness  RESPIRATORY: No cough, wheezing, hemoptysis; No shortness of breath  CARDIOVASCULAR: No chest pain or palpitations  GASTROINTESTINAL: No abdominal or epigastric pain. No nausea, vomiting, or hematemesis; No diarrhea or constipation. No melena or hematochezia.  GENITOURINARY: No dysuria, frequency or hematuria  NEUROLOGICAL: No numbness or weakness  SKIN: No itching, burning, rashes, or lesions   MSK: no joint pain, no joint swelling  All other review of systems is negative unless indicated above.      Allergies    No Known Allergies    Intolerances        ANTIBIOTICS/RELEVANT:  antimicrobials  nafcillin  IVPB      nafcillin  IVPB 2 Gram(s) IV Intermittent every 4 hours    immunologic:    OTHER:  acetaminophen   Tablet .. 650 milliGRAM(s) Oral every 6 hours PRN  ascorbic acid 500 milliGRAM(s) Oral daily  dextrose 40% Gel 15 Gram(s) Oral once  dextrose 5%. 1000 milliLiter(s) IV Continuous <Continuous>  dextrose 5%. 1000 milliLiter(s) IV Continuous <Continuous>  dextrose 50% Injectable 25 Gram(s) IV Push once  dextrose 50% Injectable 12.5 Gram(s) IV Push once  dextrose 50% Injectable 25 Gram(s) IV Push once  FIRST- Mouthwash  BLM 10 milliLiter(s) Swish and Spit daily  glucagon  Injectable 1 milliGRAM(s) IntraMuscular once  heparin   Injectable 5000 Unit(s) SubCutaneous every 8 hours  insulin glargine Injectable (LANTUS) 7 Unit(s) SubCutaneous at bedtime  insulin lispro (ADMELOG) corrective regimen sliding scale   SubCutaneous Before meals and at bedtime  insulin lispro Injectable (ADMELOG) 6 Unit(s) SubCutaneous three times a day before meals  multivitamin/minerals/iron Oral Solution (CENTRUM) 15 milliLiter(s) Oral daily  oxycodone    5 mG/acetaminophen 325 mG 1 Tablet(s) Oral every 4 hours PRN  senna 2 Tablet(s) Oral at bedtime  sodium chloride 0.9%. 1000 milliLiter(s) IV Continuous <Continuous>      Objective:  Vital Signs Last 24 Hrs  T(C): 36.2 (18 Mar 2021 14:09), Max: 36.9 (17 Mar 2021 22:00)  T(F): 97.1 (18 Mar 2021 14:09), Max: 98.5 (18 Mar 2021 05:33)  HR: 84 (18 Mar 2021 13:16) (71 - 86)  BP: 129/60 (18 Mar 2021 13:16) (121/58 - 141/65)  BP(mean): 87 (18 Mar 2021 06:49) (84 - 94)  RR: 16 (18 Mar 2021 13:16) (16 - 18)  SpO2: 96% (18 Mar 2021 13:16) (94% - 96%)      PHYSICAL EXAM:  Constitutional: WDWN resting comfortably in bed; NAD  Head: NC/AT  Eyes: PERRL, EOMI, anicteric sclera  ENT: no nasal discharge; uvula midline, no oropharyngeal erythema or exudates; MMM  Neck: supple   Respiratory: CTA B/L; no W/R/R, no retractions  Cardiac: +S1/S2; RRR; no M/R/G; PMI non-displaced  Gastrointestinal: abdomen soft, NT/ND; no rebound or guarding; +BSx4  Extremities: WWP, S/p R BKA, RLE/LLE with dressing c/d/i   Vascular: 2+ radial, femoral, 1+ DP/PT pulses LLE   Neurologic: AAOx3; CNII-XII grossly intact; no focal deficits  Psychiatric: affect and characteristics of appearance, verbalizations, behaviors are appropriate          LABS:                        10.0   7.18  )-----------( 298      ( 18 Mar 2021 08:04 )             30.5     03-18    139  |  102  |  5<L>  ----------------------------<  107<H>  3.1<L>   |  28  |  0.60    Ca    7.2<L>      18 Mar 2021 08:04  Phos  3.1     03-18  Mg     1.5     03-18            MICROBIOLOGY:            RECENT CULTURES:  03-14 @ 07:52  .Surgical Swab right foot  Staphylococcus epidermidis  Staphylococcus epidermidis  MARCE    Rare Staphylococcus epidermidis  Few Streptococcus constellatus .... See previous culture for sensitivity.  Few Streptococcus intermedius .... See previous culture for sensitivity.  Rare Candida glabrata  --  03-13 @ 18:31  .Urine Catheterized  --  --  --    No growth  --  03-13 @ 15:09  .Blood Blood  --  --  --    No growth at 4 days.  --  03-13 @ 15:08  .Blood Blood  --  --  --    No growth at 4 days.  --  03-13 @ 14:14  .Surgical Swab right foot wound  Morganella morganii  Escherichia coli  Streptococcus intermedius  Streptococcus intermedius  Staphylococcus aureus  Streptococcus constellatus  Streptococcus constellatus  Morganella morganii  MARCE    Rare Morganella morganii  Rare Escherichia coli  Moderate Streptococcus intermedius  Few Streptococcus constellatus  Few Staphylococcus aureus  Rare Candida krusei  Numerous Prevotella species ... Beta lactamase positive  --      RADIOLOGY & ADDITIONAL STUDIES:

## 2021-03-18 NOTE — PROGRESS NOTE ADULT - ASSESSMENT
Patient is a 76yo F poor historian PMH uncontrolled DMT2, UC and colon cancer s/p colectomy and chemotherapy, chronic venous stasis w/ chronic LE wounds who presents due to frequent falls over the past several weeks at home. Admitted for DKA and b/l LE cellulits/abscess with c/f RLE necrotizing infection. She was started on Vanc/Zosyn and is s/p a R guillotine BKA and I&D of left ankle with vascular surgery on 3/15. LLE w/ tendonitis/myositis, no concern for OM. Left foot surgical swab (3/13) growing staph aureus, strept intermedius, strept constellatus, bifidobacterium breve.       Recommendations:   -C/w Nafcillin 2g q4hr thru 3/29   -Please watch potassium levels as pt has been hypokalemic and Nafcillin is known to cause hypokalemia  -3/13 Bcxs NGTD       For Discharge:   - Place PICC line   - Discharge patient with Nafcillin 2g q4  - Duration of antibiotics is 2 weeks (3/15-3/29)  - Weekly labs: CMP, CBC, ESR, CRP faxed to ID office at [phone]  - Patient to follow up with Dr. Medeiros in 2 weeks ([address] [place] [address], [phone]), ID office will call patient to schedule       Plans discussed with ID attending Dr. Hartman. ID Team 1 will sign off.  Patient is a 76yo F poor historian PMH uncontrolled DMT2, UC and colon cancer s/p colectomy and chemotherapy, chronic venous stasis w/ chronic LE wounds who presents due to frequent falls over the past several weeks at home. Admitted for DKA and b/l LE cellulits/abscess with c/f RLE necrotizing infection. She was started on Vanc/Zosyn and is s/p a R guillotine BKA and I&D of left ankle with vascular surgery on 3/15. LLE w/ tendonitis/myositis, no concern for OM. Left foot surgical swab (3/13) growing staph aureus, strept intermedius, strept constellatus, bifidobacterium breve, Candida (likely colonizer).       Recommendations:   -C/w Nafcillin 2g q4hr thru 3/29   -Please watch potassium levels as pt has been hypokalemic and Nafcillin is known to cause hypokalemia  -3/13 Bcxs NGTD       For Discharge:   - Place PICC line   - Discharge patient with Nafcillin 2g q4  - Duration of antibiotics is 2 weeks (3/15-3/29)  - Weekly labs: CMP, CBC, ESR, CRP faxed to ID office at [phone]  - Patient to follow up with Dr. Medeiros in 2 weeks ([address] [place] [address], [phone]), ID office will call patient to schedule       Plans discussed with ID attending Dr. Hartman. ID Team 1 will sign off.

## 2021-03-18 NOTE — PROGRESS NOTE ADULT - ASSESSMENT
In summary, this is a 76yo woman, poor historian, with a PMH of UC and colon cancer s/p colectomy and chemotherapy and chronic venous stasis w/chronic LE wounds who presented w/frequent falls and subsequently found to have bilateral lower extremity cellulitis and abscesses c/b septic shock and DK with subsequent imaging c/f RLE necrotizing fascitis.  She was admitted to the SICU and underwent a R-foot I&D on 3/14 with Podiatry and R-BKA w/L-foot I&D on 3/15 with Vascular.  She was transferred to telemetry on 3/16, pending BKA closure on 3/19.     #BLE Cellulitis c/b Septic Shock and Nec Fasc s/p R-BKA  -- plan for OR to close BKA incision on 3/19; will remain in-house 5 days post-op  -- ID recommending Nafcillin x 2 weeks w/labs sent to Dr. Medeiros  -- will need f/u with Dr. Medeiros upon discharge  -- wound care per primary team   -- Percocet 1 tab q4h PRN and Tylenol 650mg q6h PRN w/Senna  -- PT as able     #Newly Diagnosed IDDM2 c/b DKA and SSTI  -- likely risk factor for infection and sepsis   -- Endo following, appreciate their assistance  -- will be NPO at MN; thus, recommend decreasing Lantus by at least 1/2 if not more given AM FS    #UC c/b Colon Ca s/p Ostomy  -- no active issues     #Apthous Ulcer  -- R-inner lower lip  -- can offer topical analgesic such as Oragel or Lidocaine     #Acute Blood Loss Anemia   -- likely from multiple trips to the OR and BKA  -- currently stable and will continue to monitor     #Diet - Carb Controlled / Dysphagia 1   #DVT PPx - SQH  #Dispo - likely [facility]; should be medically ready by 3/24    Bailee Cottrell  Attending Hospitalist  [phone]

## 2021-03-18 NOTE — PROGRESS NOTE ADULT - SUBJECTIVE AND OBJECTIVE BOX
INTERVAL EVENTS:  -- NAEO  -- AM FS 90     SUBJECTIVE:  -- has questions regarding the status of her legs and return trip to the OR  -- eating breakfast this morning w/o difficulty  -- no CP, SOB, cough, abdominal pain or LUTS; still voiding w/o difficulty   -- Review of Systems: 12 point review of systems otherwise negative    MEDICATIONS:  MEDICATIONS  (STANDING):  ascorbic acid 500 milliGRAM(s) Oral daily  dextrose 40% Gel 15 Gram(s) Oral once  dextrose 5%. 1000 milliLiter(s) (100 mL/Hr) IV Continuous <Continuous>  dextrose 5%. 1000 milliLiter(s) (50 mL/Hr) IV Continuous <Continuous>  dextrose 50% Injectable 25 Gram(s) IV Push once  dextrose 50% Injectable 12.5 Gram(s) IV Push once  dextrose 50% Injectable 25 Gram(s) IV Push once  FIRST- Mouthwash  BLM 10 milliLiter(s) Swish and Spit daily  glucagon  Injectable 1 milliGRAM(s) IntraMuscular once  heparin   Injectable 5000 Unit(s) SubCutaneous every 8 hours  insulin glargine Injectable (LANTUS) 7 Unit(s) SubCutaneous at bedtime  insulin lispro (ADMELOG) corrective regimen sliding scale   SubCutaneous Before meals and at bedtime  insulin lispro Injectable (ADMELOG) 6 Unit(s) SubCutaneous three times a day before meals  multivitamin/minerals/iron Oral Solution (CENTRUM) 15 milliLiter(s) Oral daily  nafcillin  IVPB      nafcillin  IVPB 2 Gram(s) IV Intermittent every 4 hours  potassium chloride   Powder 40 milliEquivalent(s) Oral every 4 hours  senna 2 Tablet(s) Oral at bedtime    MEDICATIONS  (PRN):  acetaminophen   Tablet .. 650 milliGRAM(s) Oral every 6 hours PRN Moderate Pain (4 - 6)  oxycodone    5 mG/acetaminophen 325 mG 1 Tablet(s) Oral every 4 hours PRN Severe Pain (7 - 10)    Allergies: No Known Allergies    OBJECTIVE:  Vital Signs Last 24 Hrs  T(C): 36.7 (18 Mar 2021 09:08), Max: 36.9 (17 Mar 2021 22:00)  T(F): 98.1 (18 Mar 2021 09:08), Max: 98.5 (18 Mar 2021 05:33)  HR: 86 (18 Mar 2021 08:42) (71 - 87)  BP: 139/60 (18 Mar 2021 08:42) (121/58 - 149/55)  BP(mean): 87 (18 Mar 2021 06:49) (84 - 94)  RR: 18 (18 Mar 2021 08:42) (16 - 18)  SpO2: 94% (18 Mar 2021 08:42) (94% - 96%)  I&O's Summary    17 Mar 2021 07:01  -  18 Mar 2021 07:00  --------------------------------------------------------  IN: 240 mL / OUT: 2360 mL / NET: -2120 mL    18 Mar 2021 07:01  -  18 Mar 2021 11:29  --------------------------------------------------------  IN: 180 mL / OUT: 400 mL / NET: -220 mL    PHYSICAL EXAM:  Gen: NAD, sitting upright in bed eating breakfast  HEENT: NCAT, MMM, R-lower inner lip +canker sore  CV: RRR, no m/g/r appreciated  Pulm: CTA B, no w/r/r; no increase in WOB  Abd: normoactive BS, soft, NTND  Ext: LLE with kerlex/ace bandage dressing to knee, R-BKA w/kerlex and ace bandage  Neuro: AOx1-2, able to move BLE but ROM limited 2/2 dressing w/flexion at the knees  Psych: pleasant and more conversational today but forgetful     LABS:                        10.0   7.18  )-----------( 298      ( 18 Mar 2021 08:04 )             30.5     03-18    139  |  102  |  5<L>  ----------------------------<  107<H>  3.1<L>   |  28  |  0.60    Ca    7.2<L>      18 Mar 2021 08:04  Phos  3.1     03-18  Mg     1.5     03-18    CAPILLARY BLOOD GLUCOSE  POCT Blood Glucose.: 90 mg/dL (18 Mar 2021 06:36)  POCT Blood Glucose.: 166 mg/dL (17 Mar 2021 21:36)  POCT Blood Glucose.: 231 mg/dL (17 Mar 2021 15:19)  POCT Blood Glucose.: 271 mg/dL (17 Mar 2021 11:48)    MICRODATA:  -- No new microdata.     RADIOLOGY/OTHER STUDIES:  -- No new imaging.

## 2021-03-18 NOTE — PROGRESS NOTE ADULT - SUBJECTIVE AND OBJECTIVE BOX
O/N: LENCHO, VSS Passed                         ---------------------------------------------------------------------  PLEASE CHECK WHEN PRESENT:     [  ] Heart Failure     [  ] Acute     [  ] Acute on Chronic     [  ] Chronic  -------------------------------------------------------------------     [  ]Diastolic [HFpEF]     [  ]Systolic [HFrEF]     [  ]Combined [HFpEF & HFrEF]     [  ] afib     [  ] hypertensive heart disease     [  ]Other:  -------------------------------------------------------------------  [ ] Respiratory failure  [ ] Acute cor pulmonale  [ ] Asthma/COPD Exacerbation  [ ] Pleural effusion  [ ] Aspiration pneumonia  -------------------------------------------------------------------  [  ]ANTONY     [  ]ATN     [  ]Reneal Medullary Necrosis     [  ]Renal Cortical Necrosis     [  ]Other Pathological Lesions:    [  ]CKD 1  [  ]CKD 2  [  ]CKD 3  [  ]CKD 4  [  ]CKD 5  [  ]Other  -------------------------------------------------------------------  [  ]Diabetes  [  ] Diabetic PVD Ulcer  [  ] Neuropathic ulcer to DM  [  ] Diabetes with Nephropathy  [  ] Osteomyelitis due to diabetes  [x ] Hyperglycemia  [ ] Hypoglycemia  --------------------------------------------------------------------  [  ]Malnutrition: See Nutrition Note  [  ]Cachexia  [  ]Other:   [  ]Supplement Ordered:  [  ]Morbid Obesity (BMI >=40]  ---------------------------------------------------------------------  [ ] Sepsis/severe sepsis/septic shock  [ ] UTI  [ ] Pneumonia  -----------------------------------------------------------------------  [ ] Acidosis/alkalosis  [ ] Fluid overload  [x ] Hypokalemia  [ ] Hyperkalemia  [x ] Hypomagnesemia  [x ] Hypophosphatemia  [ ] Hyperphosphatemia  [ ] Hyponatremia  [ ] Hypernatremia  ------------------------------------------------------------------------  [ ] Acute blood loss anemia  [ ] Post op blood loss anemia  [ ] Iron deficiency anemia  [ ] Anemia due to chronic disease  [ ] Hypercoagulable state  [x ] Leukocytosis  ----------------------------------------------------------------------  [ ] Cerebral infarction  [ ] Transient ischemia attack  [ ] Encephalopathy      Assessment/Plan;  75F PMHx UC, colon cancer s/p total abdominal colectomy with end ileostomy, found down at home, was admitted for DKA and R diabetic foot wound, s/p operative debridement by podiatry 3/14/21 and R foot guillotine amputation and L foot I&D by vasc surg 3/15/21.    #R foot wound and Lt foot wound  s/p R foot operative debridement by podiatry 3/14 s/p R guilletine BKA, L ankle I&D with penrose drain 3/15  -pain control  -Nafcillin 3/16-3/29 f/u ID recs  -plan for BKA closure 3/19  -local wound care  -f/u labs    #DKA  -f/u endo recs  -ISS  -lantus 10  -f/u hgb A1C    Diet: consistent carb  PPx: HSQ  Dispo: TBD   O/N: LENCHO, VSS Passed     nafcillin  IVPB   nafcillin  IVPB 2  heparin   Injectable 5000  nafcillin  IVPB   nafcillin  IVPB 2      Allergies    No Known Allergies    Intolerances        Vital Signs Last 24 Hrs  T(C): 36.9 (18 Mar 2021 05:33), Max: 37.2 (17 Mar 2021 09:42)  T(F): 98.5 (18 Mar 2021 05:33), Max: 99 (17 Mar 2021 09:42)  HR: 75 (18 Mar 2021 06:49) (71 - 87)  BP: 132/60 (18 Mar 2021 06:49) (111/55 - 149/55)  BP(mean): 87 (18 Mar 2021 06:49) (84 - 94)  RR: 18 (18 Mar 2021 06:49) (16 - 18)  SpO2: 94% (18 Mar 2021 06:49) (94% - 96%)  I&O's Summary    17 Mar 2021 07:01  -  18 Mar 2021 07:00  --------------------------------------------------------  IN: 240 mL / OUT: 1620 mL / NET: -1380 mL        Physical Exam:    General:  Well appearing, NAD  CV:  RRR  Lungs:  nonlabored breathing  Abdomen:  Soft, non-tender, no distended  Extremities: RLE guillotine amputation site clean, pink, no bleeding, no fibrinous exudate or pus, no surrounding erythema/edema. L ankle wound with pink I&D site, visible granulation tissue and penrose drain in place, minimal drainage      LABS:                        10.1   7.29  )-----------( 336      ( 17 Mar 2021 07:11 )             31.1     03-17    139  |  105  |  5<L>  ----------------------------<  137<H>  4.0   |  27  |  0.62    Ca    7.5<L>      17 Mar 2021 07:11  Phos  2.5     03-17  Mg     1.7     03-17          ---------------------------------------------------------------------  PLEASE CHECK WHEN PRESENT:     [  ] Heart Failure     [  ] Acute     [  ] Acute on Chronic     [  ] Chronic  -------------------------------------------------------------------     [  ]Diastolic [HFpEF]     [  ]Systolic [HFrEF]     [  ]Combined [HFpEF & HFrEF]     [  ] afib     [  ] hypertensive heart disease     [  ]Other:  -------------------------------------------------------------------  [ ] Respiratory failure  [ ] Acute cor pulmonale  [ ] Asthma/COPD Exacerbation  [ ] Pleural effusion  [ ] Aspiration pneumonia  -------------------------------------------------------------------  [  ]ANTONY     [  ]ATN     [  ]Reneal Medullary Necrosis     [  ]Renal Cortical Necrosis     [  ]Other Pathological Lesions:    [  ]CKD 1  [  ]CKD 2  [  ]CKD 3  [  ]CKD 4  [  ]CKD 5  [  ]Other  -------------------------------------------------------------------  [  ]Diabetes  [  ] Diabetic PVD Ulcer  [  ] Neuropathic ulcer to DM  [  ] Diabetes with Nephropathy  [  ] Osteomyelitis due to diabetes  [x ] Hyperglycemia  [ ] Hypoglycemia  --------------------------------------------------------------------  [  ]Malnutrition: See Nutrition Note  [  ]Cachexia  [  ]Other:   [  ]Supplement Ordered:  [  ]Morbid Obesity (BMI >=40]  ---------------------------------------------------------------------  [ ] Sepsis/severe sepsis/septic shock  [ ] UTI  [ ] Pneumonia  -----------------------------------------------------------------------  [ ] Acidosis/alkalosis  [ ] Fluid overload  [x ] Hypokalemia  [ ] Hyperkalemia  [x ] Hypomagnesemia  [x ] Hypophosphatemia  [ ] Hyperphosphatemia  [ ] Hyponatremia  [ ] Hypernatremia  ------------------------------------------------------------------------  [ ] Acute blood loss anemia  [ ] Post op blood loss anemia  [ ] Iron deficiency anemia  [ ] Anemia due to chronic disease  [ ] Hypercoagulable state  [x ] Leukocytosis  ----------------------------------------------------------------------  [ ] Cerebral infarction  [ ] Transient ischemia attack  [ ] Encephalopathy      Assessment/Plan;  75F PMHx UC, colon cancer s/p total abdominal colectomy with end ileostomy, found down at home, was admitted for DKA and R diabetic foot wound, s/p operative debridement by podiatry 3/14/21 and R foot guillotine amputation and L foot I&D by vasc surg 3/15/21.    #R foot wound and Lt foot wound  s/p R foot operative debridement by podiatry 3/14 s/p R guilletine BKA, L ankle I&D with penrose drain 3/15  -pain control  -Nafcillin 3/16-3/29 f/u ID recs  -plan for BKA closure 3/19  -local wound care  -f/u labs    #DKA  -f/u endo recs  -ISS  -lantus 10u HS, added lispro 6u TID with meals  -hgb A1C=16.1    Diet: consistent carb  PPx: HSQ  Dispo: TBD

## 2021-03-18 NOTE — PROGRESS NOTE ADULT - SUBJECTIVE AND OBJECTIVE BOX
Pre-op Diagnosis: Right bebe BKA  Procedure: closure of right below knee amputation  Surgeon: Dr. Leung    Consent: obtained from proxy, on chart                          10.0   7.18  )-----------( 298      ( 18 Mar 2021 08:04 )             30.5     03-18    139  |  102  |  5<L>  ----------------------------<  107<H>  3.1<L>   |  28  |  0.60    Ca    7.2<L>      18 Mar 2021 08:04  Phos  3.1     03-18  Mg     1.5     03-18            Type & Screen: A+ Ab neg, updated T&S 3/18 at 6pm    CXR: Heart size within normal limits, thoracic aortic calcification. Bibasilar opacities, decreased. Stable bony structures, dextro scoliosis.    EKG: Normal sinus rhythm  Left axis deviation  Incomplete right bundle branch block  Septal infarct , age undetermined  Prolonged QT    COVID neg 3/18        A/P: 75yFemale pre-op for above procedure  1. NPO past midnight, except medications  2. NS  3. [x] Blood on hold, Units: 2pRBCs  4. Continue antibiotics

## 2021-03-19 ENCOUNTER — RESULT REVIEW (OUTPATIENT)
Age: 76
End: 2021-03-19

## 2021-03-19 DIAGNOSIS — M72.6 NECROTIZING FASCIITIS: ICD-10-CM

## 2021-03-19 LAB
ANION GAP SERPL CALC-SCNC: 10 MMOL/L — SIGNIFICANT CHANGE UP (ref 5–17)
ANION GAP SERPL CALC-SCNC: 8 MMOL/L — SIGNIFICANT CHANGE UP (ref 5–17)
BLD GP AB SCN SERPL QL: NEGATIVE — SIGNIFICANT CHANGE UP
BUN SERPL-MCNC: 5 MG/DL — LOW (ref 7–23)
BUN SERPL-MCNC: 6 MG/DL — LOW (ref 7–23)
CALCIUM SERPL-MCNC: 7.4 MG/DL — LOW (ref 8.4–10.5)
CALCIUM SERPL-MCNC: 7.8 MG/DL — LOW (ref 8.4–10.5)
CHLORIDE SERPL-SCNC: 101 MMOL/L — SIGNIFICANT CHANGE UP (ref 96–108)
CHLORIDE SERPL-SCNC: 104 MMOL/L — SIGNIFICANT CHANGE UP (ref 96–108)
CO2 SERPL-SCNC: 26 MMOL/L — SIGNIFICANT CHANGE UP (ref 22–31)
CO2 SERPL-SCNC: 27 MMOL/L — SIGNIFICANT CHANGE UP (ref 22–31)
CREAT SERPL-MCNC: 0.56 MG/DL — SIGNIFICANT CHANGE UP (ref 0.5–1.3)
CREAT SERPL-MCNC: 0.58 MG/DL — SIGNIFICANT CHANGE UP (ref 0.5–1.3)
GLUCOSE BLDC GLUCOMTR-MCNC: 144 MG/DL — HIGH (ref 70–99)
GLUCOSE BLDC GLUCOMTR-MCNC: 262 MG/DL — HIGH (ref 70–99)
GLUCOSE BLDC GLUCOMTR-MCNC: 92 MG/DL — SIGNIFICANT CHANGE UP (ref 70–99)
GLUCOSE SERPL-MCNC: 189 MG/DL — HIGH (ref 70–99)
GLUCOSE SERPL-MCNC: 96 MG/DL — SIGNIFICANT CHANGE UP (ref 70–99)
HCT VFR BLD CALC: 31.4 % — LOW (ref 34.5–45)
HCT VFR BLD CALC: 31.6 % — LOW (ref 34.5–45)
HGB BLD-MCNC: 10.2 G/DL — LOW (ref 11.5–15.5)
HGB BLD-MCNC: 9.9 G/DL — LOW (ref 11.5–15.5)
MAGNESIUM SERPL-MCNC: 1.8 MG/DL — SIGNIFICANT CHANGE UP (ref 1.6–2.6)
MAGNESIUM SERPL-MCNC: 1.8 MG/DL — SIGNIFICANT CHANGE UP (ref 1.6–2.6)
MCHC RBC-ENTMCNC: 29.6 PG — SIGNIFICANT CHANGE UP (ref 27–34)
MCHC RBC-ENTMCNC: 29.7 PG — SIGNIFICANT CHANGE UP (ref 27–34)
MCHC RBC-ENTMCNC: 31.3 GM/DL — LOW (ref 32–36)
MCHC RBC-ENTMCNC: 32.5 GM/DL — SIGNIFICANT CHANGE UP (ref 32–36)
MCV RBC AUTO: 91.5 FL — SIGNIFICANT CHANGE UP (ref 80–100)
MCV RBC AUTO: 94.6 FL — SIGNIFICANT CHANGE UP (ref 80–100)
NRBC # BLD: 0 /100 WBCS — SIGNIFICANT CHANGE UP (ref 0–0)
NRBC # BLD: 0 /100 WBCS — SIGNIFICANT CHANGE UP (ref 0–0)
PHOSPHATE SERPL-MCNC: 3.2 MG/DL — SIGNIFICANT CHANGE UP (ref 2.5–4.5)
PHOSPHATE SERPL-MCNC: 3.7 MG/DL — SIGNIFICANT CHANGE UP (ref 2.5–4.5)
PLATELET # BLD AUTO: 320 K/UL — SIGNIFICANT CHANGE UP (ref 150–400)
PLATELET # BLD AUTO: 337 K/UL — SIGNIFICANT CHANGE UP (ref 150–400)
POTASSIUM SERPL-MCNC: 3.6 MMOL/L — SIGNIFICANT CHANGE UP (ref 3.5–5.3)
POTASSIUM SERPL-MCNC: 4.2 MMOL/L — SIGNIFICANT CHANGE UP (ref 3.5–5.3)
POTASSIUM SERPL-SCNC: 3.6 MMOL/L — SIGNIFICANT CHANGE UP (ref 3.5–5.3)
POTASSIUM SERPL-SCNC: 4.2 MMOL/L — SIGNIFICANT CHANGE UP (ref 3.5–5.3)
RBC # BLD: 3.34 M/UL — LOW (ref 3.8–5.2)
RBC # BLD: 3.43 M/UL — LOW (ref 3.8–5.2)
RBC # FLD: 13.5 % — SIGNIFICANT CHANGE UP (ref 10.3–14.5)
RBC # FLD: 13.5 % — SIGNIFICANT CHANGE UP (ref 10.3–14.5)
RH IG SCN BLD-IMP: POSITIVE — SIGNIFICANT CHANGE UP
SODIUM SERPL-SCNC: 137 MMOL/L — SIGNIFICANT CHANGE UP (ref 135–145)
SODIUM SERPL-SCNC: 139 MMOL/L — SIGNIFICANT CHANGE UP (ref 135–145)
WBC # BLD: 14.83 K/UL — HIGH (ref 3.8–10.5)
WBC # BLD: 6.45 K/UL — SIGNIFICANT CHANGE UP (ref 3.8–10.5)
WBC # FLD AUTO: 14.83 K/UL — HIGH (ref 3.8–10.5)
WBC # FLD AUTO: 6.45 K/UL — SIGNIFICANT CHANGE UP (ref 3.8–10.5)

## 2021-03-19 PROCEDURE — 99233 SBSQ HOSP IP/OBS HIGH 50: CPT

## 2021-03-19 PROCEDURE — 27884 AMPUTATION FOLLOW-UP SURGERY: CPT | Mod: RT,58,GC

## 2021-03-19 PROCEDURE — 88307 TISSUE EXAM BY PATHOLOGIST: CPT | Mod: 26

## 2021-03-19 RX ORDER — DEXTROSE 50 % IN WATER 50 %
12.5 SYRINGE (ML) INTRAVENOUS ONCE
Refills: 0 | Status: DISCONTINUED | OUTPATIENT
Start: 2021-03-19 | End: 2021-04-06

## 2021-03-19 RX ORDER — DEXTROSE 50 % IN WATER 50 %
25 SYRINGE (ML) INTRAVENOUS ONCE
Refills: 0 | Status: DISCONTINUED | OUTPATIENT
Start: 2021-03-19 | End: 2021-04-06

## 2021-03-19 RX ORDER — HEPARIN SODIUM 5000 [USP'U]/ML
5000 INJECTION INTRAVENOUS; SUBCUTANEOUS EVERY 8 HOURS
Refills: 0 | Status: DISCONTINUED | OUTPATIENT
Start: 2021-03-19 | End: 2021-04-06

## 2021-03-19 RX ORDER — ONDANSETRON 8 MG/1
4 TABLET, FILM COATED ORAL EVERY 6 HOURS
Refills: 0 | Status: DISCONTINUED | OUTPATIENT
Start: 2021-03-19 | End: 2021-04-06

## 2021-03-19 RX ORDER — SODIUM CHLORIDE 9 MG/ML
1000 INJECTION, SOLUTION INTRAVENOUS
Refills: 0 | Status: DISCONTINUED | OUTPATIENT
Start: 2021-03-19 | End: 2021-03-20

## 2021-03-19 RX ORDER — GLUCAGON INJECTION, SOLUTION 0.5 MG/.1ML
1 INJECTION, SOLUTION SUBCUTANEOUS ONCE
Refills: 0 | Status: DISCONTINUED | OUTPATIENT
Start: 2021-03-19 | End: 2021-04-06

## 2021-03-19 RX ORDER — INSULIN LISPRO 100/ML
VIAL (ML) SUBCUTANEOUS
Refills: 0 | Status: DISCONTINUED | OUTPATIENT
Start: 2021-03-19 | End: 2021-04-06

## 2021-03-19 RX ORDER — SODIUM CHLORIDE 9 MG/ML
1000 INJECTION, SOLUTION INTRAVENOUS
Refills: 0 | Status: DISCONTINUED | OUTPATIENT
Start: 2021-03-19 | End: 2021-04-06

## 2021-03-19 RX ORDER — MAGNESIUM SULFATE 500 MG/ML
1 VIAL (ML) INJECTION ONCE
Refills: 0 | Status: COMPLETED | OUTPATIENT
Start: 2021-03-19 | End: 2021-03-19

## 2021-03-19 RX ORDER — HYDROMORPHONE HYDROCHLORIDE 2 MG/ML
0.5 INJECTION INTRAMUSCULAR; INTRAVENOUS; SUBCUTANEOUS
Refills: 0 | Status: DISCONTINUED | OUTPATIENT
Start: 2021-03-19 | End: 2021-03-20

## 2021-03-19 RX ORDER — INSULIN LISPRO 100/ML
6 VIAL (ML) SUBCUTANEOUS
Refills: 0 | Status: DISCONTINUED | OUTPATIENT
Start: 2021-03-19 | End: 2021-03-20

## 2021-03-19 RX ORDER — POTASSIUM CHLORIDE 20 MEQ
40 PACKET (EA) ORAL
Refills: 0 | Status: DISCONTINUED | OUTPATIENT
Start: 2021-03-19 | End: 2021-03-19

## 2021-03-19 RX ORDER — INSULIN GLARGINE 100 [IU]/ML
7 INJECTION, SOLUTION SUBCUTANEOUS AT BEDTIME
Refills: 0 | Status: DISCONTINUED | OUTPATIENT
Start: 2021-03-19 | End: 2021-03-20

## 2021-03-19 RX ORDER — POTASSIUM CHLORIDE 20 MEQ
40 PACKET (EA) ORAL ONCE
Refills: 0 | Status: DISCONTINUED | OUTPATIENT
Start: 2021-03-19 | End: 2021-03-19

## 2021-03-19 RX ORDER — ACETAMINOPHEN 500 MG
650 TABLET ORAL EVERY 6 HOURS
Refills: 0 | Status: DISCONTINUED | OUTPATIENT
Start: 2021-03-19 | End: 2021-04-06

## 2021-03-19 RX ORDER — INSULIN GLARGINE 100 [IU]/ML
1 INJECTION, SOLUTION SUBCUTANEOUS AT BEDTIME
Refills: 0 | Status: DISCONTINUED | OUTPATIENT
Start: 2021-03-19 | End: 2021-03-19

## 2021-03-19 RX ORDER — NAFCILLIN 10 G/100ML
2 INJECTION, POWDER, FOR SOLUTION INTRAVENOUS EVERY 4 HOURS
Refills: 0 | Status: DISCONTINUED | OUTPATIENT
Start: 2021-03-19 | End: 2021-03-29

## 2021-03-19 RX ORDER — DEXTROSE 50 % IN WATER 50 %
15 SYRINGE (ML) INTRAVENOUS ONCE
Refills: 0 | Status: DISCONTINUED | OUTPATIENT
Start: 2021-03-19 | End: 2021-04-06

## 2021-03-19 RX ORDER — POTASSIUM CHLORIDE 20 MEQ
40 PACKET (EA) ORAL ONCE
Refills: 0 | Status: COMPLETED | OUTPATIENT
Start: 2021-03-19 | End: 2021-03-19

## 2021-03-19 RX ADMIN — HEPARIN SODIUM 5000 UNIT(S): 5000 INJECTION INTRAVENOUS; SUBCUTANEOUS at 00:10

## 2021-03-19 RX ADMIN — OXYCODONE AND ACETAMINOPHEN 1 TABLET(S): 5; 325 TABLET ORAL at 07:00

## 2021-03-19 RX ADMIN — Medication 40 MILLIEQUIVALENT(S): at 04:28

## 2021-03-19 RX ADMIN — Medication 650 MILLIGRAM(S): at 23:13

## 2021-03-19 RX ADMIN — Medication 650 MILLIGRAM(S): at 03:14

## 2021-03-19 RX ADMIN — NAFCILLIN 200 GRAM(S): 10 INJECTION, POWDER, FOR SOLUTION INTRAVENOUS at 03:30

## 2021-03-19 RX ADMIN — HEPARIN SODIUM 5000 UNIT(S): 5000 INJECTION INTRAVENOUS; SUBCUTANEOUS at 22:08

## 2021-03-19 RX ADMIN — Medication 650 MILLIGRAM(S): at 04:14

## 2021-03-19 RX ADMIN — OXYCODONE AND ACETAMINOPHEN 1 TABLET(S): 5; 325 TABLET ORAL at 00:25

## 2021-03-19 RX ADMIN — OXYCODONE AND ACETAMINOPHEN 1 TABLET(S): 5; 325 TABLET ORAL at 01:25

## 2021-03-19 RX ADMIN — NAFCILLIN 100 GRAM(S): 10 INJECTION, POWDER, FOR SOLUTION INTRAVENOUS at 22:13

## 2021-03-19 RX ADMIN — OXYCODONE AND ACETAMINOPHEN 1 TABLET(S): 5; 325 TABLET ORAL at 08:00

## 2021-03-19 RX ADMIN — SODIUM CHLORIDE 75 MILLILITER(S): 9 INJECTION INTRAMUSCULAR; INTRAVENOUS; SUBCUTANEOUS at 00:00

## 2021-03-19 RX ADMIN — HYDROMORPHONE HYDROCHLORIDE 0.5 MILLIGRAM(S): 2 INJECTION INTRAMUSCULAR; INTRAVENOUS; SUBCUTANEOUS at 16:10

## 2021-03-19 RX ADMIN — Medication 6: at 22:13

## 2021-03-19 RX ADMIN — HYDROMORPHONE HYDROCHLORIDE 0.5 MILLIGRAM(S): 2 INJECTION INTRAMUSCULAR; INTRAVENOUS; SUBCUTANEOUS at 15:35

## 2021-03-19 RX ADMIN — INSULIN GLARGINE 7 UNIT(S): 100 INJECTION, SOLUTION SUBCUTANEOUS at 23:07

## 2021-03-19 RX ADMIN — NAFCILLIN 100 GRAM(S): 10 INJECTION, POWDER, FOR SOLUTION INTRAVENOUS at 18:21

## 2021-03-19 RX ADMIN — SODIUM CHLORIDE 75 MILLILITER(S): 9 INJECTION, SOLUTION INTRAVENOUS at 18:45

## 2021-03-19 RX ADMIN — NAFCILLIN 200 GRAM(S): 10 INJECTION, POWDER, FOR SOLUTION INTRAVENOUS at 10:24

## 2021-03-19 RX ADMIN — HEPARIN SODIUM 5000 UNIT(S): 5000 INJECTION INTRAVENOUS; SUBCUTANEOUS at 08:12

## 2021-03-19 RX ADMIN — Medication 100 GRAM(S): at 10:24

## 2021-03-19 RX ADMIN — NAFCILLIN 200 GRAM(S): 10 INJECTION, POWDER, FOR SOLUTION INTRAVENOUS at 07:00

## 2021-03-19 NOTE — BRIEF OPERATIVE NOTE - NSICDXBRIEFPROCEDURE_GEN_ALL_CORE_FT
PROCEDURES:  Deep incision and drainage of multiple areas of foot 14-Mar-2021 00:33:02  Jakob العراقي  
PROCEDURES:  Amputation below knee 15-Mar-2021 16:49:36  Horacio Díaz  
PROCEDURES:  Amputation below knee 15-Mar-2021 16:49:36  Horacio Díaz

## 2021-03-19 NOTE — PROGRESS NOTE ADULT - SUBJECTIVE AND OBJECTIVE BOX
O/N: LENCHOBETTIE  k+3.7 repleted                          PLEASE CHECK WHEN PRESENT:     [  ] Heart Failure     [  ] Acute     [  ] Acute on Chronic     [  ] Chronic  -------------------------------------------------------------------     [  ]Diastolic [HFpEF]     [  ]Systolic [HFrEF]     [  ]Combined [HFpEF & HFrEF]     [  ] afib     [  ] hypertensive heart disease     [  ]Other:  -------------------------------------------------------------------  [ ] Respiratory failure  [ ] Acute cor pulmonale  [ ] Asthma/COPD Exacerbation  [ ] Pleural effusion  [ ] Aspiration pneumonia  -------------------------------------------------------------------  [  ]ANTONY     [  ]ATN     [  ]Reneal Medullary Necrosis     [  ]Renal Cortical Necrosis     [  ]Other Pathological Lesions:    [  ]CKD 1  [  ]CKD 2  [  ]CKD 3  [  ]CKD 4  [  ]CKD 5  [  ]Other  -------------------------------------------------------------------  [  ]Diabetes  [  ] Diabetic PVD Ulcer  [  ] Neuropathic ulcer to DM  [  ] Diabetes with Nephropathy  [  ] Osteomyelitis due to diabetes  [x ] Hyperglycemia  [ ] Hypoglycemia  --------------------------------------------------------------------  [  ]Malnutrition: See Nutrition Note  [  ]Cachexia  [  ]Other:   [  ]Supplement Ordered:  [  ]Morbid Obesity (BMI >=40]  ---------------------------------------------------------------------  [ ] Sepsis/severe sepsis/septic shock  [ ] UTI  [ ] Pneumonia  -----------------------------------------------------------------------  [ ] Acidosis/alkalosis  [ ] Fluid overload  [x ] Hypokalemia  [ ] Hyperkalemia  [x ] Hypomagnesemia  [x ] Hypophosphatemia  [ ] Hyperphosphatemia  [ ] Hyponatremia  [ ] Hypernatremia  ------------------------------------------------------------------------  [ ] Acute blood loss anemia  [ ] Post op blood loss anemia  [ ] Iron deficiency anemia  [ ] Anemia due to chronic disease  [ ] Hypercoagulable state  [x ] Leukocytosis  ----------------------------------------------------------------------  [ ] Cerebral infarction  [ ] Transient ischemia attack  [ ] Encephalopathy      Assessment/Plan;  75F PMHx UC, colon cancer s/p total abdominal colectomy with end ileostomy, found down at home, was admitted for DKA and R diabetic foot wound, s/p operative debridement by podiatry 3/14/21 and R foot guillotine amputation and L foot I&D by vasc surg 3/15/21.    #R foot wound and Lt foot wound  s/p R foot operative debridement by podiatry 3/14 s/p R john BKA, L ankle I&D with penrose drain 3/15  -pain control  -Nafcillin 3/16-3/29 f/u ID recs  -plan for BKA closure 3/19  -local wound care  -f/u labs    #DKA  -f/u endo recs  -ISS  -lantus 10u HS, added lispro 6u TID with meals  -hgb A1C=16.1    Diet: consistent carb  PPx: HSQ  Dispo: TBD     O/N: LENCHO, VSS  k+3.7 repleted      nafcillin  IVPB   nafcillin  IVPB 2  heparin   Injectable 5000  nafcillin  IVPB   nafcillin  IVPB 2      Allergies    No Known Allergies    Intolerances        Vital Signs Last 24 Hrs  T(C): 36.9 (19 Mar 2021 07:07), Max: 36.9 (18 Mar 2021 18:15)  T(F): 98.4 (19 Mar 2021 07:07), Max: 98.4 (18 Mar 2021 18:15)  HR: 77 (19 Mar 2021 00:03) (77 - 86)  BP: 136/63 (19 Mar 2021 00:03) (129/60 - 139/60)  BP(mean): --  RR: 18 (19 Mar 2021 00:03) (16 - 18)  SpO2: 97% (19 Mar 2021 00:03) (94% - 98%)  I&O's Summary    18 Mar 2021 07:01  -  19 Mar 2021 07:00  --------------------------------------------------------  IN: 540 mL / OUT: 2500 mL / NET: -1960 mL      Physical Exam:    General:  Well appearing, NAD  CV:  RRR  Lungs:  nonlabored breathing  Abdomen:  Soft, non-tender, no distended  Extremities: RLE guillotine amputation site clean, pink, no bleeding, no fibrinous exudate or pus, no surrounding erythema/edema. L ankle wound with pink I&D site, visible granulation tissue and penrose drain in place, minimal drainage      LABS:                        10.0   7.18  )-----------( 298      ( 18 Mar 2021 08:04 )             30.5     03-18    136  |  99  |  6<L>  ----------------------------<  260<H>  3.7   |  28  |  0.58    Ca    7.2<L>      18 Mar 2021 19:10  Phos  3.1     03-18  Mg     1.8     03-18            PLEASE CHECK WHEN PRESENT:     [  ] Heart Failure     [  ] Acute     [  ] Acute on Chronic     [  ] Chronic  -------------------------------------------------------------------     [  ]Diastolic [HFpEF]     [  ]Systolic [HFrEF]     [  ]Combined [HFpEF & HFrEF]     [  ] afib     [  ] hypertensive heart disease     [  ]Other:  -------------------------------------------------------------------  [ ] Respiratory failure  [ ] Acute cor pulmonale  [ ] Asthma/COPD Exacerbation  [ ] Pleural effusion  [ ] Aspiration pneumonia  -------------------------------------------------------------------  [  ]ANTONY     [  ]ATN     [  ]Reneal Medullary Necrosis     [  ]Renal Cortical Necrosis     [  ]Other Pathological Lesions:    [  ]CKD 1  [  ]CKD 2  [  ]CKD 3  [  ]CKD 4  [  ]CKD 5  [  ]Other  -------------------------------------------------------------------  [x]Diabetes - new DX on this admission  [x] Diabetic PVD Ulcer  [  ] Neuropathic ulcer to DM  [  ] Diabetes with Nephropathy  [  ] Osteomyelitis due to diabetes  [x ] Hyperglycemia  [ ] Hypoglycemia  --------------------------------------------------------------------  [  ]Malnutrition: See Nutrition Note  [  ]Cachexia  [  ]Other:   [  ]Supplement Ordered:  [  ]Morbid Obesity (BMI >=40]  ---------------------------------------------------------------------  [ ] Sepsis/severe sepsis/septic shock  [ ] UTI  [ ] Pneumonia  -----------------------------------------------------------------------  [ ] Acidosis/alkalosis  [ ] Fluid overload  [x ] Hypokalemia  [ ] Hyperkalemia  [x ] Hypomagnesemia  [x ] Hypophosphatemia  [ ] Hyperphosphatemia  [ ] Hyponatremia  [ ] Hypernatremia  ------------------------------------------------------------------------  [ ] Acute blood loss anemia  [ ] Post op blood loss anemia  [ ] Iron deficiency anemia  [ ] Anemia due to chronic disease  [ ] Hypercoagulable state  [x ] Leukocytosis  ----------------------------------------------------------------------  [ ] Cerebral infarction  [ ] Transient ischemia attack  [ ] Encephalopathy      Assessment/Plan;  75F PMHx UC, colon cancer s/p total abdominal colectomy with end ileostomy, found down at home, was admitted for DKA and R diabetic foot wound, s/p operative debridement by podiatry 3/14/21 and R foot guillotine amputation and L foot I&D by vasc surg 3/15/21.    #R foot wound and Lt foot wound  s/p R foot operative debridement by podiatry 3/14 s/p R john BKA, L ankle I&D with penrose drain 3/15  -pain control  -Nafcillin 3/16-3/29, will follow up with ID as outpatient  -plan for BKA closure 3/19, NPO p mn  -local wound care  -f/u labs    #DKA  -f/u endo recs  -ISS  -lantus 10u HS, added lispro 6u TID with meals  -hgb A1C=16.1    Diet: consistent carb  PPx: HSQ  Dispo: TBD

## 2021-03-19 NOTE — PACU DISCHARGE NOTE - COMMENTS
Report given to Gareth ESPINO. Dressing Dry and intact, 2 IV sites intact, pt resting quietly, transported on monitor, in bed accompanied by RN and PCA

## 2021-03-19 NOTE — BRIEF OPERATIVE NOTE - OPERATION/FINDINGS
Giulia right below knee amputation.    Incision and drainage of left anterior ankle purulent wound with placement of Penrose drain.
Patient placed in supine position, sedated and intubated. Nerve block done in R thigh. BKA amputaion performed. Hemostasis achieved and skin closed.
Incision and drainage and washout of Right foot with debridement of all non- viable soft tissue and bone. Incisions were made at the plantar, lateral and dorsum aspect of the right foot. About 30 cc's of purulence was drained throughout the procedure. Surgical sites were copiously irrigated with NS. Wound site has not been closed and instead was packed with packing material. Deep wound cultures and soft tissue excised from the site has been sent for pathology and culture.

## 2021-03-19 NOTE — PROGRESS NOTE ADULT - ASSESSMENT
In summary, this is a 76yo woman, poor historian, with a PMH of UC and colon cancer s/p colectomy and chemotherapy and chronic venous stasis w/chronic LE wounds who presented w/frequent falls and subsequently found to have bilateral lower extremity cellulitis and abscesses c/b septic shock and DK with subsequent imaging c/f RLE necrotizing fascitis.  She was admitted to the SICU and underwent a R-foot I&D on 3/14 with Podiatry and R-BKA w/L-foot I&D on 3/15 with Vascular.  She was transferred to telemetry on 3/16, pending BKA closure today along w/L-foot I&D.     #BLE Cellulitis c/b Septic Shock and Nec Fasc s/p R-BKA  -- plan for OR to close BKA incision today; will remain in-house 5 days post-op  -- ID recommending Nafcillin x 2 weeks (thru 3/29) w/labs sent to Dr. Medeiros  -- will need f/u with Dr. Medeiros upon discharge  -- wound care per primary team   -- Percocet 1 tab q4h PRN and Tylenol 650mg q6h PRN w/Senna  -- PT as able     #Newly Diagnosed IDDM2 c/b DKA and SSTI  -- likely risk factor for infection and sepsis   -- per Endo, received 10U last night and c/w 6U Lispro TID  -- Endo following, appreciate their assistance    #UC c/b Colon Ca s/p Ostomy  -- no active issues     #Apthous Ulcer  -- R-inner lower lip  -- continue to offer topical analgesic such as Oragel or Lidocaine     #Acute Blood Loss Anemia   -- likely from multiple trips to the OR and BKA  -- currently stable and will continue to monitor     #Diet - Carb Controlled / Dysphagia 1   #DVT PPx - SQH  #Dispo - likely [facility]; should be medically ready by 3/24    Bailee Cottrell  Attending Hospitalist  [phone]

## 2021-03-19 NOTE — PROGRESS NOTE ADULT - SUBJECTIVE AND OBJECTIVE BOX
INTERVAL EVENTS:  -- NAEO; planning for OR this afternoon    SUBJECTIVE:  -- reports feeling well; a bit groggy because she just woke up  -- aware about her trip to the OR today; does not appear anxious  -- fair appetite, voiding and +BM  -- Review of Systems: 12 point review of systems otherwise negative    MEDICATIONS:  MEDICATIONS  (STANDING):  ascorbic acid 500 milliGRAM(s) Oral daily  dextrose 40% Gel 15 Gram(s) Oral once  dextrose 5%. 1000 milliLiter(s) (50 mL/Hr) IV Continuous <Continuous>  dextrose 5%. 1000 milliLiter(s) (100 mL/Hr) IV Continuous <Continuous>  dextrose 50% Injectable 25 Gram(s) IV Push once  dextrose 50% Injectable 12.5 Gram(s) IV Push once  dextrose 50% Injectable 25 Gram(s) IV Push once  FIRST- Mouthwash  BLM 10 milliLiter(s) Swish and Spit daily  glucagon  Injectable 1 milliGRAM(s) IntraMuscular once  heparin   Injectable 5000 Unit(s) SubCutaneous every 8 hours  insulin glargine Injectable (LANTUS) 10 Unit(s) SubCutaneous at bedtime  insulin lispro (ADMELOG) corrective regimen sliding scale   SubCutaneous Before meals and at bedtime  insulin lispro Injectable (ADMELOG) 6 Unit(s) SubCutaneous three times a day before meals  multivitamin/minerals/iron Oral Solution (CENTRUM) 15 milliLiter(s) Oral daily  nafcillin  IVPB      nafcillin  IVPB 2 Gram(s) IV Intermittent every 4 hours  potassium chloride    Tablet ER 40 milliEquivalent(s) Oral once  senna 2 Tablet(s) Oral at bedtime  sodium chloride 0.9%. 1000 milliLiter(s) (75 mL/Hr) IV Continuous <Continuous>    MEDICATIONS  (PRN):  acetaminophen   Tablet .. 650 milliGRAM(s) Oral every 6 hours PRN Moderate Pain (4 - 6)  oxycodone    5 mG/acetaminophen 325 mG 1 Tablet(s) Oral every 4 hours PRN Severe Pain (7 - 10)    Allergies: No Known Allergies    OBJECTIVE:  Vital Signs Last 24 Hrs  T(C): 36.4 (19 Mar 2021 09:54), Max: 36.9 (18 Mar 2021 18:15)  T(F): 97.6 (19 Mar 2021 09:54), Max: 98.4 (18 Mar 2021 18:15)  HR: 65 (19 Mar 2021 09:41) (65 - 84)  BP: 139/63 (19 Mar 2021 09:41) (129/60 - 142/65)  BP(mean): --  RR: 16 (19 Mar 2021 09:41) (16 - 18)  SpO2: 97% (19 Mar 2021 09:41) (92% - 98%)  I&O's Summary    18 Mar 2021 07:01  -  19 Mar 2021 07:00  --------------------------------------------------------  IN: 540 mL / OUT: 2750 mL / NET: -2210 mL    19 Mar 2021 07:01  -  19 Mar 2021 12:18  --------------------------------------------------------  IN: 0 mL / OUT: 1100 mL / NET: -1100 mL    PHYSICAL EXAM:  Gen: NAD, lying comfortably at 35 deg  HEENT: NCAT, MMM, R-lower inner lip +canker sore  CV: RRR, no m/g/r appreciated  Pulm: CTA B, no w/r/r; no increase in WOB  Abd: normoactive BS, soft, NTND  Ext: LLE with kerlex/ace bandage dressing to knee, R-BKA w/kerlex and ace bandage  Neuro: AOx2, able to move BLE but ROM limited 2/2 dressing w/flexion at the knees  Psych: pleasant and conversational but forgetful and slow at organizing thoughts at time    LABS:                        10.2   6.45  )-----------( 320      ( 19 Mar 2021 09:09 )             31.4     03-19    139  |  104  |  5<L>  ----------------------------<  96  3.6   |  27  |  0.58    Ca    7.4<L>      19 Mar 2021 09:09  Phos  3.2     03-19  Mg     1.8     03-19    CAPILLARY BLOOD GLUCOSE  POCT Blood Glucose.: 92 mg/dL (19 Mar 2021 06:56)  POCT Blood Glucose.: 166 mg/dL (18 Mar 2021 21:46)  POCT Blood Glucose.: 199 mg/dL (18 Mar 2021 16:39)    MICRODATA:  No new microdata.    RADIOLOGY/OTHER STUDIES:  No new imaging.

## 2021-03-19 NOTE — CHART NOTE - NSCHARTNOTEFT_GEN_A_CORE
labs reviewed.  in OR for BKA.   FSG & Insulin received:    Yesterday:  pre-dinner fs  nutritional lispro  6 units +2   units lispro SS  bedtime fs  lantus 10  units +  2  units lispro SS    Today:  pre-breakfast fs  npo    A/P: 75F with PMHx UC s/p colostomy (s/p multiple abdominal surgeries including colostomy bag in ) BIBEMS from home complaining of weakness; presenting likely in DKA with AMS, with bilateral lower extremity open wounds. Of note, pt found to have a hx of DM2: A1C on admission is 16.1. Pt is now s/p I&D w/ washout and debridement of non- viable soft tissue.     1.  DM:   wt:75 KG  cr:0.6, GFR:89  likely insulinopenic  lantus 7 u at bedtime  pleas c/w 6 U Lispro premeals TID   Continue lispro moderate dose scale with meals and at bedtime.   Continue consistent carb diet  FSG Goal 100-180    case discussed with Dr. Yee and updated primary team labs reviewed.  in OR for BKA.   FSG & Insulin received:    Yesterday:  pre-dinner fs  nutritional lispro  6 units +2   units lispro SS  bedtime fs  lantus 10  units +  2  units lispro SS    Today:  pre-breakfast fs  npo    A/P: 75F with PMHx UC s/p colostomy (s/p multiple abdominal surgeries including colostomy bag in ) BIBEMS from home complaining of weakness; presenting likely in DKA with AMS, with bilateral lower extremity open wounds. Of note, pt found to have a hx of DM2: A1C on admission is 16.1. Pt is now s/p I&D w/ washout and debridement of non- viable soft tissue.     1.  DM:   wt:75 KG  cr:0.6, GFR:89  likely insulinopenic  lantus 7 u at bedtime  pleas c/w 6 U Lispro premeals TID   Continue lispro moderate dose scale with meals and at bedtime.   Continue consistent carb diet  FSG Goal 100-180    case discussed with Dr. Yee and updated primary team    Attending:  Dicussed with Dr. Sanabria.  Basal insulin requirements decreasing as infection resolves.  To decrease the Lantus to 7 units for tonight.    Pt will likely be able to return to oral agents at discharge    BRONSON Yee MD

## 2021-03-19 NOTE — BRIEF OPERATIVE NOTE - NSICDXBRIEFPREOP_GEN_ALL_CORE_FT
PRE-OP DIAGNOSIS:  Necrotizing fasciitis 14-Mar-2021 00:33:50  Jakob العراقي  
PRE-OP DIAGNOSIS:  Necrotizing fasciitis 14-Mar-2021 00:33:50  Jakbo العراقي  
PRE-OP DIAGNOSIS:  Necrotizing fasciitis 14-Mar-2021 00:33:50  Jakob العراقي

## 2021-03-19 NOTE — BRIEF OPERATIVE NOTE - NSICDXBRIEFPOSTOP_GEN_ALL_CORE_FT
POST-OP DIAGNOSIS:  Necrotizing erysipelas 14-Mar-2021 00:34:10  Jakob العراقي  

## 2021-03-19 NOTE — CHART NOTE - NSCHARTNOTEFT_GEN_A_CORE
Admitting Diagnosis:   Patient is a 75y old  Female who presents with a chief complaint of Weakness (19 Mar 2021 12:15)      PAST MEDICAL & SURGICAL HISTORY:  H/O ulcerative colitis    Colon cancer    Status post Jag procedure    Colostomy present        Current Nutrition Order:  NPO@12  Consistent Carbohydrate with evening snack puree diet      PO Intake: Good (%) [   ]  Fair (50-75%) [   ] Poor (<25%) [   ]   Please see Below     GI Issues:   Ostomy OP: 300ml 3/18, 450ml 3/16, 225ml 3/15   +BM moderate/liquid 3/19     Pain:  R Leg Pain 7/7    Skin Integrity:  Ketan 16  1+ Gen edema  AMP s/p R BKA    Labs:   03-19    139  |  104  |  5<L>  ----------------------------<  96  3.6   |  27  |  0.58    Ca    7.4<L>      19 Mar 2021 09:09  Phos  3.2     03-19  Mg     1.8     03-19      CAPILLARY BLOOD GLUCOSE      POCT Blood Glucose.: 144 mg/dL (19 Mar 2021 16:02)  POCT Blood Glucose.: 92 mg/dL (19 Mar 2021 06:56)  POCT Blood Glucose.: 166 mg/dL (18 Mar 2021 21:46)  POCT Blood Glucose.: 199 mg/dL (18 Mar 2021 16:39)      Medications:  MEDICATIONS  (STANDING):  dextrose 40% Gel 15 Gram(s) Oral once  dextrose 5%. 1000 milliLiter(s) (50 mL/Hr) IV Continuous <Continuous>  dextrose 5%. 1000 milliLiter(s) (100 mL/Hr) IV Continuous <Continuous>  dextrose 50% Injectable 25 Gram(s) IV Push once  dextrose 50% Injectable 12.5 Gram(s) IV Push once  dextrose 50% Injectable 25 Gram(s) IV Push once  glucagon  Injectable 1 milliGRAM(s) IntraMuscular once  heparin   Injectable 5000 Unit(s) SubCutaneous every 8 hours  insulin glargine Injectable (LANTUS) 7 Unit(s) SubCutaneous at bedtime  insulin lispro (ADMELOG) corrective regimen sliding scale   SubCutaneous Before meals and at bedtime  insulin lispro Injectable (ADMELOG) 6 Unit(s) SubCutaneous three times a day before meals  lactated ringers. 1000 milliLiter(s) (75 mL/Hr) IV Continuous <Continuous>  nafcillin  IVPB 2 Gram(s) IV Intermittent every 4 hours    MEDICATIONS  (PRN):  acetaminophen   Tablet .. 650 milliGRAM(s) Oral every 6 hours PRN Mild Pain (1 - 3)  HYDROmorphone  Injectable 0.5 milliGRAM(s) IV Push every 15 minutes PRN Moderate Pain (4 - 6)  ondansetron Injectable 4 milliGRAM(s) IV Push every 6 hours PRN Nausea        5'2''  pounds  Weight 165 pounds  BMI 30.2 %RPT=953  >> AMP s/p R BKA: 155 pounds, %QRX=069%    ** No New EMR wts, Request New EMR wt s/p AMP    Estimated energy needs:   Current body wt used for energy calculations as pt falls within % Adjusted-BW   1875-2250kcal/day 25-30kcal/kg  75-90gm/day 1.0-1.2gm/kg     Subjective:   76 yo F poor historian with hx UC and colon cancer s/p colectomy and chemotherapy, chronic venous stasis with chronic LE wounds who presents due to frequent falls over the past several weeks at home. She states that overall she has been more homebound, but recently started having more falls. Found to be in DKA with bilateral lower extremity cellulitis and foot infection.  A1c found to be 16.1,  with blood sugar 400+ O/A. Pt s/p Deep incision and drainage of multiple areas of foot with debridement of all non- viable soft tissue and bone 3/14. S/p BKA and I/D 3/15 with closured planned for 3/19. Likely to be D/C 9/23.     Pt visited on 5 UR today. Ordered for NPO@12 3/18 / consCHO (snack)/puree diet 3/16 - pt very unclear on %PO intake at this time. Appears to be on lower end however does not want PO supplements. Pt also unclear on need for puree diet. Noted bedside dys screen passed 3/14. During RD initial visit pt asking for soft foods d/t lack of teeth and had denied issues swallowing. Pt reports she can tolerate things like egg salad and pasta.   Please see below for nutritions recommendations. D/w Team.     Inadequate energy intake Etiology RT intake<EER Signs/Symptoms AEB NPO.   ON GOING @ THIS TIME   Goal/Expected Outcome Diet to be advanced in 24-48hrs as feasible.    Recommendations:  1. Diet to be advanced in 24-48hrs: Recommend Consistent Carbohydrate with evening snack diet.  >> Defer PO cons to team. Unclear if pt able to tolerate Mech Soft, team agreeable for puree diet + allow for egg salad per pt request.    2. Honor pt food preferences as able.  3. Monitor %PO intake, diet tolerance.  4. Monitor Skin, Wts, Labs, GI, GOC.  5. Labs: monitor BMP, CBC, glucose, lytes, trend renal indices, LFts, POCT.  6. RD to remain available for additional nutrition interventions as needed.     Education:  Attempted education on various aspects of diet - pt appears to have very little insight into diet / understanding at this time.     Risk Level: High [ x  ] Moderate [   ] Low [   ]

## 2021-03-20 LAB
ANION GAP SERPL CALC-SCNC: 12 MMOL/L — SIGNIFICANT CHANGE UP (ref 5–17)
BUN SERPL-MCNC: 8 MG/DL — SIGNIFICANT CHANGE UP (ref 7–23)
CALCIUM SERPL-MCNC: 8.3 MG/DL — LOW (ref 8.4–10.5)
CHLORIDE SERPL-SCNC: 101 MMOL/L — SIGNIFICANT CHANGE UP (ref 96–108)
CO2 SERPL-SCNC: 22 MMOL/L — SIGNIFICANT CHANGE UP (ref 22–31)
CREAT SERPL-MCNC: 0.57 MG/DL — SIGNIFICANT CHANGE UP (ref 0.5–1.3)
GLUCOSE BLDC GLUCOMTR-MCNC: 166 MG/DL — HIGH (ref 70–99)
GLUCOSE BLDC GLUCOMTR-MCNC: 186 MG/DL — HIGH (ref 70–99)
GLUCOSE BLDC GLUCOMTR-MCNC: 194 MG/DL — HIGH (ref 70–99)
GLUCOSE BLDC GLUCOMTR-MCNC: 218 MG/DL — HIGH (ref 70–99)
GLUCOSE SERPL-MCNC: 159 MG/DL — HIGH (ref 70–99)
HCT VFR BLD CALC: 32.5 % — LOW (ref 34.5–45)
HGB BLD-MCNC: 9.9 G/DL — LOW (ref 11.5–15.5)
MAGNESIUM SERPL-MCNC: 1.8 MG/DL — SIGNIFICANT CHANGE UP (ref 1.6–2.6)
MCHC RBC-ENTMCNC: 29.4 PG — SIGNIFICANT CHANGE UP (ref 27–34)
MCHC RBC-ENTMCNC: 30.5 GM/DL — LOW (ref 32–36)
MCV RBC AUTO: 96.4 FL — SIGNIFICANT CHANGE UP (ref 80–100)
NRBC # BLD: 0 /100 WBCS — SIGNIFICANT CHANGE UP (ref 0–0)
PHOSPHATE SERPL-MCNC: 3.4 MG/DL — SIGNIFICANT CHANGE UP (ref 2.5–4.5)
PLATELET # BLD AUTO: 311 K/UL — SIGNIFICANT CHANGE UP (ref 150–400)
POTASSIUM SERPL-MCNC: 4.7 MMOL/L — SIGNIFICANT CHANGE UP (ref 3.5–5.3)
POTASSIUM SERPL-SCNC: 4.7 MMOL/L — SIGNIFICANT CHANGE UP (ref 3.5–5.3)
RBC # BLD: 3.37 M/UL — LOW (ref 3.8–5.2)
RBC # FLD: 13.8 % — SIGNIFICANT CHANGE UP (ref 10.3–14.5)
SODIUM SERPL-SCNC: 135 MMOL/L — SIGNIFICANT CHANGE UP (ref 135–145)
SURGICAL PATHOLOGY STUDY: SIGNIFICANT CHANGE UP
WBC # BLD: 11.31 K/UL — HIGH (ref 3.8–10.5)
WBC # FLD AUTO: 11.31 K/UL — HIGH (ref 3.8–10.5)

## 2021-03-20 PROCEDURE — 99232 SBSQ HOSP IP/OBS MODERATE 35: CPT | Mod: GC

## 2021-03-20 PROCEDURE — 99233 SBSQ HOSP IP/OBS HIGH 50: CPT | Mod: GC

## 2021-03-20 RX ORDER — HYDROMORPHONE HYDROCHLORIDE 2 MG/ML
0.5 INJECTION INTRAMUSCULAR; INTRAVENOUS; SUBCUTANEOUS EVERY 4 HOURS
Refills: 0 | Status: DISCONTINUED | OUTPATIENT
Start: 2021-03-20 | End: 2021-03-20

## 2021-03-20 RX ORDER — OXYCODONE AND ACETAMINOPHEN 5; 325 MG/1; MG/1
2 TABLET ORAL EVERY 6 HOURS
Refills: 0 | Status: DISCONTINUED | OUTPATIENT
Start: 2021-03-20 | End: 2021-03-26

## 2021-03-20 RX ORDER — HYDROMORPHONE HYDROCHLORIDE 2 MG/ML
0.5 INJECTION INTRAMUSCULAR; INTRAVENOUS; SUBCUTANEOUS ONCE
Refills: 0 | Status: DISCONTINUED | OUTPATIENT
Start: 2021-03-20 | End: 2021-03-20

## 2021-03-20 RX ORDER — MORPHINE SULFATE 50 MG/1
2 CAPSULE, EXTENDED RELEASE ORAL ONCE
Refills: 0 | Status: DISCONTINUED | OUTPATIENT
Start: 2021-03-20 | End: 2021-03-20

## 2021-03-20 RX ORDER — INSULIN LISPRO 100/ML
8 VIAL (ML) SUBCUTANEOUS
Refills: 0 | Status: DISCONTINUED | OUTPATIENT
Start: 2021-03-20 | End: 2021-03-21

## 2021-03-20 RX ORDER — INSULIN GLARGINE 100 [IU]/ML
10 INJECTION, SOLUTION SUBCUTANEOUS AT BEDTIME
Refills: 0 | Status: DISCONTINUED | OUTPATIENT
Start: 2021-03-20 | End: 2021-03-21

## 2021-03-20 RX ORDER — MAGNESIUM OXIDE 400 MG ORAL TABLET 241.3 MG
400 TABLET ORAL ONCE
Refills: 0 | Status: COMPLETED | OUTPATIENT
Start: 2021-03-20 | End: 2021-03-20

## 2021-03-20 RX ORDER — OXYCODONE AND ACETAMINOPHEN 5; 325 MG/1; MG/1
1 TABLET ORAL EVERY 6 HOURS
Refills: 0 | Status: DISCONTINUED | OUTPATIENT
Start: 2021-03-20 | End: 2021-03-26

## 2021-03-20 RX ORDER — OXYCODONE AND ACETAMINOPHEN 5; 325 MG/1; MG/1
1 TABLET ORAL EVERY 4 HOURS
Refills: 0 | Status: DISCONTINUED | OUTPATIENT
Start: 2021-03-20 | End: 2021-03-20

## 2021-03-20 RX ADMIN — Medication 8 UNIT(S): at 17:18

## 2021-03-20 RX ADMIN — NAFCILLIN 100 GRAM(S): 10 INJECTION, POWDER, FOR SOLUTION INTRAVENOUS at 22:03

## 2021-03-20 RX ADMIN — OXYCODONE AND ACETAMINOPHEN 2 TABLET(S): 5; 325 TABLET ORAL at 18:19

## 2021-03-20 RX ADMIN — NAFCILLIN 100 GRAM(S): 10 INJECTION, POWDER, FOR SOLUTION INTRAVENOUS at 02:00

## 2021-03-20 RX ADMIN — HYDROMORPHONE HYDROCHLORIDE 0.5 MILLIGRAM(S): 2 INJECTION INTRAMUSCULAR; INTRAVENOUS; SUBCUTANEOUS at 10:58

## 2021-03-20 RX ADMIN — Medication 6 UNIT(S): at 12:55

## 2021-03-20 RX ADMIN — NAFCILLIN 100 GRAM(S): 10 INJECTION, POWDER, FOR SOLUTION INTRAVENOUS at 18:12

## 2021-03-20 RX ADMIN — HEPARIN SODIUM 5000 UNIT(S): 5000 INJECTION INTRAVENOUS; SUBCUTANEOUS at 14:45

## 2021-03-20 RX ADMIN — Medication 4: at 12:47

## 2021-03-20 RX ADMIN — Medication 650 MILLIGRAM(S): at 15:55

## 2021-03-20 RX ADMIN — NAFCILLIN 100 GRAM(S): 10 INJECTION, POWDER, FOR SOLUTION INTRAVENOUS at 14:45

## 2021-03-20 RX ADMIN — HYDROMORPHONE HYDROCHLORIDE 0.5 MILLIGRAM(S): 2 INJECTION INTRAMUSCULAR; INTRAVENOUS; SUBCUTANEOUS at 11:20

## 2021-03-20 RX ADMIN — HEPARIN SODIUM 5000 UNIT(S): 5000 INJECTION INTRAVENOUS; SUBCUTANEOUS at 06:35

## 2021-03-20 RX ADMIN — NAFCILLIN 100 GRAM(S): 10 INJECTION, POWDER, FOR SOLUTION INTRAVENOUS at 10:54

## 2021-03-20 RX ADMIN — OXYCODONE AND ACETAMINOPHEN 1 TABLET(S): 5; 325 TABLET ORAL at 13:54

## 2021-03-20 RX ADMIN — Medication 650 MILLIGRAM(S): at 05:02

## 2021-03-20 RX ADMIN — Medication 2: at 07:00

## 2021-03-20 RX ADMIN — HEPARIN SODIUM 5000 UNIT(S): 5000 INJECTION INTRAVENOUS; SUBCUTANEOUS at 22:01

## 2021-03-20 RX ADMIN — INSULIN GLARGINE 10 UNIT(S): 100 INJECTION, SOLUTION SUBCUTANEOUS at 22:02

## 2021-03-20 RX ADMIN — MORPHINE SULFATE 2 MILLIGRAM(S): 50 CAPSULE, EXTENDED RELEASE ORAL at 01:50

## 2021-03-20 RX ADMIN — NAFCILLIN 100 GRAM(S): 10 INJECTION, POWDER, FOR SOLUTION INTRAVENOUS at 06:57

## 2021-03-20 RX ADMIN — Medication 650 MILLIGRAM(S): at 00:13

## 2021-03-20 RX ADMIN — Medication 6 UNIT(S): at 07:49

## 2021-03-20 RX ADMIN — OXYCODONE AND ACETAMINOPHEN 2 TABLET(S): 5; 325 TABLET ORAL at 17:19

## 2021-03-20 RX ADMIN — MORPHINE SULFATE 2 MILLIGRAM(S): 50 CAPSULE, EXTENDED RELEASE ORAL at 02:05

## 2021-03-20 RX ADMIN — MAGNESIUM OXIDE 400 MG ORAL TABLET 400 MILLIGRAM(S): 241.3 TABLET ORAL at 07:49

## 2021-03-20 RX ADMIN — OXYCODONE AND ACETAMINOPHEN 1 TABLET(S): 5; 325 TABLET ORAL at 12:56

## 2021-03-20 RX ADMIN — Medication 650 MILLIGRAM(S): at 14:57

## 2021-03-20 RX ADMIN — Medication 650 MILLIGRAM(S): at 06:02

## 2021-03-20 RX ADMIN — Medication 2: at 17:19

## 2021-03-20 NOTE — PROGRESS NOTE ADULT - SUBJECTIVE AND OBJECTIVE BOX
INTERVAL HPI/OVERNIGHT EVENTS:    Patient is a 75y old  Female who presents with a chief complaint of Weakness (20 Mar 2021 06:12)  Pt was seen and examined at he bedside. AOX-2.   as per nurse, she has good appetite.   complains of right foot pain. NPO after MN.   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     FSG & Insulin received:    Yesterday:  pre-dinner fs  npo  bedtime fs  lantus 7  units + 6   units lispro SS    Today:  pre-breakfast fs  nutritional lispro 6  units+ 2   units lispro SS  pre-lunch fs  nutritional lispro  6 units+  4 units lispro SS    Pt reports the following symptoms:    CONSTITUTIONAL:  Negative fever or chills  CARDIOVASCULAR:  Negative for chest pain or palpitations  RESPIRATORY:  Negative for cough, wheezing, or SOB   GASTROINTESTINAL:  Negative for nausea, vomiting  GENITOURINARY:  Negative frequency, urgency or dysuria      MEDICATIONS  (STANDING):  dextrose 40% Gel 15 Gram(s) Oral once  dextrose 5%. 1000 milliLiter(s) (50 mL/Hr) IV Continuous <Continuous>  dextrose 5%. 1000 milliLiter(s) (100 mL/Hr) IV Continuous <Continuous>  dextrose 50% Injectable 25 Gram(s) IV Push once  dextrose 50% Injectable 12.5 Gram(s) IV Push once  dextrose 50% Injectable 25 Gram(s) IV Push once  glucagon  Injectable 1 milliGRAM(s) IntraMuscular once  heparin   Injectable 5000 Unit(s) SubCutaneous every 8 hours  insulin glargine Injectable (LANTUS) 7 Unit(s) SubCutaneous at bedtime  insulin lispro (ADMELOG) corrective regimen sliding scale   SubCutaneous Before meals and at bedtime  insulin lispro Injectable (ADMELOG) 6 Unit(s) SubCutaneous three times a day before meals  nafcillin  IVPB 2 Gram(s) IV Intermittent every 4 hours    MEDICATIONS  (PRN):  acetaminophen   Tablet .. 650 milliGRAM(s) Oral every 6 hours PRN Mild Pain (1 - 3)  ondansetron Injectable 4 milliGRAM(s) IV Push every 6 hours PRN Nausea  oxycodone    5 mG/acetaminophen 325 mG 1 Tablet(s) Oral every 6 hours PRN Moderate Pain (4 - 6)  oxycodone    5 mG/acetaminophen 325 mG 2 Tablet(s) Oral every 6 hours PRN Severe Pain (7 - 10)      Past medical history reviewed  Family history reviewed  Social history reviewed    PHYSICAL EXAM  Vital Signs Last 24 Hrs  T(C): 37.2 (20 Mar 2021 12:38), Max: 37.2 (20 Mar 2021 12:38)  T(F): 98.9 (20 Mar 2021 12:38), Max: 98.9 (20 Mar 2021 12:38)  HR: 85 (20 Mar 2021 14:53) (57 - 87)  BP: 124/60 (20 Mar 2021 14:53) (118/58 - 164/75)  BP(mean): 96 (19 Mar 2021 17:30) (94 - 108)  RR: 16 (20 Mar 2021 14:53) (8 - 18)  SpO2: 95% (20 Mar 2021 14:53) (94% - 100%)    Constitutional:  in NAD, AOx2  HEENT: no proptosis or lid retraction  Neck: no thyromegaly or palpable thyroid nodules   Respiratory: lungs CTAB.  Cardiovascular: regular rhythm, normal S1 and S2  GI: soft, NT/ND, no masses/HSM appreciated.  EXT: right BKA      LABS:                        9.9    11.31 )-----------( 311      ( 20 Mar 2021 06:13 )             32.5     03-20    135  |  101  |  8   ----------------------------<  159<H>  4.7   |  22  |  0.57    Ca    8.3<L>      20 Mar 2021 06:13  Phos  3.4     -20  Mg     1.8     03-20              HbA1C: 16.1 % ( @ 13:30)      CAPILLARY BLOOD GLUCOSE      POCT Blood Glucose.: 218 mg/dL (20 Mar 2021 12:05)  POCT Blood Glucose.: 186 mg/dL (20 Mar 2021 06:48)  POCT Blood Glucose.: 262 mg/dL (19 Mar 2021 21:31)  POCT Blood Glucose.: 144 mg/dL (19 Mar 2021 16:02)      Cholesterol, Serum: 83 mg/dL (03-15-21 @ 04:51)  HDL Cholesterol, Serum: 28 mg/dL (03-15-21 @ 04:51)  Triglycerides, Serum: 89 mg/dL (03-15-21 @ 04:51)    A/P: 75F with PMHx UC s/p colostomy (s/p multiple abdominal surgeries including colostomy bag in ) BIBEMS from home complaining of weakness; presenting likely in DKA with AMS, with bilateral lower extremity open wounds. Of note, pt found to have a hx of DM2: A1C on admission is 16.1. Pt is now s/p I&D w/ washout and debridement of non- viable soft tissue.     1.  DM:   wt:75 KG  cr:0.6, GFR:89  likely insulinopenic  lantus 10 u at bedtime  pleas increase lispro to 8 U  premeals TID   Continue lispro moderate dose scale with meals and at bedtime.   Continue consistent carb diet  FSG Goal 100-180    case seen and discussed with Dr. Harrell and updated primary team

## 2021-03-20 NOTE — PROGRESS NOTE ADULT - SUBJECTIVE AND OBJECTIVE BOX
O/N: LENCHO, VSS                                  PLEASE CHECK WHEN PRESENT:     [  ] Heart Failure     [  ] Acute     [  ] Acute on Chronic     [  ] Chronic  -------------------------------------------------------------------     [  ]Diastolic [HFpEF]     [  ]Systolic [HFrEF]     [  ]Combined [HFpEF & HFrEF]     [  ] afib     [  ] hypertensive heart disease     [  ]Other:  -------------------------------------------------------------------  [ ] Respiratory failure  [ ] Acute cor pulmonale  [ ] Asthma/COPD Exacerbation  [ ] Pleural effusion  [ ] Aspiration pneumonia  -------------------------------------------------------------------  [  ]ANTONY     [  ]ATN     [  ]Reneal Medullary Necrosis     [  ]Renal Cortical Necrosis     [  ]Other Pathological Lesions:    [  ]CKD 1  [  ]CKD 2  [  ]CKD 3  [  ]CKD 4  [  ]CKD 5  [  ]Other  -------------------------------------------------------------------  [x]Diabetes - new DX on this admission  [x] Diabetic PVD Ulcer  [  ] Neuropathic ulcer to DM  [  ] Diabetes with Nephropathy  [  ] Osteomyelitis due to diabetes  [x ] Hyperglycemia  [ ] Hypoglycemia  --------------------------------------------------------------------  [  ]Malnutrition: See Nutrition Note  [  ]Cachexia  [  ]Other:   [  ]Supplement Ordered:  [  ]Morbid Obesity (BMI >=40]  ---------------------------------------------------------------------  [ ] Sepsis/severe sepsis/septic shock  [ ] UTI  [ ] Pneumonia  -----------------------------------------------------------------------  [ ] Acidosis/alkalosis  [ ] Fluid overload  [x ] Hypokalemia  [ ] Hyperkalemia  [x ] Hypomagnesemia  [x ] Hypophosphatemia  [ ] Hyperphosphatemia  [ ] Hyponatremia  [ ] Hypernatremia  ------------------------------------------------------------------------  [ ] Acute blood loss anemia  [ ] Post op blood loss anemia  [ ] Iron deficiency anemia  [ ] Anemia due to chronic disease  [ ] Hypercoagulable state  [x ] Leukocytosis  ----------------------------------------------------------------------  [ ] Cerebral infarction  [ ] Transient ischemia attack  [ ] Encephalopathy      Assessment/Plan;  75F PMHx UC, colon cancer s/p total abdominal colectomy with end ileostomy, found down at home, was admitted for DKA and R diabetic foot wound, s/p operative debridement by podiatry 3/14/21 and R foot guillotine amputation and L foot I&D by vasc surg 3/15/21.    #R foot wound and Lt foot wound  s/p R foot operative debridement by podiatry 3/14 s/p R guilletine BKA, L ankle I&D with penrose drain 3/15  -pain control  -Nafcillin 3/16-3/29, will follow up with ID as outpatient  -plan for BKA closure 3/19, NPO p mn  -local wound care  -f/u labs    #DKA  -f/u endo recs  -ISS  -lantus 10u HS, added lispro 6u TID with meals  -hgb A1C=16.1    Diet: consistent carb  PPx: HSQ  Dispo: TBD     O/N: LENCHO, VSS      nafcillin  IVPB 2  heparin   Injectable 5000  nafcillin  IVPB 2      Allergies    No Known Allergies    Intolerances        Vital Signs Last 24 Hrs  T(C): 37 (20 Mar 2021 05:23), Max: 37 (20 Mar 2021 05:23)  T(F): 98.6 (20 Mar 2021 05:23), Max: 98.6 (20 Mar 2021 05:23)  HR: 72 (20 Mar 2021 06:23) (57 - 78)  BP: 141/63 (20 Mar 2021 06:23) (118/58 - 164/75)  BP(mean): 96 (19 Mar 2021 17:30) (94 - 108)  RR: 16 (20 Mar 2021 06:23) (8 - 18)  SpO2: 99% (20 Mar 2021 06:23) (94% - 100%)  I&O's Summary    19 Mar 2021 07:01  -  20 Mar 2021 07:00  --------------------------------------------------------  IN: 0 mL / OUT: 1600 mL / NET: -1600 mL    20 Mar 2021 07:01  -  20 Mar 2021 07:50  --------------------------------------------------------  IN: 0 mL / OUT: 1300 mL / NET: -1300 mL      Physical Exam:    General:  Well appearing, NAD  CV:  RRR  Lungs:  nonlabored breathing  Abdomen:  Soft, non-tender, no distended  Extremities: RLE below knee amputation dressing C/D/I, L ankle wound with pink I&D site, visible granulation tissue and penrose drain in place, no drainage    LABS:                        9.9    11.31 )-----------( 311      ( 20 Mar 2021 06:13 )             32.5     03-20    135  |  101  |  8   ----------------------------<  159<H>  4.7   |  22  |  0.57    Ca    8.3<L>      20 Mar 2021 06:13  Phos  3.4     03-20  Mg     1.8     03-20          PLEASE CHECK WHEN PRESENT:     [  ] Heart Failure     [  ] Acute     [  ] Acute on Chronic     [  ] Chronic  -------------------------------------------------------------------     [  ]Diastolic [HFpEF]     [  ]Systolic [HFrEF]     [  ]Combined [HFpEF & HFrEF]     [  ] afib     [  ] hypertensive heart disease     [  ]Other:  -------------------------------------------------------------------  [ ] Respiratory failure  [ ] Acute cor pulmonale  [ ] Asthma/COPD Exacerbation  [ ] Pleural effusion  [ ] Aspiration pneumonia  -------------------------------------------------------------------  [  ]ANTONY     [  ]ATN     [  ]Reneal Medullary Necrosis     [  ]Renal Cortical Necrosis     [  ]Other Pathological Lesions:    [  ]CKD 1  [  ]CKD 2  [  ]CKD 3  [  ]CKD 4  [  ]CKD 5  [  ]Other  -------------------------------------------------------------------  [x]Diabetes - new DX on this admission  [x] Diabetic PVD Ulcer  [  ] Neuropathic ulcer to DM  [  ] Diabetes with Nephropathy  [  ] Osteomyelitis due to diabetes  [x ] Hyperglycemia  [ ] Hypoglycemia  --------------------------------------------------------------------  [  ]Malnutrition: See Nutrition Note  [  ]Cachexia  [  ]Other:   [  ]Supplement Ordered:  [  ]Morbid Obesity (BMI >=40]  ---------------------------------------------------------------------  [ ] Sepsis/severe sepsis/septic shock  [ ] UTI  [ ] Pneumonia  -----------------------------------------------------------------------  [ ] Acidosis/alkalosis  [ ] Fluid overload  [x ] Hypokalemia  [ ] Hyperkalemia  [x ] Hypomagnesemia  [x ] Hypophosphatemia  [ ] Hyperphosphatemia  [ ] Hyponatremia  [ ] Hypernatremia  ------------------------------------------------------------------------  [ ] Acute blood loss anemia  [ ] Post op blood loss anemia  [ ] Iron deficiency anemia  [ ] Anemia due to chronic disease  [ ] Hypercoagulable state  [x ] Leukocytosis  ----------------------------------------------------------------------  [ ] Cerebral infarction  [ ] Transient ischemia attack  [ ] Encephalopathy      Assessment/Plan;  75F PMHx UC, colon cancer s/p total abdominal colectomy with end ileostomy, found down at home, was admitted for DKA and R diabetic foot wound, s/p operative debridement by podiatry 3/14/21 and R foot guillotine amputation and L foot I&D by vascular surgery 3/15/21.    #R foot wound and Lt foot wound  s/p R foot operative debridement by podiatry 3/14 s/p R guillotine BKA, L ankle I&D with penrose drain 3/15  -s/p R below knee amputation closure 3/19  -pain control  -Nafcillin 3/16-3/29, will follow up with ID as outpatient  -local wound care  -f/u labs    #DKA  -f/u endo recs  -ISS  -lantus 7u HS, added lispro 6u TID with meals  -hgb A1C=16.1    Diet: consistent carb  PPx: HSQ  Dispo: [facility]

## 2021-03-20 NOTE — PROGRESS NOTE ADULT - SUBJECTIVE AND OBJECTIVE BOX
Hospitalist Progress Note:     S: No events overnight, sig pain at R  foot site, concerned about not having PCP f/u on discharge    O: VSS, NAD, AOx3, clear op, lungs CTAB, RRR, abd soft, noted LLE wrapped bandage intact, RLE wrapped without bleeding/purulence. Labs reviewed- Hg stable    A/P: 76yo woman, poor historian, with a PMH of UC and colon cancer s/p colectomy and chemotherapy and chronic venous stasis w/chronic LE wounds who presented w/frequent falls and subsequently found to have bilateral lower extremity cellulitis and abscesses c/b septic shock with c/f RLE necrotizing fascitis.  She was admitted to the SICU and underwent a R-foot I&D on 3/14 with Podiatry and R-BKA w/L-foot I&D on 3/15 with Vascular.  S/p BKA closure 3/19 along w/L-foot I&D, stable postop     #BLE Cellulitis c/b Septic Shock and Nec Fasc s/p R-BKA  -- ID recommending Nafcillin x 2 weeks (thru 3/29) w/labs sent to Dr. Medeiros  -- will need f/u with Dr. Medeiros upon discharge  -- wound care per primary team   -- Percocet 1 tab q4h PRN and Tylenol 650mg q6h PRN w/Senna  -- PT as able     #Newly Diagnosed IDDM2 c/b DKA and SSTI  -- per Endo, received 10U last night and c/w 6U Lispro TID  -- Endo following, appreciate their assistance    #UC c/b Colon Ca s/p Ostomy  -- no active issues     #Apthous Ulcer  -- R-inner lower lip  -- continue to offer topical analgesic such as Oragel or Lidocaine     #Acute Blood Loss Anemia   -- likely from multiple trips to the OR and BKA  -- currently stable and will continue to monitor     #Diet - Carb Controlled / Dysphagia 1   #DVT PPx - SQH  #Dispo - likely [facility]; should be medically ready by 3/24    Giacomo Jenkins  [phone]

## 2021-03-21 LAB
ANION GAP SERPL CALC-SCNC: 11 MMOL/L — SIGNIFICANT CHANGE UP (ref 5–17)
BUN SERPL-MCNC: 6 MG/DL — LOW (ref 7–23)
CALCIUM SERPL-MCNC: 7.9 MG/DL — LOW (ref 8.4–10.5)
CHLORIDE SERPL-SCNC: 97 MMOL/L — SIGNIFICANT CHANGE UP (ref 96–108)
CO2 SERPL-SCNC: 28 MMOL/L — SIGNIFICANT CHANGE UP (ref 22–31)
CREAT SERPL-MCNC: 0.57 MG/DL — SIGNIFICANT CHANGE UP (ref 0.5–1.3)
GLUCOSE BLDC GLUCOMTR-MCNC: 192 MG/DL — HIGH (ref 70–99)
GLUCOSE BLDC GLUCOMTR-MCNC: 193 MG/DL — HIGH (ref 70–99)
GLUCOSE BLDC GLUCOMTR-MCNC: 221 MG/DL — HIGH (ref 70–99)
GLUCOSE BLDC GLUCOMTR-MCNC: 287 MG/DL — HIGH (ref 70–99)
GLUCOSE SERPL-MCNC: 204 MG/DL — HIGH (ref 70–99)
HCT VFR BLD CALC: 25.7 % — LOW (ref 34.5–45)
HCT VFR BLD CALC: 27.3 % — LOW (ref 34.5–45)
HGB BLD-MCNC: 8.2 G/DL — LOW (ref 11.5–15.5)
HGB BLD-MCNC: 8.8 G/DL — LOW (ref 11.5–15.5)
MAGNESIUM SERPL-MCNC: 1.6 MG/DL — SIGNIFICANT CHANGE UP (ref 1.6–2.6)
MCHC RBC-ENTMCNC: 29.6 PG — SIGNIFICANT CHANGE UP (ref 27–34)
MCHC RBC-ENTMCNC: 29.7 PG — SIGNIFICANT CHANGE UP (ref 27–34)
MCHC RBC-ENTMCNC: 31.9 GM/DL — LOW (ref 32–36)
MCHC RBC-ENTMCNC: 32.2 GM/DL — SIGNIFICANT CHANGE UP (ref 32–36)
MCV RBC AUTO: 91.9 FL — SIGNIFICANT CHANGE UP (ref 80–100)
MCV RBC AUTO: 93.1 FL — SIGNIFICANT CHANGE UP (ref 80–100)
NRBC # BLD: 0 /100 WBCS — SIGNIFICANT CHANGE UP (ref 0–0)
NRBC # BLD: 0 /100 WBCS — SIGNIFICANT CHANGE UP (ref 0–0)
PHOSPHATE SERPL-MCNC: 3.4 MG/DL — SIGNIFICANT CHANGE UP (ref 2.5–4.5)
PLATELET # BLD AUTO: 399 K/UL — SIGNIFICANT CHANGE UP (ref 150–400)
PLATELET # BLD AUTO: 434 K/UL — HIGH (ref 150–400)
POTASSIUM SERPL-MCNC: 3.5 MMOL/L — SIGNIFICANT CHANGE UP (ref 3.5–5.3)
POTASSIUM SERPL-SCNC: 3.5 MMOL/L — SIGNIFICANT CHANGE UP (ref 3.5–5.3)
RBC # BLD: 2.76 M/UL — LOW (ref 3.8–5.2)
RBC # BLD: 2.97 M/UL — LOW (ref 3.8–5.2)
RBC # FLD: 14.3 % — SIGNIFICANT CHANGE UP (ref 10.3–14.5)
RBC # FLD: 14.4 % — SIGNIFICANT CHANGE UP (ref 10.3–14.5)
SODIUM SERPL-SCNC: 136 MMOL/L — SIGNIFICANT CHANGE UP (ref 135–145)
WBC # BLD: 12.24 K/UL — HIGH (ref 3.8–10.5)
WBC # BLD: 14.54 K/UL — HIGH (ref 3.8–10.5)
WBC # FLD AUTO: 12.24 K/UL — HIGH (ref 3.8–10.5)
WBC # FLD AUTO: 14.54 K/UL — HIGH (ref 3.8–10.5)

## 2021-03-21 PROCEDURE — 99233 SBSQ HOSP IP/OBS HIGH 50: CPT | Mod: GC

## 2021-03-21 PROCEDURE — 99231 SBSQ HOSP IP/OBS SF/LOW 25: CPT | Mod: GC

## 2021-03-21 RX ORDER — MAGNESIUM SULFATE 500 MG/ML
2 VIAL (ML) INJECTION ONCE
Refills: 0 | Status: COMPLETED | OUTPATIENT
Start: 2021-03-21 | End: 2021-03-21

## 2021-03-21 RX ORDER — INSULIN GLARGINE 100 [IU]/ML
12 INJECTION, SOLUTION SUBCUTANEOUS AT BEDTIME
Refills: 0 | Status: DISCONTINUED | OUTPATIENT
Start: 2021-03-21 | End: 2021-03-22

## 2021-03-21 RX ORDER — INSULIN LISPRO 100/ML
10 VIAL (ML) SUBCUTANEOUS
Refills: 0 | Status: DISCONTINUED | OUTPATIENT
Start: 2021-03-21 | End: 2021-03-22

## 2021-03-21 RX ORDER — POTASSIUM CHLORIDE 20 MEQ
40 PACKET (EA) ORAL ONCE
Refills: 0 | Status: COMPLETED | OUTPATIENT
Start: 2021-03-21 | End: 2021-03-21

## 2021-03-21 RX ORDER — SODIUM CHLORIDE 9 MG/ML
500 INJECTION INTRAMUSCULAR; INTRAVENOUS; SUBCUTANEOUS ONCE
Refills: 0 | Status: COMPLETED | OUTPATIENT
Start: 2021-03-21 | End: 2021-03-21

## 2021-03-21 RX ADMIN — Medication 50 GRAM(S): at 19:12

## 2021-03-21 RX ADMIN — Medication 2: at 12:16

## 2021-03-21 RX ADMIN — HEPARIN SODIUM 5000 UNIT(S): 5000 INJECTION INTRAVENOUS; SUBCUTANEOUS at 14:41

## 2021-03-21 RX ADMIN — OXYCODONE AND ACETAMINOPHEN 2 TABLET(S): 5; 325 TABLET ORAL at 07:45

## 2021-03-21 RX ADMIN — OXYCODONE AND ACETAMINOPHEN 2 TABLET(S): 5; 325 TABLET ORAL at 06:44

## 2021-03-21 RX ADMIN — OXYCODONE AND ACETAMINOPHEN 1 TABLET(S): 5; 325 TABLET ORAL at 12:17

## 2021-03-21 RX ADMIN — Medication 6: at 17:12

## 2021-03-21 RX ADMIN — OXYCODONE AND ACETAMINOPHEN 2 TABLET(S): 5; 325 TABLET ORAL at 21:52

## 2021-03-21 RX ADMIN — NAFCILLIN 100 GRAM(S): 10 INJECTION, POWDER, FOR SOLUTION INTRAVENOUS at 17:12

## 2021-03-21 RX ADMIN — OXYCODONE AND ACETAMINOPHEN 2 TABLET(S): 5; 325 TABLET ORAL at 20:52

## 2021-03-21 RX ADMIN — NAFCILLIN 100 GRAM(S): 10 INJECTION, POWDER, FOR SOLUTION INTRAVENOUS at 06:15

## 2021-03-21 RX ADMIN — Medication 8 UNIT(S): at 08:28

## 2021-03-21 RX ADMIN — Medication 2: at 08:27

## 2021-03-21 RX ADMIN — HEPARIN SODIUM 5000 UNIT(S): 5000 INJECTION INTRAVENOUS; SUBCUTANEOUS at 06:15

## 2021-03-21 RX ADMIN — OXYCODONE AND ACETAMINOPHEN 1 TABLET(S): 5; 325 TABLET ORAL at 13:20

## 2021-03-21 RX ADMIN — NAFCILLIN 100 GRAM(S): 10 INJECTION, POWDER, FOR SOLUTION INTRAVENOUS at 14:37

## 2021-03-21 RX ADMIN — NAFCILLIN 100 GRAM(S): 10 INJECTION, POWDER, FOR SOLUTION INTRAVENOUS at 21:53

## 2021-03-21 RX ADMIN — NAFCILLIN 100 GRAM(S): 10 INJECTION, POWDER, FOR SOLUTION INTRAVENOUS at 10:13

## 2021-03-21 RX ADMIN — Medication 40 MILLIEQUIVALENT(S): at 19:12

## 2021-03-21 RX ADMIN — HEPARIN SODIUM 5000 UNIT(S): 5000 INJECTION INTRAVENOUS; SUBCUTANEOUS at 21:53

## 2021-03-21 RX ADMIN — Medication 8 UNIT(S): at 12:50

## 2021-03-21 RX ADMIN — Medication 8 UNIT(S): at 17:13

## 2021-03-21 RX ADMIN — INSULIN GLARGINE 12 UNIT(S): 100 INJECTION, SOLUTION SUBCUTANEOUS at 21:53

## 2021-03-21 RX ADMIN — Medication 4: at 21:56

## 2021-03-21 RX ADMIN — SODIUM CHLORIDE 1000 MILLILITER(S): 9 INJECTION INTRAMUSCULAR; INTRAVENOUS; SUBCUTANEOUS at 18:12

## 2021-03-21 RX ADMIN — NAFCILLIN 100 GRAM(S): 10 INJECTION, POWDER, FOR SOLUTION INTRAVENOUS at 03:43

## 2021-03-21 NOTE — PROGRESS NOTE ADULT - SUBJECTIVE AND OBJECTIVE BOX
Hospitalist Progress Note:     S: No events overnight, mild nausea this am after eating heavy meal and working with PT but no diarrhea, remains with pain at R leg wound site though improved from day prior. Also endorsing headache mild present since arrival- does endorse hx of falls at home prior to admission     O: VSS, NAD, AOx3, clear op, lungs CTAB, RRR, abd soft, noted LLE wrapped bandage intact, RLE wrapped without bleeding/purulence. Labs reviewed- Hg mild downtrend 8.8, WBC up to 12 no bands     A/P: 74yo woman, poor historian, with a PMH of UC and colon cancer s/p colectomy and chemotherapy and chronic venous stasis w/chronic LE wounds who presented w/frequent falls and subsequently found to have bilateral lower extremity cellulitis and abscesses c/b septic shock with c/f RLE necrotizing fascitis.  She was admitted to the SICU and underwent a R-foot I&D on 3/14 with Podiatry and R-BKA w/L-foot I&D on 3/15 with Vascular.  S/p BKA closure 3/19 along w/L-foot I&D, stable postop     #BLE Cellulitis c/b Septic Shock and Nec Fasc s/p R-BKA, mild leukocytosis today but no diarrhea low c/f cdiff  -- Monitor WBC count for now, continue nafcillin per ID thru 3/29  -- will need f/u with Dr. Medeiros upon discharge  -- wound care per primary team   -- Percocet 1 tab q4h PRN and Tylenol 650mg q6h PRN w/Senna  -- PT as able     #Newly Diagnosed IDDM2 c/b DKA and SSTI  -- per Endo, 12U tonight and 10 units Lispro TID  -- Endo following, appreciate their assistance    #UC c/b Colon Ca s/p Ostomy  -- no active issues     #Apthous Ulcer  -- R-inner lower lip  -- continue to offer topical analgesic such as Oragel/ Lidocaine     #Acute Blood Loss Anemia   -- likely from multiple trips to the OR and BKA   -- currently stable and will continue to monitor- small downtrend today, surgical site looks ok      #Nodular lung density on CTPE  -outpt PCP f/u    #Diet - Carb Controlled / Dysphagia 1- perhaps advance as able   #DVT PPx - SQH  #Dispo - acute rehab per PT Ana ([facility] team Monday), likely medically ready [facility]    Giacomo Jenkins  [phone]      Hospitalist Progress Note:     S: No events overnight, mild nausea this am after eating heavy meal and working with PT but no diarrhea, remains with pain at R leg wound site though improved from day prior. Also endorsing headache mild present since arrival- does endorse hx of falls at home prior to admission but CT head neg on arrival     O: VSS, NAD, AOx3, clear op, lungs CTAB, RRR, abd soft, noted LLE wrapped bandage intact, RLE wrapped without bleeding/purulence. Labs reviewed- Hg mild downtrend 8.8, WBC up to 12 no bands     A/P: 76yo woman, poor historian, with a PMH of UC and colon cancer s/p colectomy and chemotherapy and chronic venous stasis w/chronic LE wounds who presented w/frequent falls and subsequently found to have bilateral lower extremity cellulitis and abscesses c/b septic shock with c/f RLE necrotizing fascitis.  She was admitted to the SICU and underwent a R-foot I&D on 3/14 with Podiatry and R-BKA w/L-foot I&D on 3/15 with Vascular.  S/p BKA closure 3/19 along w/L-foot I&D, stable postop     #BLE Cellulitis c/b Septic Shock and Nec Fasc s/p R-BKA, mild leukocytosis today but no diarrhea low c/f cdiff  -- Monitor WBC count for now, continue nafcillin per ID thru 3/29  -- will need f/u with Dr. Medeiros upon discharge  -- wound care per primary team   -- Percocet 1 tab q4h PRN and Tylenol 650mg q6h PRN w/Senna  -- PT as able     #Newly Diagnosed IDDM2 c/b DKA and SSTI  -- per Endo, 12U tonight and 10 units Lispro TID  -- Endo following, appreciate their assistance    #UC c/b Colon Ca s/p Ostomy  -- no active issues     #Apthous Ulcer  -- R-inner lower lip  -- continue to offer topical analgesic such as Oragel/ Lidocaine     #Acute Blood Loss Anemia   -- likely from multiple trips to the OR and BKA   -- currently stable and will continue to monitor- small downtrend today, surgical site looks ok      #Nodular lung density on CTPE  -outpt PCP f/u    #Diet - Carb Controlled / Dysphagia 1- perhaps advance as able   #DVT PPx - SQH  #[facility] likely medically ready midweek. Patient would like to have new PCP before dc, will discuss with primary team this week     Giacomo Jenkins  [phone]

## 2021-03-21 NOTE — PROGRESS NOTE ADULT - SUBJECTIVE AND OBJECTIVE BOX
24hr Events:  O/N:BETTIE MUSA  3/20: PT will see patient sat vs sun, aware that patient wants Acute Rehab, insulin reg changed according to Endo recs          PLEASE CHECK WHEN PRESENT:     [  ] Heart Failure     [  ] Acute     [  ] Acute on Chronic     [  ] Chronic  -------------------------------------------------------------------     [  ]Diastolic [HFpEF]     [  ]Systolic [HFrEF]     [  ]Combined [HFpEF & HFrEF]     [  ] afib     [  ] hypertensive heart disease     [  ]Other:  -------------------------------------------------------------------  [ ] Respiratory failure  [ ] Acute cor pulmonale  [ ] Asthma/COPD Exacerbation  [ ] Pleural effusion  [ ] Aspiration pneumonia  -------------------------------------------------------------------  [  ]ANTONY     [  ]ATN     [  ]Reneal Medullary Necrosis     [  ]Renal Cortical Necrosis     [  ]Other Pathological Lesions:    [  ]CKD 1  [  ]CKD 2  [  ]CKD 3  [  ]CKD 4  [  ]CKD 5  [  ]Other  -------------------------------------------------------------------  [x]Diabetes - new DX on this admission  [x] Diabetic PVD Ulcer  [  ] Neuropathic ulcer to DM  [  ] Diabetes with Nephropathy  [  ] Osteomyelitis due to diabetes  [x ] Hyperglycemia  [ ] Hypoglycemia  --------------------------------------------------------------------  [  ]Malnutrition: See Nutrition Note  [  ]Cachexia  [  ]Other:   [  ]Supplement Ordered:  [  ]Morbid Obesity (BMI >=40]  ---------------------------------------------------------------------  [ ] Sepsis/severe sepsis/septic shock  [ ] UTI  [ ] Pneumonia  -----------------------------------------------------------------------  [ ] Acidosis/alkalosis  [ ] Fluid overload  [x ] Hypokalemia  [ ] Hyperkalemia  [x ] Hypomagnesemia  [x ] Hypophosphatemia  [ ] Hyperphosphatemia  [ ] Hyponatremia  [ ] Hypernatremia  ------------------------------------------------------------------------  [ ] Acute blood loss anemia  [ ] Post op blood loss anemia  [ ] Iron deficiency anemia  [ ] Anemia due to chronic disease  [ ] Hypercoagulable state  [x ] Leukocytosis  ----------------------------------------------------------------------  [ ] Cerebral infarction  [ ] Transient ischemia attack  [ ] Encephalopathy      Assessment/Plan;  75F PMHx UC, colon cancer s/p total abdominal colectomy with end ileostomy, found down at home, was admitted for DKA and R diabetic foot wound, s/p operative debridement by podiatry 3/14/21 and R foot guillotine amputation and L foot I&D by vascular surgery 3/15/21.    #R foot wound and Lt foot wound  s/p R foot operative debridement by podiatry 3/14 s/p R guillotine BKA, L ankle I&D with penrose drain 3/15  -s/p R below knee amputation closure 3/19  -pain control  -Nafcillin 3/16-3/29, will follow up with ID as outpatient  -local wound care  -f/u labs    #DKA  -f/u endo recs  -ISS  -lantus 7u HS, added lispro 6u TID with meals  -hgb A1C=16.1    Diet: consistent carb  PPx: HSQ  Dispo: [facility]       24hr Events:  O/N:LENCHO, VSS  3/20: PT will see patient sat vs sun, aware that patient wants Acute Rehab, insulin reg changed according to Endo recs      Subjective: Seen and examined at bedside with Team  pt has no complaint    ROS:   Denies Headache, blurred vision, Chest Pain, SOB, Abdominal pain, nausea or vomiting     Social   nafcillin  IVPB 2  heparin   Injectable 5000  nafcillin  IVPB 2      Allergies    No Known Allergies    Intolerances        Vital Signs Last 24 Hrs  T(C): 36.7 (21 Mar 2021 10:03), Max: 37.2 (21 Mar 2021 04:52)  T(F): 98 (21 Mar 2021 10:03), Max: 99 (21 Mar 2021 04:52)  HR: 80 (21 Mar 2021 12:12) (77 - 93)  BP: 117/57 (21 Mar 2021 12:12) (112/55 - 142/58)  BP(mean): --  RR: 16 (21 Mar 2021 12:12) (16 - 22)  SpO2: 97% (21 Mar 2021 12:12) (95% - 97%)  I&O's Summary    20 Mar 2021 07:01  -  21 Mar 2021 07:00  --------------------------------------------------------  IN: 1215 mL / OUT: 3810 mL / NET: -2595 mL    21 Mar 2021 07:01  -  21 Mar 2021 12:42  --------------------------------------------------------  IN: 120 mL / OUT: 400 mL / NET: -280 mL        Physical Exam:    General:  Well appearing, NAD  CV:  RRR  Lungs:  nonlabored breathing  Abdomen:  Soft, non-tender, no distended  Extremities: RLE below knee amputation dressing C/D/I, L ankle wound with pink I&D site, visible granulation tissue and penrose drain in place, no drainage            LABS:                        8.8    12.24 )-----------( 399      ( 21 Mar 2021 07:28 )             27.3     03-21    136  |  97  |  6<L>  ----------------------------<  204<H>  3.5   |  28  |  0.57    Ca    7.9<L>      21 Mar 2021 07:28  Phos  3.4     03-21  Mg     1.6     03-21          Radiology and Additional Studies:                    PLEASE CHECK WHEN PRESENT:     [  ] Heart Failure     [  ] Acute     [  ] Acute on Chronic     [  ] Chronic  -------------------------------------------------------------------     [  ]Diastolic [HFpEF]     [  ]Systolic [HFrEF]     [  ]Combined [HFpEF & HFrEF]     [  ] afib     [  ] hypertensive heart disease     [  ]Other:  -------------------------------------------------------------------  [ ] Respiratory failure  [ ] Acute cor pulmonale  [ ] Asthma/COPD Exacerbation  [ ] Pleural effusion  [ ] Aspiration pneumonia  -------------------------------------------------------------------  [  ]ANTONY     [  ]ATN     [  ]Reneal Medullary Necrosis     [  ]Renal Cortical Necrosis     [  ]Other Pathological Lesions:    [  ]CKD 1  [  ]CKD 2  [  ]CKD 3  [  ]CKD 4  [  ]CKD 5  [  ]Other  -------------------------------------------------------------------  [x]Diabetes - new DX on this admission  [x] Diabetic PVD Ulcer  [  ] Neuropathic ulcer to DM  [  ] Diabetes with Nephropathy  [  ] Osteomyelitis due to diabetes  [x ] Hyperglycemia  [ ] Hypoglycemia  --------------------------------------------------------------------  [  ]Malnutrition: See Nutrition Note  [  ]Cachexia  [  ]Other:   [  ]Supplement Ordered:  [  ]Morbid Obesity (BMI >=40]  ---------------------------------------------------------------------  [ ] Sepsis/severe sepsis/septic shock  [ ] UTI  [ ] Pneumonia  -----------------------------------------------------------------------  [ ] Acidosis/alkalosis  [ ] Fluid overload  [x ] Hypokalemia  [ ] Hyperkalemia  [x ] Hypomagnesemia  [x ] Hypophosphatemia  [ ] Hyperphosphatemia  [ ] Hyponatremia  [ ] Hypernatremia  ------------------------------------------------------------------------  [ ] Acute blood loss anemia  [ ] Post op blood loss anemia  [ ] Iron deficiency anemia  [ ] Anemia due to chronic disease  [ ] Hypercoagulable state  [x ] Leukocytosis  ----------------------------------------------------------------------  [ ] Cerebral infarction  [ ] Transient ischemia attack  [ ] Encephalopathy      Assessment/Plan;  75F PMHx UC, colon cancer s/p total abdominal colectomy with end ileostomy, found down at home, was admitted for DKA and R diabetic foot wound, s/p operative debridement by podiatry 3/14/21 and R foot guillotine amputation and L foot I&D by vascular surgery 3/15/21.    #R foot wound and Lt foot wound  s/p R foot operative debridement by podiatry 3/14 s/p R guillotine BKA, L ankle I&D with penrose drain 3/15  -s/p R below knee amputation closure 3/19  -pain control  -Nafcillin 3/16-3/29, will follow up with ID as outpatient  -local wound care  -f/u labs    #DKA  -f/u endo recs  -ISS  -lantus 7u HS, added lispro 6u TID with meals  -hgb A1C=16.1    Diet: consistent carb  PPx: HSQ  Dispo: [facility]

## 2021-03-21 NOTE — PHYSICAL THERAPY INITIAL EVALUATION ADULT - IMPAIRMENTS FOUND, PT EVAL
aerobic capacity/endurance/gait, locomotion, and balance/gross motor/muscle strength/poor safety awareness/ROM

## 2021-03-21 NOTE — PHYSICAL THERAPY INITIAL EVALUATION ADULT - GENERAL OBSERVATIONS, REHAB EVAL
Pt received semi supine in bed with +EKG, +ostomy, +hep-lock, +ace bandage on L foot C/D/I, +ace bandage on L stump(C/D/I) with +dimas, NAD. P left  as found, NAD, call bell in reach, RN awares, drains intact, +bed alarm.

## 2021-03-21 NOTE — PHYSICAL THERAPY INITIAL EVALUATION ADULT - ADDITIONAL COMMENTS
Pt lives alone in an apt with elevator. Prior to admission, pt ambulated independently with rolling walker.

## 2021-03-21 NOTE — PHYSICAL THERAPY INITIAL EVALUATION ADULT - LEVEL OF INDEPENDENCE: CHAIR TO BED, REHAB EVAL
secondary pt complains feeling dizziness and blood pressure dropped to 83/46 from 124/58, assist pt return to supine, blood pressure improved to 132/51 and pt stated symptom subsided./unable to perform

## 2021-03-21 NOTE — PHYSICAL THERAPY INITIAL EVALUATION ADULT - ACTIVE RANGE OF MOTION EXAMINATION, REHAB EVAL
AROM WFL through out except  unable to assess R knee ROM pt does not let PT to assess ROM at R knee due to pain, Pt maintains R knee flexed between 70 degrees to 90 degrees t/o session

## 2021-03-21 NOTE — PROGRESS NOTE ADULT - SUBJECTIVE AND OBJECTIVE BOX
INTERVAL HPI/OVERNIGHT EVENTS:    Patient is a 75y old  Female who presents with a chief complaint of Weakness (21 Mar 2021 04:49)  Pt was seen and examined at he bedside.   worsening leukocytosis wbc: 12.4.  as per nurse, she has good appetite.   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     FSG & Insulin received:    Yesterday:  pre-dinner fs  nutritional lispro 8  units+ 2   units lispro SS  bedtime fs  lantus 10  units + 2   units lispro SS    Today:  pre-breakfast fs  nutritional lispro 8  units+ 2   units lispro SS  pre-lunch fs  nutritional lispro  8 units+  2 units lispro SS    Pt reports the following symptoms:    CONSTITUTIONAL:  Negative fever or chills  CARDIOVASCULAR:  Negative for chest pain or palpitations  RESPIRATORY:  Negative for cough, wheezing, or SOB   GASTROINTESTINAL:  Negative for nausea, vomiting  GENITOURINARY:  Negative frequency, urgency or dysuria      MEDICATIONS  (STANDING):  dextrose 40% Gel 15 Gram(s) Oral once  dextrose 5%. 1000 milliLiter(s) (50 mL/Hr) IV Continuous <Continuous>  dextrose 5%. 1000 milliLiter(s) (100 mL/Hr) IV Continuous <Continuous>  dextrose 50% Injectable 25 Gram(s) IV Push once  dextrose 50% Injectable 12.5 Gram(s) IV Push once  dextrose 50% Injectable 25 Gram(s) IV Push once  glucagon  Injectable 1 milliGRAM(s) IntraMuscular once  heparin   Injectable 5000 Unit(s) SubCutaneous every 8 hours  insulin glargine Injectable (LANTUS) 10 Unit(s) SubCutaneous at bedtime  insulin lispro (ADMELOG) corrective regimen sliding scale   SubCutaneous Before meals and at bedtime  insulin lispro Injectable (ADMELOG) 8 Unit(s) SubCutaneous three times a day with meals  nafcillin  IVPB 2 Gram(s) IV Intermittent every 4 hours    MEDICATIONS  (PRN):  acetaminophen   Tablet .. 650 milliGRAM(s) Oral every 6 hours PRN Mild Pain (1 - 3)  ondansetron Injectable 4 milliGRAM(s) IV Push every 6 hours PRN Nausea  oxycodone    5 mG/acetaminophen 325 mG 1 Tablet(s) Oral every 6 hours PRN Moderate Pain (4 - 6)  oxycodone    5 mG/acetaminophen 325 mG 2 Tablet(s) Oral every 6 hours PRN Severe Pain (7 - 10)      Past medical history reviewed  Family history reviewed  Social history reviewed    PHYSICAL EXAM  Vital Signs Last 24 Hrs  T(C): 36.8 (21 Mar 2021 13:33), Max: 37.2 (21 Mar 2021 04:52)  T(F): 98.2 (21 Mar 2021 13:33), Max: 99 (21 Mar 2021 04:52)  HR: 80 (21 Mar 2021 12:12) (77 - 93)  BP: 117/57 (21 Mar 2021 12:12) (112/55 - 142/58)  BP(mean): --  RR: 16 (21 Mar 2021 12:12) (16 - 22)  SpO2: 97% (21 Mar 2021 12:12) (95% - 97%)    Constitutional:  in NAD, AOx2  HEENT: no proptosis or lid retraction  Neck: no thyromegaly or palpable thyroid nodules   Respiratory: lungs CTAB.  Cardiovascular: regular rhythm, normal S1 and S2  GI: soft, NT/ND, no masses/HSM appreciated.  EXT: right BKA    LABS:                        8.8    12.24 )-----------( 399      ( 21 Mar 2021 07:28 )             27.3     03-21    136  |  97  |  6<L>  ----------------------------<  204<H>  3.5   |  28  |  0.57    Ca    7.9<L>      21 Mar 2021 07:28  Phos  3.4       Mg     1.6                   HbA1C: 16.1 % ( @ 13:30)      CAPILLARY BLOOD GLUCOSE      POCT Blood Glucose.: 192 mg/dL (21 Mar 2021 11:53)  POCT Blood Glucose.: 193 mg/dL (21 Mar 2021 07:06)  POCT Blood Glucose.: 166 mg/dL (20 Mar 2021 21:38)  POCT Blood Glucose.: 194 mg/dL (20 Mar 2021 16:30)      Cholesterol, Serum: 83 mg/dL (03-15-21 @ 04:51)  HDL Cholesterol, Serum: 28 mg/dL (03-15-21 @ 04:51)  Triglycerides, Serum: 89 mg/dL (03-15-21 @ 04:51)    A/P: 75F with PMHx UC s/p colostomy (s/p multiple abdominal surgeries including colostomy bag in ) BIBEMS from home complaining of weakness; presenting likely in DKA with AMS, with bilateral lower extremity open wounds. Of note, pt found to have a hx of DM2: A1C on admission is 16.1. Pt is now s/p I&D w/ washout and debridement of non- viable soft tissue.     1.  DM:   wt:75 KG  cr:0.6, GFR:89  likely insulinopenic  lantus 12 u at bedtime  pleas increase lispro to 10 U  premeals TID   Continue lispro moderate dose scale with meals and at bedtime.   Continue consistent carb diet  FSG Goal 100-180    case seen and discussed with Dr. Harrell and updated primary team

## 2021-03-22 LAB
ANION GAP SERPL CALC-SCNC: 7 MMOL/L — SIGNIFICANT CHANGE UP (ref 5–17)
BUN SERPL-MCNC: 6 MG/DL — LOW (ref 7–23)
CALCIUM SERPL-MCNC: 7.9 MG/DL — LOW (ref 8.4–10.5)
CHLORIDE SERPL-SCNC: 97 MMOL/L — SIGNIFICANT CHANGE UP (ref 96–108)
CO2 SERPL-SCNC: 28 MMOL/L — SIGNIFICANT CHANGE UP (ref 22–31)
CREAT SERPL-MCNC: 0.56 MG/DL — SIGNIFICANT CHANGE UP (ref 0.5–1.3)
GLUCOSE BLDC GLUCOMTR-MCNC: 158 MG/DL — HIGH (ref 70–99)
GLUCOSE BLDC GLUCOMTR-MCNC: 185 MG/DL — HIGH (ref 70–99)
GLUCOSE BLDC GLUCOMTR-MCNC: 204 MG/DL — HIGH (ref 70–99)
GLUCOSE BLDC GLUCOMTR-MCNC: 254 MG/DL — HIGH (ref 70–99)
GLUCOSE SERPL-MCNC: 162 MG/DL — HIGH (ref 70–99)
HCT VFR BLD CALC: 26.5 % — LOW (ref 34.5–45)
HGB BLD-MCNC: 8.5 G/DL — LOW (ref 11.5–15.5)
MAGNESIUM SERPL-MCNC: 1.8 MG/DL — SIGNIFICANT CHANGE UP (ref 1.6–2.6)
MCHC RBC-ENTMCNC: 29.6 PG — SIGNIFICANT CHANGE UP (ref 27–34)
MCHC RBC-ENTMCNC: 32.1 GM/DL — SIGNIFICANT CHANGE UP (ref 32–36)
MCV RBC AUTO: 92.3 FL — SIGNIFICANT CHANGE UP (ref 80–100)
NRBC # BLD: 0 /100 WBCS — SIGNIFICANT CHANGE UP (ref 0–0)
PHOSPHATE SERPL-MCNC: 3.7 MG/DL — SIGNIFICANT CHANGE UP (ref 2.5–4.5)
PLATELET # BLD AUTO: 414 K/UL — HIGH (ref 150–400)
POTASSIUM SERPL-MCNC: 3.5 MMOL/L — SIGNIFICANT CHANGE UP (ref 3.5–5.3)
POTASSIUM SERPL-SCNC: 3.5 MMOL/L — SIGNIFICANT CHANGE UP (ref 3.5–5.3)
RBC # BLD: 2.87 M/UL — LOW (ref 3.8–5.2)
RBC # FLD: 14.6 % — HIGH (ref 10.3–14.5)
SODIUM SERPL-SCNC: 132 MMOL/L — LOW (ref 135–145)
SURGICAL PATHOLOGY STUDY: SIGNIFICANT CHANGE UP
WBC # BLD: 11.81 K/UL — HIGH (ref 3.8–10.5)
WBC # FLD AUTO: 11.81 K/UL — HIGH (ref 3.8–10.5)

## 2021-03-22 PROCEDURE — 99233 SBSQ HOSP IP/OBS HIGH 50: CPT

## 2021-03-22 PROCEDURE — 99232 SBSQ HOSP IP/OBS MODERATE 35: CPT | Mod: GC

## 2021-03-22 RX ORDER — POTASSIUM CHLORIDE 20 MEQ
10 PACKET (EA) ORAL
Refills: 0 | Status: COMPLETED | OUTPATIENT
Start: 2021-03-22 | End: 2021-03-22

## 2021-03-22 RX ORDER — ACETAMINOPHEN 500 MG
325 TABLET ORAL ONCE
Refills: 0 | Status: COMPLETED | OUTPATIENT
Start: 2021-03-22 | End: 2021-03-22

## 2021-03-22 RX ORDER — INSULIN GLARGINE 100 [IU]/ML
16 INJECTION, SOLUTION SUBCUTANEOUS AT BEDTIME
Refills: 0 | Status: DISCONTINUED | OUTPATIENT
Start: 2021-03-22 | End: 2021-03-23

## 2021-03-22 RX ORDER — SENNA PLUS 8.6 MG/1
2 TABLET ORAL AT BEDTIME
Refills: 0 | Status: DISCONTINUED | OUTPATIENT
Start: 2021-03-22 | End: 2021-04-06

## 2021-03-22 RX ORDER — MAGNESIUM SULFATE 500 MG/ML
2 VIAL (ML) INJECTION ONCE
Refills: 0 | Status: COMPLETED | OUTPATIENT
Start: 2021-03-22 | End: 2021-03-22

## 2021-03-22 RX ORDER — MORPHINE SULFATE 50 MG/1
1 CAPSULE, EXTENDED RELEASE ORAL ONCE
Refills: 0 | Status: DISCONTINUED | OUTPATIENT
Start: 2021-03-22 | End: 2021-03-22

## 2021-03-22 RX ORDER — INSULIN LISPRO 100/ML
12 VIAL (ML) SUBCUTANEOUS
Refills: 0 | Status: DISCONTINUED | OUTPATIENT
Start: 2021-03-22 | End: 2021-03-23

## 2021-03-22 RX ORDER — NYSTATIN CREAM 100000 [USP'U]/G
1 CREAM TOPICAL
Refills: 0 | Status: DISCONTINUED | OUTPATIENT
Start: 2021-03-22 | End: 2021-04-06

## 2021-03-22 RX ADMIN — HEPARIN SODIUM 5000 UNIT(S): 5000 INJECTION INTRAVENOUS; SUBCUTANEOUS at 06:03

## 2021-03-22 RX ADMIN — Medication 4: at 16:51

## 2021-03-22 RX ADMIN — NAFCILLIN 100 GRAM(S): 10 INJECTION, POWDER, FOR SOLUTION INTRAVENOUS at 06:03

## 2021-03-22 RX ADMIN — NAFCILLIN 100 GRAM(S): 10 INJECTION, POWDER, FOR SOLUTION INTRAVENOUS at 21:32

## 2021-03-22 RX ADMIN — OXYCODONE AND ACETAMINOPHEN 2 TABLET(S): 5; 325 TABLET ORAL at 06:03

## 2021-03-22 RX ADMIN — Medication 100 MILLIEQUIVALENT(S): at 09:58

## 2021-03-22 RX ADMIN — Medication 2: at 12:01

## 2021-03-22 RX ADMIN — MORPHINE SULFATE 1 MILLIGRAM(S): 50 CAPSULE, EXTENDED RELEASE ORAL at 17:15

## 2021-03-22 RX ADMIN — Medication 100 MILLIEQUIVALENT(S): at 07:24

## 2021-03-22 RX ADMIN — OXYCODONE AND ACETAMINOPHEN 2 TABLET(S): 5; 325 TABLET ORAL at 22:06

## 2021-03-22 RX ADMIN — HEPARIN SODIUM 5000 UNIT(S): 5000 INJECTION INTRAVENOUS; SUBCUTANEOUS at 13:39

## 2021-03-22 RX ADMIN — NAFCILLIN 100 GRAM(S): 10 INJECTION, POWDER, FOR SOLUTION INTRAVENOUS at 13:39

## 2021-03-22 RX ADMIN — OXYCODONE AND ACETAMINOPHEN 1 TABLET(S): 5; 325 TABLET ORAL at 11:01

## 2021-03-22 RX ADMIN — NAFCILLIN 100 GRAM(S): 10 INJECTION, POWDER, FOR SOLUTION INTRAVENOUS at 17:22

## 2021-03-22 RX ADMIN — Medication 50 GRAM(S): at 07:24

## 2021-03-22 RX ADMIN — Medication 12 UNIT(S): at 16:51

## 2021-03-22 RX ADMIN — Medication 2: at 07:35

## 2021-03-22 RX ADMIN — OXYCODONE AND ACETAMINOPHEN 1 TABLET(S): 5; 325 TABLET ORAL at 00:17

## 2021-03-22 RX ADMIN — OXYCODONE AND ACETAMINOPHEN 1 TABLET(S): 5; 325 TABLET ORAL at 10:06

## 2021-03-22 RX ADMIN — OXYCODONE AND ACETAMINOPHEN 1 TABLET(S): 5; 325 TABLET ORAL at 01:17

## 2021-03-22 RX ADMIN — Medication 10 UNIT(S): at 07:35

## 2021-03-22 RX ADMIN — NAFCILLIN 100 GRAM(S): 10 INJECTION, POWDER, FOR SOLUTION INTRAVENOUS at 09:58

## 2021-03-22 RX ADMIN — OXYCODONE AND ACETAMINOPHEN 2 TABLET(S): 5; 325 TABLET ORAL at 23:07

## 2021-03-22 RX ADMIN — MORPHINE SULFATE 1 MILLIGRAM(S): 50 CAPSULE, EXTENDED RELEASE ORAL at 16:52

## 2021-03-22 RX ADMIN — INSULIN GLARGINE 16 UNIT(S): 100 INJECTION, SOLUTION SUBCUTANEOUS at 22:06

## 2021-03-22 RX ADMIN — Medication 325 MILLIGRAM(S): at 23:54

## 2021-03-22 RX ADMIN — OXYCODONE AND ACETAMINOPHEN 2 TABLET(S): 5; 325 TABLET ORAL at 16:00

## 2021-03-22 RX ADMIN — Medication 10 UNIT(S): at 12:01

## 2021-03-22 RX ADMIN — Medication 100 MILLIEQUIVALENT(S): at 08:34

## 2021-03-22 RX ADMIN — HEPARIN SODIUM 5000 UNIT(S): 5000 INJECTION INTRAVENOUS; SUBCUTANEOUS at 21:31

## 2021-03-22 RX ADMIN — OXYCODONE AND ACETAMINOPHEN 2 TABLET(S): 5; 325 TABLET ORAL at 07:03

## 2021-03-22 RX ADMIN — OXYCODONE AND ACETAMINOPHEN 2 TABLET(S): 5; 325 TABLET ORAL at 14:55

## 2021-03-22 RX ADMIN — Medication 6: at 22:06

## 2021-03-22 RX ADMIN — NAFCILLIN 100 GRAM(S): 10 INJECTION, POWDER, FOR SOLUTION INTRAVENOUS at 02:08

## 2021-03-22 NOTE — PROGRESS NOTE ADULT - NSHPATTENDINGPLANDISCUSS_GEN_ALL_CORE
patient and primary team.
primary team, microbiology
patient and primary team.
patient and primary team.
[facility] Sanab[facility] and [facility] team
ic[facility] team

## 2021-03-22 NOTE — PROGRESS NOTE ADULT - SUBJECTIVE AND OBJECTIVE BOX
INTERVAL EVENTS:  -- NAEO    SUBJECTIVE:  -- no complaints; getting ready to work with PT today  -- good appetite, no fevers/chill, +BM and voiding; pain controlled   -- Review of Systems: 12 point review of systems otherwise negative    MEDICATIONS:  MEDICATIONS  (STANDING):  dextrose 40% Gel 15 Gram(s) Oral once  dextrose 5%. 1000 milliLiter(s) (50 mL/Hr) IV Continuous <Continuous>  dextrose 5%. 1000 milliLiter(s) (100 mL/Hr) IV Continuous <Continuous>  dextrose 50% Injectable 25 Gram(s) IV Push once  dextrose 50% Injectable 12.5 Gram(s) IV Push once  dextrose 50% Injectable 25 Gram(s) IV Push once  glucagon  Injectable 1 milliGRAM(s) IntraMuscular once  heparin   Injectable 5000 Unit(s) SubCutaneous every 8 hours  insulin glargine Injectable (LANTUS) 12 Unit(s) SubCutaneous at bedtime  insulin lispro (ADMELOG) corrective regimen sliding scale   SubCutaneous Before meals and at bedtime  insulin lispro Injectable (ADMELOG) 10 Unit(s) SubCutaneous three times a day before meals  nafcillin  IVPB 2 Gram(s) IV Intermittent every 4 hours    MEDICATIONS  (PRN):  acetaminophen   Tablet .. 650 milliGRAM(s) Oral every 6 hours PRN Mild Pain (1 - 3)  ondansetron Injectable 4 milliGRAM(s) IV Push every 6 hours PRN Nausea  oxycodone    5 mG/acetaminophen 325 mG 1 Tablet(s) Oral every 6 hours PRN Moderate Pain (4 - 6)  oxycodone    5 mG/acetaminophen 325 mG 2 Tablet(s) Oral every 6 hours PRN Severe Pain (7 - 10)    Allergies: No Known Allergies    OBJECTIVE:  Vital Signs Last 24 Hrs  T(C): 36.5 (22 Mar 2021 08:00), Max: 36.8 (21 Mar 2021 13:33)  T(F): 97.7 (22 Mar 2021 08:00), Max: 98.2 (21 Mar 2021 13:33)  HR: 84 (22 Mar 2021 08:36) (75 - 104)  BP: 103/50 (22 Mar 2021 08:36) (83/46 - 140/60)  BP(mean): --  RR: 18 (22 Mar 2021 08:36) (16 - 18)  SpO2: 98% (22 Mar 2021 08:36) (97% - 98%)  I&O's Summary    21 Mar 2021 07:01  -  22 Mar 2021 07:00  --------------------------------------------------------  IN: 1510 mL / OUT: 1630 mL / NET: -120 mL    22 Mar 2021 07:01  -  22 Mar 2021 10:23  --------------------------------------------------------  IN: 180 mL / OUT: 0 mL / NET: 180 mL    PHYSICAL EXAM:  Gen: NAD, lying comfortably at 35 deg  HEENT: NCAT, MMM, R-lower inner lip +canker sore  CV: RRR, no m/g/r appreciated  Pulm: CTA B, no w/r/r; no increase in WOB  Abd: normoactive BS, soft, NTND  Ext: LLE with kerlex/ace bandage dressing to knee, R-BKA w/kerlex and ace bandage  Neuro: AOx2,   Psych: pleasant and conversational but forgetful and slow at organizing thoughts at time    LABS:                        8.5    11.81 )-----------( 414      ( 22 Mar 2021 06:46 )             26.5     03-22    132<L>  |  97  |  6<L>  ----------------------------<  162<H>  3.5   |  28  |  0.56    Ca    7.9<L>      22 Mar 2021 06:46  Phos  3.7     03-22  Mg     1.8     03-22    MICRODATA:  -- No new microdata.    RADIOLOGY/OTHER STUDIES:  -- No new imaging.

## 2021-03-22 NOTE — PROGRESS NOTE ADULT - SUBJECTIVE AND OBJECTIVE BOX
24hr Events:  O/N: 8pm hgb 8.2 from 8.8, VSS   3/21:PT will see pt today, hypotensive when working with PT, given 500ml NS bolus with improvement              PLEASE CHECK WHEN PRESENT:     [  ] Heart Failure     [  ] Acute     [  ] Acute on Chronic     [  ] Chronic  -------------------------------------------------------------------     [  ]Diastolic [HFpEF]     [  ]Systolic [HFrEF]     [  ]Combined [HFpEF & HFrEF]     [  ] afib     [  ] hypertensive heart disease     [  ]Other:  -------------------------------------------------------------------  [ ] Respiratory failure  [ ] Acute cor pulmonale  [ ] Asthma/COPD Exacerbation  [ ] Pleural effusion  [ ] Aspiration pneumonia  -------------------------------------------------------------------  [  ]ANTONY     [  ]ATN     [  ]Reneal Medullary Necrosis     [  ]Renal Cortical Necrosis     [  ]Other Pathological Lesions:    [  ]CKD 1  [  ]CKD 2  [  ]CKD 3  [  ]CKD 4  [  ]CKD 5  [  ]Other  -------------------------------------------------------------------  [x]Diabetes - new DX on this admission  [x] Diabetic PVD Ulcer  [  ] Neuropathic ulcer to DM  [  ] Diabetes with Nephropathy  [  ] Osteomyelitis due to diabetes  [x ] Hyperglycemia  [ ] Hypoglycemia  --------------------------------------------------------------------  [  ]Malnutrition: See Nutrition Note  [  ]Cachexia  [  ]Other:   [  ]Supplement Ordered:  [  ]Morbid Obesity (BMI >=40]  ---------------------------------------------------------------------  [ ] Sepsis/severe sepsis/septic shock  [ ] UTI  [ ] Pneumonia  -----------------------------------------------------------------------  [ ] Acidosis/alkalosis  [ ] Fluid overload  [x ] Hypokalemia  [ ] Hyperkalemia  [x ] Hypomagnesemia  [x ] Hypophosphatemia  [ ] Hyperphosphatemia  [ ] Hyponatremia  [ ] Hypernatremia  ------------------------------------------------------------------------  [ ] Acute blood loss anemia  [ ] Post op blood loss anemia  [ ] Iron deficiency anemia  [ ] Anemia due to chronic disease  [ ] Hypercoagulable state  [x ] Leukocytosis  ----------------------------------------------------------------------  [ ] Cerebral infarction  [ ] Transient ischemia attack  [ ] Encephalopathy      Assessment/Plan;  75F PMHx UC, colon cancer s/p total abdominal colectomy with end ileostomy, found down at home, was admitted for DKA and R diabetic foot wound, s/p operative debridement by podiatry 3/14/21 and R foot guillotine amputation and L foot I&D by vascular surgery 3/15/21.    #R foot wound and Lt foot wound  s/p R foot operative debridement by podiatry 3/14 s/p R guillotine BKA, L ankle I&D with penrose drain 3/15  -s/p R below knee amputation closure 3/19  -pain control  -Nafcillin 3/16-3/29, will follow up with ID as outpatient  -local wound care  -f/u labs    #DKA  -f/u endo recs  -ISS  -lantus 12u HS, added lispro 10u TID with meals  -hgb A1C=16.1    Diet: consistent carb  PPx: HSQ  Dispo: [facility]       24hr Events:  O/N: 8pm hgb 8.2 from 8.8, VSS   3/21:PT will see pt today, hypotensive when working with PT, given 500ml NS bolus with improvement    nafcillin  IVPB 2  heparin   Injectable 5000  nafcillin  IVPB 2        Vital Signs Last 24 Hrs  T(C): 36.5 (22 Mar 2021 04:42), Max: 36.8 (21 Mar 2021 13:33)  T(F): 97.7 (22 Mar 2021 04:42), Max: 98.2 (21 Mar 2021 13:33)  HR: 80 (22 Mar 2021 05:46) (75 - 104)  BP: 140/60 (22 Mar 2021 05:46) (83/46 - 140/60)  BP(mean): --  RR: 18 (22 Mar 2021 05:46) (16 - 18)  SpO2: 98% (22 Mar 2021 05:46) (95% - 98%)  I&O's Summary    21 Mar 2021 07:01  -  22 Mar 2021 07:00  --------------------------------------------------------  IN: 1510 mL / OUT: 1630 mL / NET: -120 mL        Physical Exam:  General: NAD, resting comfortably in bed  Pulmonary: Nonlabored breathing, no respiratory distress  Extremities: RLE below knee amputation dressing C/D/I, L ankle wound with pink tissue at I&D site, visible granulation tissue and penrose drain in place, no drainage      LABS:                        8.5    11.81 )-----------( 414      ( 22 Mar 2021 06:46 )             26.5     03-22    x   |  97  |  x   ----------------------------<  x   3.5   |  28  |  0.56    Ca    7.9<L>      21 Mar 2021 07:28  Phos  3.7     03-22  Mg     1.8     03-22              PLEASE CHECK WHEN PRESENT:     [  ] Heart Failure     [  ] Acute     [  ] Acute on Chronic     [  ] Chronic  -------------------------------------------------------------------     [  ]Diastolic [HFpEF]     [  ]Systolic [HFrEF]     [  ]Combined [HFpEF & HFrEF]     [  ] afib     [  ] hypertensive heart disease     [  ]Other:  -------------------------------------------------------------------  [ ] Respiratory failure  [ ] Acute cor pulmonale  [ ] Asthma/COPD Exacerbation  [ ] Pleural effusion  [ ] Aspiration pneumonia  -------------------------------------------------------------------  [  ]ANTONY     [  ]ATN     [  ]Reneal Medullary Necrosis     [  ]Renal Cortical Necrosis     [  ]Other Pathological Lesions:    [  ]CKD 1  [  ]CKD 2  [  ]CKD 3  [  ]CKD 4  [  ]CKD 5  [  ]Other  -------------------------------------------------------------------  [x]Diabetes - new DX on this admission  [x] Diabetic PVD Ulcer  [  ] Neuropathic ulcer to DM  [  ] Diabetes with Nephropathy  [  ] Osteomyelitis due to diabetes  [x ] Hyperglycemia  [ ] Hypoglycemia  --------------------------------------------------------------------  [  ]Malnutrition: See Nutrition Note  [  ]Cachexia  [  ]Other:   [  ]Supplement Ordered:  [  ]Morbid Obesity (BMI >=40]  ---------------------------------------------------------------------  [ ] Sepsis/severe sepsis/septic shock  [ ] UTI  [ ] Pneumonia  -----------------------------------------------------------------------  [ ] Acidosis/alkalosis  [ ] Fluid overload  [x ] Hypokalemia  [ ] Hyperkalemia  [x ] Hypomagnesemia  [x ] Hypophosphatemia  [ ] Hyperphosphatemia  [ ] Hyponatremia  [ ] Hypernatremia  ------------------------------------------------------------------------  [x ] Acute blood loss anemia- due to OR for BKA guillotine and closure  [ ] Post op blood loss anemia  [ ] Iron deficiency anemia  [ ] Anemia due to chronic disease  [ ] Hypercoagulable state  [x ] Leukocytosis  ----------------------------------------------------------------------  [ ] Cerebral infarction  [ ] Transient ischemia attack  [ ] Encephalopathy      Assessment/Plan;  75F PMHx UC, colon cancer s/p total abdominal colectomy with end ileostomy, found down at home, was admitted for DKA and R diabetic foot wound, s/p operative debridement by podiatry 3/14/21 and R foot guillotine amputation and L foot I&D by vascular surgery 3/15/21.    #R foot wound and Lt foot wound  s/p R foot operative debridement by podiatry 3/14 s/p R bebe ROMAN, BRANDON ankle I&D with penrose drain 3/15  -s/p R below knee amputation closure 3/19  -pain control  -Nafcillin 3/16-3/29, will follow up with ID as outpatient  -local wound care  -f/u labs    #DKA  -f/u endo recs  -ISS  -lantus 12u HS, added lispro 10u TID with meals  -hgb A1C=16.1    Diet: consistent carb  PPx: HSQ  Dispo: f/u PT recs; patient wants AR

## 2021-03-22 NOTE — PROGRESS NOTE ADULT - SUBJECTIVE AND OBJECTIVE BOX
INTERVAL HPI/OVERNIGHT EVENTS:    Patient is a 75y old  Female who presents with a chief complaint of Weakness (22 Mar 2021 10:22)  Pt was seen and examined at he bedside.   She complains of nausea and  lightheadedness.   as per nurse, she has good appetite.   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     FSG & Insulin received:    Yesterday:  pre-dinner fs  nutritional lispro 8  units+ 6   units lispro SS  bedtime fs  lantus 12 units + 4   units lispro SS    Today:  pre-breakfast fs  nutritional lispro 10 units+ 2   units lispro SS  pre-lunch fs  nutritional lispro  10 units+  2 units lispro SS    Pt reports the following symptoms:    CONSTITUTIONAL:  Negative fever or chills  CARDIOVASCULAR:  Negative for chest pain or palpitations  RESPIRATORY:  Negative for cough, wheezing, or SOB   GASTROINTESTINAL:  Negative for nausea, vomiting  GENITOURINARY:  Negative frequency, urgency or dysuria      MEDICATIONS  (STANDING):  dextrose 40% Gel 15 Gram(s) Oral once  dextrose 5%. 1000 milliLiter(s) (50 mL/Hr) IV Continuous <Continuous>  dextrose 5%. 1000 milliLiter(s) (100 mL/Hr) IV Continuous <Continuous>  dextrose 50% Injectable 25 Gram(s) IV Push once  dextrose 50% Injectable 25 Gram(s) IV Push once  dextrose 50% Injectable 12.5 Gram(s) IV Push once  glucagon  Injectable 1 milliGRAM(s) IntraMuscular once  heparin   Injectable 5000 Unit(s) SubCutaneous every 8 hours  insulin glargine Injectable (LANTUS) 12 Unit(s) SubCutaneous at bedtime  insulin lispro (ADMELOG) corrective regimen sliding scale   SubCutaneous Before meals and at bedtime  insulin lispro Injectable (ADMELOG) 10 Unit(s) SubCutaneous three times a day before meals  nafcillin  IVPB 2 Gram(s) IV Intermittent every 4 hours    MEDICATIONS  (PRN):  acetaminophen   Tablet .. 650 milliGRAM(s) Oral every 6 hours PRN Mild Pain (1 - 3)  ondansetron Injectable 4 milliGRAM(s) IV Push every 6 hours PRN Nausea  oxycodone    5 mG/acetaminophen 325 mG 1 Tablet(s) Oral every 6 hours PRN Moderate Pain (4 - 6)  oxycodone    5 mG/acetaminophen 325 mG 2 Tablet(s) Oral every 6 hours PRN Severe Pain (7 - 10)      Past medical history reviewed  Family history reviewed  Social history reviewed    PHYSICAL EXAM  Vital Signs Last 24 Hrs  T(C): 36.7 (22 Mar 2021 12:35), Max: 36.8 (21 Mar 2021 13:33)  T(F): 98 (22 Mar 2021 12:35), Max: 98.2 (21 Mar 2021 13:33)  HR: 84 (22 Mar 2021 08:36) (75 - 104)  BP: 103/50 (22 Mar 2021 08:36) (83/46 - 140/60)  BP(mean): --  RR: 18 (22 Mar 2021 08:36) (16 - 18)  SpO2: 98% (22 Mar 2021 08:36) (97% - 98%)    Constitutional:  in NAD, AOx2  HEENT: no proptosis or lid retraction  Neck: no thyromegaly or palpable thyroid nodules   Respiratory: lungs CTAB.  Cardiovascular: regular rhythm, normal S1 and S2  GI: soft, NT/ND, no masses/HSM appreciated.  EXT: right BKA    LABS:                        8.5    11.81 )-----------( 414      ( 22 Mar 2021 06:46 )             26.5     03-    132<L>  |  97  |  6<L>  ----------------------------<  162<H>  3.5   |  28  |  0.56    Ca    7.9<L>      22 Mar 2021 06:46  Phos  3.7       Mg     1.8                   HbA1C: 16.1 % ( @ 13:30)      CAPILLARY BLOOD GLUCOSE      POCT Blood Glucose.: 185 mg/dL (22 Mar 2021 11:49)  POCT Blood Glucose.: 158 mg/dL (22 Mar 2021 06:51)  POCT Blood Glucose.: 221 mg/dL (21 Mar 2021 21:50)  POCT Blood Glucose.: 287 mg/dL (21 Mar 2021 16:47)      Cholesterol, Serum: 83 mg/dL (03-15-21 @ 04:51)  HDL Cholesterol, Serum: 28 mg/dL (03-15-21 @ 04:51)  Triglycerides, Serum: 89 mg/dL (03-15-21 @ 04:51)    A/P: 75F with PMHx UC s/p colostomy (s/p multiple abdominal surgeries including colostomy bag in ) BIBEMS from home complaining of weakness; presenting likely in DKA with AMS, with bilateral lower extremity open wounds. Of note, pt found to have a hx of DM2: A1C on admission is 16.1. Pt is now s/p I&D w/ washout and debridement of non- viable soft tissue.     1.  DM:   wt:75 KG  cr:0.6, GFR:89  likely insulinopenic  lantus  u at bedtime  pleas ilispro to  U  premeals TID   Continue lispro moderate dose scale with meals and at bedtime.   Continue consistent carb diet  FSG Goal 100-180    case seen and discussed with   and updated primary team     INTERVAL HPI/OVERNIGHT EVENTS:    Patient is a 75y old  Female who presents with a chief complaint of Weakness (22 Mar 2021 10:22)  Pt was seen and examined at he bedside.   She complains of nausea and  lightheadedness.   as per nurse, she has good appetite.   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     FSG & Insulin received:    Yesterday:  pre-dinner fs  nutritional lispro 8  units+ 6   units lispro SS  bedtime fs  lantus 12 units + 4   units lispro SS    Today:  pre-breakfast fs  nutritional lispro 10 units+ 2   units lispro SS  pre-lunch fs  nutritional lispro  10 units+  2 units lispro SS    Pt reports the following symptoms:    CONSTITUTIONAL:  Negative fever or chills  CARDIOVASCULAR:  Negative for chest pain or palpitations  RESPIRATORY:  Negative for cough, wheezing, or SOB   GASTROINTESTINAL:  Negative for nausea, vomiting  GENITOURINARY:  Negative frequency, urgency or dysuria      MEDICATIONS  (STANDING):  dextrose 40% Gel 15 Gram(s) Oral once  dextrose 5%. 1000 milliLiter(s) (50 mL/Hr) IV Continuous <Continuous>  dextrose 5%. 1000 milliLiter(s) (100 mL/Hr) IV Continuous <Continuous>  dextrose 50% Injectable 25 Gram(s) IV Push once  dextrose 50% Injectable 25 Gram(s) IV Push once  dextrose 50% Injectable 12.5 Gram(s) IV Push once  glucagon  Injectable 1 milliGRAM(s) IntraMuscular once  heparin   Injectable 5000 Unit(s) SubCutaneous every 8 hours  insulin glargine Injectable (LANTUS) 12 Unit(s) SubCutaneous at bedtime  insulin lispro (ADMELOG) corrective regimen sliding scale   SubCutaneous Before meals and at bedtime  insulin lispro Injectable (ADMELOG) 10 Unit(s) SubCutaneous three times a day before meals  nafcillin  IVPB 2 Gram(s) IV Intermittent every 4 hours    MEDICATIONS  (PRN):  acetaminophen   Tablet .. 650 milliGRAM(s) Oral every 6 hours PRN Mild Pain (1 - 3)  ondansetron Injectable 4 milliGRAM(s) IV Push every 6 hours PRN Nausea  oxycodone    5 mG/acetaminophen 325 mG 1 Tablet(s) Oral every 6 hours PRN Moderate Pain (4 - 6)  oxycodone    5 mG/acetaminophen 325 mG 2 Tablet(s) Oral every 6 hours PRN Severe Pain (7 - 10)      Past medical history reviewed  Family history reviewed  Social history reviewed    PHYSICAL EXAM  Vital Signs Last 24 Hrs  T(C): 36.7 (22 Mar 2021 12:35), Max: 36.8 (21 Mar 2021 13:33)  T(F): 98 (22 Mar 2021 12:35), Max: 98.2 (21 Mar 2021 13:33)  HR: 84 (22 Mar 2021 08:36) (75 - 104)  BP: 103/50 (22 Mar 2021 08:36) (83/46 - 140/60)  BP(mean): --  RR: 18 (22 Mar 2021 08:36) (16 - 18)  SpO2: 98% (22 Mar 2021 08:36) (97% - 98%)    Constitutional:  in NAD, AOx2  HEENT: no proptosis or lid retraction  Neck: no thyromegaly or palpable thyroid nodules   Respiratory: lungs CTAB.  Cardiovascular: regular rhythm, normal S1 and S2  GI: soft, NT/ND, no masses/HSM appreciated.  EXT: right BKA    LABS:                        8.5    11.81 )-----------( 414      ( 22 Mar 2021 06:46 )             26.5     03-    132<L>  |  97  |  6<L>  ----------------------------<  162<H>  3.5   |  28  |  0.56    Ca    7.9<L>      22 Mar 2021 06:46  Phos  3.7       Mg     1.8                   HbA1C: 16.1 % ( @ 13:30)      CAPILLARY BLOOD GLUCOSE      POCT Blood Glucose.: 185 mg/dL (22 Mar 2021 11:49)  POCT Blood Glucose.: 158 mg/dL (22 Mar 2021 06:51)  POCT Blood Glucose.: 221 mg/dL (21 Mar 2021 21:50)  POCT Blood Glucose.: 287 mg/dL (21 Mar 2021 16:47)      Cholesterol, Serum: 83 mg/dL (03-15-21 @ 04:51)  HDL Cholesterol, Serum: 28 mg/dL (03-15-21 @ 04:51)  Triglycerides, Serum: 89 mg/dL (03-15-21 @ 04:51)    A/P: 75F with PMHx UC s/p colostomy (s/p multiple abdominal surgeries including colostomy bag in ) BIBEMS from home complaining of weakness; presenting likely in DKA with AMS, with bilateral lower extremity open wounds. Of note, pt found to have a hx of DM2: A1C on admission is 16.1. Pt is now s/p I&D w/ washout and debridement of non- viable soft tissue.     1.  DM:   wt:75 KG  cr:0.6, GFR:89  likely insulinopenic  lantus 16  u at bedtime  pleas give lispro 12 U  premeals TID   Continue lispro moderate dose scale with meals and at bedtime.   Continue consistent carb diet  FSG Goal 100-180    case seen and discussed with Dr. Yee and updated primary team

## 2021-03-22 NOTE — PROGRESS NOTE ADULT - ASSESSMENT
In summary, this is a 76yo woman, poor historian, with a PMH of UC and colon cancer s/p colectomy and chemotherapy and chronic venous stasis w/chronic LE wounds who presented w/frequent falls and subsequently found to have bilateral lower extremity cellulitis and abscesses c/b septic shock and DK with subsequent imaging c/f RLE necrotizing fascitis.  She was admitted to the SICU and underwent a R-foot I&D on 3/14 with Podiatry and R-BKA w/L-foot I&D on 3/15 with Vascular.  She was transferred to telemetry on 3/16 and now s/p BKA closure on 3/19.     #BLE Cellulitis c/b Septic Shock and Nec Fasc s/p R-BKA  -- ID recommending Nafcillin x 2 weeks (thru 3/29) w/labs sent to Dr. Medeiros  -- will need f/u with Dr. Medeiros upon discharge  -- wound care per primary team   -- Percocet 1 tab q4h PRN and Tylenol 650mg q6h PRN w/Senna  -- PT as able     #Newly Diagnosed IDDM2 c/b DKA and SSTI  -- likely risk factor for infection and sepsis   -- per Endo, received 12U last night and c/w 10U Lispro TID  -- Endo following, appreciate their assistance    #UC c/b Colon Ca s/p Ostomy  -- no active issues     #Apthous Ulcer  -- R-inner lower lip  -- continue to offer topical analgesic such as Oragel or Lidocaine     #Acute Blood Loss Anemia   -- likely from multiple trips to the OR and BKA  -- currently stable and will continue to monitor     #Diet - Carb Controlled / Dysphagia 1   #DVT PPx - SQH  #Dispo - likely [facility]; should be medically ready by 3/24    Bailee Cottrell  Attending Hospitalist  [phone]

## 2021-03-23 LAB
ANION GAP SERPL CALC-SCNC: 9 MMOL/L — SIGNIFICANT CHANGE UP (ref 5–17)
BUN SERPL-MCNC: 6 MG/DL — LOW (ref 7–23)
CALCIUM SERPL-MCNC: 8.4 MG/DL — SIGNIFICANT CHANGE UP (ref 8.4–10.5)
CHLORIDE SERPL-SCNC: 101 MMOL/L — SIGNIFICANT CHANGE UP (ref 96–108)
CO2 SERPL-SCNC: 25 MMOL/L — SIGNIFICANT CHANGE UP (ref 22–31)
CREAT SERPL-MCNC: 0.53 MG/DL — SIGNIFICANT CHANGE UP (ref 0.5–1.3)
GLUCOSE BLDC GLUCOMTR-MCNC: 152 MG/DL — HIGH (ref 70–99)
GLUCOSE BLDC GLUCOMTR-MCNC: 167 MG/DL — HIGH (ref 70–99)
GLUCOSE BLDC GLUCOMTR-MCNC: 175 MG/DL — HIGH (ref 70–99)
GLUCOSE BLDC GLUCOMTR-MCNC: 89 MG/DL — SIGNIFICANT CHANGE UP (ref 70–99)
GLUCOSE SERPL-MCNC: 155 MG/DL — HIGH (ref 70–99)
HCT VFR BLD CALC: 32 % — LOW (ref 34.5–45)
HGB BLD-MCNC: 10 G/DL — LOW (ref 11.5–15.5)
MAGNESIUM SERPL-MCNC: 2.1 MG/DL — SIGNIFICANT CHANGE UP (ref 1.6–2.6)
MCHC RBC-ENTMCNC: 29.8 PG — SIGNIFICANT CHANGE UP (ref 27–34)
MCHC RBC-ENTMCNC: 31.3 GM/DL — LOW (ref 32–36)
MCV RBC AUTO: 95.2 FL — SIGNIFICANT CHANGE UP (ref 80–100)
NRBC # BLD: 0 /100 WBCS — SIGNIFICANT CHANGE UP (ref 0–0)
PHOSPHATE SERPL-MCNC: 4.2 MG/DL — SIGNIFICANT CHANGE UP (ref 2.5–4.5)
PLATELET # BLD AUTO: 554 K/UL — HIGH (ref 150–400)
POTASSIUM SERPL-MCNC: 4.1 MMOL/L — SIGNIFICANT CHANGE UP (ref 3.5–5.3)
POTASSIUM SERPL-SCNC: 4.1 MMOL/L — SIGNIFICANT CHANGE UP (ref 3.5–5.3)
RBC # BLD: 3.36 M/UL — LOW (ref 3.8–5.2)
RBC # FLD: 15.5 % — HIGH (ref 10.3–14.5)
SODIUM SERPL-SCNC: 135 MMOL/L — SIGNIFICANT CHANGE UP (ref 135–145)
WBC # BLD: 14.33 K/UL — HIGH (ref 3.8–10.5)
WBC # FLD AUTO: 14.33 K/UL — HIGH (ref 3.8–10.5)

## 2021-03-23 PROCEDURE — 99231 SBSQ HOSP IP/OBS SF/LOW 25: CPT | Mod: GC

## 2021-03-23 PROCEDURE — 99233 SBSQ HOSP IP/OBS HIGH 50: CPT

## 2021-03-23 RX ORDER — INSULIN LISPRO 100/ML
10 VIAL (ML) SUBCUTANEOUS
Refills: 0 | Status: DISCONTINUED | OUTPATIENT
Start: 2021-03-23 | End: 2021-03-24

## 2021-03-23 RX ORDER — INSULIN GLARGINE 100 [IU]/ML
14 INJECTION, SOLUTION SUBCUTANEOUS AT BEDTIME
Refills: 0 | Status: DISCONTINUED | OUTPATIENT
Start: 2021-03-23 | End: 2021-03-26

## 2021-03-23 RX ADMIN — NAFCILLIN 100 GRAM(S): 10 INJECTION, POWDER, FOR SOLUTION INTRAVENOUS at 18:19

## 2021-03-23 RX ADMIN — INSULIN GLARGINE 14 UNIT(S): 100 INJECTION, SOLUTION SUBCUTANEOUS at 22:42

## 2021-03-23 RX ADMIN — NAFCILLIN 100 GRAM(S): 10 INJECTION, POWDER, FOR SOLUTION INTRAVENOUS at 05:54

## 2021-03-23 RX ADMIN — NYSTATIN CREAM 1 APPLICATION(S): 100000 CREAM TOPICAL at 17:49

## 2021-03-23 RX ADMIN — OXYCODONE AND ACETAMINOPHEN 2 TABLET(S): 5; 325 TABLET ORAL at 13:37

## 2021-03-23 RX ADMIN — OXYCODONE AND ACETAMINOPHEN 1 TABLET(S): 5; 325 TABLET ORAL at 02:22

## 2021-03-23 RX ADMIN — Medication 10 UNIT(S): at 18:19

## 2021-03-23 RX ADMIN — SENNA PLUS 2 TABLET(S): 8.6 TABLET ORAL at 22:40

## 2021-03-23 RX ADMIN — Medication 650 MILLIGRAM(S): at 23:01

## 2021-03-23 RX ADMIN — OXYCODONE AND ACETAMINOPHEN 2 TABLET(S): 5; 325 TABLET ORAL at 07:03

## 2021-03-23 RX ADMIN — NAFCILLIN 100 GRAM(S): 10 INJECTION, POWDER, FOR SOLUTION INTRAVENOUS at 02:22

## 2021-03-23 RX ADMIN — NAFCILLIN 100 GRAM(S): 10 INJECTION, POWDER, FOR SOLUTION INTRAVENOUS at 09:30

## 2021-03-23 RX ADMIN — HEPARIN SODIUM 5000 UNIT(S): 5000 INJECTION INTRAVENOUS; SUBCUTANEOUS at 05:54

## 2021-03-23 RX ADMIN — NAFCILLIN 100 GRAM(S): 10 INJECTION, POWDER, FOR SOLUTION INTRAVENOUS at 13:37

## 2021-03-23 RX ADMIN — OXYCODONE AND ACETAMINOPHEN 1 TABLET(S): 5; 325 TABLET ORAL at 03:22

## 2021-03-23 RX ADMIN — OXYCODONE AND ACETAMINOPHEN 2 TABLET(S): 5; 325 TABLET ORAL at 07:47

## 2021-03-23 RX ADMIN — Medication 2: at 07:39

## 2021-03-23 RX ADMIN — Medication 650 MILLIGRAM(S): at 23:45

## 2021-03-23 RX ADMIN — HEPARIN SODIUM 5000 UNIT(S): 5000 INJECTION INTRAVENOUS; SUBCUTANEOUS at 22:40

## 2021-03-23 RX ADMIN — Medication 12 UNIT(S): at 07:39

## 2021-03-23 RX ADMIN — ONDANSETRON 4 MILLIGRAM(S): 8 TABLET, FILM COATED ORAL at 11:39

## 2021-03-23 RX ADMIN — Medication 2: at 18:19

## 2021-03-23 RX ADMIN — OXYCODONE AND ACETAMINOPHEN 2 TABLET(S): 5; 325 TABLET ORAL at 19:37

## 2021-03-23 RX ADMIN — Medication 325 MILLIGRAM(S): at 00:54

## 2021-03-23 RX ADMIN — Medication 2: at 22:27

## 2021-03-23 RX ADMIN — OXYCODONE AND ACETAMINOPHEN 2 TABLET(S): 5; 325 TABLET ORAL at 14:30

## 2021-03-23 RX ADMIN — NAFCILLIN 100 GRAM(S): 10 INJECTION, POWDER, FOR SOLUTION INTRAVENOUS at 22:40

## 2021-03-23 RX ADMIN — OXYCODONE AND ACETAMINOPHEN 2 TABLET(S): 5; 325 TABLET ORAL at 20:50

## 2021-03-23 RX ADMIN — HEPARIN SODIUM 5000 UNIT(S): 5000 INJECTION INTRAVENOUS; SUBCUTANEOUS at 13:37

## 2021-03-23 RX ADMIN — Medication 12 UNIT(S): at 11:59

## 2021-03-23 RX ADMIN — NYSTATIN CREAM 1 APPLICATION(S): 100000 CREAM TOPICAL at 05:54

## 2021-03-23 NOTE — PROGRESS NOTE ADULT - ASSESSMENT
In summary, this is a 76yo woman, poor historian, with a PMH of UC and colon cancer s/p colectomy and chemotherapy and chronic venous stasis w/chronic LE wounds who presented w/frequent falls and subsequently found to have bilateral lower extremity cellulitis and abscesses c/b septic shock and DK with subsequent imaging c/f RLE necrotizing fascitis.  She was admitted to the SICU and underwent a R-foot I&D on 3/14 with Podiatry and R-BKA w/L-foot I&D on 3/15 with Vascular.  She was transferred to telemetry on 3/16 and now s/p BKA closure on 3/19, nearing discharge.     #BLE Cellulitis c/b Septic Shock and Nec Fasc s/p R-BKA  -- ID recommending Nafcillin x 2 weeks (thru 3/29) w/labs sent to Dr. Medeiros  -- will need f/u with Dr. Medeiros upon discharge  -- wound care per primary team; NITO drain likely to come out Wednesday   -- Percocet 1 tab q4h PRN and Tylenol 650mg q6h PRN w/Senna  -- PT as able - likely a full session tomorrow     #Newly Diagnosed IDDM2 c/b DKA and SSTI  -- likely risk factor for infection and sepsis   -- per Endo, Lantus 16U QHS and Lispro 12U TID  -- Endo following, appreciate their assistance    #UC c/b Colon Ca s/p Ostomy  -- no active issues     #Apthous Ulcer  -- R-inner lower lip  -- continue to offer topical analgesic such as Oragel or Lidocaine     #Acute Blood Loss Anemia   -- likely from multiple trips to the OR and BKA  -- currently stable and will continue to monitor     #Diet - Carb Controlled / Dysphagia 1   #DVT PPx - SQH  #Dispo - acute rehab v [facility]; should be medically ready by 3/24    Bailee Cottrell  Attending Hospitalist  [phone]

## 2021-03-23 NOTE — PROGRESS NOTE ADULT - SUBJECTIVE AND OBJECTIVE BOX
INTERVAL HPI/OVERNIGHT EVENTS:    Patient is a 75y old  Female who presents with a chief complaint of Weakness (23 Mar 2021 11:04)  Pt was seen and examined at he bedside.  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     FSG & Insulin received:    Yesterday:  pre-dinner fs  nutritional lispro 12  units+ 4   units lispro SS  bedtime fs  lantus 16 units + 6   units lispro SS    Today:  pre-breakfast fs  nutritional lispro 12 units+ 2   units lispro SS  pre-lunch fs  nutritional lispro  10 units    Pt reports the following symptoms:    CONSTITUTIONAL:  Negative fever or chills  CARDIOVASCULAR:  Negative for chest pain or palpitations  RESPIRATORY:  Negative for cough, wheezing, or SOB   GASTROINTESTINAL:  Negative for nausea, vomiting  GENITOURINARY:  Negative frequency, urgency or dysuria    MEDICATIONS  (STANDING):  dextrose 40% Gel 15 Gram(s) Oral once  dextrose 5%. 1000 milliLiter(s) (100 mL/Hr) IV Continuous <Continuous>  dextrose 5%. 1000 milliLiter(s) (50 mL/Hr) IV Continuous <Continuous>  dextrose 50% Injectable 25 Gram(s) IV Push once  dextrose 50% Injectable 12.5 Gram(s) IV Push once  dextrose 50% Injectable 25 Gram(s) IV Push once  glucagon  Injectable 1 milliGRAM(s) IntraMuscular once  heparin   Injectable 5000 Unit(s) SubCutaneous every 8 hours  insulin glargine Injectable (LANTUS) 16 Unit(s) SubCutaneous at bedtime  insulin lispro (ADMELOG) corrective regimen sliding scale   SubCutaneous Before meals and at bedtime  insulin lispro Injectable (ADMELOG) 12 Unit(s) SubCutaneous three times a day before meals  nafcillin  IVPB 2 Gram(s) IV Intermittent every 4 hours  nystatin Powder 1 Application(s) Topical two times a day  senna 2 Tablet(s) Oral at bedtime    MEDICATIONS  (PRN):  acetaminophen   Tablet .. 650 milliGRAM(s) Oral every 6 hours PRN Mild Pain (1 - 3)  ondansetron Injectable 4 milliGRAM(s) IV Push every 6 hours PRN Nausea  oxycodone    5 mG/acetaminophen 325 mG 1 Tablet(s) Oral every 6 hours PRN Moderate Pain (4 - 6)  oxycodone    5 mG/acetaminophen 325 mG 2 Tablet(s) Oral every 6 hours PRN Severe Pain (7 - 10)      Past medical history reviewed  Family history reviewed  Social history reviewed    PHYSICAL EXAM  Vital Signs Last 24 Hrs  T(C): 36.4 (23 Mar 2021 09:02), Max: 37.3 (22 Mar 2021 22:27)  T(F): 97.5 (23 Mar 2021 09:02), Max: 99.1 (22 Mar 2021 22:27)  HR: 70 (23 Mar 2021 08:13) (70 - 88)  BP: 127/60 (23 Mar 2021 08:13) (104/51 - 136/61)  BP(mean): --  RR: 18 (23 Mar 2021 08:13) (17 - 18)  SpO2: 98% (23 Mar 2021 08:13) (97% - 98%)    Constitutional:  in NAD, AOx2  HEENT: no proptosis or lid retraction  Neck: no thyromegaly or palpable thyroid nodules   Respiratory: lungs CTAB.  Cardiovascular: regular rhythm, normal S1 and S2  GI: soft, NT/ND, no masses/HSM appreciated.  EXT: right BKA    LABS:                        10.0   14.33 )-----------( 554      ( 23 Mar 2021 06:50 )             32.0     -    135  |  101  |  6<L>  ----------------------------<  155<H>  4.1   |  25  |  0.53    Ca    8.4      23 Mar 2021 06:50  Phos  4.2       Mg     2.1                   HbA1C: 16.1 % ( @ 13:30)      CAPILLARY BLOOD GLUCOSE      POCT Blood Glucose.: 89 mg/dL (23 Mar 2021 11:53)  POCT Blood Glucose.: 167 mg/dL (23 Mar 2021 07:34)  POCT Blood Glucose.: 254 mg/dL (22 Mar 2021 21:56)  POCT Blood Glucose.: 204 mg/dL (22 Mar 2021 16:42)      Cholesterol, Serum: 83 mg/dL (03-15-21 @ 04:51)  HDL Cholesterol, Serum: 28 mg/dL (03-15-21 @ 04:51)  Triglycerides, Serum: 89 mg/dL (03-15-21 @ 04:51)        A/P: 75F with PMHx UC s/p colostomy (s/p multiple abdominal surgeries including colostomy bag in ) BIBEMS from home complaining of weakness; presenting likely in DKA with AMS, with bilateral lower extremity open wounds. Of note, pt found to have a hx of DM2: A1C on admission is 16.1. Pt is now s/p I&D w/ washout and debridement of non- viable soft tissue.     1.  DM:   wt:75 KG  cr:0.6, GFR:89  likely insulinopenic  lantus   u at bedtime  pleas give lispro  U  premeals TID   Continue lispro moderate dose scale with meals and at bedtime.   Continue consistent carb diet  FSG Goal 100-180    case seen and discussed with Dr. Yee and updated primary team   INTERVAL HPI/OVERNIGHT EVENTS:    Patient is a 75y old  Female who presents with a chief complaint of Weakness (23 Mar 2021 11:04)  Pt was seen and examined at he bedside. She reports nausea  today.   wbc:14. she complains of left leg pain.   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     FSG & Insulin received:    Yesterday:  pre-dinner fs  nutritional lispro 12  units+ 4   units lispro SS  bedtime fs  lantus 16 units + 6   units lispro SS    Today:  pre-breakfast fs  nutritional lispro 12 units+ 2   units lispro SS  pre-lunch fs  nutritional lispro  10 units    Pt reports the following symptoms:    CONSTITUTIONAL:  Negative fever or chills  CARDIOVASCULAR:  Negative for chest pain or palpitations  RESPIRATORY:  Negative for cough, wheezing, or SOB   GASTROINTESTINAL:  Negative for nausea, vomiting  GENITOURINARY:  Negative frequency, urgency or dysuria    MEDICATIONS  (STANDING):  dextrose 40% Gel 15 Gram(s) Oral once  dextrose 5%. 1000 milliLiter(s) (100 mL/Hr) IV Continuous <Continuous>  dextrose 5%. 1000 milliLiter(s) (50 mL/Hr) IV Continuous <Continuous>  dextrose 50% Injectable 25 Gram(s) IV Push once  dextrose 50% Injectable 12.5 Gram(s) IV Push once  dextrose 50% Injectable 25 Gram(s) IV Push once  glucagon  Injectable 1 milliGRAM(s) IntraMuscular once  heparin   Injectable 5000 Unit(s) SubCutaneous every 8 hours  insulin glargine Injectable (LANTUS) 16 Unit(s) SubCutaneous at bedtime  insulin lispro (ADMELOG) corrective regimen sliding scale   SubCutaneous Before meals and at bedtime  insulin lispro Injectable (ADMELOG) 12 Unit(s) SubCutaneous three times a day before meals  nafcillin  IVPB 2 Gram(s) IV Intermittent every 4 hours  nystatin Powder 1 Application(s) Topical two times a day  senna 2 Tablet(s) Oral at bedtime    MEDICATIONS  (PRN):  acetaminophen   Tablet .. 650 milliGRAM(s) Oral every 6 hours PRN Mild Pain (1 - 3)  ondansetron Injectable 4 milliGRAM(s) IV Push every 6 hours PRN Nausea  oxycodone    5 mG/acetaminophen 325 mG 1 Tablet(s) Oral every 6 hours PRN Moderate Pain (4 - 6)  oxycodone    5 mG/acetaminophen 325 mG 2 Tablet(s) Oral every 6 hours PRN Severe Pain (7 - 10)      Past medical history reviewed  Family history reviewed  Social history reviewed    PHYSICAL EXAM  Vital Signs Last 24 Hrs  T(C): 36.4 (23 Mar 2021 09:02), Max: 37.3 (22 Mar 2021 22:27)  T(F): 97.5 (23 Mar 2021 09:02), Max: 99.1 (22 Mar 2021 22:27)  HR: 70 (23 Mar 2021 08:13) (70 - 88)  BP: 127/60 (23 Mar 2021 08:13) (104/51 - 136/61)  BP(mean): --  RR: 18 (23 Mar 2021 08:13) (17 - 18)  SpO2: 98% (23 Mar 2021 08:13) (97% - 98%)    Constitutional:  in NAD, AOx2  HEENT: no proptosis or lid retraction  Neck: no thyromegaly or palpable thyroid nodules   Respiratory: lungs CTAB.  Cardiovascular: regular rhythm, normal S1 and S2  GI: soft, NT/ND, no masses/HSM appreciated.  EXT: right BKA    LABS:                        10.0   14.33 )-----------( 554      ( 23 Mar 2021 06:50 )             32.0         135  |  101  |  6<L>  ----------------------------<  155<H>  4.1   |  25  |  0.53    Ca    8.4      23 Mar 2021 06:50  Phos  4.2       Mg     2.1                   HbA1C: 16.1 % ( @ 13:30)      CAPILLARY BLOOD GLUCOSE      POCT Blood Glucose.: 89 mg/dL (23 Mar 2021 11:53)  POCT Blood Glucose.: 167 mg/dL (23 Mar 2021 07:34)  POCT Blood Glucose.: 254 mg/dL (22 Mar 2021 21:56)  POCT Blood Glucose.: 204 mg/dL (22 Mar 2021 16:42)      Cholesterol, Serum: 83 mg/dL (03-15-21 @ 04:51)  HDL Cholesterol, Serum: 28 mg/dL (03-15-21 @ 04:51)  Triglycerides, Serum: 89 mg/dL (03-15-21 @ 04:51)        A/P: 75F with PMHx UC s/p colostomy (s/p multiple abdominal surgeries including colostomy bag in ) BIBEMS from home complaining of weakness; presenting likely in DKA with AMS, with bilateral lower extremity open wounds. Of note, pt found to have a hx of DM2: A1C on admission is 16.1. Pt is now s/p I&D w/ washout and debridement of non- viable soft tissue.     1.  DM:   wt:75 KG  cr:0.6, GFR:89  likely insulinopenic  lantus  14 u at bedtime  pleas give lispro  10 U  premeals TID   Continue lispro moderate dose scale with meals and at bedtime.   Continue consistent carb diet  FSG Goal 100-180    case seen and discussed with Dr. Yee and updated primary team

## 2021-03-23 NOTE — PROGRESS NOTE ADULT - SUBJECTIVE AND OBJECTIVE BOX
24hr Events:  O/N:Attempted to straighten knee, refused knee mobilizer,   3/22: Hg 8.5 (from 8.2), NITO: 30 cc/24hr, WBC 11 (14), penrose drain of left foot removed, morphine for severe pain x1, refuse leg stretching exercises. Refused to work with OT due to pain.           PLEASE CHECK WHEN PRESENT:     [  ] Heart Failure     [  ] Acute     [  ] Acute on Chronic     [  ] Chronic  -------------------------------------------------------------------     [  ]Diastolic [HFpEF]     [  ]Systolic [HFrEF]     [  ]Combined [HFpEF & HFrEF]     [  ] afib     [  ] hypertensive heart disease     [  ]Other:  -------------------------------------------------------------------  [ ] Respiratory failure  [ ] Acute cor pulmonale  [ ] Asthma/COPD Exacerbation  [ ] Pleural effusion  [ ] Aspiration pneumonia  -------------------------------------------------------------------  [  ]ANTONY     [  ]ATN     [  ]Reneal Medullary Necrosis     [  ]Renal Cortical Necrosis     [  ]Other Pathological Lesions:    [  ]CKD 1  [  ]CKD 2  [  ]CKD 3  [  ]CKD 4  [  ]CKD 5  [  ]Other  -------------------------------------------------------------------  [x]Diabetes - new DX on this admission  [x] Diabetic PVD Ulcer  [  ] Neuropathic ulcer to DM  [  ] Diabetes with Nephropathy  [  ] Osteomyelitis due to diabetes  [x ] Hyperglycemia  [ ] Hypoglycemia  --------------------------------------------------------------------  [  ]Malnutrition: See Nutrition Note  [  ]Cachexia  [  ]Other:   [  ]Supplement Ordered:  [  ]Morbid Obesity (BMI >=40]  ---------------------------------------------------------------------  [ ] Sepsis/severe sepsis/septic shock  [ ] UTI  [ ] Pneumonia  -----------------------------------------------------------------------  [ ] Acidosis/alkalosis  [ ] Fluid overload  [x ] Hypokalemia  [ ] Hyperkalemia  [x ] Hypomagnesemia  [x ] Hypophosphatemia  [ ] Hyperphosphatemia  [ ] Hyponatremia  [ ] Hypernatremia  ------------------------------------------------------------------------  [x ] Acute blood loss anemia- due to OR for BKA guillotine and closure  [ ] Post op blood loss anemia  [ ] Iron deficiency anemia  [ ] Anemia due to chronic disease  [ ] Hypercoagulable state  [x ] Leukocytosis  ----------------------------------------------------------------------  [ ] Cerebral infarction  [ ] Transient ischemia attack  [ ] Encephalopathy      Assessment/Plan;  75F PMHx UC, colon cancer s/p total abdominal colectomy with end ileostomy, found down at home, was admitted for DKA and R diabetic foot wound, s/p operative debridement by podiatry 3/14/21 and R foot guillotine amputation and L foot I&D by vascular surgery 3/15/21.    #R foot wound and Lt foot wound  s/p R foot operative debridement by podiatry 3/14 s/p R guillotine BKA, L ankle I&D with penrose drain 3/15  -s/p R below knee amputation closure 3/19  -pain control  -Nafcillin 3/16-3/29, will follow up with ID as outpatient  -local wound care  -f/u labs    #DKA  -f/u endo recs  -ISS  -lantus 12u HS, added lispro 10u TID with meals  -hgb A1C=16.1    Diet: consistent carb  PPx: HSQ  Dispo: f/u PT recs; patient wants AR     24hr Events:  O/N:Attempted to straighten knee, refused knee mobilizer,   3/22: Hg 8.5 (from 8.2), NITO: 30 cc/24hr, WBC 11 (14), penrose drain of left foot removed, morphine for severe pain x1, refuse leg stretching exercises. Refused to work with OT due to pain.     nafcillin  IVPB 2  heparin   Injectable 5000  nafcillin  IVPB 2        Vital Signs Last 24 Hrs  T(C): 37.2 (23 Mar 2021 05:57), Max: 37.3 (22 Mar 2021 22:27)  T(F): 99 (23 Mar 2021 05:57), Max: 99.1 (22 Mar 2021 22:27)  HR: 76 (23 Mar 2021 05:50) (74 - 88)  BP: 136/61 (23 Mar 2021 05:50) (103/50 - 136/61)  BP(mean): --  RR: 17 (23 Mar 2021 05:50) (17 - 18)  SpO2: 97% (23 Mar 2021 05:50) (97% - 98%)  I&O's Summary    22 Mar 2021 07:01  -  23 Mar 2021 07:00  --------------------------------------------------------  IN: 1140 mL / OUT: 2526 mL / NET: -1386 mL        Physical Exam:  General: NAD, resting comfortably in bed  Pulmonary: Nonlabored breathing, no respiratory distress  Extremities: RLE below knee amputation dressing C/D/I, L ankle wound with pink tissue at I&D site, visible granulation tissue and penrose drain in place, no drainage      LABS:                        10.0   14.33 )-----------( 554      ( 23 Mar 2021 06:50 )             32.0     03-23    135  |  101  |  6<L>  ----------------------------<  155<H>  4.1   |  25  |  0.53    Ca    8.4      23 Mar 2021 06:50  Phos  4.2     03-23  Mg     2.1     03-23            PLEASE CHECK WHEN PRESENT:     [  ] Heart Failure     [  ] Acute     [  ] Acute on Chronic     [  ] Chronic  -------------------------------------------------------------------     [  ]Diastolic [HFpEF]     [  ]Systolic [HFrEF]     [  ]Combined [HFpEF & HFrEF]     [  ] afib     [  ] hypertensive heart disease     [  ]Other:  -------------------------------------------------------------------  [ ] Respiratory failure  [ ] Acute cor pulmonale  [ ] Asthma/COPD Exacerbation  [ ] Pleural effusion  [ ] Aspiration pneumonia  -------------------------------------------------------------------  [  ]ANTONY     [  ]ATN     [  ]Reneal Medullary Necrosis     [  ]Renal Cortical Necrosis     [  ]Other Pathological Lesions:    [  ]CKD 1  [  ]CKD 2  [  ]CKD 3  [  ]CKD 4  [  ]CKD 5  [  ]Other  -------------------------------------------------------------------  [x]Diabetes - new DX on this admission  [x] Diabetic PVD Ulcer  [  ] Neuropathic ulcer to DM  [  ] Diabetes with Nephropathy  [  ] Osteomyelitis due to diabetes  [x ] Hyperglycemia  [ ] Hypoglycemia  --------------------------------------------------------------------  [  ]Malnutrition: See Nutrition Note  [  ]Cachexia  [  ]Other:   [  ]Supplement Ordered:  [  ]Morbid Obesity (BMI >=40]  ---------------------------------------------------------------------  [ ] Sepsis/severe sepsis/septic shock  [ ] UTI  [ ] Pneumonia  -----------------------------------------------------------------------  [ ] Acidosis/alkalosis  [ ] Fluid overload  [x ] Hypokalemia  [ ] Hyperkalemia  [x ] Hypomagnesemia  [x ] Hypophosphatemia  [ ] Hyperphosphatemia  [ ] Hyponatremia  [ ] Hypernatremia  ------------------------------------------------------------------------  [x ] Acute blood loss anemia- due to OR for BKA guillotine and closure  [ ] Post op blood loss anemia  [ ] Iron deficiency anemia  [ ] Anemia due to chronic disease  [ ] Hypercoagulable state  [x ] Leukocytosis  ----------------------------------------------------------------------  [ ] Cerebral infarction  [ ] Transient ischemia attack  [ ] Encephalopathy      Assessment/Plan;  75F PMHx UC, colon cancer s/p total abdominal colectomy with end ileostomy, found down at home, was admitted for DKA and R diabetic foot wound, s/p operative debridement by podiatry 3/14/21 and R foot guillotine amputation and L foot I&D by vascular surgery 3/15/21.    #R foot wound and Lt foot wound  s/p R foot operative debridement by podiatry 3/14 s/p R guillotine BKA, L ankle I&D with penrose drain 3/15  -s/p R below knee amputation closure 3/19  -pain control  -Nafcillin 3/16-3/29, will follow up with ID as outpatient  -local wound care  -f/u labs    #DKA  -f/u endo recs  -ISS  -lantus 12u HS, added lispro 10u TID with meals  -hgb A1C=16.1    Diet: consistent carb  PPx: HSQ  Dispo: f/u PT recs; patient wants AR

## 2021-03-23 NOTE — PROGRESS NOTE ADULT - SUBJECTIVE AND OBJECTIVE BOX
INTERVAL EVENTS:  -- NAEO    SUBJECTIVE:  -- reports feeling nausea so did not eat breakfast; does not want to ask for her Zofran PRN  -- having some pain at her R-BKA and asked for a PRN   -- denies fevers, chills, palpitations, CP, SOB, abdominal pain   -- Review of Systems: 12 point review of systems otherwise negative    MEDICATIONS:  MEDICATIONS  (STANDING):  dextrose 40% Gel 15 Gram(s) Oral once  dextrose 5%. 1000 milliLiter(s) (50 mL/Hr) IV Continuous <Continuous>  dextrose 5%. 1000 milliLiter(s) (100 mL/Hr) IV Continuous <Continuous>  dextrose 50% Injectable 25 Gram(s) IV Push once  dextrose 50% Injectable 12.5 Gram(s) IV Push once  dextrose 50% Injectable 25 Gram(s) IV Push once  glucagon  Injectable 1 milliGRAM(s) IntraMuscular once  heparin   Injectable 5000 Unit(s) SubCutaneous every 8 hours  insulin glargine Injectable (LANTUS) 16 Unit(s) SubCutaneous at bedtime  insulin lispro (ADMELOG) corrective regimen sliding scale   SubCutaneous Before meals and at bedtime  insulin lispro Injectable (ADMELOG) 12 Unit(s) SubCutaneous three times a day before meals  nafcillin  IVPB 2 Gram(s) IV Intermittent every 4 hours  nystatin Powder 1 Application(s) Topical two times a day  senna 2 Tablet(s) Oral at bedtime    MEDICATIONS  (PRN):  acetaminophen   Tablet .. 650 milliGRAM(s) Oral every 6 hours PRN Mild Pain (1 - 3)  ondansetron Injectable 4 milliGRAM(s) IV Push every 6 hours PRN Nausea  oxycodone    5 mG/acetaminophen 325 mG 1 Tablet(s) Oral every 6 hours PRN Moderate Pain (4 - 6)  oxycodone    5 mG/acetaminophen 325 mG 2 Tablet(s) Oral every 6 hours PRN Severe Pain (7 - 10)    Allergies: No Known Allergies    OBJECTIVE:  Vital Signs Last 24 Hrs  T(C): 36.4 (23 Mar 2021 09:02), Max: 37.3 (22 Mar 2021 22:27)  T(F): 97.5 (23 Mar 2021 09:02), Max: 99.1 (22 Mar 2021 22:27)  HR: 70 (23 Mar 2021 08:13) (70 - 88)  BP: 127/60 (23 Mar 2021 08:13) (104/51 - 136/61)  BP(mean): --  RR: 18 (23 Mar 2021 08:13) (17 - 18)  SpO2: 98% (23 Mar 2021 08:13) (97% - 98%)  I&O's Summary    22 Mar 2021 07:01  -  23 Mar 2021 07:00  --------------------------------------------------------  IN: 1140 mL / OUT: 2526 mL / NET: -1386 mL    23 Mar 2021 07:01  -  23 Mar 2021 11:04  --------------------------------------------------------  IN: 0 mL / OUT: 0 mL / NET: 0 mL    PHYSICAL EXAM:  Gen: NAD, lying comfortably at 35 deg  HEENT: NCAT, MMM, clear OP  CV: RRR, no m/g/r appreciated  Pulm: CTA B, no w/r/r; no increase in WOB  Abd: normoactive BS, soft, NTND  Ext: LLE with kerlex/ace bandage dressing to knee, R-BKA w/ace bandage and NITO drain w/scant blood   Neuro: AOx2, nonfocal despite BKA  Psych: pleasant and conversational but forgetful and slow at organizing thoughts at time    LABS:                        10.0   14.33 )-----------( 554      ( 23 Mar 2021 06:50 )             32.0     03-23    135  |  101  |  6<L>  ----------------------------<  155<H>  4.1   |  25  |  0.53    Ca    8.4      23 Mar 2021 06:50  Phos  4.2     03-23  Mg     2.1     03-23    CAPILLARY BLOOD GLUCOSE  POCT Blood Glucose.: 167 mg/dL (23 Mar 2021 07:34)  POCT Blood Glucose.: 254 mg/dL (22 Mar 2021 21:56)  POCT Blood Glucose.: 204 mg/dL (22 Mar 2021 16:42)  POCT Blood Glucose.: 185 mg/dL (22 Mar 2021 11:49)    MICRODATA:  No new microdata.    RADIOLOGY/OTHER STUDIES:  No new imaging.

## 2021-03-24 LAB
ANION GAP SERPL CALC-SCNC: 11 MMOL/L — SIGNIFICANT CHANGE UP (ref 5–17)
BUN SERPL-MCNC: 6 MG/DL — LOW (ref 7–23)
CALCIUM SERPL-MCNC: 8.1 MG/DL — LOW (ref 8.4–10.5)
CHLORIDE SERPL-SCNC: 103 MMOL/L — SIGNIFICANT CHANGE UP (ref 96–108)
CO2 SERPL-SCNC: 23 MMOL/L — SIGNIFICANT CHANGE UP (ref 22–31)
CREAT SERPL-MCNC: 0.56 MG/DL — SIGNIFICANT CHANGE UP (ref 0.5–1.3)
GLUCOSE BLDC GLUCOMTR-MCNC: 108 MG/DL — HIGH (ref 70–99)
GLUCOSE BLDC GLUCOMTR-MCNC: 129 MG/DL — HIGH (ref 70–99)
GLUCOSE BLDC GLUCOMTR-MCNC: 136 MG/DL — HIGH (ref 70–99)
GLUCOSE BLDC GLUCOMTR-MCNC: 179 MG/DL — HIGH (ref 70–99)
GLUCOSE SERPL-MCNC: 138 MG/DL — HIGH (ref 70–99)
HCT VFR BLD CALC: 26.8 % — LOW (ref 34.5–45)
HCT VFR BLD CALC: 27 % — LOW (ref 34.5–45)
HGB BLD-MCNC: 8.4 G/DL — LOW (ref 11.5–15.5)
HGB BLD-MCNC: 8.7 G/DL — LOW (ref 11.5–15.5)
MAGNESIUM SERPL-MCNC: 1.8 MG/DL — SIGNIFICANT CHANGE UP (ref 1.6–2.6)
MCHC RBC-ENTMCNC: 29.7 PG — SIGNIFICANT CHANGE UP (ref 27–34)
MCHC RBC-ENTMCNC: 30.5 PG — SIGNIFICANT CHANGE UP (ref 27–34)
MCHC RBC-ENTMCNC: 31.3 GM/DL — LOW (ref 32–36)
MCHC RBC-ENTMCNC: 32.2 GM/DL — SIGNIFICANT CHANGE UP (ref 32–36)
MCV RBC AUTO: 94.7 FL — SIGNIFICANT CHANGE UP (ref 80–100)
MCV RBC AUTO: 94.7 FL — SIGNIFICANT CHANGE UP (ref 80–100)
NRBC # BLD: 0 /100 WBCS — SIGNIFICANT CHANGE UP (ref 0–0)
NRBC # BLD: 0 /100 WBCS — SIGNIFICANT CHANGE UP (ref 0–0)
PHOSPHATE SERPL-MCNC: 4.4 MG/DL — SIGNIFICANT CHANGE UP (ref 2.5–4.5)
PLATELET # BLD AUTO: 499 K/UL — HIGH (ref 150–400)
PLATELET # BLD AUTO: 583 K/UL — HIGH (ref 150–400)
POTASSIUM SERPL-MCNC: 4.2 MMOL/L — SIGNIFICANT CHANGE UP (ref 3.5–5.3)
POTASSIUM SERPL-SCNC: 4.2 MMOL/L — SIGNIFICANT CHANGE UP (ref 3.5–5.3)
RBC # BLD: 2.83 M/UL — LOW (ref 3.8–5.2)
RBC # BLD: 2.85 M/UL — LOW (ref 3.8–5.2)
RBC # FLD: 15.8 % — HIGH (ref 10.3–14.5)
RBC # FLD: 16 % — HIGH (ref 10.3–14.5)
SODIUM SERPL-SCNC: 137 MMOL/L — SIGNIFICANT CHANGE UP (ref 135–145)
SURGICAL PATHOLOGY STUDY: SIGNIFICANT CHANGE UP
WBC # BLD: 14.34 K/UL — HIGH (ref 3.8–10.5)
WBC # BLD: 15.46 K/UL — HIGH (ref 3.8–10.5)
WBC # FLD AUTO: 14.34 K/UL — HIGH (ref 3.8–10.5)
WBC # FLD AUTO: 15.46 K/UL — HIGH (ref 3.8–10.5)

## 2021-03-24 PROCEDURE — 99233 SBSQ HOSP IP/OBS HIGH 50: CPT

## 2021-03-24 PROCEDURE — 99231 SBSQ HOSP IP/OBS SF/LOW 25: CPT | Mod: GC

## 2021-03-24 RX ORDER — INSULIN GLARGINE 100 [IU]/ML
10 INJECTION, SOLUTION SUBCUTANEOUS
Qty: 0 | Refills: 0 | DISCHARGE
Start: 2021-03-24

## 2021-03-24 RX ORDER — ACETAMINOPHEN 500 MG
2 TABLET ORAL
Qty: 0 | Refills: 0 | DISCHARGE
Start: 2021-03-24

## 2021-03-24 RX ORDER — INSULIN LISPRO 100/ML
7 VIAL (ML) SUBCUTANEOUS
Qty: 0 | Refills: 0 | DISCHARGE
Start: 2021-03-24

## 2021-03-24 RX ORDER — NAFCILLIN 10 G/100ML
2 INJECTION, POWDER, FOR SOLUTION INTRAVENOUS
Qty: 0 | Refills: 0 | DISCHARGE
Start: 2021-03-24 | End: 2021-03-29

## 2021-03-24 RX ORDER — INSULIN GLARGINE 100 [IU]/ML
11 INJECTION, SOLUTION SUBCUTANEOUS
Qty: 0 | Refills: 0 | DISCHARGE
Start: 2021-03-24

## 2021-03-24 RX ORDER — INSULIN GLARGINE 100 [IU]/ML
14 INJECTION, SOLUTION SUBCUTANEOUS
Qty: 0 | Refills: 0 | DISCHARGE
Start: 2021-03-24

## 2021-03-24 RX ORDER — SENNA PLUS 8.6 MG/1
2 TABLET ORAL
Qty: 0 | Refills: 0 | DISCHARGE
Start: 2021-03-24

## 2021-03-24 RX ORDER — INSULIN LISPRO 100/ML
10 VIAL (ML) SUBCUTANEOUS
Qty: 0 | Refills: 0 | DISCHARGE
Start: 2021-03-24

## 2021-03-24 RX ORDER — INSULIN LISPRO 100/ML
8 VIAL (ML) SUBCUTANEOUS
Refills: 0 | Status: DISCONTINUED | OUTPATIENT
Start: 2021-03-24 | End: 2021-03-26

## 2021-03-24 RX ORDER — INSULIN LISPRO 100/ML
8 VIAL (ML) SUBCUTANEOUS
Qty: 0 | Refills: 0 | DISCHARGE
Start: 2021-03-24

## 2021-03-24 RX ADMIN — OXYCODONE AND ACETAMINOPHEN 2 TABLET(S): 5; 325 TABLET ORAL at 03:24

## 2021-03-24 RX ADMIN — NAFCILLIN 100 GRAM(S): 10 INJECTION, POWDER, FOR SOLUTION INTRAVENOUS at 06:09

## 2021-03-24 RX ADMIN — Medication 8 UNIT(S): at 17:41

## 2021-03-24 RX ADMIN — Medication 10 UNIT(S): at 12:37

## 2021-03-24 RX ADMIN — NAFCILLIN 100 GRAM(S): 10 INJECTION, POWDER, FOR SOLUTION INTRAVENOUS at 22:00

## 2021-03-24 RX ADMIN — OXYCODONE AND ACETAMINOPHEN 2 TABLET(S): 5; 325 TABLET ORAL at 14:46

## 2021-03-24 RX ADMIN — NAFCILLIN 100 GRAM(S): 10 INJECTION, POWDER, FOR SOLUTION INTRAVENOUS at 02:13

## 2021-03-24 RX ADMIN — HEPARIN SODIUM 5000 UNIT(S): 5000 INJECTION INTRAVENOUS; SUBCUTANEOUS at 14:13

## 2021-03-24 RX ADMIN — NAFCILLIN 100 GRAM(S): 10 INJECTION, POWDER, FOR SOLUTION INTRAVENOUS at 10:45

## 2021-03-24 RX ADMIN — OXYCODONE AND ACETAMINOPHEN 2 TABLET(S): 5; 325 TABLET ORAL at 04:00

## 2021-03-24 RX ADMIN — Medication 2: at 17:41

## 2021-03-24 RX ADMIN — Medication 650 MILLIGRAM(S): at 07:10

## 2021-03-24 RX ADMIN — ONDANSETRON 4 MILLIGRAM(S): 8 TABLET, FILM COATED ORAL at 13:29

## 2021-03-24 RX ADMIN — HEPARIN SODIUM 5000 UNIT(S): 5000 INJECTION INTRAVENOUS; SUBCUTANEOUS at 06:09

## 2021-03-24 RX ADMIN — OXYCODONE AND ACETAMINOPHEN 2 TABLET(S): 5; 325 TABLET ORAL at 09:45

## 2021-03-24 RX ADMIN — Medication 10 UNIT(S): at 08:26

## 2021-03-24 RX ADMIN — NYSTATIN CREAM 1 APPLICATION(S): 100000 CREAM TOPICAL at 06:08

## 2021-03-24 RX ADMIN — NYSTATIN CREAM 1 APPLICATION(S): 100000 CREAM TOPICAL at 17:41

## 2021-03-24 RX ADMIN — SENNA PLUS 2 TABLET(S): 8.6 TABLET ORAL at 21:17

## 2021-03-24 RX ADMIN — OXYCODONE AND ACETAMINOPHEN 2 TABLET(S): 5; 325 TABLET ORAL at 21:17

## 2021-03-24 RX ADMIN — INSULIN GLARGINE 14 UNIT(S): 100 INJECTION, SOLUTION SUBCUTANEOUS at 22:12

## 2021-03-24 RX ADMIN — OXYCODONE AND ACETAMINOPHEN 2 TABLET(S): 5; 325 TABLET ORAL at 16:20

## 2021-03-24 RX ADMIN — OXYCODONE AND ACETAMINOPHEN 2 TABLET(S): 5; 325 TABLET ORAL at 10:43

## 2021-03-24 RX ADMIN — NAFCILLIN 100 GRAM(S): 10 INJECTION, POWDER, FOR SOLUTION INTRAVENOUS at 17:41

## 2021-03-24 RX ADMIN — OXYCODONE AND ACETAMINOPHEN 2 TABLET(S): 5; 325 TABLET ORAL at 22:00

## 2021-03-24 RX ADMIN — Medication 650 MILLIGRAM(S): at 19:47

## 2021-03-24 RX ADMIN — HEPARIN SODIUM 5000 UNIT(S): 5000 INJECTION INTRAVENOUS; SUBCUTANEOUS at 22:00

## 2021-03-24 RX ADMIN — NAFCILLIN 100 GRAM(S): 10 INJECTION, POWDER, FOR SOLUTION INTRAVENOUS at 14:13

## 2021-03-24 RX ADMIN — Medication 650 MILLIGRAM(S): at 20:15

## 2021-03-24 RX ADMIN — Medication 650 MILLIGRAM(S): at 06:23

## 2021-03-24 NOTE — OCCUPATIONAL THERAPY INITIAL EVALUATION ADULT - PHYSICAL ASSIST/NONPHYSICAL ASSIST: SCOOT/BRIDGE, REHAB EVAL
pt self-limiting, requiring verbal cues for encouragement/verbal cues/nonverbal cues (demo/gestures)/1 person assist

## 2021-03-24 NOTE — PROGRESS NOTE ADULT - SUBJECTIVE AND OBJECTIVE BOX
24hr events:  O/N: BETTIE MUSA  3/23: Dressing changed on left foot, healing well/no pus, refused leg stretching exercises.   NITO: 20ml. urine: 600cc. WBC: 14 (from 11).               PLEASE CHECK WHEN PRESENT:     [  ] Heart Failure     [  ] Acute     [  ] Acute on Chronic     [  ] Chronic  -------------------------------------------------------------------     [  ]Diastolic [HFpEF]     [  ]Systolic [HFrEF]     [  ]Combined [HFpEF & HFrEF]     [  ] afib     [  ] hypertensive heart disease     [  ]Other:  -------------------------------------------------------------------  [ ] Respiratory failure  [ ] Acute cor pulmonale  [ ] Asthma/COPD Exacerbation  [ ] Pleural effusion  [ ] Aspiration pneumonia  -------------------------------------------------------------------  [  ]ANTONY     [  ]ATN     [  ]Reneal Medullary Necrosis     [  ]Renal Cortical Necrosis     [  ]Other Pathological Lesions:    [  ]CKD 1  [  ]CKD 2  [  ]CKD 3  [  ]CKD 4  [  ]CKD 5  [  ]Other  -------------------------------------------------------------------  [x]Diabetes - new DX on this admission  [x] Diabetic PVD Ulcer  [  ] Neuropathic ulcer to DM  [  ] Diabetes with Nephropathy  [  ] Osteomyelitis due to diabetes  [x ] Hyperglycemia  [ ] Hypoglycemia  --------------------------------------------------------------------  [  ]Malnutrition: See Nutrition Note  [  ]Cachexia  [  ]Other:   [  ]Supplement Ordered:  [  ]Morbid Obesity (BMI >=40]  ---------------------------------------------------------------------  [ ] Sepsis/severe sepsis/septic shock  [ ] UTI  [ ] Pneumonia  -----------------------------------------------------------------------  [ ] Acidosis/alkalosis  [ ] Fluid overload  [x ] Hypokalemia  [ ] Hyperkalemia  [x ] Hypomagnesemia  [x ] Hypophosphatemia  [ ] Hyperphosphatemia  [ ] Hyponatremia  [ ] Hypernatremia  ------------------------------------------------------------------------  [x ] Acute blood loss anemia- due to OR for BKA guillotine and closure  [ ] Post op blood loss anemia  [ ] Iron deficiency anemia  [ ] Anemia due to chronic disease  [ ] Hypercoagulable state  [x ] Leukocytosis  ----------------------------------------------------------------------  [ ] Cerebral infarction  [ ] Transient ischemia attack  [ ] Encephalopathy      Assessment/Plan;  75F PMHx UC, colon cancer s/p total abdominal colectomy with end ileostomy, found down at home, was admitted for DKA and R diabetic foot wound, s/p operative debridement by podiatry 3/14/21 and R foot guillotine amputation and L foot I&D by vascular surgery 3/15/21.    #R foot wound and Lt foot wound  s/p R foot operative debridement by podiatry 3/14 s/p R guillotine BKA, L ankle I&D with penrose drain 3/15  -s/p R below knee amputation closure 3/19  -pain control  -Nafcillin 3/16-3/29, will follow up with ID as outpatient  -local wound care  -f/u labs    #DKA  -f/u endo recs  -ISS  -lantus 12u HS, added lispro 10u TID with meals  -hgb A1C=16.1    Diet: consistent carb  PPx: HSQ  Dispo: f/u PT recs; patient wants AR       24hr events:  O/N: LENCHO, VSS  3/23: Dressing changed on left foot, healing well/no pus, refused leg stretching exercises.   NITO: 20ml. urine: 600cc. WBC: 14 (from 11).     nafcillin  IVPB 2  heparin   Injectable 5000  nafcillin  IVPB 2        Vital Signs Last 24 Hrs  T(C): 37.6 (24 Mar 2021 05:48), Max: 37.7 (23 Mar 2021 22:18)  T(F): 99.6 (24 Mar 2021 05:48), Max: 99.8 (23 Mar 2021 22:18)  HR: 72 (24 Mar 2021 05:36) (70 - 90)  BP: 119/60 (24 Mar 2021 05:36) (102/51 - 150/62)  BP(mean): --  RR: 18 (24 Mar 2021 05:36) (17 - 18)  SpO2: 98% (24 Mar 2021 05:36) (97% - 98%)  I&O's Summary    23 Mar 2021 07:01  -  24 Mar 2021 07:00  --------------------------------------------------------  IN: 500 mL / OUT: 2185 mL / NET: -1685 mL        Physical Exam:  General: NAD, resting comfortably in bed  Pulmonary: Nonlabored breathing, no respiratory distress  Extremities: RLE below knee amputation dressing removed incision c/d/i no erythema or discharge soft no hematoma appreciated NITO drain removed; L ankle wound with pink tissue at I&D site, visible granulation tissue, no drainage      LABS:                        8.4    15.46 )-----------( 583      ( 24 Mar 2021 07:03 )             26.8     03-23    135  |  101  |  6<L>  ----------------------------<  155<H>  4.1   |  25  |  0.53    Ca    8.4      23 Mar 2021 06:50  Phos  4.2     03-23  Mg     2.1     03-23            PLEASE CHECK WHEN PRESENT:     [  ] Heart Failure     [  ] Acute     [  ] Acute on Chronic     [  ] Chronic  -------------------------------------------------------------------     [  ]Diastolic [HFpEF]     [  ]Systolic [HFrEF]     [  ]Combined [HFpEF & HFrEF]     [  ] afib     [  ] hypertensive heart disease     [  ]Other:  -------------------------------------------------------------------  [ ] Respiratory failure  [ ] Acute cor pulmonale  [ ] Asthma/COPD Exacerbation  [ ] Pleural effusion  [ ] Aspiration pneumonia  -------------------------------------------------------------------  [  ]ANTONY     [  ]ATN     [  ]Reneal Medullary Necrosis     [  ]Renal Cortical Necrosis     [  ]Other Pathological Lesions:    [  ]CKD 1  [  ]CKD 2  [  ]CKD 3  [  ]CKD 4  [  ]CKD 5  [  ]Other  -------------------------------------------------------------------  [x]Diabetes - new DX on this admission  [x] Diabetic PVD Ulcer  [  ] Neuropathic ulcer to DM  [  ] Diabetes with Nephropathy  [  ] Osteomyelitis due to diabetes  [x ] Hyperglycemia  [ ] Hypoglycemia  --------------------------------------------------------------------  [  ]Malnutrition: See Nutrition Note  [  ]Cachexia  [  ]Other:   [  ]Supplement Ordered:  [  ]Morbid Obesity (BMI >=40]  ---------------------------------------------------------------------  [ ] Sepsis/severe sepsis/septic shock  [ ] UTI  [ ] Pneumonia  -----------------------------------------------------------------------  [ ] Acidosis/alkalosis  [ ] Fluid overload  [x ] Hypokalemia  [ ] Hyperkalemia  [x ] Hypomagnesemia  [x ] Hypophosphatemia  [ ] Hyperphosphatemia  [ ] Hyponatremia  [ ] Hypernatremia  ------------------------------------------------------------------------  [x ] Acute blood loss anemia- due to OR for BKA guillotine and closure  [ ] Post op blood loss anemia  [ ] Iron deficiency anemia  [ ] Anemia due to chronic disease  [ ] Hypercoagulable state  [x ] Leukocytosis  ----------------------------------------------------------------------  [ ] Cerebral infarction  [ ] Transient ischemia attack  [ ] Encephalopathy      Assessment/Plan;  75F PMHx UC, colon cancer s/p total abdominal colectomy with end ileostomy, found down at home, was admitted for DKA and R diabetic foot wound, s/p operative debridement by podiatry 3/14/21 and R foot guillotine amputation and L foot I&D by vascular surgery 3/15/21.    #R foot wound and Lt foot wound  s/p R foot operative debridement by podiatry 3/14 s/p R bebe BKA, L ankle I&D with penrose drain 3/15  -s/p R below knee amputation closure 3/19  -pain control  -Nafcillin 3/16-3/29, will follow up with ID as outpatient  -local wound care  -f/u labs    #DKA  -f/u endo recs  -ISS  -lantus 12u HS, added lispro 10u TID with meals  -hgb A1C=16.1    Diet: consistent carb  PPx: HSQ  Dispo: f/u PT recs, plan to dc to rehab today       24hr events:  O/N: LENCHO, VSS  3/23: Dressing changed on left foot, healing well/no pus, refused leg stretching exercises.   NITO: 20ml. urine: 600cc. WBC: 14 (from 11).     nafcillin  IVPB 2  heparin   Injectable 5000  nafcillin  IVPB 2        Vital Signs Last 24 Hrs  T(C): 37.6 (24 Mar 2021 05:48), Max: 37.7 (23 Mar 2021 22:18)  T(F): 99.6 (24 Mar 2021 05:48), Max: 99.8 (23 Mar 2021 22:18)  HR: 72 (24 Mar 2021 05:36) (70 - 90)  BP: 119/60 (24 Mar 2021 05:36) (102/51 - 150/62)  BP(mean): --  RR: 18 (24 Mar 2021 05:36) (17 - 18)  SpO2: 98% (24 Mar 2021 05:36) (97% - 98%)  I&O's Summary    23 Mar 2021 07:01  -  24 Mar 2021 07:00  --------------------------------------------------------  IN: 500 mL / OUT: 2185 mL / NET: -1685 mL        Physical Exam:  General: NAD, resting comfortably in bed  Pulmonary: Nonlabored breathing, no respiratory distress  Extremities: RLE below knee amputation dressing removed incision c/d/i no erythema or discharge soft no hematoma appreciated NITO drain removed; L ankle wound with pink tissue at I&D site, visible granulation tissue, no drainage      LABS:                        8.4    15.46 )-----------( 583      ( 24 Mar 2021 07:03 )             26.8     03-23    135  |  101  |  6<L>  ----------------------------<  155<H>  4.1   |  25  |  0.53    Ca    8.4      23 Mar 2021 06:50  Phos  4.2     03-23  Mg     2.1     03-23            PLEASE CHECK WHEN PRESENT:     [  ] Heart Failure     [  ] Acute     [  ] Acute on Chronic     [  ] Chronic  -------------------------------------------------------------------     [  ]Diastolic [HFpEF]     [  ]Systolic [HFrEF]     [  ]Combined [HFpEF & HFrEF]     [  ] afib     [  ] hypertensive heart disease     [  ]Other:  -------------------------------------------------------------------  [ ] Respiratory failure  [ ] Acute cor pulmonale  [ ] Asthma/COPD Exacerbation  [ ] Pleural effusion  [ ] Aspiration pneumonia  -------------------------------------------------------------------  [  ]ANTONY     [  ]ATN     [  ]Reneal Medullary Necrosis     [  ]Renal Cortical Necrosis     [  ]Other Pathological Lesions:    [  ]CKD 1  [  ]CKD 2  [  ]CKD 3  [  ]CKD 4  [  ]CKD 5  [  ]Other  -------------------------------------------------------------------  [x]Diabetes - new DX on this admission  [x] Diabetic PVD Ulcer  [  ] Neuropathic ulcer to DM  [  ] Diabetes with Nephropathy  [  ] Osteomyelitis due to diabetes  [x ] Hyperglycemia  [ ] Hypoglycemia  --------------------------------------------------------------------  [  ]Malnutrition: See Nutrition Note  [  ]Cachexia  [  ]Other:   [  ]Supplement Ordered:  [  ]Morbid Obesity (BMI >=40]  ---------------------------------------------------------------------  [ ] Sepsis/severe sepsis/septic shock  [ ] UTI  [ ] Pneumonia  -----------------------------------------------------------------------  [ ] Acidosis/alkalosis  [ ] Fluid overload  [x ] Hypokalemia  [ ] Hyperkalemia  [x ] Hypomagnesemia  [x ] Hypophosphatemia  [ ] Hyperphosphatemia  [ ] Hyponatremia  [ ] Hypernatremia  ------------------------------------------------------------------------  [x ] Acute blood loss anemia- due to OR for BKA guillotine and closure  [ ] Post op blood loss anemia  [ ] Iron deficiency anemia  [ ] Anemia due to chronic disease  [ ] Hypercoagulable state  [x ] Leukocytosis  ----------------------------------------------------------------------  [ ] Cerebral infarction  [ ] Transient ischemia attack  [ ] Encephalopathy      Assessment/Plan;  75F PMHx UC, colon cancer s/p total abdominal colectomy with end ileostomy, found down at home, was admitted for DKA and R diabetic foot wound, s/p operative debridement by podiatry 3/14/21 and R foot guillotine amputation and L foot I&D by vascular surgery 3/15/21.    #R foot wound and Lt foot wound  s/p R foot operative debridement by podiatry 3/14 s/p R bebe BKA, L ankle I&D with penrose drain 3/15  -s/p R below knee amputation closure 3/19  -pain control  -Nafcillin 3/16-3/29, will follow up with ID as outpatient  -local wound care  -f/u labs    #DKA  -f/u endo recs  -ISS  -lantus 14u HS, added lispro 10u TID with meals  -hgb A1C=16.1    Diet: consistent carb  PPx: HSQ  Dispo: f/u PT recs, plan to dc to rehab today

## 2021-03-24 NOTE — PROGRESS NOTE ADULT - SUBJECTIVE AND OBJECTIVE BOX
INTERVAL HPI/OVERNIGHT EVENTS:    Patient is a 75y old  Female who presents with a chief complaint of Weakness (24 Mar 2021 11:52)  Pt was seen and examined at the bedside.   wbc:15. she complains of  leg pain. She is eating soup from outside for lunch. She cant remember what she ate fro breakfast   S/p BKA right side 3/19.     FSG & Insulin received:    Yesterday:  pre-dinner fs  nutritional lispro 10 units+ 2   units lispro SS  bedtime fs  lantus 14 units + 2   units lispro SS    Today:  pre-breakfast fs  nutritional lispro 10 units  pre-lunch fs  nutritional lispro  10 units    Pt reports the following symptoms:    CONSTITUTIONAL:  Negative fever or chills  CARDIOVASCULAR:  Negative for chest pain or palpitations  RESPIRATORY:  Negative for cough, wheezing, or SOB   GASTROINTESTINAL:  Negative for nausea, vomiting  GENITOURINARY:  Negative frequency, urgency or dysuria        MEDICATIONS  (STANDING):  dextrose 40% Gel 15 Gram(s) Oral once  dextrose 5%. 1000 milliLiter(s) (50 mL/Hr) IV Continuous <Continuous>  dextrose 5%. 1000 milliLiter(s) (100 mL/Hr) IV Continuous <Continuous>  dextrose 50% Injectable 25 Gram(s) IV Push once  dextrose 50% Injectable 12.5 Gram(s) IV Push once  dextrose 50% Injectable 25 Gram(s) IV Push once  glucagon  Injectable 1 milliGRAM(s) IntraMuscular once  heparin   Injectable 5000 Unit(s) SubCutaneous every 8 hours  insulin glargine Injectable (LANTUS) 14 Unit(s) SubCutaneous at bedtime  insulin lispro (ADMELOG) corrective regimen sliding scale   SubCutaneous Before meals and at bedtime  insulin lispro Injectable (ADMELOG) 10 Unit(s) SubCutaneous three times a day before meals  nafcillin  IVPB 2 Gram(s) IV Intermittent every 4 hours  nystatin Powder 1 Application(s) Topical two times a day  senna 2 Tablet(s) Oral at bedtime    MEDICATIONS  (PRN):  acetaminophen   Tablet .. 650 milliGRAM(s) Oral every 6 hours PRN Mild Pain (1 - 3)  ondansetron Injectable 4 milliGRAM(s) IV Push every 6 hours PRN Nausea  oxycodone    5 mG/acetaminophen 325 mG 1 Tablet(s) Oral every 6 hours PRN Moderate Pain (4 - 6)  oxycodone    5 mG/acetaminophen 325 mG 2 Tablet(s) Oral every 6 hours PRN Severe Pain (7 - 10)      Past medical history reviewed  Family history reviewed  Social history reviewed    PHYSICAL EXAM  Vital Signs Last 24 Hrs  T(C): 37.1 (24 Mar 2021 13:42), Max: 37.7 (23 Mar 2021 22:18)  T(F): 98.8 (24 Mar 2021 13:42), Max: 99.8 (23 Mar 2021 22:18)  HR: 72 (24 Mar 2021 05:36) (72 - 90)  BP: 119/60 (24 Mar 2021 05:36) (102/51 - 137/60)  BP(mean): --  RR: 18 (24 Mar 2021 05:36) (17 - 18)  SpO2: 98% (24 Mar 2021 05:36) (98% - 98%)    Constitutional:  in NAD, AOx2  HEENT: no proptosis or lid retraction  Neck: no thyromegaly or palpable thyroid nodules   Respiratory: lungs CTAB.  Cardiovascular: regular rhythm, normal S1 and S2  GI: soft, NT/ND, no masses/HSM appreciated.  EXT: right BKA    LABS:                        8.7    14.34 )-----------( 499      ( 24 Mar 2021 10:41 )             27.0         137  |  103  |  6<L>  ----------------------------<  138<H>  4.2   |  23  |  0.56    Ca    8.1<L>      24 Mar 2021 07:03  Phos  4.4       Mg     1.8                   HbA1C: 16.1 % ( @ 13:30)      CAPILLARY BLOOD GLUCOSE      POCT Blood Glucose.: 108 mg/dL (24 Mar 2021 11:41)  POCT Blood Glucose.: 129 mg/dL (24 Mar 2021 07:05)  POCT Blood Glucose.: 175 mg/dL (23 Mar 2021 22:03)  POCT Blood Glucose.: 152 mg/dL (23 Mar 2021 17:51)      Cholesterol, Serum: 83 mg/dL (03-15-21 @ 04:51)  HDL Cholesterol, Serum: 28 mg/dL (03-15-21 @ 04:51)  Triglycerides, Serum: 89 mg/dL (03-15-21 @ 04:51)      A/P: 75F with PMHx UC s/p colostomy (s/p multiple abdominal surgeries including colostomy bag in ) BIBEMS from home complaining of weakness; presenting likely in DKA with AMS, with bilateral lower extremity open wounds. Of note, pt found to have a hx of DM2: A1C on admission is 16.1. Pt is now s/p I&D w/ washout and debridement of non- viable soft tissue.     1.  DM:   wt:75 KG  cr:0.6, GFR:89  likely insulinopenic  lantus   u at bedtime  pleas give lispro   U  premeals TID   Continue lispro moderate dose scale with meals and at bedtime.   Continue consistent carb diet  FSG Goal 100-180    case seen and discussed with Dr. Yee and updated primary team     INTERVAL HPI/OVERNIGHT EVENTS:    Patient is a 75y old  Female who presents with a chief complaint of Weakness (24 Mar 2021 11:52)  Pt was seen and examined at the bedside.   wbc:15. she complains of  leg pain.   She is eating soup from outside for lunch. She cant remember what she ate fro breakfast   S/p BKA right side 3/19.     FSG & Insulin received:    Yesterday:  pre-dinner fs  nutritional lispro 10 units+ 2   units lispro SS  bedtime fs  lantus 14 units + 2   units lispro SS    Today:  pre-breakfast fs  nutritional lispro 10 units  pre-lunch fs  nutritional lispro  10 units    Pt reports the following symptoms:    CONSTITUTIONAL:  Negative fever or chills  CARDIOVASCULAR:  Negative for chest pain or palpitations  RESPIRATORY:  Negative for cough, wheezing, or SOB   GASTROINTESTINAL:  Negative for nausea, vomiting  GENITOURINARY:  Negative frequency, urgency or dysuria        MEDICATIONS  (STANDING):  dextrose 40% Gel 15 Gram(s) Oral once  dextrose 5%. 1000 milliLiter(s) (50 mL/Hr) IV Continuous <Continuous>  dextrose 5%. 1000 milliLiter(s) (100 mL/Hr) IV Continuous <Continuous>  dextrose 50% Injectable 25 Gram(s) IV Push once  dextrose 50% Injectable 12.5 Gram(s) IV Push once  dextrose 50% Injectable 25 Gram(s) IV Push once  glucagon  Injectable 1 milliGRAM(s) IntraMuscular once  heparin   Injectable 5000 Unit(s) SubCutaneous every 8 hours  insulin glargine Injectable (LANTUS) 14 Unit(s) SubCutaneous at bedtime  insulin lispro (ADMELOG) corrective regimen sliding scale   SubCutaneous Before meals and at bedtime  insulin lispro Injectable (ADMELOG) 10 Unit(s) SubCutaneous three times a day before meals  nafcillin  IVPB 2 Gram(s) IV Intermittent every 4 hours  nystatin Powder 1 Application(s) Topical two times a day  senna 2 Tablet(s) Oral at bedtime    MEDICATIONS  (PRN):  acetaminophen   Tablet .. 650 milliGRAM(s) Oral every 6 hours PRN Mild Pain (1 - 3)  ondansetron Injectable 4 milliGRAM(s) IV Push every 6 hours PRN Nausea  oxycodone    5 mG/acetaminophen 325 mG 1 Tablet(s) Oral every 6 hours PRN Moderate Pain (4 - 6)  oxycodone    5 mG/acetaminophen 325 mG 2 Tablet(s) Oral every 6 hours PRN Severe Pain (7 - 10)      Past medical history reviewed  Family history reviewed  Social history reviewed    PHYSICAL EXAM  Vital Signs Last 24 Hrs  T(C): 37.1 (24 Mar 2021 13:42), Max: 37.7 (23 Mar 2021 22:18)  T(F): 98.8 (24 Mar 2021 13:42), Max: 99.8 (23 Mar 2021 22:18)  HR: 72 (24 Mar 2021 05:36) (72 - 90)  BP: 119/60 (24 Mar 2021 05:36) (102/51 - 137/60)  BP(mean): --  RR: 18 (24 Mar 2021 05:36) (17 - 18)  SpO2: 98% (24 Mar 2021 05:36) (98% - 98%)    Constitutional:  in NAD, AOx2  HEENT: no proptosis or lid retraction  Neck: no thyromegaly or palpable thyroid nodules   Respiratory: lungs CTAB.  Cardiovascular: regular rhythm, normal S1 and S2  GI: soft, NT/ND, no masses/HSM appreciated.  EXT: right BKA    LABS:                        8.7    14.34 )-----------( 499      ( 24 Mar 2021 10:41 )             27.0         137  |  103  |  6<L>  ----------------------------<  138<H>  4.2   |  23  |  0.56    Ca    8.1<L>      24 Mar 2021 07:03  Phos  4.4       Mg     1.8                   HbA1C: 16.1 % ( @ 13:30)      CAPILLARY BLOOD GLUCOSE      POCT Blood Glucose.: 108 mg/dL (24 Mar 2021 11:41)  POCT Blood Glucose.: 129 mg/dL (24 Mar 2021 07:05)  POCT Blood Glucose.: 175 mg/dL (23 Mar 2021 22:03)  POCT Blood Glucose.: 152 mg/dL (23 Mar 2021 17:51)      Cholesterol, Serum: 83 mg/dL (03-15-21 @ 04:51)  HDL Cholesterol, Serum: 28 mg/dL (03-15-21 @ 04:51)  Triglycerides, Serum: 89 mg/dL (03-15-21 @ 04:51)      A/P: 75F with PMHx UC s/p colostomy (s/p multiple abdominal surgeries including colostomy bag in ) BIBEMS from home complaining of weakness; presenting likely in DKA with AMS, with bilateral lower extremity open wounds. Of note, pt found to have a hx of DM2: A1C on admission is 16.1. Pt is now s/p I&D w/ washout and debridement of non- viable soft tissue.     1.  DM:   wt:75 KG  cr:0.6, GFR:89  likely insulinopenic  lantus 14  u at bedtime  pleas give lispro 8  U  premeals TID   Continue lispro moderate dose scale with meals and at bedtime.   Continue consistent carb diet  FSG Goal 100-180    case seen and discussed with Dr. Yee and updated primary team

## 2021-03-24 NOTE — OCCUPATIONAL THERAPY INITIAL EVALUATION ADULT - PLANNED THERAPY INTERVENTIONS, OT EVAL
ADL retraining/IADL retraining/balance training/bed mobility training/fine motor coordination training/ROM/strengthening/transfer training

## 2021-03-24 NOTE — CHART NOTE - NSCHARTNOTEFT_GEN_A_CORE
Admitting Diagnosis:   Patient is a 75y old  Female who presents with a chief complaint of Weakness (24 Mar 2021 05:46)      PAST MEDICAL & SURGICAL HISTORY:  H/O ulcerative colitis    Colon cancer    Status post Jag procedure    Colostomy present        Current Nutrition Order:  Consistent Carbohydrate with evening snack puree diet/thins     PO Intake: Good (%) [  X ]  Fair (50-75%) [   ] Poor (<25%) [   ]     GI Issues:   BM+ 3/23 3/24     Pain:  +R Stump Pain     Skin Integrity:  Ketan 14  1+ R Leg edema  AMP s/p R BKA  Ostomy: OP 300ml 3/24, 400ml 3/23, 800ml 3/23       Labs:   03-24    137  |  103  |  6<L>  ----------------------------<  138<H>  4.2   |  23  |  0.56    Ca    8.1<L>      24 Mar 2021 07:03  Phos  4.4     03-24  Mg     1.8     03-24      CAPILLARY BLOOD GLUCOSE      POCT Blood Glucose.: 129 mg/dL (24 Mar 2021 07:05)  POCT Blood Glucose.: 175 mg/dL (23 Mar 2021 22:03)  POCT Blood Glucose.: 152 mg/dL (23 Mar 2021 17:51)  POCT Blood Glucose.: 89 mg/dL (23 Mar 2021 11:53)      Medications:  MEDICATIONS  (STANDING):  dextrose 40% Gel 15 Gram(s) Oral once  dextrose 5%. 1000 milliLiter(s) (50 mL/Hr) IV Continuous <Continuous>  dextrose 5%. 1000 milliLiter(s) (100 mL/Hr) IV Continuous <Continuous>  dextrose 50% Injectable 25 Gram(s) IV Push once  dextrose 50% Injectable 12.5 Gram(s) IV Push once  dextrose 50% Injectable 25 Gram(s) IV Push once  glucagon  Injectable 1 milliGRAM(s) IntraMuscular once  heparin   Injectable 5000 Unit(s) SubCutaneous every 8 hours  insulin glargine Injectable (LANTUS) 14 Unit(s) SubCutaneous at bedtime  insulin lispro (ADMELOG) corrective regimen sliding scale   SubCutaneous Before meals and at bedtime  insulin lispro Injectable (ADMELOG) 10 Unit(s) SubCutaneous three times a day before meals  nafcillin  IVPB 2 Gram(s) IV Intermittent every 4 hours  nystatin Powder 1 Application(s) Topical two times a day  senna 2 Tablet(s) Oral at bedtime    MEDICATIONS  (PRN):  acetaminophen   Tablet .. 650 milliGRAM(s) Oral every 6 hours PRN Mild Pain (1 - 3)  ondansetron Injectable 4 milliGRAM(s) IV Push every 6 hours PRN Nausea  oxycodone    5 mG/acetaminophen 325 mG 1 Tablet(s) Oral every 6 hours PRN Moderate Pain (4 - 6)  oxycodone    5 mG/acetaminophen 325 mG 2 Tablet(s) Oral every 6 hours PRN Severe Pain (7 - 10)              5'2''  pounds  Weight 165 pounds  BMI 30.2 %JXP=648  >> AMP s/p R BKA: 155 pounds, %LXK=421%    ** No New EMR wts, Request New EMR wt s/p AMP    Estimated energy needs:   Current body wt used for energy calculations as pt falls within % Adjusted-BW   EER Adjsuted for age, Skin - recommend upper end of needs  1875-2250kcal/day 25-30kcal/kg  75-90gm/day 1.0-1.2gm/kg   Fluids per team     Subjective:   76 yo F poor historian with hx UC and colon cancer s/p colectomy and chemotherapy, chronic venous stasis with chronic LE wounds who presents due to frequent falls over the past several weeks at home. She states that overall she has been more homebound, but recently started having more falls. Found to be in DKA with bilateral lower extremity cellulitis and foot infection.  A1c found to be 16.1,  with blood sugar 400+ O/A. Pt s/p Deep incision and drainage of multiple areas of foot with debridement of all non- viable soft tissue and bone 3/14. S/p BKA and I/D 3/15 with closured planned for 3/19.    Pt visited on 5 UR today. Possible D/C pending - D/C issues d/t SW issues and pt preference for [facility] vs [facility]. Remains ordered dys 1 puree/thins consCHO with evening snack diet - breakfast at bedside 100% consumed. Pt unclear on need for puree diet. Noted bedside dys screen passed 3/14. During RD initial visit pt asking for soft foods d/t lack of teeth and had denied issues swallowing. Pt reports she can tolerate things like egg salad and pasta - RD spoke with team about this during prior visit. Pt does reports some Pain which has resulted in nausea, denies vomiting however (Zofran ordered).   Last 3 POCT 129 175 152.   Please see below for nutritions recommendations.    Inadequate energy intake Etiology RT intake<EER Signs/Symptoms AEB NPO.   D/C    NEW PES: Increased nutrient needs RT increased demands AEB Skin   Goal/Expected Outcome: Meet ~75% of EER via feasible route       Recommendations:  1. Recommend Consistent Carbohydrate with evening snack diet.  >> Defer PO cons to team. Unclear if pt able to tolerate Mech Soft, team agreeable for puree diet + allow for egg salad per pt request.    2. Honor pt food preferences as able.  3. Monitor %PO intake, diet tolerance (s/s of issues chewing/swallowing).   4. Monitor Skin, Wts, Labs, GI, GOC.  5. Labs: monitor BMP, CBC, glucose, lytes, trend renal indices, LFts, POCT.  6. RD to remain available for additional nutrition interventions as needed.     Education:  Encouraged PO intake during meals & reviewed importance - reviewed protein foods.  Tips for GI distress provided.     Risk Level: High [ x  ] Moderate [   ] Low [   ].

## 2021-03-24 NOTE — PROGRESS NOTE ADULT - ASSESSMENT
In summary, this is a 76yo woman, poor historian, with a PMH of UC and colon cancer s/p colectomy and chemotherapy and chronic venous stasis w/chronic LE wounds who presented w/frequent falls and subsequently found to have bilateral lower extremity cellulitis and abscesses c/b septic shock and DK with subsequent imaging c/f RLE necrotizing fascitis.  She was admitted to the SICU and underwent a R-foot I&D on 3/14 with Podiatry and R-BKA w/L-foot I&D on 3/15 with Vascular.  She was transferred to telemetry on 3/16 and now s/p BKA closure on 3/19, nearing discharge.     #BLE Cellulitis c/b Septic Shock and Nec Fasc s/p R-BKA  -- ID recommending Nafcillin x 2 weeks (thru 3/29) w/labs sent to Dr. Medeiros  -- will need f/u with Dr. Medeiros upon discharge  -- wound care per primary team; NITO drained pulled   -- Percocet 1 tab q4h PRN and Tylenol 650mg q6h PRN w/Senna    #Newly Diagnosed IDDM2 c/b DKA and SSTI  -- likely risk factor for infection and sepsis   -- per Endo, Lantus 8U QHS and Lispro 10U TID; would finalize recs as nearing discharge   -- Endo following, appreciate their assistance    #UC c/b Colon Ca s/p Ostomy  -- no active issues     #Apthous Ulcer  -- R-inner lower lip  -- continue to offer topical analgesic such as Oragel or Lidocaine     #Acute Blood Loss Anemia   -- likely from multiple trips to the OR and BKA  -- currently stable and will continue to monitor     #Diet - Carb Controlled / Dysphagia 1   #DVT PPx - SQH  #Dispo - no acute rehab acceptances, likely [facility] w/IV abx    Bailee Cottrell  Attending Hospitalist  [phone]

## 2021-03-24 NOTE — PROGRESS NOTE ADULT - SUBJECTIVE AND OBJECTIVE BOX
INTERVAL EVENTS:  -- WBC slightly up; repeat CBC pending    SUBJECTIVE:  -- patient reports stable pain at the site of her R-BKA  -- does not want to be discharged prior to completing her course of abx  -- says she is waiting for her friend to arrive from [place] to help with her disposition  -- good appetite, no fevers/chill, CP, palpitations, N/V  -- Review of Systems: 12 point review of systems otherwise negative    MEDICATIONS:  MEDICATIONS  (STANDING):  dextrose 40% Gel 15 Gram(s) Oral once  dextrose 5%. 1000 milliLiter(s) (50 mL/Hr) IV Continuous <Continuous>  dextrose 5%. 1000 milliLiter(s) (100 mL/Hr) IV Continuous <Continuous>  dextrose 50% Injectable 25 Gram(s) IV Push once  dextrose 50% Injectable 12.5 Gram(s) IV Push once  dextrose 50% Injectable 25 Gram(s) IV Push once  glucagon  Injectable 1 milliGRAM(s) IntraMuscular once  heparin   Injectable 5000 Unit(s) SubCutaneous every 8 hours  insulin glargine Injectable (LANTUS) 14 Unit(s) SubCutaneous at bedtime  insulin lispro (ADMELOG) corrective regimen sliding scale   SubCutaneous Before meals and at bedtime  insulin lispro Injectable (ADMELOG) 10 Unit(s) SubCutaneous three times a day before meals  nafcillin  IVPB 2 Gram(s) IV Intermittent every 4 hours  nystatin Powder 1 Application(s) Topical two times a day  senna 2 Tablet(s) Oral at bedtime    MEDICATIONS  (PRN):  acetaminophen   Tablet .. 650 milliGRAM(s) Oral every 6 hours PRN Mild Pain (1 - 3)  ondansetron Injectable 4 milliGRAM(s) IV Push every 6 hours PRN Nausea  oxycodone    5 mG/acetaminophen 325 mG 1 Tablet(s) Oral every 6 hours PRN Moderate Pain (4 - 6)  oxycodone    5 mG/acetaminophen 325 mG 2 Tablet(s) Oral every 6 hours PRN Severe Pain (7 - 10)    Allergies  No Known Allergies    OBJECTIVE:  Vital Signs Last 24 Hrs  T(C): 36.8 (24 Mar 2021 09:07), Max: 37.7 (23 Mar 2021 22:18)  T(F): 98.2 (24 Mar 2021 09:07), Max: 99.8 (23 Mar 2021 22:18)  HR: 72 (24 Mar 2021 05:36) (72 - 90)  BP: 119/60 (24 Mar 2021 05:36) (102/51 - 137/60)  BP(mean): --  RR: 18 (24 Mar 2021 05:36) (17 - 18)  SpO2: 98% (24 Mar 2021 05:36) (98% - 98%)  I&O's Summary    23 Mar 2021 07:01  -  24 Mar 2021 07:00  --------------------------------------------------------  IN: 500 mL / OUT: 2185 mL / NET: -1685 mL    24 Mar 2021 07:01  -  24 Mar 2021 11:53  --------------------------------------------------------  IN: 180 mL / OUT: 600 mL / NET: -420 mL    PHYSICAL EXAM:  Gen: NAD, sitting at 45 deg on RA eating breakfast  HEENT: NCAT, MMM, clear OP  CV: RRR, no m/g/r appreciated  Pulm: CTA B, no w/r/r; no increase in WOB  Abd: normoactive BS, soft, NTND  Ext: LLE with kerlex/ace bandage dressing to knee, R-BKA w/ace bandage  Neuro: AOx2, nonfocal despite BKA  Psych: conversational but forgetful and slow at organizing thoughts at time    LABS:                        8.7    14.34 )-----------( 499      ( 24 Mar 2021 10:41 )             27.0     03-24    137  |  103  |  6<L>  ----------------------------<  138<H>  4.2   |  23  |  0.56    Ca    8.1<L>      24 Mar 2021 07:03  Phos  4.4     03-24  Mg     1.8     03-24    CAPILLARY BLOOD GLUCOSE  POCT Blood Glucose.: 108 mg/dL (24 Mar 2021 11:41)  POCT Blood Glucose.: 129 mg/dL (24 Mar 2021 07:05)  POCT Blood Glucose.: 175 mg/dL (23 Mar 2021 22:03)  POCT Blood Glucose.: 152 mg/dL (23 Mar 2021 17:51)  POCT Blood Glucose.: 89 mg/dL (23 Mar 2021 11:53)    MICRODATA:  No new microdata.    RADIOLOGY/OTHER STUDIES:  No new imaging.

## 2021-03-24 NOTE — OCCUPATIONAL THERAPY INITIAL EVALUATION ADULT - GENERAL OBSERVATIONS, REHAB EVAL
Pt cleared for OT by SRINIVAS Davis. Pt received semi-garcía, NAD, +tele, +IV, +primafit, +RLE BKA ace bandage, +LLE foot gauze wrap, (L) ostomy bag.

## 2021-03-25 PROBLEM — Z87.19 PERSONAL HISTORY OF OTHER DISEASES OF THE DIGESTIVE SYSTEM: Chronic | Status: ACTIVE | Noted: 2021-03-13

## 2021-03-25 PROBLEM — C18.9 MALIGNANT NEOPLASM OF COLON, UNSPECIFIED: Chronic | Status: ACTIVE | Noted: 2021-03-13

## 2021-03-25 LAB
GLUCOSE BLDC GLUCOMTR-MCNC: 133 MG/DL — HIGH (ref 70–99)
GLUCOSE BLDC GLUCOMTR-MCNC: 136 MG/DL — HIGH (ref 70–99)
GLUCOSE BLDC GLUCOMTR-MCNC: 142 MG/DL — HIGH (ref 70–99)
GLUCOSE BLDC GLUCOMTR-MCNC: 182 MG/DL — HIGH (ref 70–99)
SARS-COV-2 RNA SPEC QL NAA+PROBE: NEGATIVE — SIGNIFICANT CHANGE UP

## 2021-03-25 PROCEDURE — 99231 SBSQ HOSP IP/OBS SF/LOW 25: CPT | Mod: GC

## 2021-03-25 PROCEDURE — 99233 SBSQ HOSP IP/OBS HIGH 50: CPT

## 2021-03-25 RX ORDER — HYDROMORPHONE HYDROCHLORIDE 2 MG/ML
0.5 INJECTION INTRAMUSCULAR; INTRAVENOUS; SUBCUTANEOUS ONCE
Refills: 0 | Status: DISCONTINUED | OUTPATIENT
Start: 2021-03-25 | End: 2021-03-25

## 2021-03-25 RX ADMIN — NAFCILLIN 100 GRAM(S): 10 INJECTION, POWDER, FOR SOLUTION INTRAVENOUS at 22:07

## 2021-03-25 RX ADMIN — OXYCODONE AND ACETAMINOPHEN 2 TABLET(S): 5; 325 TABLET ORAL at 22:07

## 2021-03-25 RX ADMIN — HEPARIN SODIUM 5000 UNIT(S): 5000 INJECTION INTRAVENOUS; SUBCUTANEOUS at 15:04

## 2021-03-25 RX ADMIN — Medication 8 UNIT(S): at 12:38

## 2021-03-25 RX ADMIN — NAFCILLIN 100 GRAM(S): 10 INJECTION, POWDER, FOR SOLUTION INTRAVENOUS at 18:40

## 2021-03-25 RX ADMIN — Medication 650 MILLIGRAM(S): at 03:08

## 2021-03-25 RX ADMIN — OXYCODONE AND ACETAMINOPHEN 2 TABLET(S): 5; 325 TABLET ORAL at 11:39

## 2021-03-25 RX ADMIN — NYSTATIN CREAM 1 APPLICATION(S): 100000 CREAM TOPICAL at 05:59

## 2021-03-25 RX ADMIN — Medication 650 MILLIGRAM(S): at 17:15

## 2021-03-25 RX ADMIN — INSULIN GLARGINE 14 UNIT(S): 100 INJECTION, SOLUTION SUBCUTANEOUS at 22:06

## 2021-03-25 RX ADMIN — NYSTATIN CREAM 1 APPLICATION(S): 100000 CREAM TOPICAL at 18:50

## 2021-03-25 RX ADMIN — HYDROMORPHONE HYDROCHLORIDE 0.5 MILLIGRAM(S): 2 INJECTION INTRAMUSCULAR; INTRAVENOUS; SUBCUTANEOUS at 18:00

## 2021-03-25 RX ADMIN — OXYCODONE AND ACETAMINOPHEN 2 TABLET(S): 5; 325 TABLET ORAL at 12:39

## 2021-03-25 RX ADMIN — OXYCODONE AND ACETAMINOPHEN 2 TABLET(S): 5; 325 TABLET ORAL at 06:30

## 2021-03-25 RX ADMIN — OXYCODONE AND ACETAMINOPHEN 2 TABLET(S): 5; 325 TABLET ORAL at 23:15

## 2021-03-25 RX ADMIN — HEPARIN SODIUM 5000 UNIT(S): 5000 INJECTION INTRAVENOUS; SUBCUTANEOUS at 05:56

## 2021-03-25 RX ADMIN — HEPARIN SODIUM 5000 UNIT(S): 5000 INJECTION INTRAVENOUS; SUBCUTANEOUS at 22:06

## 2021-03-25 RX ADMIN — NAFCILLIN 100 GRAM(S): 10 INJECTION, POWDER, FOR SOLUTION INTRAVENOUS at 02:16

## 2021-03-25 RX ADMIN — HYDROMORPHONE HYDROCHLORIDE 0.5 MILLIGRAM(S): 2 INJECTION INTRAMUSCULAR; INTRAVENOUS; SUBCUTANEOUS at 18:47

## 2021-03-25 RX ADMIN — Medication 650 MILLIGRAM(S): at 04:00

## 2021-03-25 RX ADMIN — NAFCILLIN 100 GRAM(S): 10 INJECTION, POWDER, FOR SOLUTION INTRAVENOUS at 15:04

## 2021-03-25 RX ADMIN — SENNA PLUS 2 TABLET(S): 8.6 TABLET ORAL at 22:06

## 2021-03-25 RX ADMIN — NAFCILLIN 100 GRAM(S): 10 INJECTION, POWDER, FOR SOLUTION INTRAVENOUS at 06:03

## 2021-03-25 RX ADMIN — Medication 650 MILLIGRAM(S): at 16:10

## 2021-03-25 RX ADMIN — Medication 8 UNIT(S): at 18:39

## 2021-03-25 RX ADMIN — OXYCODONE AND ACETAMINOPHEN 2 TABLET(S): 5; 325 TABLET ORAL at 05:57

## 2021-03-25 RX ADMIN — Medication 2: at 12:39

## 2021-03-25 RX ADMIN — Medication 8 UNIT(S): at 07:52

## 2021-03-25 RX ADMIN — NAFCILLIN 100 GRAM(S): 10 INJECTION, POWDER, FOR SOLUTION INTRAVENOUS at 10:35

## 2021-03-25 NOTE — PROGRESS NOTE ADULT - SUBJECTIVE AND OBJECTIVE BOX
Patient is a 75y old  Female who presents with a chief complaint of Weakness (25 Mar 2021 05:15)    INTERVAL EVENTS:  - WBC stable vs day before. Remains afebrile.   - No acute events overnight     SUBJECTIVE:  - Appetite good.   - Voiding bladder and bowels without difficulty.   - No dyspnea, chest pain, or diarrhea.   - Rest of review of systems negative unless documented otherwise in note.    MEDICATIONS:  MEDICATIONS  (STANDING):  dextrose 40% Gel 15 Gram(s) Oral once  dextrose 5%. 1000 milliLiter(s) (50 mL/Hr) IV Continuous <Continuous>  dextrose 5%. 1000 milliLiter(s) (100 mL/Hr) IV Continuous <Continuous>  dextrose 50% Injectable 25 Gram(s) IV Push once  dextrose 50% Injectable 12.5 Gram(s) IV Push once  dextrose 50% Injectable 25 Gram(s) IV Push once  glucagon  Injectable 1 milliGRAM(s) IntraMuscular once  heparin   Injectable 5000 Unit(s) SubCutaneous every 8 hours  insulin glargine Injectable (LANTUS) 14 Unit(s) SubCutaneous at bedtime  insulin lispro (ADMELOG) corrective regimen sliding scale   SubCutaneous Before meals and at bedtime  insulin lispro Injectable (ADMELOG) 8 Unit(s) SubCutaneous three times a day with meals  nafcillin  IVPB 2 Gram(s) IV Intermittent every 4 hours  nystatin Powder 1 Application(s) Topical two times a day  senna 2 Tablet(s) Oral at bedtime    MEDICATIONS  (PRN):  acetaminophen   Tablet .. 650 milliGRAM(s) Oral every 6 hours PRN Mild Pain (1 - 3)  ondansetron Injectable 4 milliGRAM(s) IV Push every 6 hours PRN Nausea  oxycodone    5 mG/acetaminophen 325 mG 1 Tablet(s) Oral every 6 hours PRN Moderate Pain (4 - 6)  oxycodone    5 mG/acetaminophen 325 mG 2 Tablet(s) Oral every 6 hours PRN Severe Pain (7 - 10)      Allergies    No Known Allergies    Intolerances        OBJECTIVE:  Vital Signs Last 24 Hrs  T(C): 36.1 (25 Mar 2021 13:27), Max: 37.2 (25 Mar 2021 10:12)  T(F): 97 (25 Mar 2021 13:27), Max: 99 (25 Mar 2021 10:12)  HR: 88 (25 Mar 2021 11:41) (82 - 94)  BP: 117/58 (25 Mar 2021 11:41) (101/54 - 131/58)  BP(mean): 84 (25 Mar 2021 11:41) (82 - 84)  RR: 16 (25 Mar 2021 11:41) (16 - 18)  SpO2: 98% (25 Mar 2021 11:41) (98% - 98%)  I&O's Summary    24 Mar 2021 07:01  -  25 Mar 2021 07:00  --------------------------------------------------------  IN: 720 mL / OUT: 2050 mL / NET: -1330 mL    25 Mar 2021 07:01  -  25 Mar 2021 15:05  --------------------------------------------------------  IN: 420 mL / OUT: 450 mL / NET: -30 mL        PHYSICAL EXAM:  Gen: sitting upright in bed at time of exam  HEENT: NCAT, MMM, clear OP  Neck: supple, trachea at midline  CV: RRR, +S1/S2, no murmurs appreciated  Pulm: adequate respiratory effort, normal work of breathing; no crackles, no wheeze  Abd: soft, NTND; obese  Skin: warm and dry, no new rashes vs prior report  Ext: Right knee s/p BKA, wrapped in ACE bandages; LLE with Kerlex dressing to knee.   Neuro: AO to person, hospital, and reason for hospitalization, no gross focal neurological deficits  Psych: affect and behavior appropriate; Easily distracted, difficult to elicit focused answers to questions.     LABS:                        8.7    14.34 )-----------( 499      ( 24 Mar 2021 10:41 )             27.0     03-24    137  |  103  |  6<L>  ----------------------------<  138<H>  4.2   |  23  |  0.56    Ca    8.1<L>      24 Mar 2021 07:03  Phos  4.4     03-24  Mg     1.8     03-24      CAPILLARY BLOOD GLUCOSE      POCT Blood Glucose.: 182 mg/dL (25 Mar 2021 12:11)  POCT Blood Glucose.: 136 mg/dL (25 Mar 2021 06:49)  POCT Blood Glucose.: 136 mg/dL (24 Mar 2021 21:21)  POCT Blood Glucose.: 179 mg/dL (24 Mar 2021 16:45)        MICRODATA:      RADIOLOGY/OTHER STUDIES:

## 2021-03-25 NOTE — PROGRESS NOTE ADULT - ASSESSMENT
76yo woman, poor historian, with a PMH of UC and colon cancer s/p colectomy and chemotherapy and chronic venous stasis w/chronic LE wounds who presented w/frequent falls and subsequently found to have bilateral lower extremity cellulitis and abscesses c/b septic shock and DK with subsequent imaging c/f RLE necrotizing fascitis.  She was admitted to the SICU and underwent a R-foot I&D on 3/14 with Podiatry and R-BKA w/L-foot I&D on 3/15 with Vascular.  She was transferred to telemetry on 3/16 and now s/p BKA closure on 3/19, nearing discharge.     #BLE Cellulitis c/b Septic Shock and Nec Fasc s/p R-BKA  -- ID recommending Nafcillin x 2 weeks (thru 3/29) w/labs sent to Dr. Medeiros  -- will need f/u with Dr. Mdeeiros upon discharge  -- wound care per primary team; NITO drained pulled   -- Percocet 1 tab q4h PRN and Tylenol 650mg q6h PRN w/Senna    #Newly Diagnosed IDDM2 c/b DKA and SSTI  -- likely risk factor for infection and sepsis   -- per Endo, Lantus 8U QHS and Lispro 10U TID; would finalize recs as nearing discharge   -- Endo following, appreciate their assistance    #UC c/b Colon Ca s/p Ostomy  -- no active issues     #Apthous Ulcer  -- R-inner lower lip  -- continue to offer topical analgesic such as Oragel or Lidocaine     #Acute Blood Loss Anemia   -- likely from multiple trips to the OR and BKA  -- currently stable and will continue to monitor     #Diet - Carb Controlled / Dysphagia 1   #DVT PPx - SQH  #Dispo - no acute rehab acceptances, likely [facility] w/IV abx     74yo woman, poor historian, with a PMH of UC and colon cancer s/p colectomy and chemotherapy and chronic venous stasis w/chronic LE wounds who presented w/frequent falls and subsequently found to have bilateral lower extremity cellulitis and abscesses c/b septic shock and DK with subsequent imaging c/f RLE necrotizing fascitis.  She was admitted to the SICU and underwent a R-foot I&D on 3/14 with Podiatry and R-BKA w/L-foot I&D on 3/15 with Vascular.  She was transferred to telemetry on 3/16 and now s/p BKA closure on 3/19, nearing discharge.     #BLE Cellulitis c/b Septic Shock and Nec Fasc s/p R-BKA  -- ID recommending Nafcillin x 2 weeks (thru 3/29) w/labs sent to Dr. Medeiros  -- will need f/u with Dr. Medeiros upon discharge  -- wound care per primary team; NITO drained pulled   -- Percocet 1 tab q4h PRN and Tylenol 650mg q6h PRN w/Senna    #Newly Diagnosed IDDM2 c/b DKA and SSTI  -- likely risk factor for infection and sepsis   -- per Endo, Lantus 14U QHS and Lispro 8U TID premeal; would finalize recs as nearing discharge   -- Endo following, appreciate their assistance    #UC c/b Colon Ca s/p Ostomy  -- no active issues     #Apthous Ulcer  -- R-inner lower lip  -- continue to offer topical analgesic such as Oragel or Lidocaine     #Acute Blood Loss Anemia   -- likely from multiple trips to the OR and BKA  -- currently stable and will continue to monitor     #Diet - Carb Controlled / Dysphagia 1   #DVT PPx - SQH  #Dispo - no acute rehab acceptances, likely [facility] w/IV abx

## 2021-03-25 NOTE — PROGRESS NOTE ADULT - SUBJECTIVE AND OBJECTIVE BOX
O/N: LENCHO, VSS                                   PLEASE CHECK WHEN PRESENT:     [  ] Heart Failure     [  ] Acute     [  ] Acute on Chronic     [  ] Chronic  -------------------------------------------------------------------     [  ]Diastolic [HFpEF]     [  ]Systolic [HFrEF]     [  ]Combined [HFpEF & HFrEF]     [  ] afib     [  ] hypertensive heart disease     [  ]Other:  -------------------------------------------------------------------  [ ] Respiratory failure  [ ] Acute cor pulmonale  [ ] Asthma/COPD Exacerbation  [ ] Pleural effusion  [ ] Aspiration pneumonia  -------------------------------------------------------------------  [  ]ANTONY     [  ]ATN     [  ]Reneal Medullary Necrosis     [  ]Renal Cortical Necrosis     [  ]Other Pathological Lesions:    [  ]CKD 1  [  ]CKD 2  [  ]CKD 3  [  ]CKD 4  [  ]CKD 5  [  ]Other  -------------------------------------------------------------------  [x]Diabetes - new DX on this admission  [x] Diabetic PVD Ulcer  [  ] Neuropathic ulcer to DM  [  ] Diabetes with Nephropathy  [  ] Osteomyelitis due to diabetes  [x ] Hyperglycemia  [ ] Hypoglycemia  --------------------------------------------------------------------  [  ]Malnutrition: See Nutrition Note  [  ]Cachexia  [  ]Other:   [  ]Supplement Ordered:  [  ]Morbid Obesity (BMI >=40]  ---------------------------------------------------------------------  [ ] Sepsis/severe sepsis/septic shock  [ ] UTI  [ ] Pneumonia  -----------------------------------------------------------------------  [ ] Acidosis/alkalosis  [ ] Fluid overload  [x ] Hypokalemia  [ ] Hyperkalemia  [x ] Hypomagnesemia  [x ] Hypophosphatemia  [ ] Hyperphosphatemia  [ ] Hyponatremia  [ ] Hypernatremia  ------------------------------------------------------------------------  [x ] Acute blood loss anemia- due to OR for BKA guillotine and closure  [ ] Post op blood loss anemia  [ ] Iron deficiency anemia  [ ] Anemia due to chronic disease  [ ] Hypercoagulable state  [x ] Leukocytosis  ----------------------------------------------------------------------  [ ] Cerebral infarction  [ ] Transient ischemia attack  [ ] Encephalopathy      Assessment/Plan;  75F PMHx UC, colon cancer s/p total abdominal colectomy with end ileostomy, found down at home, was admitted for DKA and R diabetic foot wound, s/p operative debridement by podiatry 3/14/21 and R foot guillotine amputation and L foot I&D by vascular surgery 3/15/21.    #R foot wound and Lt foot wound  s/p R foot operative debridement by podiatry 3/14 s/p R guillotine BKA, L ankle I&D with penrose drain 3/15  -s/p R below knee amputation closure 3/19  -pain control  -Nafcillin 3/16-3/29, will follow up with ID as outpatient  -local wound care  -f/u labs    #DKA  -f/u endo recs  -ISS  -lantus 14u HS, added lispro 10u TID with meals  -hgb A1C=16.1    Diet: consistent carb  PPx: HSQ  Dispo: f/u PT recs, plan to dc to rehab today       O/N: LENCHO, VSS         Allergies  No Known Allergies      Vital Signs Last 24 Hrs  T(C): 36.7 (25 Mar 2021 06:57), Max: 37.1 (24 Mar 2021 13:42)  T(F): 98 (25 Mar 2021 06:57), Max: 98.8 (24 Mar 2021 13:42)  HR: 84 (25 Mar 2021 05:06) (66 - 94)  BP: 131/58 (25 Mar 2021 05:06) (101/54 - 131/58)  BP(mean): --  RR: 18 (25 Mar 2021 05:06) (18 - 18)  SpO2: 98% (25 Mar 2021 05:06) (98% - 98%)  I&O's Summary    24 Mar 2021 07:01  -  25 Mar 2021 07:00  --------------------------------------------------------  IN: 720 mL / OUT: 2050 mL / NET: -1330 mL        Physical Exam:  General: NAD, resting comfortably in bed  Pulmonary: Nonlabored breathing, no respiratory distress  Extremities: RLE below knee amputation incision c/d/i no erythema or discharge soft no hematoma appreciated L ankle wound with pink tissue at I&D site, visible granulation tissue, no drainage        LABS:                        8.7    14.34 )-----------( 499      ( 24 Mar 2021 10:41 )             27.0     03-24    137  |  103  |  6<L>  ----------------------------<  138<H>  4.2   |  23  |  0.56    Ca    8.1<L>      24 Mar 2021 07:03  Phos  4.4     03-24  Mg     1.8     03-24          Radiology and Additional Studies:        PLEASE CHECK WHEN PRESENT:     [  ] Heart Failure     [  ] Acute     [  ] Acute on Chronic     [  ] Chronic  -------------------------------------------------------------------     [  ]Diastolic [HFpEF]     [  ]Systolic [HFrEF]     [  ]Combined [HFpEF & HFrEF]     [  ] afib     [  ] hypertensive heart disease     [  ]Other:  -------------------------------------------------------------------  [ ] Respiratory failure  [ ] Acute cor pulmonale  [ ] Asthma/COPD Exacerbation  [ ] Pleural effusion  [ ] Aspiration pneumonia  -------------------------------------------------------------------  [  ]ANTONY     [  ]ATN     [  ]Reneal Medullary Necrosis     [  ]Renal Cortical Necrosis     [  ]Other Pathological Lesions:    [  ]CKD 1  [  ]CKD 2  [  ]CKD 3  [  ]CKD 4  [  ]CKD 5  [  ]Other  -------------------------------------------------------------------  [x]Diabetes - new DX on this admission  [x] Diabetic PVD Ulcer  [  ] Neuropathic ulcer to DM  [  ] Diabetes with Nephropathy  [  ] Osteomyelitis due to diabetes  [x ] Hyperglycemia  [ ] Hypoglycemia  --------------------------------------------------------------------  [  ]Malnutrition: See Nutrition Note  [  ]Cachexia  [  ]Other:   [  ]Supplement Ordered:  [  ]Morbid Obesity (BMI >=40]  ---------------------------------------------------------------------  [ ] Sepsis/severe sepsis/septic shock  [ ] UTI  [ ] Pneumonia  -----------------------------------------------------------------------  [ ] Acidosis/alkalosis  [ ] Fluid overload  [x ] Hypokalemia  [ ] Hyperkalemia  [x ] Hypomagnesemia  [x ] Hypophosphatemia  [ ] Hyperphosphatemia  [ ] Hyponatremia  [ ] Hypernatremia  ------------------------------------------------------------------------  [x ] Acute blood loss anemia- due to OR for BKA guillotine and closure  [ ] Post op blood loss anemia  [ ] Iron deficiency anemia  [ ] Anemia due to chronic disease  [ ] Hypercoagulable state  [x ] Leukocytosis  ----------------------------------------------------------------------  [ ] Cerebral infarction  [ ] Transient ischemia attack  [ ] Encephalopathy      Assessment/Plan;  75F PMHx UC, colon cancer s/p total abdominal colectomy with end ileostomy, found down at home, was admitted for DKA and R diabetic foot wound, s/p operative debridement by podiatry 3/14/21 and R foot guillotine amputation and L foot I&D by vascular surgery 3/15/21.    #R foot wound and Lt foot wound  s/p R foot operative debridement by podiatry 3/14 s/p R guillotine BKA, L ankle I&D  3/15  -s/p R below knee amputation closure 3/19  -pain control  -Nafcillin 3/16-3/29, will follow up with ID as outpatient  -local wound care  -f/u labs    #DKA  -f/u endo recs  -ISS  -lantus 14u HS, added lispro 10u TID with meals  -hgb A1C=16.1    Diet: consistent carb  PPx: HSQ  Dispo: f/u PT recs, plan to dc to rehab today, patient given 24hr notice on 3/24, pending appeal

## 2021-03-25 NOTE — PROGRESS NOTE ADULT - SUBJECTIVE AND OBJECTIVE BOX
INTERVAL HPI/OVERNIGHT EVENTS:    Patient is a 75y old  Female who presents with a chief complaint of Weakness (25 Mar 2021 15:05)  Pt was seen and examined at the bedside. Remains afebrile.   wbc:14.3.   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  good appetite.      FSG & Insulin received:    Yesterday:  pre-dinner fs  nutritional lispro 8 units+ 2   units lispro SS  bedtime fs  lantus 14 units     Today:  pre-breakfast fs  nutritional lispro 8 units  pre-lunch fs  nutritional lispro  8 units+ 2ss    Pt reports the following symptoms:    CONSTITUTIONAL:  Negative fever or chills  CARDIOVASCULAR:  Negative for chest pain or palpitations  RESPIRATORY:  Negative for cough, wheezing, or SOB   GASTROINTESTINAL:  Negative for nausea, vomiting  GENITOURINARY:  Negative frequency, urgency or dysuria        MEDICATIONS  (STANDING):  dextrose 40% Gel 15 Gram(s) Oral once  dextrose 5%. 1000 milliLiter(s) (50 mL/Hr) IV Continuous <Continuous>  dextrose 5%. 1000 milliLiter(s) (100 mL/Hr) IV Continuous <Continuous>  dextrose 50% Injectable 25 Gram(s) IV Push once  dextrose 50% Injectable 12.5 Gram(s) IV Push once  dextrose 50% Injectable 25 Gram(s) IV Push once  glucagon  Injectable 1 milliGRAM(s) IntraMuscular once  heparin   Injectable 5000 Unit(s) SubCutaneous every 8 hours  insulin glargine Injectable (LANTUS) 14 Unit(s) SubCutaneous at bedtime  insulin lispro (ADMELOG) corrective regimen sliding scale   SubCutaneous Before meals and at bedtime  insulin lispro Injectable (ADMELOG) 8 Unit(s) SubCutaneous three times a day with meals  nafcillin  IVPB 2 Gram(s) IV Intermittent every 4 hours  nystatin Powder 1 Application(s) Topical two times a day  senna 2 Tablet(s) Oral at bedtime    MEDICATIONS  (PRN):  acetaminophen   Tablet .. 650 milliGRAM(s) Oral every 6 hours PRN Mild Pain (1 - 3)  ondansetron Injectable 4 milliGRAM(s) IV Push every 6 hours PRN Nausea  oxycodone    5 mG/acetaminophen 325 mG 1 Tablet(s) Oral every 6 hours PRN Moderate Pain (4 - 6)  oxycodone    5 mG/acetaminophen 325 mG 2 Tablet(s) Oral every 6 hours PRN Severe Pain (7 - 10)      Past medical history reviewed  Family history reviewed  Social history reviewed    PHYSICAL EXAM  Vital Signs Last 24 Hrs  T(C): 36.1 (25 Mar 2021 13:27), Max: 37.2 (25 Mar 2021 10:12)  T(F): 97 (25 Mar 2021 13:27), Max: 99 (25 Mar 2021 10:12)  HR: 88 (25 Mar 2021 11:41) (82 - 90)  BP: 117/58 (25 Mar 2021 11:41) (114/64 - 131/58)  BP(mean): 84 (25 Mar 2021 11:41) (82 - 84)  RR: 16 (25 Mar 2021 11:41) (16 - 18)  SpO2: 98% (25 Mar 2021 11:41) (98% - 98%)    Constitutional: wn/wd in NAD.   HEENT: no proptosis or lid retraction  Neck: no thyromegaly or palpable thyroid nodules   Respiratory: lungs CTAB.  Cardiovascular: regular rhythm, normal S1 and S2  GI: soft, NT/ND, no masses/HSM appreciated.  EXT: right BKA    LABS:                        8.7    14.34 )-----------( 499      ( 24 Mar 2021 10:41 )             27.0         137  |  103  |  6<L>  ----------------------------<  138<H>  4.2   |  23  |  0.56    Ca    8.1<L>      24 Mar 2021 07:03  Phos  4.4       Mg     1.8                   HbA1C: 16.1 % ( @ 13:30)      CAPILLARY BLOOD GLUCOSE      POCT Blood Glucose.: 142 mg/dL (25 Mar 2021 16:33)  POCT Blood Glucose.: 182 mg/dL (25 Mar 2021 12:11)  POCT Blood Glucose.: 136 mg/dL (25 Mar 2021 06:49)  POCT Blood Glucose.: 136 mg/dL (24 Mar 2021 21:21)      Cholesterol, Serum: 83 mg/dL (03-15-21 @ 04:51)  HDL Cholesterol, Serum: 28 mg/dL (03-15-21 @ 04:51)  Triglycerides, Serum: 89 mg/dL (03-15-21 @ 04:51)    A/P: 75F with PMHx UC s/p colostomy (s/p multiple abdominal surgeries including colostomy bag in ) BIBEMS from home complaining of weakness; presenting likely in DKA with AMS, with bilateral lower extremity open wounds. Of note, pt found to have a hx of DM2: A1C on admission is 16.1. Pt is now s/p I&D w/ washout and debridement of non- viable soft tissue.     1.  DM:   wt:75 KG  cr:0.6, GFR:89  likely insulinopenic  lantus 14  u at bedtime  pleas give lispro 8  U  premeals TID   Continue lispro moderate dose scale with meals and at bedtime.   Continue consistent carb diet  FSG Goal 100-180    case seen and discussed with Dr. Yee and updated primary team

## 2021-03-26 LAB
APPEARANCE UR: CLEAR — SIGNIFICANT CHANGE UP
BILIRUB UR-MCNC: NEGATIVE — SIGNIFICANT CHANGE UP
COLOR SPEC: YELLOW — SIGNIFICANT CHANGE UP
DIFF PNL FLD: NEGATIVE — SIGNIFICANT CHANGE UP
GLUCOSE BLDC GLUCOMTR-MCNC: 126 MG/DL — HIGH (ref 70–99)
GLUCOSE BLDC GLUCOMTR-MCNC: 127 MG/DL — HIGH (ref 70–99)
GLUCOSE BLDC GLUCOMTR-MCNC: 148 MG/DL — HIGH (ref 70–99)
GLUCOSE BLDC GLUCOMTR-MCNC: 204 MG/DL — HIGH (ref 70–99)
GLUCOSE UR QL: NEGATIVE — SIGNIFICANT CHANGE UP
HCT VFR BLD CALC: 28.2 % — LOW (ref 34.5–45)
HGB BLD-MCNC: 8.9 G/DL — LOW (ref 11.5–15.5)
KETONES UR-MCNC: NEGATIVE — SIGNIFICANT CHANGE UP
LEUKOCYTE ESTERASE UR-ACNC: NEGATIVE — SIGNIFICANT CHANGE UP
MCHC RBC-ENTMCNC: 29.9 PG — SIGNIFICANT CHANGE UP (ref 27–34)
MCHC RBC-ENTMCNC: 31.6 GM/DL — LOW (ref 32–36)
MCV RBC AUTO: 94.6 FL — SIGNIFICANT CHANGE UP (ref 80–100)
NITRITE UR-MCNC: NEGATIVE — SIGNIFICANT CHANGE UP
NRBC # BLD: 0 /100 WBCS — SIGNIFICANT CHANGE UP (ref 0–0)
PH UR: 6.5 — SIGNIFICANT CHANGE UP (ref 5–8)
PLATELET # BLD AUTO: 724 K/UL — HIGH (ref 150–400)
PROT UR-MCNC: NEGATIVE MG/DL — SIGNIFICANT CHANGE UP
RBC # BLD: 2.98 M/UL — LOW (ref 3.8–5.2)
RBC # FLD: 16.2 % — HIGH (ref 10.3–14.5)
SP GR SPEC: 1.01 — SIGNIFICANT CHANGE UP (ref 1–1.03)
UROBILINOGEN FLD QL: 0.2 E.U./DL — SIGNIFICANT CHANGE UP
WBC # BLD: 13.34 K/UL — HIGH (ref 3.8–10.5)
WBC # FLD AUTO: 13.34 K/UL — HIGH (ref 3.8–10.5)

## 2021-03-26 PROCEDURE — 99231 SBSQ HOSP IP/OBS SF/LOW 25: CPT | Mod: GC

## 2021-03-26 PROCEDURE — 99232 SBSQ HOSP IP/OBS MODERATE 35: CPT

## 2021-03-26 PROCEDURE — 71045 X-RAY EXAM CHEST 1 VIEW: CPT | Mod: 26

## 2021-03-26 RX ORDER — INSULIN LISPRO 100/ML
6 VIAL (ML) SUBCUTANEOUS
Refills: 0 | Status: DISCONTINUED | OUTPATIENT
Start: 2021-03-26 | End: 2021-03-28

## 2021-03-26 RX ORDER — OXYCODONE AND ACETAMINOPHEN 5; 325 MG/1; MG/1
1 TABLET ORAL EVERY 6 HOURS
Refills: 0 | Status: DISCONTINUED | OUTPATIENT
Start: 2021-03-26 | End: 2021-04-02

## 2021-03-26 RX ORDER — OXYCODONE AND ACETAMINOPHEN 5; 325 MG/1; MG/1
2 TABLET ORAL EVERY 6 HOURS
Refills: 0 | Status: DISCONTINUED | OUTPATIENT
Start: 2021-03-26 | End: 2021-04-02

## 2021-03-26 RX ORDER — INSULIN GLARGINE 100 [IU]/ML
12 INJECTION, SOLUTION SUBCUTANEOUS AT BEDTIME
Refills: 0 | Status: DISCONTINUED | OUTPATIENT
Start: 2021-03-26 | End: 2021-03-28

## 2021-03-26 RX ADMIN — Medication 4: at 17:01

## 2021-03-26 RX ADMIN — NAFCILLIN 100 GRAM(S): 10 INJECTION, POWDER, FOR SOLUTION INTRAVENOUS at 02:15

## 2021-03-26 RX ADMIN — NAFCILLIN 100 GRAM(S): 10 INJECTION, POWDER, FOR SOLUTION INTRAVENOUS at 06:25

## 2021-03-26 RX ADMIN — Medication 650 MILLIGRAM(S): at 15:30

## 2021-03-26 RX ADMIN — HEPARIN SODIUM 5000 UNIT(S): 5000 INJECTION INTRAVENOUS; SUBCUTANEOUS at 14:11

## 2021-03-26 RX ADMIN — OXYCODONE AND ACETAMINOPHEN 1 TABLET(S): 5; 325 TABLET ORAL at 17:02

## 2021-03-26 RX ADMIN — HEPARIN SODIUM 5000 UNIT(S): 5000 INJECTION INTRAVENOUS; SUBCUTANEOUS at 21:58

## 2021-03-26 RX ADMIN — NYSTATIN CREAM 1 APPLICATION(S): 100000 CREAM TOPICAL at 06:38

## 2021-03-26 RX ADMIN — Medication 6 UNIT(S): at 17:41

## 2021-03-26 RX ADMIN — Medication 650 MILLIGRAM(S): at 14:34

## 2021-03-26 RX ADMIN — OXYCODONE AND ACETAMINOPHEN 2 TABLET(S): 5; 325 TABLET ORAL at 04:16

## 2021-03-26 RX ADMIN — NAFCILLIN 100 GRAM(S): 10 INJECTION, POWDER, FOR SOLUTION INTRAVENOUS at 21:57

## 2021-03-26 RX ADMIN — INSULIN GLARGINE 12 UNIT(S): 100 INJECTION, SOLUTION SUBCUTANEOUS at 21:58

## 2021-03-26 RX ADMIN — NAFCILLIN 100 GRAM(S): 10 INJECTION, POWDER, FOR SOLUTION INTRAVENOUS at 14:11

## 2021-03-26 RX ADMIN — Medication 8 UNIT(S): at 13:08

## 2021-03-26 RX ADMIN — OXYCODONE AND ACETAMINOPHEN 2 TABLET(S): 5; 325 TABLET ORAL at 21:45

## 2021-03-26 RX ADMIN — ONDANSETRON 4 MILLIGRAM(S): 8 TABLET, FILM COATED ORAL at 12:39

## 2021-03-26 RX ADMIN — NYSTATIN CREAM 1 APPLICATION(S): 100000 CREAM TOPICAL at 18:14

## 2021-03-26 RX ADMIN — SENNA PLUS 2 TABLET(S): 8.6 TABLET ORAL at 22:22

## 2021-03-26 RX ADMIN — OXYCODONE AND ACETAMINOPHEN 1 TABLET(S): 5; 325 TABLET ORAL at 18:02

## 2021-03-26 RX ADMIN — NAFCILLIN 100 GRAM(S): 10 INJECTION, POWDER, FOR SOLUTION INTRAVENOUS at 10:01

## 2021-03-26 RX ADMIN — NAFCILLIN 100 GRAM(S): 10 INJECTION, POWDER, FOR SOLUTION INTRAVENOUS at 18:13

## 2021-03-26 RX ADMIN — Medication 8 UNIT(S): at 07:44

## 2021-03-26 RX ADMIN — OXYCODONE AND ACETAMINOPHEN 2 TABLET(S): 5; 325 TABLET ORAL at 20:37

## 2021-03-26 RX ADMIN — OXYCODONE AND ACETAMINOPHEN 2 TABLET(S): 5; 325 TABLET ORAL at 05:17

## 2021-03-26 RX ADMIN — HEPARIN SODIUM 5000 UNIT(S): 5000 INJECTION INTRAVENOUS; SUBCUTANEOUS at 06:24

## 2021-03-26 NOTE — PROGRESS NOTE ADULT - SUBJECTIVE AND OBJECTIVE BOX
INTERVAL EVENTS:  -- NAEO; won appeal with [employer]    SUBJECTIVE:  -- no new complaints; enjoying her breakfast this AM   -- Review of Systems: 12 point review of systems otherwise negative    MEDICATIONS:  MEDICATIONS  (STANDING):  dextrose 40% Gel 15 Gram(s) Oral once  dextrose 5%. 1000 milliLiter(s) (50 mL/Hr) IV Continuous <Continuous>  dextrose 5%. 1000 milliLiter(s) (100 mL/Hr) IV Continuous <Continuous>  dextrose 50% Injectable 25 Gram(s) IV Push once  dextrose 50% Injectable 12.5 Gram(s) IV Push once  dextrose 50% Injectable 25 Gram(s) IV Push once  glucagon  Injectable 1 milliGRAM(s) IntraMuscular once  heparin   Injectable 5000 Unit(s) SubCutaneous every 8 hours  insulin glargine Injectable (LANTUS) 14 Unit(s) SubCutaneous at bedtime  insulin lispro (ADMELOG) corrective regimen sliding scale   SubCutaneous Before meals and at bedtime  insulin lispro Injectable (ADMELOG) 8 Unit(s) SubCutaneous three times a day with meals  nafcillin  IVPB 2 Gram(s) IV Intermittent every 4 hours  nystatin Powder 1 Application(s) Topical two times a day  senna 2 Tablet(s) Oral at bedtime    MEDICATIONS  (PRN):  acetaminophen   Tablet .. 650 milliGRAM(s) Oral every 6 hours PRN Mild Pain (1 - 3)  ondansetron Injectable 4 milliGRAM(s) IV Push every 6 hours PRN Nausea  oxycodone    5 mG/acetaminophen 325 mG 1 Tablet(s) Oral every 6 hours PRN Moderate Pain (4 - 6)  oxycodone    5 mG/acetaminophen 325 mG 2 Tablet(s) Oral every 6 hours PRN Severe Pain (7 - 10)    Allergies: No Known Allergies    OBJECTIVE:  Vital Signs Last 24 Hrs  T(C): 36.7 (26 Mar 2021 09:35), Max: 37.2 (26 Mar 2021 04:21)  T(F): 98 (26 Mar 2021 09:35), Max: 99 (26 Mar 2021 04:21)  HR: 89 (26 Mar 2021 09:01) (74 - 89)  BP: 116/53 (26 Mar 2021 09:01) (116/53 - 140/65)  BP(mean): 84 (25 Mar 2021 17:58) (84 - 84)  RR: 16 (26 Mar 2021 09:01) (16 - 16)  SpO2: 95% (26 Mar 2021 09:01) (95% - 98%)  I&O's Summary    25 Mar 2021 07:01  -  26 Mar 2021 07:00  --------------------------------------------------------  IN: 760 mL / OUT: 2125 mL / NET: -1365 mL    26 Mar 2021 07:01  -  26 Mar 2021 11:30  --------------------------------------------------------  IN: 290 mL / OUT: 0 mL / NET: 290 mL    PHYSICAL EXAM:  Gen: NAD, sitting at 45 deg on RA eating breakfast  HEENT: NCAT, MMM, clear OP  CV: RRR, no m/g/r appreciated  Pulm: CTA B, no w/r/r; no increase in WOB  Abd: normoactive BS, soft, NTND  Ext: LLE with kerlex/ace bandage dressing to knee, R-BKA w/ace bandage  Neuro: AOx2, nonfocal despite BKA  Psych: conversational but forgetful and slow at organizing thoughts    LABS:  CAPILLARY BLOOD GLUCOSE  POCT Blood Glucose.: 127 mg/dL (26 Mar 2021 06:47)  POCT Blood Glucose.: 133 mg/dL (25 Mar 2021 21:32)  POCT Blood Glucose.: 142 mg/dL (25 Mar 2021 16:33)  POCT Blood Glucose.: 182 mg/dL (25 Mar 2021 12:11)    MICRODATA:  No new microdata.    RADIOLOGY/OTHER STUDIES:  No new imaging.

## 2021-03-26 NOTE — PROGRESS NOTE ADULT - SUBJECTIVE AND OBJECTIVE BOX
O/N: LENCHO, VSS                              proPLEASE CHECK WHEN PRESENT:     [  ] Heart Failure     [  ] Acute     [  ] Acute on Chronic     [  ] Chronic  -------------------------------------------------------------------     [  ]Diastolic [HFpEF]     [  ]Systolic [HFrEF]     [  ]Combined [HFpEF & HFrEF]     [  ] afib     [  ] hypertensive heart disease     [  ]Other:  -------------------------------------------------------------------  [ ] Respiratory failure  [ ] Acute cor pulmonale  [ ] Asthma/COPD Exacerbation  [ ] Pleural effusion  [ ] Aspiration pneumonia  -------------------------------------------------------------------  [  ]ANTONY     [  ]ATN     [  ]Reneal Medullary Necrosis     [  ]Renal Cortical Necrosis     [  ]Other Pathological Lesions:    [  ]CKD 1  [  ]CKD 2  [  ]CKD 3  [  ]CKD 4  [  ]CKD 5  [  ]Other  -------------------------------------------------------------------  [x]Diabetes - new DX on this admission  [x] Diabetic PVD Ulcer  [  ] Neuropathic ulcer to DM  [  ] Diabetes with Nephropathy  [  ] Osteomyelitis due to diabetes  [x ] Hyperglycemia  [ ] Hypoglycemia  --------------------------------------------------------------------  [  ]Malnutrition: See Nutrition Note  [  ]Cachexia  [  ]Other:   [  ]Supplement Ordered:  [  ]Morbid Obesity (BMI >=40]  ---------------------------------------------------------------------  [ ] Sepsis/severe sepsis/septic shock  [ ] UTI  [ ] Pneumonia  -----------------------------------------------------------------------  [ ] Acidosis/alkalosis  [ ] Fluid overload  [x ] Hypokalemia  [ ] Hyperkalemia  [x ] Hypomagnesemia  [x ] Hypophosphatemia  [ ] Hyperphosphatemia  [ ] Hyponatremia  [ ] Hypernatremia  ------------------------------------------------------------------------  [x ] Acute blood loss anemia- due to OR for BKA guillotine and closure  [ ] Post op blood loss anemia  [ ] Iron deficiency anemia  [ ] Anemia due to chronic disease  [ ] Hypercoagulable state  [x ] Leukocytosis  ----------------------------------------------------------------------  [ ] Cerebral infarction  [ ] Transient ischemia attack  [ ] Encephalopathy      Assessment/Plan;  75F PMHx UC, colon cancer s/p total abdominal colectomy with end ileostomy, found down at home, was admitted for DKA and R diabetic foot wound, s/p operative debridement by podiatry 3/14/21 and R foot guillotine amputation and L foot I&D by vascular surgery 3/15/21.    #R foot wound and Lt foot wound  s/p R foot operative debridement by podiatry 3/14 s/p R guillotine BKA, L ankle I&D  3/15  -s/p R below knee amputation closure 3/19  -pain control  -Nafcillin 3/16-3/29, will follow up with ID as outpatient  -local wound care  -f/u labs    #DKA  -f/u endo recs  -ISS  -lantus 14u HS, added lispro 10u TID with meals  -hgb A1C=16.1    Diet: consistent carb  PPx: HSQ  Dispo: f/u PT recs, plan to dc to rehab today, patient given 24hr notice on 3/24, pending appeal     O/N: LENCHO, VSS        Vital Signs Last 24 Hrs  T(C): 37.2 (26 Mar 2021 04:21), Max: 37.2 (25 Mar 2021 10:12)  T(F): 99 (26 Mar 2021 04:21), Max: 99 (25 Mar 2021 10:12)  HR: 84 (26 Mar 2021 04:16) (74 - 90)  BP: 118/56 (26 Mar 2021 04:16) (114/64 - 140/65)  BP(mean): 84 (25 Mar 2021 17:58) (82 - 84)  RR: 16 (26 Mar 2021 04:16) (16 - 16)  SpO2: 96% (26 Mar 2021 04:16) (96% - 98%)  I&O's Summary    25 Mar 2021 07:01  -  26 Mar 2021 07:00  --------------------------------------------------------  IN: 760 mL / OUT: 2125 mL / NET: -1365 mL          Physical Exam:  General: NAD, resting comfortably in bed  Pulmonary: Nonlabored breathing, no respiratory distress  Extremities: RLE below knee amputation incision c/d/i no erythema or discharge soft no hematoma appreciated L ankle wound with pink tissue at I&D site, visible granulation tissue, no drainage        LABS:                        8.7    14.34 )-----------( 499      ( 24 Mar 2021 10:41 )             27.0               PLEASE CHECK WHEN PRESENT:     [  ] Heart Failure     [  ] Acute     [  ] Acute on Chronic     [  ] Chronic  -------------------------------------------------------------------     [  ]Diastolic [HFpEF]     [  ]Systolic [HFrEF]     [  ]Combined [HFpEF & HFrEF]     [  ] afib     [  ] hypertensive heart disease     [  ]Other:  -------------------------------------------------------------------  [ ] Respiratory failure  [ ] Acute cor pulmonale  [ ] Asthma/COPD Exacerbation  [ ] Pleural effusion  [ ] Aspiration pneumonia  -------------------------------------------------------------------  [  ]ANTONY     [  ]ATN     [  ]Reneal Medullary Necrosis     [  ]Renal Cortical Necrosis     [  ]Other Pathological Lesions:    [  ]CKD 1  [  ]CKD 2  [  ]CKD 3  [  ]CKD 4  [  ]CKD 5  [  ]Other  -------------------------------------------------------------------  [x]Diabetes - new DX on this admission  [x] Diabetic PVD Ulcer  [  ] Neuropathic ulcer to DM  [  ] Diabetes with Nephropathy  [  ] Osteomyelitis due to diabetes  [x ] Hyperglycemia  [ ] Hypoglycemia  --------------------------------------------------------------------  [  ]Malnutrition: See Nutrition Note  [  ]Cachexia  [  ]Other:   [  ]Supplement Ordered:  [  ]Morbid Obesity (BMI >=40]  ---------------------------------------------------------------------  [ ] Sepsis/severe sepsis/septic shock  [ ] UTI  [ ] Pneumonia  -----------------------------------------------------------------------  [ ] Acidosis/alkalosis  [ ] Fluid overload  [x ] Hypokalemia  [ ] Hyperkalemia  [x ] Hypomagnesemia  [x ] Hypophosphatemia  [ ] Hyperphosphatemia  [ ] Hyponatremia  [ ] Hypernatremia  ------------------------------------------------------------------------  [x ] Acute blood loss anemia- due to OR for BKA guillotine and closure  [ ] Post op blood loss anemia  [ ] Iron deficiency anemia  [ ] Anemia due to chronic disease  [ ] Hypercoagulable state  [x ] Leukocytosis  ----------------------------------------------------------------------  [ ] Cerebral infarction  [ ] Transient ischemia attack  [ ] Encephalopathy      Assessment/Plan;  75F PMHx UC, colon cancer s/p total abdominal colectomy with end ileostomy, found down at home, was admitted for DKA and R diabetic foot wound, s/p operative debridement by podiatry 3/14/21 and R foot guillotine amputation and L foot I&D by vascular surgery 3/15/21.    #R foot wound and Lt foot wound  s/p R foot operative debridement by podiatry 3/14 s/p R guillotine BKA, L ankle I&D  3/15  -s/p R below knee amputation closure 3/19  -pain control  -Nafcillin 3/16-3/29, will follow up with ID as outpatient  -local wound care  -f/u labs    #DKA  -f/u endo recs  -ISS  -lantus 14u HS, added lispro 10u TID with meals  -hgb A1C=16.1    Diet: consistent carb  PPx: HSQ  Dispo: plan to dc to rehab today, patient medically ready

## 2021-03-26 NOTE — PROGRESS NOTE ADULT - SUBJECTIVE AND OBJECTIVE BOX
INTERVAL HPI/OVERNIGHT EVENTS:    Patient is a 75y old  Female who presents with a chief complaint of Weakness (26 Mar 2021 11:30)      Pt reports the following symptoms:    CONSTITUTIONAL:  Negative fever or chills, feels well, good appetite  EYES:  Negative  blurry vision or double vision  CARDIOVASCULAR:  Negative for chest pain or palpitations  RESPIRATORY:  Negative for cough, wheezing, or SOB   GASTROINTESTINAL:  Negative for nausea, vomiting, diarrhea, constipation, or abdominal pain  GENITOURINARY:  Negative frequency, urgency or dysuria  NEUROLOGIC:  No headache, confusion, dizziness, lightheadedness    MEDICATIONS  (STANDING):  dextrose 40% Gel 15 Gram(s) Oral once  dextrose 5%. 1000 milliLiter(s) (50 mL/Hr) IV Continuous <Continuous>  dextrose 5%. 1000 milliLiter(s) (100 mL/Hr) IV Continuous <Continuous>  dextrose 50% Injectable 25 Gram(s) IV Push once  dextrose 50% Injectable 12.5 Gram(s) IV Push once  dextrose 50% Injectable 25 Gram(s) IV Push once  glucagon  Injectable 1 milliGRAM(s) IntraMuscular once  heparin   Injectable 5000 Unit(s) SubCutaneous every 8 hours  insulin glargine Injectable (LANTUS) 12 Unit(s) SubCutaneous at bedtime  insulin lispro (ADMELOG) corrective regimen sliding scale   SubCutaneous Before meals and at bedtime  insulin lispro Injectable (ADMELOG) 6 Unit(s) SubCutaneous three times a day with meals  nafcillin  IVPB 2 Gram(s) IV Intermittent every 4 hours  nystatin Powder 1 Application(s) Topical two times a day  senna 2 Tablet(s) Oral at bedtime    MEDICATIONS  (PRN):  acetaminophen   Tablet .. 650 milliGRAM(s) Oral every 6 hours PRN Mild Pain (1 - 3)  ondansetron Injectable 4 milliGRAM(s) IV Push every 6 hours PRN Nausea  oxycodone    5 mG/acetaminophen 325 mG 1 Tablet(s) Oral every 6 hours PRN Moderate Pain (4 - 6)  oxycodone    5 mG/acetaminophen 325 mG 2 Tablet(s) Oral every 6 hours PRN Severe Pain (7 - 10)      PHYSICAL EXAM  Vital Signs Last 24 Hrs  T(C): 37.4 (26 Mar 2021 16:27), Max: 38.4 (26 Mar 2021 14:25)  T(F): 99.3 (26 Mar 2021 16:27), Max: 101.1 (26 Mar 2021 14:25)  HR: 87 (26 Mar 2021 16:57) (74 - 119)  BP: 107/50 (26 Mar 2021 16:57) (107/50 - 140/65)  BP(mean): --  RR: 18 (26 Mar 2021 16:57) (16 - 18)  SpO2: 98% (26 Mar 2021 16:57) (95% - 99%)    Constitutional: wn/wd in NAD.   HEENT: NCAT, MMM, OP clear, EOMI, no proptosis or lid retraction  Neck: no thyromegaly or palpable thyroid nodules   Respiratory: lungs CTAB.  Cardiovascular: regular rhythm, normal S1 and S2, no audible murmurs, no peripheral edema  GI: soft, NT/ND, no masses/HSM appreciated.  Neurology: no tremors, DTR 2+  Skin: no visible rashes/lesions  Psychiatric: AAO x 3, normal affect/mood.    LABS:            Urinalysis Basic - ( 26 Mar 2021 15:27 )    Color: Yellow / Appearance: Clear / S.010 / pH: x  Gluc: x / Ketone: NEGATIVE  / Bili: Negative / Urobili: 0.2 E.U./dL   Blood: x / Protein: NEGATIVE mg/dL / Nitrite: NEGATIVE   Leuk Esterase: NEGATIVE / RBC: x / WBC x   Sq Epi: x / Non Sq Epi: x / Bacteria: x          HbA1C:   CAPILLARY BLOOD GLUCOSE      POCT Blood Glucose.: 204 mg/dL (26 Mar 2021 16:52)  POCT Blood Glucose.: 126 mg/dL (26 Mar 2021 11:38)  POCT Blood Glucose.: 127 mg/dL (26 Mar 2021 06:47)  POCT Blood Glucose.: 133 mg/dL (25 Mar 2021 21:32)      Insulin Sliding Scale requirements X 24 Hours:    RADIOLOGY & ADDITIONAL TESTS:    A/P: 75y Female with history of DM type II presenting for       1.  DM -     Please continue           units lantus at bedtime  / in the morning and        units lispro with meals and lispro moderate / low dose sliding scale 4 times daily with meals and at bedtime.  Please continue consistent carbohydrate diet.      Goal FSG is   Will continue to monitor   For discharge, pt can continue    Pt can follow up at discharge with [facility] by calling  to make an appointment.   Will discuss case with     and update primary team INTERVAL HPI/OVERNIGHT EVENTS:    Patient is a 75y old  Female who presents with a chief complaint of Weakness (26 Mar 2021 11:30)  Pt was seen and examined at the bedside. Remains afebrile.   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  good appetite.    She started having fever spikes today - sepsis work-up ordered    FSG & Insulin received:    Yesterday:  pre-dinner fs, nutritional lispro 8 units, had soup  bedtime fs, lantus 14 units     Today:  pre-breakfast fs, nutritional lispro 8 units, had cream of wheat  pre-lunch fs,    Pt reports the following symptoms:    CONSTITUTIONAL:  Negative chills  CARDIOVASCULAR:  Negative for chest pain or palpitations  RESPIRATORY:  Negative for cough, wheezing, or SOB   GASTROINTESTINAL:  Negative for nausea, vomiting  GENITOURINARY:  Negative frequency, urgency or dysuria      MEDICATIONS  (STANDING):  dextrose 40% Gel 15 Gram(s) Oral once  dextrose 5%. 1000 milliLiter(s) (50 mL/Hr) IV Continuous <Continuous>  dextrose 5%. 1000 milliLiter(s) (100 mL/Hr) IV Continuous <Continuous>  dextrose 50% Injectable 25 Gram(s) IV Push once  dextrose 50% Injectable 12.5 Gram(s) IV Push once  dextrose 50% Injectable 25 Gram(s) IV Push once  glucagon  Injectable 1 milliGRAM(s) IntraMuscular once  heparin   Injectable 5000 Unit(s) SubCutaneous every 8 hours  insulin glargine Injectable (LANTUS) 12 Unit(s) SubCutaneous at bedtime  insulin lispro (ADMELOG) corrective regimen sliding scale   SubCutaneous Before meals and at bedtime  insulin lispro Injectable (ADMELOG) 6 Unit(s) SubCutaneous three times a day with meals  nafcillin  IVPB 2 Gram(s) IV Intermittent every 4 hours  nystatin Powder 1 Application(s) Topical two times a day  senna 2 Tablet(s) Oral at bedtime    MEDICATIONS  (PRN):  acetaminophen   Tablet .. 650 milliGRAM(s) Oral every 6 hours PRN Mild Pain (1 - 3)  ondansetron Injectable 4 milliGRAM(s) IV Push every 6 hours PRN Nausea  oxycodone    5 mG/acetaminophen 325 mG 1 Tablet(s) Oral every 6 hours PRN Moderate Pain (4 - 6)  oxycodone    5 mG/acetaminophen 325 mG 2 Tablet(s) Oral every 6 hours PRN Severe Pain (7 - 10)      PHYSICAL EXAM  Vital Signs Last 24 Hrs  T(C): 37.4 (26 Mar 2021 16:27), Max: 38.4 (26 Mar 2021 14:25)  T(F): 99.3 (26 Mar 2021 16:27), Max: 101.1 (26 Mar 2021 14:25)  HR: 87 (26 Mar 2021 16:57) (74 - 119)  BP: 107/50 (26 Mar 2021 16:57) (107/50 - 140/65)  BP(mean): --  RR: 18 (26 Mar 2021 16:57) (16 - 18)  SpO2: 98% (26 Mar 2021 16:57) (95% - 99%)    Constitutional: wn/wd in NAD.   Respiratory: lungs CTAB.  Cardiovascular: regular rhythm, normal S1 and S2  GI: soft, NT/ND, no masses/HSM appreciated.  EXT: right BKA    LABS:            Urinalysis Basic - ( 26 Mar 2021 15:27 )    Color: Yellow / Appearance: Clear / S.010 / pH: x  Gluc: x / Ketone: NEGATIVE  / Bili: Negative / Urobili: 0.2 E.U./dL   Blood: x / Protein: NEGATIVE mg/dL / Nitrite: NEGATIVE   Leuk Esterase: NEGATIVE / RBC: x / WBC x   Sq Epi: x / Non Sq Epi: x / Bacteria: x          HbA1C:   CAPILLARY BLOOD GLUCOSE      POCT Blood Glucose.: 204 mg/dL (26 Mar 2021 16:52)  POCT Blood Glucose.: 126 mg/dL (26 Mar 2021 11:38)  POCT Blood Glucose.: 127 mg/dL (26 Mar 2021 06:47)  POCT Blood Glucose.: 133 mg/dL (25 Mar 2021 21:32)      Insulin Sliding Scale requirements X 24 Hours:    RADIOLOGY & ADDITIONAL TESTS:    A/P: 75F with PMHx UC s/p colostomy (s/p multiple abdominal surgeries including colostomy bag in ) BIBEMS from home complaining of weakness; presenting likely in DKA with AMS, with bilateral lower extremity open wounds. Of note, pt found to have a hx of DM2: A1C on admission is 16.1. Pt is now s/p I&D w/ washout and debridement of non- viable soft tissue.     1.  DM:   wt:75 KG, a1c 16.1  cr:0.6, GFR:89  Please give lantus 12 units at bedtime  pleas give lispro 6  Units  premeals TID   Continue lispro moderate dose scale with meals and at bedtime.   Continue consistent carb diet  FSG Goal 100-180    case seen and discussed with Dr. Yee and updated primary team

## 2021-03-26 NOTE — PROGRESS NOTE ADULT - ASSESSMENT
In summary, this is a 74yo woman, poor historian, with a PMH of UC and colon cancer s/p colectomy and chemotherapy and chronic venous stasis w/chronic LE wounds who presented w/frequent falls and subsequently found to have bilateral lower extremity cellulitis and abscesses c/b septic shock and DK with subsequent imaging c/f RLE necrotizing fascitis.  She was admitted to the SICU and underwent a R-foot I&D on 3/14 with Podiatry and R-BKA w/L-foot I&D on 3/15 with Vascular.  She was transferred to telemetry on 3/16 and now s/p BKA closure on 3/19, medically ready for discharge.     #BLE Cellulitis c/b Septic Shock and Nec Fasc s/p R-BKA  -- ID recommending Nafcillin x 2 weeks (thru 3/29 via PICC, [facility] be completed at [facility])  -- will need f/u with Dr. Medeiros or Dr. Hartman upon discharge  -- wound care per primary team; NITO drained pulled   -- Percocet 1 tab q4h PRN and Tylenol 650mg q6h PRN w/Senna    #Newly Diagnosed IDDM2 c/b DKA and SSTI  -- likely risk factor for infection and sepsis   -- per Endo, Lantus 14U QHS and Lispro 8U TID; would finalize recs as nearing discharge   -- Endo following, appreciate their assistance    #UC c/b Colon Ca s/p Ostomy  -- no active issues     #Apthous Ulcer  -- R-inner lower lip  -- continue to offer topical analgesic such as Oragel or Lidocaine     #Acute Blood Loss Anemia   -- likely from multiple trips to the OR and BKA  -- currently stable and will continue to monitor     #Diet - Carb Controlled / Dysphagia 1   #DVT PPx - SQH  #Dispo - no acute rehab acceptances, likely [facility] w/IV abx    Bailee Cottrell  Attending Hospitalist  [phone]

## 2021-03-27 LAB
GLUCOSE BLDC GLUCOMTR-MCNC: 134 MG/DL — HIGH (ref 70–99)
GLUCOSE BLDC GLUCOMTR-MCNC: 148 MG/DL — HIGH (ref 70–99)
GLUCOSE BLDC GLUCOMTR-MCNC: 154 MG/DL — HIGH (ref 70–99)
GLUCOSE BLDC GLUCOMTR-MCNC: 169 MG/DL — HIGH (ref 70–99)
GLUCOSE BLDC GLUCOMTR-MCNC: 188 MG/DL — HIGH (ref 70–99)

## 2021-03-27 PROCEDURE — 99232 SBSQ HOSP IP/OBS MODERATE 35: CPT

## 2021-03-27 RX ADMIN — OXYCODONE AND ACETAMINOPHEN 2 TABLET(S): 5; 325 TABLET ORAL at 01:15

## 2021-03-27 RX ADMIN — INSULIN GLARGINE 12 UNIT(S): 100 INJECTION, SOLUTION SUBCUTANEOUS at 23:02

## 2021-03-27 RX ADMIN — NAFCILLIN 100 GRAM(S): 10 INJECTION, POWDER, FOR SOLUTION INTRAVENOUS at 18:20

## 2021-03-27 RX ADMIN — OXYCODONE AND ACETAMINOPHEN 1 TABLET(S): 5; 325 TABLET ORAL at 17:43

## 2021-03-27 RX ADMIN — SENNA PLUS 2 TABLET(S): 8.6 TABLET ORAL at 22:22

## 2021-03-27 RX ADMIN — OXYCODONE AND ACETAMINOPHEN 1 TABLET(S): 5; 325 TABLET ORAL at 16:43

## 2021-03-27 RX ADMIN — OXYCODONE AND ACETAMINOPHEN 1 TABLET(S): 5; 325 TABLET ORAL at 06:21

## 2021-03-27 RX ADMIN — OXYCODONE AND ACETAMINOPHEN 2 TABLET(S): 5; 325 TABLET ORAL at 14:06

## 2021-03-27 RX ADMIN — OXYCODONE AND ACETAMINOPHEN 2 TABLET(S): 5; 325 TABLET ORAL at 06:40

## 2021-03-27 RX ADMIN — Medication 6 UNIT(S): at 14:51

## 2021-03-27 RX ADMIN — NAFCILLIN 100 GRAM(S): 10 INJECTION, POWDER, FOR SOLUTION INTRAVENOUS at 10:37

## 2021-03-27 RX ADMIN — NAFCILLIN 100 GRAM(S): 10 INJECTION, POWDER, FOR SOLUTION INTRAVENOUS at 02:48

## 2021-03-27 RX ADMIN — NYSTATIN CREAM 1 APPLICATION(S): 100000 CREAM TOPICAL at 18:20

## 2021-03-27 RX ADMIN — NAFCILLIN 100 GRAM(S): 10 INJECTION, POWDER, FOR SOLUTION INTRAVENOUS at 22:22

## 2021-03-27 RX ADMIN — NYSTATIN CREAM 1 APPLICATION(S): 100000 CREAM TOPICAL at 06:41

## 2021-03-27 RX ADMIN — OXYCODONE AND ACETAMINOPHEN 1 TABLET(S): 5; 325 TABLET ORAL at 11:37

## 2021-03-27 RX ADMIN — HEPARIN SODIUM 5000 UNIT(S): 5000 INJECTION INTRAVENOUS; SUBCUTANEOUS at 22:22

## 2021-03-27 RX ADMIN — Medication 650 MILLIGRAM(S): at 04:49

## 2021-03-27 RX ADMIN — Medication 2: at 16:44

## 2021-03-27 RX ADMIN — NAFCILLIN 100 GRAM(S): 10 INJECTION, POWDER, FOR SOLUTION INTRAVENOUS at 06:41

## 2021-03-27 RX ADMIN — OXYCODONE AND ACETAMINOPHEN 1 TABLET(S): 5; 325 TABLET ORAL at 02:48

## 2021-03-27 RX ADMIN — HEPARIN SODIUM 5000 UNIT(S): 5000 INJECTION INTRAVENOUS; SUBCUTANEOUS at 13:56

## 2021-03-27 RX ADMIN — OXYCODONE AND ACETAMINOPHEN 1 TABLET(S): 5; 325 TABLET ORAL at 10:37

## 2021-03-27 RX ADMIN — HEPARIN SODIUM 5000 UNIT(S): 5000 INJECTION INTRAVENOUS; SUBCUTANEOUS at 06:41

## 2021-03-27 RX ADMIN — OXYCODONE AND ACETAMINOPHEN 2 TABLET(S): 5; 325 TABLET ORAL at 20:51

## 2021-03-27 RX ADMIN — Medication 2: at 22:19

## 2021-03-27 RX ADMIN — NAFCILLIN 100 GRAM(S): 10 INJECTION, POWDER, FOR SOLUTION INTRAVENOUS at 13:56

## 2021-03-27 RX ADMIN — OXYCODONE AND ACETAMINOPHEN 2 TABLET(S): 5; 325 TABLET ORAL at 21:30

## 2021-03-27 RX ADMIN — Medication 6 UNIT(S): at 08:41

## 2021-03-27 NOTE — PROGRESS NOTE ADULT - SUBJECTIVE AND OBJECTIVE BOX
INTERVAL HPI/OVERNIGHT EVENTS:    Patient is a 75y old  Female who presents with a chief complaint of Weakness (27 Mar 2021 06:00)    Pt is without any new complaints.    Still with pain in both the right BKA stump and left foot.  Says that her appetite is fair.  Cannot recall what she ate for supper last night, just said "vegetables"  (She is apparently a pescatarian, but has not been ordering fish since she has been in the hospital.  Says that she had a waffle for breakfast.  Denies any chills/sweats  Afebrile.  WBC has decreased slightly from 14,300 to 13,300    Fingersticks and insulin:    5 PM yesterday--204 (6 lispro + 4 correction)  10 PM  148  (12 Lantus)  7 AM today 154/148  (6 lispro)  Noon  134  (6 lispro)      Pt reports the following symptoms:    CONSTITUTIONAL:  Negative fever or chills, fair appetite  EYES:  Negative  blurry vision or double vision  CARDIOVASCULAR:  Negative for chest pain or palpitations  RESPIRATORY:  Negative for cough, wheezing, or SOB   GASTROINTESTINAL:  Negative for nausea, vomiting, diarrhea, constipation, or abdominal pain  GENITOURINARY:  Negative frequency, urgency or dysuria  NEUROLOGIC:  No headache, confusion, dizziness, lightheadedness    MEDICATIONS  (STANDING):  dextrose 40% Gel 15 Gram(s) Oral once  dextrose 5%. 1000 milliLiter(s) (50 mL/Hr) IV Continuous <Continuous>  dextrose 5%. 1000 milliLiter(s) (100 mL/Hr) IV Continuous <Continuous>  dextrose 50% Injectable 25 Gram(s) IV Push once  dextrose 50% Injectable 12.5 Gram(s) IV Push once  dextrose 50% Injectable 25 Gram(s) IV Push once  glucagon  Injectable 1 milliGRAM(s) IntraMuscular once  heparin   Injectable 5000 Unit(s) SubCutaneous every 8 hours  insulin glargine Injectable (LANTUS) 12 Unit(s) SubCutaneous at bedtime  insulin lispro (ADMELOG) corrective regimen sliding scale   SubCutaneous Before meals and at bedtime  insulin lispro Injectable (ADMELOG) 6 Unit(s) SubCutaneous three times a day with meals  nafcillin  IVPB 2 Gram(s) IV Intermittent every 4 hours  nystatin Powder 1 Application(s) Topical two times a day  senna 2 Tablet(s) Oral at bedtime    MEDICATIONS  (PRN):  acetaminophen   Tablet .. 650 milliGRAM(s) Oral every 6 hours PRN Mild Pain (1 - 3)  ondansetron Injectable 4 milliGRAM(s) IV Push every 6 hours PRN Nausea  oxycodone    5 mG/acetaminophen 325 mG 1 Tablet(s) Oral every 6 hours PRN Moderate Pain (4 - 6)  oxycodone    5 mG/acetaminophen 325 mG 2 Tablet(s) Oral every 6 hours PRN Severe Pain (7 - 10)      PHYSICAL EXAM  Vital Signs Last 24 Hrs  T(C): 37.2 (27 Mar 2021 14:00), Max: 37.4 (26 Mar 2021 16:27)  T(F): 98.9 (27 Mar 2021 14:00), Max: 99.3 (26 Mar 2021 16:27)  HR: 88 (27 Mar 2021 14:10) (70 - 88)  BP: 113/58 (27 Mar 2021 14:10) (107/50 - 132/61)  BP(mean): --  RR: 16 (27 Mar 2021 14:10) (16 - 18)  SpO2: 98% (27 Mar 2021 14:10) (93% - 98%)    Constitutional: wn/wd in NAD.   HEENT: NCAT, MMM, no proptosis or lid retraction  Neck: no thyromegaly or palpable thyroid nodules.  No carotid bruits  Respiratory: lungs CTAB.  Cardiovascular: regular rhythm, normal S1 and S2, no audible murmurs  Extr:  s/p R BKA--stump Ace-wrapped, no drainage on the dressing.  L foot with Kerlex dressing, exposed toes  GI: soft, NT/ND, no masses.  Ostomy.  No hepatomegaly  Neurology: no tremors, sensation in the left foot grossly intact  Skin: no visible rashes/lesions  Psychiatric: AAO x 3, normal affect/mood.    LABS:                        8.9    13.34 )-----------( 724      ( 26 Mar 2021 18:17 )             28.2             Urinalysis Basic - ( 26 Mar 2021 15:27 )    Color: Yellow / Appearance: Clear / S.010 / pH: x  Gluc: x / Ketone: NEGATIVE  / Bili: Negative / Urobili: 0.2 E.U./dL   Blood: x / Protein: NEGATIVE mg/dL / Nitrite: NEGATIVE   Leuk Esterase: NEGATIVE / RBC: x / WBC x   Sq Epi: x / Non Sq Epi: x / Bacteria: x          HbA1C:   CAPILLARY BLOOD GLUCOSE      POCT Blood Glucose.: 134 mg/dL (27 Mar 2021 11:45)  POCT Blood Glucose.: 148 mg/dL (27 Mar 2021 08:36)  POCT Blood Glucose.: 154 mg/dL (27 Mar 2021 06:54)  POCT Blood Glucose.: 148 mg/dL (26 Mar 2021 21:40)  POCT Blood Glucose.: 204 mg/dL (26 Mar 2021 16:52)      Insulin Sliding Scale requirements X 24 Hours:    RADIOLOGY & ADDITIONAL TESTS:    A/P: 75y Female with history of DM type II and PAD now s/p right BKA plus debridement of necrotic ulcer L foot.     1) DM:  Glucoses have been mostly in target range.   Would have liked to be able to continue tapering the insulin with the aim of transitioning her back to oral agents, but the current glucoses do not really allow for further tapering.  To continue 12 units Lantus qhs, 6 units pre-meal lispro  Continue moderate scale correction doses  Discussed the pt's diet with her.  She does not eat chicken or beef, but I suggested that she order salmon INTERVAL HPI/OVERNIGHT EVENTS:    Patient is a 75y old  Female who presents with a chief complaint of Weakness (27 Mar 2021 06:00)    Pt is without any new complaints.    Still with pain in both the right BKA stump and left foot.  Says that her appetite is fair.  Cannot recall what she ate for supper last night, just said "vegetables"  (She is apparently a pescatarian, but has not been ordering fish since she has been in the hospital.  Says that she had a waffle for breakfast.  Denies any chills/sweats  Afebrile.  WBC has decreased slightly from 14,300 to 13,300    Fingersticks and insulin:    5 PM yesterday--204 (6 lispro + 4 correction)  10 PM  148  (12 Lantus)  7 AM today 154/148  (6 lispro)  Noon  134  (6 lispro)      Pt reports the following symptoms:    CONSTITUTIONAL:  Negative fever or chills, fair appetite  EYES:  Negative  blurry vision or double vision  CARDIOVASCULAR:  Negative for chest pain or palpitations  RESPIRATORY:  Negative for cough, wheezing, or SOB   GASTROINTESTINAL:  Negative for nausea, vomiting, diarrhea, constipation, or abdominal pain  GENITOURINARY:  Negative frequency, urgency or dysuria  NEUROLOGIC:  No headache, confusion, dizziness, lightheadedness    MEDICATIONS  (STANDING):  dextrose 40% Gel 15 Gram(s) Oral once  dextrose 5%. 1000 milliLiter(s) (50 mL/Hr) IV Continuous <Continuous>  dextrose 5%. 1000 milliLiter(s) (100 mL/Hr) IV Continuous <Continuous>  dextrose 50% Injectable 25 Gram(s) IV Push once  dextrose 50% Injectable 12.5 Gram(s) IV Push once  dextrose 50% Injectable 25 Gram(s) IV Push once  glucagon  Injectable 1 milliGRAM(s) IntraMuscular once  heparin   Injectable 5000 Unit(s) SubCutaneous every 8 hours  insulin glargine Injectable (LANTUS) 12 Unit(s) SubCutaneous at bedtime  insulin lispro (ADMELOG) corrective regimen sliding scale   SubCutaneous Before meals and at bedtime  insulin lispro Injectable (ADMELOG) 6 Unit(s) SubCutaneous three times a day with meals  nafcillin  IVPB 2 Gram(s) IV Intermittent every 4 hours  nystatin Powder 1 Application(s) Topical two times a day  senna 2 Tablet(s) Oral at bedtime    MEDICATIONS  (PRN):  acetaminophen   Tablet .. 650 milliGRAM(s) Oral every 6 hours PRN Mild Pain (1 - 3)  ondansetron Injectable 4 milliGRAM(s) IV Push every 6 hours PRN Nausea  oxycodone    5 mG/acetaminophen 325 mG 1 Tablet(s) Oral every 6 hours PRN Moderate Pain (4 - 6)  oxycodone    5 mG/acetaminophen 325 mG 2 Tablet(s) Oral every 6 hours PRN Severe Pain (7 - 10)      PHYSICAL EXAM  Vital Signs Last 24 Hrs  T(C): 37.2 (27 Mar 2021 14:00), Max: 37.4 (26 Mar 2021 16:27)  T(F): 98.9 (27 Mar 2021 14:00), Max: 99.3 (26 Mar 2021 16:27)  HR: 88 (27 Mar 2021 14:10) (70 - 88)  BP: 113/58 (27 Mar 2021 14:10) (107/50 - 132/61)  BP(mean): --  RR: 16 (27 Mar 2021 14:10) (16 - 18)  SpO2: 98% (27 Mar 2021 14:10) (93% - 98%)    Constitutional: wn/wd in NAD.   HEENT: NCAT, MMM, no proptosis or lid retraction  Neck: no thyromegaly or palpable thyroid nodules.  No carotid bruits  Respiratory: lungs CTAB.  Cardiovascular: regular rhythm, normal S1 and S2, no audible murmurs  Extr:  s/p R BKA--stump Ace-wrapped, no drainage on the dressing.  L foot with Kerlex dressing, exposed toes  GI: soft, NT/ND, no masses.  Ostomy.  No hepatomegaly  Neurology: no tremors, sensation in the left foot grossly intact  Skin: no visible rashes/lesions  Psychiatric: AAO x 3, normal affect/mood.    LABS:                        8.9    13.34 )-----------( 724      ( 26 Mar 2021 18:17 )             28.2             Urinalysis Basic - ( 26 Mar 2021 15:27 )    Color: Yellow / Appearance: Clear / S.010 / pH: x  Gluc: x / Ketone: NEGATIVE  / Bili: Negative / Urobili: 0.2 E.U./dL   Blood: x / Protein: NEGATIVE mg/dL / Nitrite: NEGATIVE   Leuk Esterase: NEGATIVE / RBC: x / WBC x   Sq Epi: x / Non Sq Epi: x / Bacteria: x          HbA1C:   CAPILLARY BLOOD GLUCOSE      POCT Blood Glucose.: 134 mg/dL (27 Mar 2021 11:45)  POCT Blood Glucose.: 148 mg/dL (27 Mar 2021 08:36)  POCT Blood Glucose.: 154 mg/dL (27 Mar 2021 06:54)  POCT Blood Glucose.: 148 mg/dL (26 Mar 2021 21:40)  POCT Blood Glucose.: 204 mg/dL (26 Mar 2021 16:52)      Insulin Sliding Scale requirements X 24 Hours:    RADIOLOGY & ADDITIONAL TESTS:    A/P: 75y Female with history of DM type II and PAD now s/p right BKA plus debridement of necrotic ulcer L foot.     1) DM:  Glucoses have been mostly in target range.   Would have liked to be able to continue tapering the insulin with the aim of transitioning her back to oral agents, but the current glucoses do not really allow for further tapering.  To continue 12 units Lantus qhs, 6 units pre-meal lispro  Continue moderate scale correction doses  Discussed the pt's diet with her.  She does not eat chicken or beef, but I suggested that she order salmon    Plans discussed with Vascular Surgery team

## 2021-03-27 NOTE — PROGRESS NOTE ADULT - SUBJECTIVE AND OBJECTIVE BOX
O/N: LENCHO, VSS                      PLEASE CHECK WHEN PRESENT:     [  ] Heart Failure     [  ] Acute     [  ] Acute on Chronic     [  ] Chronic  -------------------------------------------------------------------     [  ]Diastolic [HFpEF]     [  ]Systolic [HFrEF]     [  ]Combined [HFpEF & HFrEF]     [  ] afib     [  ] hypertensive heart disease     [  ]Other:  -------------------------------------------------------------------  [ ] Respiratory failure  [ ] Acute cor pulmonale  [ ] Asthma/COPD Exacerbation  [ ] Pleural effusion  [ ] Aspiration pneumonia  -------------------------------------------------------------------  [  ]ANTONY     [  ]ATN     [  ]Reneal Medullary Necrosis     [  ]Renal Cortical Necrosis     [  ]Other Pathological Lesions:    [  ]CKD 1  [  ]CKD 2  [  ]CKD 3  [  ]CKD 4  [  ]CKD 5  [  ]Other  -------------------------------------------------------------------  [x]Diabetes - new DX on this admission  [x] Diabetic PVD Ulcer  [  ] Neuropathic ulcer to DM  [  ] Diabetes with Nephropathy  [  ] Osteomyelitis due to diabetes  [x ] Hyperglycemia  [ ] Hypoglycemia  --------------------------------------------------------------------  [  ]Malnutrition: See Nutrition Note  [  ]Cachexia  [  ]Other:   [  ]Supplement Ordered:  [  ]Morbid Obesity (BMI >=40]  ---------------------------------------------------------------------  [ ] Sepsis/severe sepsis/septic shock  [ ] UTI  [ ] Pneumonia  -----------------------------------------------------------------------  [ ] Acidosis/alkalosis  [ ] Fluid overload  [x ] Hypokalemia  [ ] Hyperkalemia  [x ] Hypomagnesemia  [x ] Hypophosphatemia  [ ] Hyperphosphatemia  [ ] Hyponatremia  [ ] Hypernatremia  ------------------------------------------------------------------------  [x ] Acute blood loss anemia- due to OR for BKA guillotine and closure  [ ] Post op blood loss anemia  [ ] Iron deficiency anemia  [ ] Anemia due to chronic disease  [ ] Hypercoagulable state  [x ] Leukocytosis  ----------------------------------------------------------------------  [ ] Cerebral infarction  [ ] Transient ischemia attack  [ ] Encephalopathy      Assessment/Plan;  75F PMHx UC, colon cancer s/p total abdominal colectomy with end ileostomy, found down at home, was admitted for DKA and R diabetic foot wound, s/p operative debridement by podiatry 3/14/21 and R foot guillotine amputation and L foot I&D by vascular surgery 3/15/21.    #R foot wound and Lt foot wound  s/p R foot operative debridement by podiatry 3/14 s/p R guillotine BKA, L ankle I&D  3/15  -s/p R below knee amputation closure 3/19  -pain control  -Nafcillin 3/16-3/29, will follow up with ID as outpatient  -local wound care  -f/u labs    #DKA  -f/u endo recs  -ISS  -lantus 14u HS, added lispro 10u TID with meals  -hgb A1C=16.1    Diet: consistent carb  PPx: HSQ  Dispo: plan to dc to rehab today, patient medically ready       O/N: LENCHO, VSS    STATUS POST:  R leg BKA on 3/19     SUBJECTIVE: Patient seen and examined bedside by vascular team. Patient reports she is comfortable and has no complaints at this time.     heparin   Injectable 5000 Unit(s) SubCutaneous every 8 hours  nafcillin  IVPB 2 Gram(s) IV Intermittent every 4 hours      Vital Signs Last 24 Hrs  T(C): 37.1 (27 Mar 2021 05:41), Max: 38.4 (26 Mar 2021 14:25)  T(F): 98.7 (27 Mar 2021 05:41), Max: 101.1 (26 Mar 2021 14:25)  HR: 74 (27 Mar 2021 06:39) (70 - 119)  BP: 130/59 (27 Mar 2021 06:39) (107/50 - 132/61)  BP(mean): --  RR: 18 (27 Mar 2021 06:39) (16 - 18)  SpO2: 97% (27 Mar 2021 06:39) (93% - 99%)  I&O's Detail    26 Mar 2021 07:01  -  27 Mar 2021 07:00  --------------------------------------------------------  IN:    IV PiggyBack: 150 mL    Oral Fluid: 720 mL  Total IN: 870 mL    OUT:    Colostomy (mL): 475 mL    Voided (mL): 700 mL  Total OUT: 1175 mL    Total NET: -305 mL          Physical Exam:  General: No acute distress, resting comfortably in bed  C/V: normal sinus rhythm  Pulm: Nonlabored breathing, no respiratory distress  Abd: soft, non-tender, non-distended.  Extrem: warm and well perfused, no edema. R leg BKA amputation site with healthy appearing and well healing stump without any signs of infection or hematoma.     LABS:                        8.9    13.34 )-----------( 724      ( 26 Mar 2021 18:17 )             28.2             Urinalysis Basic - ( 26 Mar 2021 15:27 )    Color: Yellow / Appearance: Clear / S.010 / pH: x  Gluc: x / Ketone: NEGATIVE  / Bili: Negative / Urobili: 0.2 E.U./dL   Blood: x / Protein: NEGATIVE mg/dL / Nitrite: NEGATIVE   Leuk Esterase: NEGATIVE / RBC: x / WBC x   Sq Epi: x / Non Sq Epi: x / Bacteria: x        PLEASE CHECK WHEN PRESENT:     [  ] Heart Failure     [  ] Acute     [  ] Acute on Chronic     [  ] Chronic  -------------------------------------------------------------------     [  ]Diastolic [HFpEF]     [  ]Systolic [HFrEF]     [  ]Combined [HFpEF & HFrEF]     [  ] afib     [  ] hypertensive heart disease     [  ]Other:  -------------------------------------------------------------------  [ ] Respiratory failure  [ ] Acute cor pulmonale  [ ] Asthma/COPD Exacerbation  [ ] Pleural effusion  [ ] Aspiration pneumonia  -------------------------------------------------------------------  [  ]ANTONY     [  ]ATN     [  ]Reneal Medullary Necrosis     [  ]Renal Cortical Necrosis     [  ]Other Pathological Lesions:    [  ]CKD 1  [  ]CKD 2  [  ]CKD 3  [  ]CKD 4  [  ]CKD 5  [  ]Other  -------------------------------------------------------------------  [x]Diabetes - new DX on this admission  [x] Diabetic PVD Ulcer  [  ] Neuropathic ulcer to DM  [  ] Diabetes with Nephropathy  [  ] Osteomyelitis due to diabetes  [x ] Hyperglycemia  [ ] Hypoglycemia  --------------------------------------------------------------------  [  ]Malnutrition: See Nutrition Note  [  ]Cachexia  [  ]Other:   [  ]Supplement Ordered:  [  ]Morbid Obesity (BMI >=40]  ---------------------------------------------------------------------  [ ] Sepsis/severe sepsis/septic shock  [ ] UTI  [ ] Pneumonia  -----------------------------------------------------------------------  [ ] Acidosis/alkalosis  [ ] Fluid overload  [x ] Hypokalemia  [ ] Hyperkalemia  [x ] Hypomagnesemia  [x ] Hypophosphatemia  [ ] Hyperphosphatemia  [ ] Hyponatremia  [ ] Hypernatremia  ------------------------------------------------------------------------  [x ] Acute blood loss anemia- due to OR for BKA guillotine and closure  [ ] Post op blood loss anemia  [ ] Iron deficiency anemia  [ ] Anemia due to chronic disease  [ ] Hypercoagulable state  [x ] Leukocytosis  ----------------------------------------------------------------------  [ ] Cerebral infarction  [ ] Transient ischemia attack  [ ] Encephalopathy      Assessment/Plan;  75F PMHx UC, colon cancer s/p total abdominal colectomy with end ileostomy, found down at home, was admitted for DKA and R diabetic foot wound, s/p operative debridement by podiatry 3/14/21 and R foot guillotine amputation and L foot I&D by vascular surgery 3/15/21.    #R foot wound and Lt foot wound  s/p R foot operative debridement by podiatry 3/14 s/p R guillotine BKA, L ankle I&D  3/15  -s/p R below knee amputation closure 3/19  -pain control  -Nafcillin 3/16-3/29, will follow up with ID as outpatient  -local wound care  -continue to monitor for fevers    #DKA  -f/u endo recs  -ISS  -lantus 14u HS, added lispro 10u TID with meals  -hgb A1C=16.1    Diet: consistent carb  PPx: HSQ  Dispo: plan to dc to rehab today, patient medically ready

## 2021-03-27 NOTE — PROGRESS NOTE ADULT - SUBJECTIVE AND OBJECTIVE BOX
Patient is a 75y old  Female who presents with a chief complaint of Weakness (27 Mar 2021 14:24)    INTERVAL EVENTS:  No acute events overnight.    SUBJECTIVE:  Eating lunch at time of interview   Good appetite, no chest pain, dyspnea, constipation or diarrhea  Rest of review of systems negative unless documented otherwise in note.    MEDICATIONS:  MEDICATIONS  (STANDING):  dextrose 40% Gel 15 Gram(s) Oral once  dextrose 5%. 1000 milliLiter(s) (50 mL/Hr) IV Continuous <Continuous>  dextrose 5%. 1000 milliLiter(s) (100 mL/Hr) IV Continuous <Continuous>  dextrose 50% Injectable 25 Gram(s) IV Push once  dextrose 50% Injectable 12.5 Gram(s) IV Push once  dextrose 50% Injectable 25 Gram(s) IV Push once  glucagon  Injectable 1 milliGRAM(s) IntraMuscular once  heparin   Injectable 5000 Unit(s) SubCutaneous every 8 hours  insulin glargine Injectable (LANTUS) 12 Unit(s) SubCutaneous at bedtime  insulin lispro (ADMELOG) corrective regimen sliding scale   SubCutaneous Before meals and at bedtime  insulin lispro Injectable (ADMELOG) 6 Unit(s) SubCutaneous three times a day with meals  nafcillin  IVPB 2 Gram(s) IV Intermittent every 4 hours  nystatin Powder 1 Application(s) Topical two times a day  senna 2 Tablet(s) Oral at bedtime    MEDICATIONS  (PRN):  acetaminophen   Tablet .. 650 milliGRAM(s) Oral every 6 hours PRN Mild Pain (1 - 3)  ondansetron Injectable 4 milliGRAM(s) IV Push every 6 hours PRN Nausea  oxycodone    5 mG/acetaminophen 325 mG 1 Tablet(s) Oral every 6 hours PRN Moderate Pain (4 - 6)  oxycodone    5 mG/acetaminophen 325 mG 2 Tablet(s) Oral every 6 hours PRN Severe Pain (7 - 10)      Allergies    No Known Allergies    Intolerances        OBJECTIVE:  Vital Signs Last 24 Hrs  T(C): 37.1 (27 Mar 2021 18:31), Max: 37.2 (27 Mar 2021 14:00)  T(F): 98.7 (27 Mar 2021 18:31), Max: 98.9 (27 Mar 2021 14:00)  HR: 79 (27 Mar 2021 18:27) (74 - 88)  BP: 112/53 (27 Mar 2021 18:27) (109/52 - 132/61)  BP(mean): --  RR: 16 (27 Mar 2021 18:27) (16 - 18)  SpO2: 98% (27 Mar 2021 18:27) (95% - 98%)  I&O's Summary    26 Mar 2021 07:01  -  27 Mar 2021 07:00  --------------------------------------------------------  IN: 870 mL / OUT: 1175 mL / NET: -305 mL    27 Mar 2021 07:01  -  27 Mar 2021 20:30  --------------------------------------------------------  IN: 360 mL / OUT: 900 mL / NET: -540 mL        PHYSICAL EXAM:  PHYSICAL EXAM:  Gen: NAD, sitting at 45 deg on RA eating lunch   HEENT: NCAT, MMM, clear OP  CV: RRR, no m/g/r appreciated  Pulm: CTA B, no rhonchi, no wheeze, no increase in WOB  Abd: normoactive BS, soft, NTND  Ext: LLE with kerlix/ace bandage dressing to knee, R-BKA w/ace bandage  Neuro: AOx2, no gross neurological deficits  Psych: conversational but forgetful and slow at organizing thoughts; behavior and affect otherwise appropriate to circumstance   No Restraints in place   Skin: Warm and dry, no nrew rashes vs prior report    LABS:                        8.9    13.34 )-----------( 724      ( 26 Mar 2021 18:17 )             28.2               CAPILLARY BLOOD GLUCOSE      POCT Blood Glucose.: 169 mg/dL (27 Mar 2021 16:33)  POCT Blood Glucose.: 134 mg/dL (27 Mar 2021 11:45)  POCT Blood Glucose.: 148 mg/dL (27 Mar 2021 08:36)  POCT Blood Glucose.: 154 mg/dL (27 Mar 2021 06:54)  POCT Blood Glucose.: 148 mg/dL (26 Mar 2021 21:40)    Urinalysis Basic - ( 26 Mar 2021 15:27 )    Color: Yellow / Appearance: Clear / S.010 / pH: x  Gluc: x / Ketone: NEGATIVE  / Bili: Negative / Urobili: 0.2 E.U./dL   Blood: x / Protein: NEGATIVE mg/dL / Nitrite: NEGATIVE   Leuk Esterase: NEGATIVE / RBC: x / WBC x   Sq Epi: x / Non Sq Epi: x / Bacteria: x        MICRODATA:    Culture - Blood (collected 26 Mar 2021 15:27)  Source: .Blood Blood  Preliminary Report (27 Mar 2021 16:01):    No growth at 1 day.    Culture - Blood (collected 26 Mar 2021 15:27)  Source: .Blood Blood  Preliminary Report (27 Mar 2021 16:01):    No growth at 1 day.        RADIOLOGY/OTHER STUDIES:

## 2021-03-27 NOTE — PROGRESS NOTE ADULT - ASSESSMENT
76yo woman, poor historian, with a PMH of UC and colon cancer s/p colectomy and chemotherapy and chronic venous stasis w/chronic LE wounds who presented w/frequent falls and subsequently found to have bilateral lower extremity cellulitis and abscesses c/b septic shock and DK with subsequent imaging c/f RLE necrotizing fascitis.  She was admitted to the SICU and underwent a R-foot I&D on 3/14 with Podiatry and R-BKA w/L-foot I&D on 3/15 with Vascular.  She was transferred to telemetry on 3/16 and now s/p BKA closure on 3/19, medically ready for discharge.     #BLE Cellulitis c/b Septic Shock and Nec Fasc s/p R-BKA  -- ID recommending Nafcillin x 2 weeks (thru 3/29 via PICC, can be completed at [facility])  -- will need f/u with Dr. Medeiros or Dr. Hartman upon discharge  -- wound care per primary team; NIOT drained pulled   -- Percocet 1 tab q4h PRN and Tylenol 650mg q6h PRN w/Senna    #Newly Diagnosed IDDM2 c/b DKA and SSTI  -- likely risk factor for infection and sepsis   -- per Endo, Lantus 12U QHS and Lispro 6U TID with meals; would finalize recs as nearing discharge   -- continue moderate scale correction doses of insulin   -- Endo following, appreciate their assistance    #UC c/b Colon Ca s/p Ostomy  -- no active issues     #Apthous Ulcer  -- R-inner lower lip  -- continue to offer topical analgesic such as Oragel or Lidocaine     #Acute Blood Loss Anemia   -- likely from multiple trips to the OR and BKA  -- currently stable and will continue to monitor     #Diet - Carb Controlled / Dysphagia 1   #DVT PPx - SQH  #Dispo - no acute rehab acceptances, likely [facility] w/IV abx

## 2021-03-28 LAB
GLUCOSE BLDC GLUCOMTR-MCNC: 120 MG/DL — HIGH (ref 70–99)
GLUCOSE BLDC GLUCOMTR-MCNC: 127 MG/DL — HIGH (ref 70–99)
GLUCOSE BLDC GLUCOMTR-MCNC: 153 MG/DL — HIGH (ref 70–99)
GLUCOSE BLDC GLUCOMTR-MCNC: 157 MG/DL — HIGH (ref 70–99)

## 2021-03-28 PROCEDURE — 99232 SBSQ HOSP IP/OBS MODERATE 35: CPT

## 2021-03-28 RX ORDER — INSULIN LISPRO 100/ML
7 VIAL (ML) SUBCUTANEOUS
Refills: 0 | Status: DISCONTINUED | OUTPATIENT
Start: 2021-03-28 | End: 2021-03-30

## 2021-03-28 RX ORDER — INSULIN GLARGINE 100 [IU]/ML
14 INJECTION, SOLUTION SUBCUTANEOUS AT BEDTIME
Refills: 0 | Status: DISCONTINUED | OUTPATIENT
Start: 2021-03-28 | End: 2021-03-29

## 2021-03-28 RX ADMIN — Medication 650 MILLIGRAM(S): at 03:47

## 2021-03-28 RX ADMIN — NAFCILLIN 100 GRAM(S): 10 INJECTION, POWDER, FOR SOLUTION INTRAVENOUS at 18:17

## 2021-03-28 RX ADMIN — OXYCODONE AND ACETAMINOPHEN 2 TABLET(S): 5; 325 TABLET ORAL at 06:01

## 2021-03-28 RX ADMIN — HEPARIN SODIUM 5000 UNIT(S): 5000 INJECTION INTRAVENOUS; SUBCUTANEOUS at 21:38

## 2021-03-28 RX ADMIN — OXYCODONE AND ACETAMINOPHEN 1 TABLET(S): 5; 325 TABLET ORAL at 01:40

## 2021-03-28 RX ADMIN — OXYCODONE AND ACETAMINOPHEN 2 TABLET(S): 5; 325 TABLET ORAL at 12:13

## 2021-03-28 RX ADMIN — INSULIN GLARGINE 14 UNIT(S): 100 INJECTION, SOLUTION SUBCUTANEOUS at 21:42

## 2021-03-28 RX ADMIN — OXYCODONE AND ACETAMINOPHEN 2 TABLET(S): 5; 325 TABLET ORAL at 13:03

## 2021-03-28 RX ADMIN — Medication 2: at 17:04

## 2021-03-28 RX ADMIN — OXYCODONE AND ACETAMINOPHEN 1 TABLET(S): 5; 325 TABLET ORAL at 00:56

## 2021-03-28 RX ADMIN — Medication 7 UNIT(S): at 17:04

## 2021-03-28 RX ADMIN — NYSTATIN CREAM 1 APPLICATION(S): 100000 CREAM TOPICAL at 18:32

## 2021-03-28 RX ADMIN — NAFCILLIN 100 GRAM(S): 10 INJECTION, POWDER, FOR SOLUTION INTRAVENOUS at 21:38

## 2021-03-28 RX ADMIN — NYSTATIN CREAM 1 APPLICATION(S): 100000 CREAM TOPICAL at 06:02

## 2021-03-28 RX ADMIN — Medication 650 MILLIGRAM(S): at 21:37

## 2021-03-28 RX ADMIN — NAFCILLIN 100 GRAM(S): 10 INJECTION, POWDER, FOR SOLUTION INTRAVENOUS at 01:56

## 2021-03-28 RX ADMIN — Medication 6 UNIT(S): at 12:12

## 2021-03-28 RX ADMIN — OXYCODONE AND ACETAMINOPHEN 2 TABLET(S): 5; 325 TABLET ORAL at 17:32

## 2021-03-28 RX ADMIN — OXYCODONE AND ACETAMINOPHEN 2 TABLET(S): 5; 325 TABLET ORAL at 18:19

## 2021-03-28 RX ADMIN — HEPARIN SODIUM 5000 UNIT(S): 5000 INJECTION INTRAVENOUS; SUBCUTANEOUS at 14:57

## 2021-03-28 RX ADMIN — Medication 6 UNIT(S): at 08:40

## 2021-03-28 RX ADMIN — Medication 650 MILLIGRAM(S): at 22:37

## 2021-03-28 RX ADMIN — NAFCILLIN 100 GRAM(S): 10 INJECTION, POWDER, FOR SOLUTION INTRAVENOUS at 06:02

## 2021-03-28 RX ADMIN — NAFCILLIN 100 GRAM(S): 10 INJECTION, POWDER, FOR SOLUTION INTRAVENOUS at 14:53

## 2021-03-28 RX ADMIN — HEPARIN SODIUM 5000 UNIT(S): 5000 INJECTION INTRAVENOUS; SUBCUTANEOUS at 06:02

## 2021-03-28 RX ADMIN — Medication 2: at 07:39

## 2021-03-28 RX ADMIN — NAFCILLIN 100 GRAM(S): 10 INJECTION, POWDER, FOR SOLUTION INTRAVENOUS at 10:54

## 2021-03-28 RX ADMIN — SENNA PLUS 2 TABLET(S): 8.6 TABLET ORAL at 21:38

## 2021-03-28 NOTE — PROGRESS NOTE ADULT - ASSESSMENT
74yo woman, poor historian, with a PMH of UC and colon cancer s/p colectomy and chemotherapy and chronic venous stasis w/chronic LE wounds who presented w/frequent falls and subsequently found to have bilateral lower extremity cellulitis and abscesses c/b septic shock and DK with subsequent imaging c/f RLE necrotizing fascitis.  She was admitted to the SICU and underwent a R-foot I&D on 3/14 with Podiatry and R-BKA w/L-foot I&D on 3/15 with Vascular.  She was transferred to telemetry on 3/16 and now s/p BKA closure on 3/19, medically ready for discharge.     #BLE Cellulitis c/b Septic Shock and Nec Fasc s/p R-BKA  -- ID recommending Nafcillin x 2 weeks (thru 3/29 via PICC, can be completed at [facility])  -- will need f/u with Dr. Medeiros or Dr. Hartman upon discharge  -- wound care per primary team; NITO drained pulled   -- Percocet 1 tab q4h PRN and Tylenol 650mg q6h PRN w/Senna    #Newly Diagnosed IDDM2 c/b DKA and SSTI  -- likely risk factor for infection and sepsis   -- per Endo, Lantus 14 U QHS and Lispro 7U TID with meals; would finalize recs as nearing discharge   -- continue moderate scale correction doses of insulin   -- Endo following, appreciate their assistance    #UC c/b Colon Ca s/p Ostomy  -- no active issues     #Apthous Ulcer  -- R-inner lower lip  -- continue to offer topical analgesic such as Oragel or Lidocaine     #Acute Blood Loss Anemia   -- likely from multiple trips to the OR and BKA  -- currently stable and will continue to monitor     #Diet - Carb Controlled / Dysphagia 1   #DVT PPx - SQH  #Dispo - no acute rehab acceptances, likely [facility] w/IV abx

## 2021-03-28 NOTE — PROGRESS NOTE ADULT - SUBJECTIVE AND OBJECTIVE BOX
O/N: LENCHO, VSS. Afebrile throughout the night (last fever Friday)    PLEASE CHECK WHEN PRESENT:     [  ] Heart Failure     [  ] Acute     [  ] Acute on Chronic     [  ] Chronic  -------------------------------------------------------------------     [  ]Diastolic [HFpEF]     [  ]Systolic [HFrEF]     [  ]Combined [HFpEF & HFrEF]     [  ] afib     [  ] hypertensive heart disease     [  ]Other:  -------------------------------------------------------------------  [ ] Respiratory failure  [ ] Acute cor pulmonale  [ ] Asthma/COPD Exacerbation  [ ] Pleural effusion  [ ] Aspiration pneumonia  -------------------------------------------------------------------  [  ]ANTONY     [  ]ATN     [  ]Reneal Medullary Necrosis     [  ]Renal Cortical Necrosis     [  ]Other Pathological Lesions:    [  ]CKD 1  [  ]CKD 2  [  ]CKD 3  [  ]CKD 4  [  ]CKD 5  [  ]Other  -------------------------------------------------------------------  [x]Diabetes - new DX on this admission  [x] Diabetic PVD Ulcer  [  ] Neuropathic ulcer to DM  [  ] Diabetes with Nephropathy  [  ] Osteomyelitis due to diabetes  [x ] Hyperglycemia  [ ] Hypoglycemia  --------------------------------------------------------------------  [  ]Malnutrition: See Nutrition Note  [  ]Cachexia  [  ]Other:   [  ]Supplement Ordered:  [  ]Morbid Obesity (BMI >=40]  ---------------------------------------------------------------------  [ ] Sepsis/severe sepsis/septic shock  [ ] UTI  [ ] Pneumonia  -----------------------------------------------------------------------  [ ] Acidosis/alkalosis  [ ] Fluid overload  [x ] Hypokalemia  [ ] Hyperkalemia  [x ] Hypomagnesemia  [x ] Hypophosphatemia  [ ] Hyperphosphatemia  [ ] Hyponatremia  [ ] Hypernatremia  ------------------------------------------------------------------------  [x ] Acute blood loss anemia- due to OR for BKA guillotine and closure  [ ] Post op blood loss anemia  [ ] Iron deficiency anemia  [ ] Anemia due to chronic disease  [ ] Hypercoagulable state  [x ] Leukocytosis  ----------------------------------------------------------------------  [ ] Cerebral infarction  [ ] Transient ischemia attack  [ ] Encephalopathy      Assessment/Plan;  75F PMHx UC, colon cancer s/p total abdominal colectomy with end ileostomy, found down at home, was admitted for DKA and R diabetic foot wound, s/p operative debridement by podiatry 3/14/21 and R foot guillotine amputation and L foot I&D by vascular surgery 3/15/21.    #R foot wound and Lt foot wound  s/p R foot operative debridement by podiatry 3/14 s/p R guillotine BKA, L ankle I&D  3/15  -s/p R below knee amputation closure 3/19  -pain control  -Nafcillin 3/16-3/29, will follow up with ID as outpatient  -local wound care  -continue to monitor for fevers    #DKA  -f/u endo recs  -ISS  -lantus 14u HS, added lispro 10u TID with meals  -hgb A1C=16.1    Diet: consistent carb  PPx: HSQ  Dispo: plan to dc to rehab Monday 3/29, patient medically ready   O/N: LENCHO, VSS. Afebrile throughout the night (last fever Friday)    nafcillin  IVPB 2  heparin   Injectable 5000  nafcillin  IVPB 2    Vital Signs Last 24 Hrs  T(C): 37.2 (28 Mar 2021 06:41), Max: 37.2 (27 Mar 2021 14:00)  T(F): 98.9 (28 Mar 2021 06:41), Max: 98.9 (27 Mar 2021 14:00)  HR: 82 (28 Mar 2021 08:41) (71 - 88)  BP: 107/53 (28 Mar 2021 08:41) (107/53 - 134/63)  BP(mean): 77 (28 Mar 2021 08:41) (77 - 77)  RR: 16 (28 Mar 2021 08:41) (16 - 16)  SpO2: 97% (28 Mar 2021 08:41) (96% - 99%)  I&O's Summary    27 Mar 2021 07:01  -  28 Mar 2021 07:00  --------------------------------------------------------  IN: 460 mL / OUT: 1200 mL / NET: -740 mL        Physical Exam:  General: No acute distress, resting comfortably in bed  C/V: normal sinus rhythm  Pulm: Nonlabored breathing, no respiratory distress  Abd: soft, non-tender, non-distended.  Extrem: warm and well perfused, no edema. R leg BKA amputation site with healthy appearing and well healing stump without any signs of infection or hematoma.       LABS:                        8.9    13.34 )-----------( 724      ( 26 Mar 2021 18:17 )             28.2               PLEASE CHECK WHEN PRESENT:     [  ] Heart Failure     [  ] Acute     [  ] Acute on Chronic     [  ] Chronic  -------------------------------------------------------------------     [  ]Diastolic [HFpEF]     [  ]Systolic [HFrEF]     [  ]Combined [HFpEF & HFrEF]     [  ] afib     [  ] hypertensive heart disease     [  ]Other:  -------------------------------------------------------------------  [ ] Respiratory failure  [ ] Acute cor pulmonale  [ ] Asthma/COPD Exacerbation  [ ] Pleural effusion  [ ] Aspiration pneumonia  -------------------------------------------------------------------  [  ]ANTONY     [  ]ATN     [  ]Reneal Medullary Necrosis     [  ]Renal Cortical Necrosis     [  ]Other Pathological Lesions:    [  ]CKD 1  [  ]CKD 2  [  ]CKD 3  [  ]CKD 4  [  ]CKD 5  [  ]Other  -------------------------------------------------------------------  [x]Diabetes - new DX on this admission  [x] Diabetic PVD Ulcer  [  ] Neuropathic ulcer to DM  [  ] Diabetes with Nephropathy  [  ] Osteomyelitis due to diabetes  [x ] Hyperglycemia  [ ] Hypoglycemia  --------------------------------------------------------------------  [  ]Malnutrition: See Nutrition Note  [  ]Cachexia  [  ]Other:   [  ]Supplement Ordered:  [  ]Morbid Obesity (BMI >=40]  ---------------------------------------------------------------------  [ ] Sepsis/severe sepsis/septic shock  [ ] UTI  [ ] Pneumonia  -----------------------------------------------------------------------  [ ] Acidosis/alkalosis  [ ] Fluid overload  [x ] Hypokalemia  [ ] Hyperkalemia  [x ] Hypomagnesemia  [x ] Hypophosphatemia  [ ] Hyperphosphatemia  [ ] Hyponatremia  [ ] Hypernatremia  ------------------------------------------------------------------------  [x ] Acute blood loss anemia- due to OR for BKA guillotine and closure  [ ] Post op blood loss anemia  [ ] Iron deficiency anemia  [ ] Anemia due to chronic disease  [ ] Hypercoagulable state  [x ] Leukocytosis  ----------------------------------------------------------------------  [ ] Cerebral infarction  [ ] Transient ischemia attack  [ ] Encephalopathy      Assessment/Plan;  75F PMHx UC, colon cancer s/p total abdominal colectomy with end ileostomy, found down at home, was admitted for DKA and R diabetic foot wound, s/p operative debridement by podiatry 3/14/21 and R foot guillotine amputation and L foot I&D by vascular surgery 3/15/21.    #R foot wound and Lt foot wound  s/p R foot operative debridement by podiatry 3/14 s/p R guillotine BKA, L ankle I&D  3/15  -s/p R below knee amputation closure 3/19  -pain control  -Nafcillin 3/16-3/29, will follow up with ID as outpatient  -local wound care  -continue to monitor for fevers    #DKA  -f/u endo recs  -ISS  -lantus 14u HS, added lispro 10u TID with meals  -hgb A1C=16.1    Diet: consistent carb  PPx: HSQ  Dispo: plan to dc to rehab Monday 3/29, patient medically ready

## 2021-03-28 NOTE — PROGRESS NOTE ADULT - SUBJECTIVE AND OBJECTIVE BOX
INTERVAL HPI/OVERNIGHT EVENTS:    Patient is a 75y old  Female who presents with a chief complaint of Weakness (28 Mar 2021 06:54).    Only new complaint today is "dizziness" and slight nausea.  Appears to be more orthostatic light-headedness rather than vertigo (though she appeared to tolerate having to sit up by the side of the bed for PT today.  BPs have been well into the normal range today, though orthostatics were not tested  Says that her appetite has been fairly good today   Denies any chest pain or SOB  Says that the pain in her BKA stump and L foot are still at least moderate in severity and unchanged,      Fingersticks since yesterday:  5 PM yesterday 169 (received only 2 units coverage--supposedly told the nurse that she was not going to eat supper)  10  (12 Lantus + 2 lispro)  7  (6 + 2 lispro)--does not remember what she ate for breakfast  Noon 120 (6 lispro)    No new labs    Pt reports the following symptoms:    CONSTITUTIONAL:  Negative fever or chills, feels well, fair appetite  EYES:  Negative  blurry vision or double vision  CARDIOVASCULAR:  Negative for chest pain or palpitations  RESPIRATORY:  Negative for cough, wheezing, or SOB   GASTROINTESTINAL:  Slight nausea with the dizziness.  Also more intestinal gas.  No vomiting, diarrhea, constipation, or abdominal pain  GENITOURINARY:  Negative frequency, urgency or dysuria  NEUROLOGIC:  dizziness, lightheadedness as noted in the HPI    MEDICATIONS  (STANDING):  dextrose 40% Gel 15 Gram(s) Oral once  dextrose 5%. 1000 milliLiter(s) (50 mL/Hr) IV Continuous <Continuous>  dextrose 5%. 1000 milliLiter(s) (100 mL/Hr) IV Continuous <Continuous>  dextrose 50% Injectable 25 Gram(s) IV Push once  dextrose 50% Injectable 12.5 Gram(s) IV Push once  dextrose 50% Injectable 25 Gram(s) IV Push once  glucagon  Injectable 1 milliGRAM(s) IntraMuscular once  heparin   Injectable 5000 Unit(s) SubCutaneous every 8 hours  insulin glargine Injectable (LANTUS) 14 Unit(s) SubCutaneous at bedtime  insulin lispro (ADMELOG) corrective regimen sliding scale   SubCutaneous Before meals and at bedtime  insulin lispro Injectable (ADMELOG) 7 Unit(s) SubCutaneous three times a day with meals  nafcillin  IVPB 2 Gram(s) IV Intermittent every 4 hours  nystatin Powder 1 Application(s) Topical two times a day  senna 2 Tablet(s) Oral at bedtime    MEDICATIONS  (PRN):  acetaminophen   Tablet .. 650 milliGRAM(s) Oral every 6 hours PRN Mild Pain (1 - 3)  ondansetron Injectable 4 milliGRAM(s) IV Push every 6 hours PRN Nausea  oxycodone    5 mG/acetaminophen 325 mG 1 Tablet(s) Oral every 6 hours PRN Moderate Pain (4 - 6)  oxycodone    5 mG/acetaminophen 325 mG 2 Tablet(s) Oral every 6 hours PRN Severe Pain (7 - 10)      PHYSICAL EXAM  Vital Signs Last 24 Hrs  T(C): 37.2 (28 Mar 2021 12:05), Max: 37.2 (28 Mar 2021 06:41)  T(F): 98.9 (28 Mar 2021 12:05), Max: 98.9 (28 Mar 2021 06:41)  HR: 86 (28 Mar 2021 12:05) (71 - 86)  BP: 131/53 (28 Mar 2021 12:05) (107/53 - 134/63)  BP(mean): 76 (28 Mar 2021 12:05) (76 - 77)  RR: 15 (28 Mar 2021 12:05) (15 - 16)  SpO2: 97% (28 Mar 2021 12:05) (96% - 99%)    Constitutional: wn/wd in NAD.   HEENT: NCAT, EOMI, no proptosis or lid retraction  Neck: no thyromegaly or palpable thyroid nodules   Respiratory: Faint end-inspiratory crackles.  Cardiovascular: regular rhythm, normal S1 and S2, Grade 2/6 NELY which is actually loudest over the pulmonic area.    Extr:  S/p R BKA--Ace-wrapped, no drainage on dressing. Knee in tonic flexion, pt unable to extend the knee.  Kerlex dressing over the L ankle/prox foot.  Left DP pulse non-palpable  GI: soft, NT/ND, Colostomy RLQ with large amount of gas in bag  Neurology: no tremors.  Not as cognitively "clear" as yesterday  Skin: no visible rashes/lesions  Psychiatric: AAO x 3, normal affect/mood.    LABS:                        8.9    13.34 )-----------( 724      ( 26 Mar 2021 18:17 )             28.2             Urinalysis Basic - ( 26 Mar 2021 15:27 )    Color: Yellow / Appearance: Clear / S.010 / pH: x  Gluc: x / Ketone: NEGATIVE  / Bili: Negative / Urobili: 0.2 E.U./dL   Blood: x / Protein: NEGATIVE mg/dL / Nitrite: NEGATIVE   Leuk Esterase: NEGATIVE / RBC: x / WBC x   Sq Epi: x / Non Sq Epi: x / Bacteria: x          HbA1C:   CAPILLARY BLOOD GLUCOSE      POCT Blood Glucose.: 120 mg/dL (28 Mar 2021 11:46)  POCT Blood Glucose.: 157 mg/dL (28 Mar 2021 06:55)  POCT Blood Glucose.: 188 mg/dL (27 Mar 2021 21:32)  POCT Blood Glucose.: 169 mg/dL (27 Mar 2021 16:33)      Insulin Sliding Scale requirements X 24 Hours:    RADIOLOGY & ADDITIONAL TESTS:    A/P: 75y Female with history of DM type II now s/p right BKA plus debridement of ulcer L foot    1) DM:  Glucoses are essentially all in target range.  Suspect that the slightly higher number at bedtime was because she did not receive standing pre-meal insulin--likely told the nurse she was not going to eat and then did eat part of the meal.  --To increase the Lantus back to 14 units and the pre-meal slightly to 7 units  --Continue moderate scale correction doses  --Again encouraged her to order fish for her main meal--she rarely eats beef or chicken

## 2021-03-28 NOTE — PROGRESS NOTE ADULT - SUBJECTIVE AND OBJECTIVE BOX
Patient is a 75y old  Female who presents with a chief complaint of Weakness (28 Mar 2021 14:40)    INTERVAL EVENTS:  - no acute events overnight     SUBJECTIVE:  - eating lunch at time of exam   - No nausea, appetite good, no constipation   Rest of 12 point review of systems negative unless documented otherwise in note.      MEDICATIONS:  MEDICATIONS  (STANDING):  dextrose 40% Gel 15 Gram(s) Oral once  dextrose 5%. 1000 milliLiter(s) (50 mL/Hr) IV Continuous <Continuous>  dextrose 5%. 1000 milliLiter(s) (100 mL/Hr) IV Continuous <Continuous>  dextrose 50% Injectable 25 Gram(s) IV Push once  dextrose 50% Injectable 12.5 Gram(s) IV Push once  dextrose 50% Injectable 25 Gram(s) IV Push once  glucagon  Injectable 1 milliGRAM(s) IntraMuscular once  heparin   Injectable 5000 Unit(s) SubCutaneous every 8 hours  insulin glargine Injectable (LANTUS) 14 Unit(s) SubCutaneous at bedtime  insulin lispro (ADMELOG) corrective regimen sliding scale   SubCutaneous Before meals and at bedtime  insulin lispro Injectable (ADMELOG) 7 Unit(s) SubCutaneous three times a day with meals  nafcillin  IVPB 2 Gram(s) IV Intermittent every 4 hours  nystatin Powder 1 Application(s) Topical two times a day  senna 2 Tablet(s) Oral at bedtime    MEDICATIONS  (PRN):  acetaminophen   Tablet .. 650 milliGRAM(s) Oral every 6 hours PRN Mild Pain (1 - 3)  ondansetron Injectable 4 milliGRAM(s) IV Push every 6 hours PRN Nausea  oxycodone    5 mG/acetaminophen 325 mG 1 Tablet(s) Oral every 6 hours PRN Moderate Pain (4 - 6)  oxycodone    5 mG/acetaminophen 325 mG 2 Tablet(s) Oral every 6 hours PRN Severe Pain (7 - 10)      Allergies    No Known Allergies    Intolerances        OBJECTIVE:  Vital Signs Last 24 Hrs  T(C): 37.2 (28 Mar 2021 12:05), Max: 37.2 (28 Mar 2021 06:41)  T(F): 98.9 (28 Mar 2021 12:05), Max: 98.9 (28 Mar 2021 06:41)  HR: 86 (28 Mar 2021 12:05) (71 - 86)  BP: 131/53 (28 Mar 2021 12:05) (107/53 - 134/63)  BP(mean): 76 (28 Mar 2021 12:05) (76 - 77)  RR: 15 (28 Mar 2021 12:05) (15 - 16)  SpO2: 97% (28 Mar 2021 12:05) (96% - 99%)  I&O's Summary    27 Mar 2021 07:01  -  28 Mar 2021 07:00  --------------------------------------------------------  IN: 460 mL / OUT: 1200 mL / NET: -740 mL    28 Mar 2021 07:01  -  28 Mar 2021 18:27  --------------------------------------------------------  IN: 180 mL / OUT: 0 mL / NET: 180 mL        PHYSICAL EXAM:  Gen: NAD, sitting upright in bed at time of interview, eating lunch ; Comfortable on room air   HEENT: NCAT, MMM, clear OP  CV: RRR, no m/g/r appreciated  Pulm: CTA B, no rhonchi, no wheeze, no increase in WOB  Abd: normoactive BS, soft, NTND  Ext: LLE with kerlix/ace bandage dressing to knee, R-BKA w/ace bandage  Neuro: AOx2, no gross neurological deficits  Psych: conversational but forgetful and slow at organizing thoughts; behavior and affect otherwise appropriate to circumstance   No Restraints in place   Skin: Warm and dry, no new rashes vs prior report      LABS:              CAPILLARY BLOOD GLUCOSE      POCT Blood Glucose.: 153 mg/dL (28 Mar 2021 16:28)  POCT Blood Glucose.: 120 mg/dL (28 Mar 2021 11:46)  POCT Blood Glucose.: 157 mg/dL (28 Mar 2021 06:55)  POCT Blood Glucose.: 188 mg/dL (27 Mar 2021 21:32)        MICRODATA:    Culture - Blood (collected 26 Mar 2021 15:27)  Source: .Blood Blood  Preliminary Report (28 Mar 2021 16:00):    No growth at 2 days.    Culture - Blood (collected 26 Mar 2021 15:27)  Source: .Blood Blood  Preliminary Report (28 Mar 2021 16:00):    No growth at 2 days.        RADIOLOGY/OTHER STUDIES:

## 2021-03-29 LAB
ANION GAP SERPL CALC-SCNC: 13 MMOL/L — SIGNIFICANT CHANGE UP (ref 5–17)
BUN SERPL-MCNC: 7 MG/DL — SIGNIFICANT CHANGE UP (ref 7–23)
CALCIUM SERPL-MCNC: 8.8 MG/DL — SIGNIFICANT CHANGE UP (ref 8.4–10.5)
CHLORIDE SERPL-SCNC: 102 MMOL/L — SIGNIFICANT CHANGE UP (ref 96–108)
CO2 SERPL-SCNC: 24 MMOL/L — SIGNIFICANT CHANGE UP (ref 22–31)
CREAT SERPL-MCNC: 0.51 MG/DL — SIGNIFICANT CHANGE UP (ref 0.5–1.3)
GLUCOSE BLDC GLUCOMTR-MCNC: 142 MG/DL — HIGH (ref 70–99)
GLUCOSE BLDC GLUCOMTR-MCNC: 196 MG/DL — HIGH (ref 70–99)
GLUCOSE BLDC GLUCOMTR-MCNC: 209 MG/DL — HIGH (ref 70–99)
GLUCOSE BLDC GLUCOMTR-MCNC: 96 MG/DL — SIGNIFICANT CHANGE UP (ref 70–99)
GLUCOSE SERPL-MCNC: 100 MG/DL — HIGH (ref 70–99)
GRAM STN FLD: SIGNIFICANT CHANGE UP
HCT VFR BLD CALC: 27.8 % — LOW (ref 34.5–45)
HGB BLD-MCNC: 8.8 G/DL — LOW (ref 11.5–15.5)
MAGNESIUM SERPL-MCNC: 1.6 MG/DL — SIGNIFICANT CHANGE UP (ref 1.6–2.6)
MAGNESIUM SERPL-MCNC: 1.9 MG/DL — SIGNIFICANT CHANGE UP (ref 1.6–2.6)
MAGNESIUM SERPL-MCNC: 2.1 MG/DL — SIGNIFICANT CHANGE UP (ref 1.6–2.6)
MCHC RBC-ENTMCNC: 29.5 PG — SIGNIFICANT CHANGE UP (ref 27–34)
MCHC RBC-ENTMCNC: 31.7 GM/DL — LOW (ref 32–36)
MCV RBC AUTO: 93.3 FL — SIGNIFICANT CHANGE UP (ref 80–100)
NRBC # BLD: 0 /100 WBCS — SIGNIFICANT CHANGE UP (ref 0–0)
PHOSPHATE SERPL-MCNC: 4.9 MG/DL — HIGH (ref 2.5–4.5)
PLATELET # BLD AUTO: 741 K/UL — HIGH (ref 150–400)
POTASSIUM SERPL-MCNC: 3.2 MMOL/L — LOW (ref 3.5–5.3)
POTASSIUM SERPL-MCNC: 4.4 MMOL/L — SIGNIFICANT CHANGE UP (ref 3.5–5.3)
POTASSIUM SERPL-MCNC: SIGNIFICANT CHANGE UP MMOL/L (ref 3.5–5.3)
POTASSIUM SERPL-SCNC: 3.2 MMOL/L — LOW (ref 3.5–5.3)
POTASSIUM SERPL-SCNC: 4.4 MMOL/L — SIGNIFICANT CHANGE UP (ref 3.5–5.3)
POTASSIUM SERPL-SCNC: SIGNIFICANT CHANGE UP MMOL/L (ref 3.5–5.3)
RBC # BLD: 2.98 M/UL — LOW (ref 3.8–5.2)
RBC # FLD: 16.2 % — HIGH (ref 10.3–14.5)
SODIUM SERPL-SCNC: 139 MMOL/L — SIGNIFICANT CHANGE UP (ref 135–145)
SPECIMEN SOURCE: SIGNIFICANT CHANGE UP
WBC # BLD: 7.96 K/UL — SIGNIFICANT CHANGE UP (ref 3.8–10.5)
WBC # FLD AUTO: 7.96 K/UL — SIGNIFICANT CHANGE UP (ref 3.8–10.5)

## 2021-03-29 PROCEDURE — 99233 SBSQ HOSP IP/OBS HIGH 50: CPT | Mod: GC

## 2021-03-29 PROCEDURE — 99231 SBSQ HOSP IP/OBS SF/LOW 25: CPT | Mod: GC

## 2021-03-29 PROCEDURE — 99232 SBSQ HOSP IP/OBS MODERATE 35: CPT

## 2021-03-29 RX ORDER — MORPHINE SULFATE 50 MG/1
1 CAPSULE, EXTENDED RELEASE ORAL ONCE
Refills: 0 | Status: DISCONTINUED | OUTPATIENT
Start: 2021-03-29 | End: 2021-03-29

## 2021-03-29 RX ORDER — ASPIRIN/CALCIUM CARB/MAGNESIUM 324 MG
81 TABLET ORAL DAILY
Refills: 0 | Status: DISCONTINUED | OUTPATIENT
Start: 2021-03-29 | End: 2021-04-06

## 2021-03-29 RX ORDER — INSULIN GLARGINE 100 [IU]/ML
12 INJECTION, SOLUTION SUBCUTANEOUS AT BEDTIME
Refills: 0 | Status: DISCONTINUED | OUTPATIENT
Start: 2021-03-29 | End: 2021-03-31

## 2021-03-29 RX ORDER — POTASSIUM CHLORIDE 20 MEQ
40 PACKET (EA) ORAL ONCE
Refills: 0 | Status: COMPLETED | OUTPATIENT
Start: 2021-03-29 | End: 2021-03-29

## 2021-03-29 RX ORDER — HYDROMORPHONE HYDROCHLORIDE 2 MG/ML
1 INJECTION INTRAMUSCULAR; INTRAVENOUS; SUBCUTANEOUS ONCE
Refills: 0 | Status: DISCONTINUED | OUTPATIENT
Start: 2021-03-29 | End: 2021-03-29

## 2021-03-29 RX ORDER — ASCORBIC ACID 60 MG
500 TABLET,CHEWABLE ORAL DAILY
Refills: 0 | Status: DISCONTINUED | OUTPATIENT
Start: 2021-03-29 | End: 2021-04-06

## 2021-03-29 RX ORDER — MAGNESIUM SULFATE 500 MG/ML
2 VIAL (ML) INJECTION ONCE
Refills: 0 | Status: COMPLETED | OUTPATIENT
Start: 2021-03-29 | End: 2021-03-29

## 2021-03-29 RX ORDER — NAFCILLIN 10 G/100ML
2 INJECTION, POWDER, FOR SOLUTION INTRAVENOUS EVERY 4 HOURS
Refills: 0 | Status: DISCONTINUED | OUTPATIENT
Start: 2021-03-29 | End: 2021-03-30

## 2021-03-29 RX ORDER — POTASSIUM CHLORIDE 20 MEQ
10 PACKET (EA) ORAL
Refills: 0 | Status: DISCONTINUED | OUTPATIENT
Start: 2021-03-29 | End: 2021-03-29

## 2021-03-29 RX ADMIN — Medication 2: at 22:08

## 2021-03-29 RX ADMIN — Medication 40 MILLIEQUIVALENT(S): at 11:00

## 2021-03-29 RX ADMIN — NYSTATIN CREAM 1 APPLICATION(S): 100000 CREAM TOPICAL at 18:44

## 2021-03-29 RX ADMIN — Medication 40 MILLIEQUIVALENT(S): at 10:34

## 2021-03-29 RX ADMIN — OXYCODONE AND ACETAMINOPHEN 2 TABLET(S): 5; 325 TABLET ORAL at 12:38

## 2021-03-29 RX ADMIN — SENNA PLUS 2 TABLET(S): 8.6 TABLET ORAL at 22:08

## 2021-03-29 RX ADMIN — Medication 4: at 16:58

## 2021-03-29 RX ADMIN — HEPARIN SODIUM 5000 UNIT(S): 5000 INJECTION INTRAVENOUS; SUBCUTANEOUS at 14:09

## 2021-03-29 RX ADMIN — Medication 50 GRAM(S): at 09:24

## 2021-03-29 RX ADMIN — HYDROMORPHONE HYDROCHLORIDE 1 MILLIGRAM(S): 2 INJECTION INTRAMUSCULAR; INTRAVENOUS; SUBCUTANEOUS at 10:05

## 2021-03-29 RX ADMIN — OXYCODONE AND ACETAMINOPHEN 2 TABLET(S): 5; 325 TABLET ORAL at 07:15

## 2021-03-29 RX ADMIN — HYDROMORPHONE HYDROCHLORIDE 1 MILLIGRAM(S): 2 INJECTION INTRAMUSCULAR; INTRAVENOUS; SUBCUTANEOUS at 10:20

## 2021-03-29 RX ADMIN — NAFCILLIN 100 GRAM(S): 10 INJECTION, POWDER, FOR SOLUTION INTRAVENOUS at 22:08

## 2021-03-29 RX ADMIN — OXYCODONE AND ACETAMINOPHEN 2 TABLET(S): 5; 325 TABLET ORAL at 06:21

## 2021-03-29 RX ADMIN — OXYCODONE AND ACETAMINOPHEN 2 TABLET(S): 5; 325 TABLET ORAL at 13:38

## 2021-03-29 RX ADMIN — OXYCODONE AND ACETAMINOPHEN 2 TABLET(S): 5; 325 TABLET ORAL at 19:38

## 2021-03-29 RX ADMIN — Medication 81 MILLIGRAM(S): at 18:38

## 2021-03-29 RX ADMIN — NAFCILLIN 100 GRAM(S): 10 INJECTION, POWDER, FOR SOLUTION INTRAVENOUS at 14:09

## 2021-03-29 RX ADMIN — Medication 1 TABLET(S): at 18:38

## 2021-03-29 RX ADMIN — NAFCILLIN 100 GRAM(S): 10 INJECTION, POWDER, FOR SOLUTION INTRAVENOUS at 06:21

## 2021-03-29 RX ADMIN — Medication 100 MILLIEQUIVALENT(S): at 09:24

## 2021-03-29 RX ADMIN — Medication 40 MILLIEQUIVALENT(S): at 09:24

## 2021-03-29 RX ADMIN — INSULIN GLARGINE 12 UNIT(S): 100 INJECTION, SOLUTION SUBCUTANEOUS at 22:08

## 2021-03-29 RX ADMIN — Medication 7 UNIT(S): at 16:58

## 2021-03-29 RX ADMIN — Medication 7 UNIT(S): at 12:38

## 2021-03-29 RX ADMIN — NAFCILLIN 100 GRAM(S): 10 INJECTION, POWDER, FOR SOLUTION INTRAVENOUS at 18:38

## 2021-03-29 RX ADMIN — OXYCODONE AND ACETAMINOPHEN 2 TABLET(S): 5; 325 TABLET ORAL at 00:20

## 2021-03-29 RX ADMIN — NAFCILLIN 100 GRAM(S): 10 INJECTION, POWDER, FOR SOLUTION INTRAVENOUS at 02:06

## 2021-03-29 RX ADMIN — NYSTATIN CREAM 1 APPLICATION(S): 100000 CREAM TOPICAL at 06:21

## 2021-03-29 RX ADMIN — OXYCODONE AND ACETAMINOPHEN 2 TABLET(S): 5; 325 TABLET ORAL at 18:38

## 2021-03-29 RX ADMIN — NAFCILLIN 100 GRAM(S): 10 INJECTION, POWDER, FOR SOLUTION INTRAVENOUS at 10:35

## 2021-03-29 RX ADMIN — OXYCODONE AND ACETAMINOPHEN 2 TABLET(S): 5; 325 TABLET ORAL at 01:20

## 2021-03-29 RX ADMIN — HEPARIN SODIUM 5000 UNIT(S): 5000 INJECTION INTRAVENOUS; SUBCUTANEOUS at 06:20

## 2021-03-29 RX ADMIN — Medication 500 MILLIGRAM(S): at 18:38

## 2021-03-29 RX ADMIN — HEPARIN SODIUM 5000 UNIT(S): 5000 INJECTION INTRAVENOUS; SUBCUTANEOUS at 22:08

## 2021-03-29 NOTE — PROGRESS NOTE ADULT - ASSESSMENT
Patient is a 76yo F poor historian PMH uncontrolled DMT2, UC and colon cancer s/p colectomy and chemotherapy, chronic venous stasis w/ chronic LE wounds who presents due to frequent falls over the past several weeks at home. Admitted for DKA and b/l LE cellulits/abscess with c/f RLE necrotizing infection. She was started on Vanc/Zosyn and is s/p a R guillotine BKA and I&D of left ankle with vascular surgery on 3/15. LLE w/ tendonitis/myositis, no concern for OM. Left foot surgical swab (3/13) growing staph aureus, strept intermedius, strept constellatus, bifidobacterium breve, Candida (likely colonizer). Now c/f infected hematoma at R BKA stump site, R BKA wound cx obtained.        Recommendations:   - C/w Nafcillin 2g IV q4hr   - F/u R BKA wound cx   - Please add on ESR/CRP to AM labs   - Please watch potassium levels as pt has been hypokalemic and Nafcillin is known to cause hypokalemia  - Bcxs 3/13, 3/26 NGTD       Plan discussed with ID attending Dr. Hartman. ID Team 1 will continue to follow.

## 2021-03-29 NOTE — PROGRESS NOTE ADULT - SUBJECTIVE AND OBJECTIVE BOX
INTERVAL HPI/OVERNIGHT EVENTS:    Patient is a 75y old  Female who presents with a chief complaint of Weakness (29 Mar 2021 09:38)      Pt reports the following symptoms:    CONSTITUTIONAL:  Negative fever or chills, feels well, good appetite  EYES:  Negative  blurry vision or double vision  CARDIOVASCULAR:  Negative for chest pain or palpitations  RESPIRATORY:  Negative for cough, wheezing, or SOB   GASTROINTESTINAL:  Negative for nausea, vomiting, diarrhea, constipation, or abdominal pain  GENITOURINARY:  Negative frequency, urgency or dysuria  NEUROLOGIC:  No headache, confusion, dizziness, lightheadedness    MEDICATIONS  (STANDING):  dextrose 40% Gel 15 Gram(s) Oral once  dextrose 5%. 1000 milliLiter(s) (50 mL/Hr) IV Continuous <Continuous>  dextrose 5%. 1000 milliLiter(s) (100 mL/Hr) IV Continuous <Continuous>  dextrose 50% Injectable 25 Gram(s) IV Push once  dextrose 50% Injectable 12.5 Gram(s) IV Push once  dextrose 50% Injectable 25 Gram(s) IV Push once  glucagon  Injectable 1 milliGRAM(s) IntraMuscular once  heparin   Injectable 5000 Unit(s) SubCutaneous every 8 hours  insulin glargine Injectable (LANTUS) 14 Unit(s) SubCutaneous at bedtime  insulin lispro (ADMELOG) corrective regimen sliding scale   SubCutaneous Before meals and at bedtime  insulin lispro Injectable (ADMELOG) 7 Unit(s) SubCutaneous three times a day with meals  nafcillin  IVPB 2 Gram(s) IV Intermittent every 4 hours  nystatin Powder 1 Application(s) Topical two times a day  senna 2 Tablet(s) Oral at bedtime    MEDICATIONS  (PRN):  acetaminophen   Tablet .. 650 milliGRAM(s) Oral every 6 hours PRN Mild Pain (1 - 3)  ondansetron Injectable 4 milliGRAM(s) IV Push every 6 hours PRN Nausea  oxycodone    5 mG/acetaminophen 325 mG 1 Tablet(s) Oral every 6 hours PRN Moderate Pain (4 - 6)  oxycodone    5 mG/acetaminophen 325 mG 2 Tablet(s) Oral every 6 hours PRN Severe Pain (7 - 10)      PHYSICAL EXAM  Vital Signs Last 24 Hrs  T(C): 36.5 (29 Mar 2021 09:39), Max: 37.2 (28 Mar 2021 12:05)  T(F): 97.7 (29 Mar 2021 09:39), Max: 99 (28 Mar 2021 19:32)  HR: 77 (29 Mar 2021 08:47) (71 - 86)  BP: 137/63 (29 Mar 2021 08:47) (117/56 - 137/63)  BP(mean): 88 (28 Mar 2021 18:11) (76 - 88)  RR: 19 (29 Mar 2021 08:47) (15 - 20)  SpO2: 96% (29 Mar 2021 08:47) (96% - 98%)    Constitutional: wn/wd in NAD.   HEENT: NCAT, MMM, OP clear, EOMI, no proptosis or lid retraction  Neck: no thyromegaly or palpable thyroid nodules   Respiratory: lungs CTAB.  Cardiovascular: regular rhythm, normal S1 and S2, no audible murmurs, no peripheral edema  GI: soft, NT/ND, no masses/HSM appreciated.  Neurology: no tremors, DTR 2+  Skin: no visible rashes/lesions  Psychiatric: AAO x 3, normal affect/mood.    LABS:                        8.8    7.96  )-----------( 741      ( 29 Mar 2021 07:28 )             27.8     03-29    139  |  102  |  7   ----------------------------<  100<H>  3.2<L>   |  24  |  0.51    Ca    8.8      29 Mar 2021 07:28  Phos  4.9     03-29  Mg     1.6     03-29              HbA1C:   CAPILLARY BLOOD GLUCOSE      POCT Blood Glucose.: 142 mg/dL (29 Mar 2021 11:43)  POCT Blood Glucose.: 96 mg/dL (29 Mar 2021 07:19)  POCT Blood Glucose.: 127 mg/dL (28 Mar 2021 21:39)  POCT Blood Glucose.: 153 mg/dL (28 Mar 2021 16:28)      Insulin Sliding Scale requirements X 24 Hours:    RADIOLOGY & ADDITIONAL TESTS:    A/P: 75y Female with history of DM type II presenting for       1.  DM -     Please continue           units lantus at bedtime  / in the morning and        units lispro with meals and lispro moderate / low dose sliding scale 4 times daily with meals and at bedtime.  Please continue consistent carbohydrate diet.      Goal FSG is   Will continue to monitor   For discharge, pt can continue    Pt can follow up at discharge with [facility] by calling  to make an appointment.   Will discuss case with     and update primary team INTERVAL HPI/OVERNIGHT EVENTS:    Patient is a 75y old  Female who presents with a chief complaint of Weakness (29 Mar 2021 09:38)  Pt was seen and examined at the bedside. Remains afebrile.   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  good appetite.    had some discharge from the hematoma stump site - cultures sent  Was initially kept NPO in the morning for possible procedure but got cancelled    FSG & Insulin received:    Yesterday:  pre-dinner fs, nutritional lispro 7 + 2 units, had potatoes, beans  bedtime fs, lantus 14 units     Today:  pre-breakfast fs, no premeal insulin but had BF with 1 toast  pre-lunch fs    Pt reports the following symptoms:  CONSTITUTIONAL:  Negative chills  CARDIOVASCULAR:  Negative for chest pain or palpitations  RESPIRATORY:  Negative for cough, wheezing, or SOB   GASTROINTESTINAL:  Negative for nausea, vomiting  GENITOURINARY:  Negative frequency, urgency or dysuria      MEDICATIONS  (STANDING):  dextrose 40% Gel 15 Gram(s) Oral once  dextrose 5%. 1000 milliLiter(s) (50 mL/Hr) IV Continuous <Continuous>  dextrose 5%. 1000 milliLiter(s) (100 mL/Hr) IV Continuous <Continuous>  dextrose 50% Injectable 25 Gram(s) IV Push once  dextrose 50% Injectable 12.5 Gram(s) IV Push once  dextrose 50% Injectable 25 Gram(s) IV Push once  glucagon  Injectable 1 milliGRAM(s) IntraMuscular once  heparin   Injectable 5000 Unit(s) SubCutaneous every 8 hours  insulin glargine Injectable (LANTUS) 14 Unit(s) SubCutaneous at bedtime  insulin lispro (ADMELOG) corrective regimen sliding scale   SubCutaneous Before meals and at bedtime  insulin lispro Injectable (ADMELOG) 7 Unit(s) SubCutaneous three times a day with meals  nafcillin  IVPB 2 Gram(s) IV Intermittent every 4 hours  nystatin Powder 1 Application(s) Topical two times a day  senna 2 Tablet(s) Oral at bedtime    MEDICATIONS  (PRN):  acetaminophen   Tablet .. 650 milliGRAM(s) Oral every 6 hours PRN Mild Pain (1 - 3)  ondansetron Injectable 4 milliGRAM(s) IV Push every 6 hours PRN Nausea  oxycodone    5 mG/acetaminophen 325 mG 1 Tablet(s) Oral every 6 hours PRN Moderate Pain (4 - 6)  oxycodone    5 mG/acetaminophen 325 mG 2 Tablet(s) Oral every 6 hours PRN Severe Pain (7 - 10)      PHYSICAL EXAM  Vital Signs Last 24 Hrs  T(C): 36.5 (29 Mar 2021 09:39), Max: 37.2 (28 Mar 2021 12:05)  T(F): 97.7 (29 Mar 2021 09:39), Max: 99 (28 Mar 2021 19:32)  HR: 77 (29 Mar 2021 08:47) (71 - 86)  BP: 137/63 (29 Mar 2021 08:47) (117/56 - 137/63)  BP(mean): 88 (28 Mar 2021 18:11) (76 - 88)  RR: 19 (29 Mar 2021 08:47) (15 - 20)  SpO2: 96% (29 Mar 2021 08:47) (96% - 98%)    Constitutional: wn/wd in NAD.   Respiratory: lungs CTAB.  Cardiovascular: regular rhythm, normal S1 and S2  GI: soft, NT/ND, no masses/HSM appreciated.  EXT: right BKA    LABS:                        8.8    7.96  )-----------( 741      ( 29 Mar 2021 07:28 )             27.8     03-29    139  |  102  |  7   ----------------------------<  100<H>  3.2<L>   |  24  |  0.51    Ca    8.8      29 Mar 2021 07:28  Phos  4.9     03-29  Mg     1.6     03-29              HbA1C:   CAPILLARY BLOOD GLUCOSE      POCT Blood Glucose.: 142 mg/dL (29 Mar 2021 11:43)  POCT Blood Glucose.: 96 mg/dL (29 Mar 2021 07:19)  POCT Blood Glucose.: 127 mg/dL (28 Mar 2021 21:39)  POCT Blood Glucose.: 153 mg/dL (28 Mar 2021 16:28)      Insulin Sliding Scale requirements X 24 Hours:    RADIOLOGY & ADDITIONAL TESTS:    A/P: 75F with PMHx UC s/p colostomy (s/p multiple abdominal surgeries including colostomy bag in ) BIBEMS from home complaining of weakness; presenting likely in DKA with AMS, with bilateral lower extremity open wounds. Of note, pt found to have a hx of DM2: A1C on admission is 16.1. Pt is now s/p I&D w/ washout and debridement of non- viable soft tissue.     1.  DM:   wt:75 KG, a1c 16.1  cr:0.6, GFR:89  Please give lantus 12 units at bedtime  pleas give lispro 7  Units  premeals TID   Continue lispro moderate dose scale with meals and at bedtime.   Continue consistent carb diet  FSG Goal 100-180    case seen and discussed with Dr. Yee and updated primary team

## 2021-03-29 NOTE — PROGRESS NOTE ADULT - SUBJECTIVE AND OBJECTIVE BOX
Subjective/Interval events:  -Reported purulence at wound site R foot per vascular team  -Pt reports chronic nausea present through the visit, otherwise no acute complaints     MEDICATIONS  (STANDING):  dextrose 40% Gel 15 Gram(s) Oral once  dextrose 5%. 1000 milliLiter(s) (50 mL/Hr) IV Continuous <Continuous>  dextrose 5%. 1000 milliLiter(s) (100 mL/Hr) IV Continuous <Continuous>  dextrose 50% Injectable 25 Gram(s) IV Push once  dextrose 50% Injectable 12.5 Gram(s) IV Push once  dextrose 50% Injectable 25 Gram(s) IV Push once  glucagon  Injectable 1 milliGRAM(s) IntraMuscular once  heparin   Injectable 5000 Unit(s) SubCutaneous every 8 hours  insulin glargine Injectable (LANTUS) 14 Unit(s) SubCutaneous at bedtime  insulin lispro (ADMELOG) corrective regimen sliding scale   SubCutaneous Before meals and at bedtime  insulin lispro Injectable (ADMELOG) 7 Unit(s) SubCutaneous three times a day with meals  nafcillin  IVPB 2 Gram(s) IV Intermittent every 4 hours  nystatin Powder 1 Application(s) Topical two times a day  senna 2 Tablet(s) Oral at bedtime    MEDICATIONS  (PRN):  acetaminophen   Tablet .. 650 milliGRAM(s) Oral every 6 hours PRN Mild Pain (1 - 3)  ondansetron Injectable 4 milliGRAM(s) IV Push every 6 hours PRN Nausea  oxycodone    5 mG/acetaminophen 325 mG 1 Tablet(s) Oral every 6 hours PRN Moderate Pain (4 - 6)  oxycodone    5 mG/acetaminophen 325 mG 2 Tablet(s) Oral every 6 hours PRN Severe Pain (7 - 10)      Vital Signs Last 24 Hrs  T(C): 37.1 (29 Mar 2021 13:36), Max: 37.2 (28 Mar 2021 19:32)  T(F): 98.8 (29 Mar 2021 13:36), Max: 99 (28 Mar 2021 19:32)  HR: 86 (29 Mar 2021 12:37) (71 - 86)  BP: 120/56 (29 Mar 2021 12:37) (117/56 - 137/63)  BP(mean): 88 (28 Mar 2021 18:11) (88 - 88)  RR: 17 (29 Mar 2021 12:37) (16 - 20)  SpO2: 97% (29 Mar 2021 12:37) (96% - 98%)    PHYSICAL EXAM:  GENERAL: pleasant, NAD  NEURO: Aox3, intact strength/sensation all 4 ext   HEENT: clear op, mmm  NECK:  No JVD, no lymphadenopathy  CHEST/LUNG: Clear to auscultation bilaterally; No rales, rhonchi, wheezing. Normal work of breathing, not tachypneic  HEART: Regular rate and rhythm; No murmurs, rubs, or gallops  ABDOMEN: Soft, Nontender, Nondistended. ostomy site outpt green  EXTREMITIES:  No sig le edema, wwp, b/l legs wrapped without bleeding/purulence on bandages     LABS (reviewed)  -no WBC count noted, K mildly low, fsg stable    ASSESSMENT AND PLAN: 74yo F with PMH of UC and colon cancer s/p colectomy and chemotherapy and chronic venous stasis w/chronic LE wounds who presented w/frequent falls and subsequently found to have bilateral lower extremity cellulitis and abscesses c/b septic shock, DKA, and RLE necrotizing fascitis.  She was admitted to the SICU and underwent a R-foot I&D on 3/14 with Podiatry and R-BKA w/L-foot I&D on 3/15 with Vascular, s/p BKA closure on 3/19, purulence/bloody drainage at site noted per vascular team today, clinically stable    #BLE Cellulitis c/b Septic Shock and Nec Fasc s/p R-BKA  -- Appreciate recs today- F/u cx data of site, f/u ESR/CRP   -- c/w nafcillin per ID for now  -- wound care per primary team  -- Percocet 1 tab q4h PRN and Tylenol 650mg q6h PRN w/Senna    #Newly Diagnosed IDDM2 c/b DKA and SSTI  -- f/u endo recs- fsg stable on lantus and ISS for now  -- continue moderate scale correction doses of insulin     #UC c/b Colon Ca s/p Ostomy  -- no active issues     #Apthous Ulcer  -- R-inner lower lip  -- continue to offer topical analgesic such as Oragel or Lidocaine     #Acute Blood Loss Anemia   -- likely from multiple trips to the OR and BKA  -- currently stable and will continue to monitor     #Diet - Carb Controlled / Dysphagia 1   #DVT PPx - SQH  #Dispo- plan for [facility], pending workup above    Patient was seen and examined by me at bedside    Greater than 35 minutes spent on total encounter; more than 50% of the visit was spent counseling and/or coordinating care by the attending physician.    Giacomo Jenkins MD [phone]

## 2021-03-29 NOTE — PROGRESS NOTE ADULT - SUBJECTIVE AND OBJECTIVE BOX
INTERVAL HPI/OVERNIGHT EVENTS: S/p BKA closure 3/19. Febrile 101.1 on 3/26, workup neg. ID re-consulted due to c/f infected hematoma at R BKA stump site.       SUBJECTIVE: Patient seen and examined at bedside. Patient resting in bed, reports pain at R stump. She is worried about where she will go upon discharge and being able to take care of herself. Denies fevers, chills, HA, chest pain, sob, nausea, vomiting, abdominal pain, diarrhea, constipation, dysuria.      REVIEW OF SYSTEMS:    CONSTITUTIONAL: No weakness, fevers or chills  EYES/ENT: No visual changes;  No vertigo or throat pain   NECK: No pain or stiffness  RESPIRATORY: No cough, wheezing, hemoptysis; No shortness of breath  CARDIOVASCULAR: No chest pain or palpitations  GASTROINTESTINAL: No abdominal or epigastric pain. No nausea, vomiting, or hematemesis; No diarrhea or constipation. No melena or hematochezia.  GENITOURINARY: No dysuria, frequency or hematuria  NEUROLOGICAL: No numbness or weakness  SKIN: No itching, burning, rashes, or lesions   MSK: no joint pain, no joint swelling  All other review of systems is negative unless indicated above.      Allergies    No Known Allergies    Intolerances        ANTIBIOTICS/RELEVANT:  antimicrobials  nafcillin  IVPB 2 Gram(s) IV Intermittent every 4 hours    immunologic:    OTHER:  acetaminophen   Tablet .. 650 milliGRAM(s) Oral every 6 hours PRN  ascorbic acid 500 milliGRAM(s) Oral daily  dextrose 40% Gel 15 Gram(s) Oral once  dextrose 5%. 1000 milliLiter(s) IV Continuous <Continuous>  dextrose 5%. 1000 milliLiter(s) IV Continuous <Continuous>  dextrose 50% Injectable 25 Gram(s) IV Push once  dextrose 50% Injectable 12.5 Gram(s) IV Push once  dextrose 50% Injectable 25 Gram(s) IV Push once  glucagon  Injectable 1 milliGRAM(s) IntraMuscular once  heparin   Injectable 5000 Unit(s) SubCutaneous every 8 hours  insulin glargine Injectable (LANTUS) 14 Unit(s) SubCutaneous at bedtime  insulin lispro (ADMELOG) corrective regimen sliding scale   SubCutaneous Before meals and at bedtime  insulin lispro Injectable (ADMELOG) 7 Unit(s) SubCutaneous three times a day with meals  multivitamin  Chewable 1 Tablet(s) Oral daily  nystatin Powder 1 Application(s) Topical two times a day  ondansetron Injectable 4 milliGRAM(s) IV Push every 6 hours PRN  oxycodone    5 mG/acetaminophen 325 mG 1 Tablet(s) Oral every 6 hours PRN  oxycodone    5 mG/acetaminophen 325 mG 2 Tablet(s) Oral every 6 hours PRN  senna 2 Tablet(s) Oral at bedtime      Objective:  Vital Signs Last 24 Hrs  T(C): 37.1 (29 Mar 2021 13:36), Max: 37.2 (28 Mar 2021 19:32)  T(F): 98.8 (29 Mar 2021 13:36), Max: 99 (28 Mar 2021 19:32)  HR: 86 (29 Mar 2021 12:37) (71 - 86)  BP: 120/56 (29 Mar 2021 12:37) (117/56 - 137/63)  BP(mean): 88 (28 Mar 2021 18:11) (88 - 88)  RR: 17 (29 Mar 2021 12:37) (16 - 20)  SpO2: 97% (29 Mar 2021 12:37) (96% - 98%)      PHYSICAL EXAM:    Constitutional: WDWN resting comfortably in bed; NAD  Head: NC/AT  Eyes: PERRL, EOMI, anicteric sclera  ENT: no nasal discharge; uvula midline, no oropharyngeal erythema or exudates; MMM  Neck: supple   Respiratory: CTA B/L; no W/R/R, no retractions  Cardiac: +S1/S2; RRR; no M/R/G; PMI non-displaced  Gastrointestinal: abdomen soft, NT/ND; no rebound or guarding; +BSx4  Extremities: WWP, S/p R BKA, RLE/LLE with dressing c/d/i   Vascular: 2+ radial RUE/LUE, 1+ DP/PT pulses LLE   Neurologic: AAOx3; CNII-XII grossly intact; no focal deficits        LABS:                        8.8    7.96  )-----------( 741      ( 29 Mar 2021 07:28 )             27.8     03-29    139  |  102  |  7   ----------------------------<  100<H>  3.2<L>   |  24  |  0.51    Ca    8.8      29 Mar 2021 07:28  Phos  4.9     03-29  Mg     1.6     03-29            MICROBIOLOGY:            RECENT CULTURES:  03-26 @ 15:27  .Blood Blood  --  --  --    No growth at 3 days.  --      RADIOLOGY & ADDITIONAL STUDIES: Reviewed

## 2021-03-29 NOTE — PROGRESS NOTE ADULT - SUBJECTIVE AND OBJECTIVE BOX
24hr Event:  O/N: BETTIE MUSA  3/28: insulin changed to 14 lantus/7 lispro            PLEASE CHECK WHEN PRESENT:     [  ] Heart Failure     [  ] Acute     [  ] Acute on Chronic     [  ] Chronic  -------------------------------------------------------------------     [  ]Diastolic [HFpEF]     [  ]Systolic [HFrEF]     [  ]Combined [HFpEF & HFrEF]     [  ] afib     [  ] hypertensive heart disease     [  ]Other:  -------------------------------------------------------------------  [ ] Respiratory failure  [ ] Acute cor pulmonale  [ ] Asthma/COPD Exacerbation  [ ] Pleural effusion  [ ] Aspiration pneumonia  -------------------------------------------------------------------  [  ]ANTONY     [  ]ATN     [  ]Reneal Medullary Necrosis     [  ]Renal Cortical Necrosis     [  ]Other Pathological Lesions:    [  ]CKD 1  [  ]CKD 2  [  ]CKD 3  [  ]CKD 4  [  ]CKD 5  [  ]Other  -------------------------------------------------------------------  [x]Diabetes - new DX on this admission  [x] Diabetic PVD Ulcer  [  ] Neuropathic ulcer to DM  [  ] Diabetes with Nephropathy  [  ] Osteomyelitis due to diabetes  [x ] Hyperglycemia  [ ] Hypoglycemia  --------------------------------------------------------------------  [  ]Malnutrition: See Nutrition Note  [  ]Cachexia  [  ]Other:   [  ]Supplement Ordered:  [  ]Morbid Obesity (BMI >=40]  ---------------------------------------------------------------------  [ ] Sepsis/severe sepsis/septic shock  [ ] UTI  [ ] Pneumonia  -----------------------------------------------------------------------  [ ] Acidosis/alkalosis  [ ] Fluid overload  [x ] Hypokalemia  [ ] Hyperkalemia  [x ] Hypomagnesemia  [x ] Hypophosphatemia  [ ] Hyperphosphatemia  [ ] Hyponatremia  [ ] Hypernatremia  ------------------------------------------------------------------------  [x ] Acute blood loss anemia- due to OR for BKA guillotine and closure  [ ] Post op blood loss anemia  [ ] Iron deficiency anemia  [ ] Anemia due to chronic disease  [ ] Hypercoagulable state  [x ] Leukocytosis  ----------------------------------------------------------------------  [ ] Cerebral infarction  [ ] Transient ischemia attack  [ ] Encephalopathy      Assessment/Plan;  75F PMHx UC, colon cancer s/p total abdominal colectomy with end ileostomy, found down at home, was admitted for DKA and R diabetic foot wound, s/p operative debridement by podiatry 3/14/21 and R foot guillotine amputation and L foot I&D by vascular surgery 3/15/21.    #R foot wound and Lt foot wound  s/p R foot operative debridement by podiatry 3/14 s/p R guillotine BKA, L ankle I&D  3/15  -s/p R below knee amputation closure 3/19  -pain control  -Nafcillin 3/16-3/29, will follow up with ID as outpatient  -local wound care  -continue to monitor for fevers    #DKA  -f/u endo recs  -ISS  -lantus 14u HS, added lispro 10u TID with meals  -hgb A1C=16.1    Diet: consistent carb  PPx: HSQ  Dispo: plan to dc to rehab Monday 3/29, patient medically ready       24hr Event:  O/N: BETTIE MUSA  3/28: insulin changed to 14 lantus/7 lispro    nafcillin  IVPB 2  heparin   Injectable 5000  nafcillin  IVPB 2        Vital Signs Last 24 Hrs  T(C): 37.1 (29 Mar 2021 07:06), Max: 37.2 (28 Mar 2021 12:05)  T(F): 98.7 (29 Mar 2021 07:06), Max: 99 (28 Mar 2021 19:32)  HR: 79 (29 Mar 2021 06:20) (71 - 86)  BP: 117/56 (29 Mar 2021 06:20) (107/53 - 133/58)  BP(mean): 88 (28 Mar 2021 18:11) (76 - 88)  RR: 20 (29 Mar 2021 06:20) (15 - 20)  SpO2: 97% (29 Mar 2021 06:20) (97% - 98%)  I&O's Summary    28 Mar 2021 07:01  -  29 Mar 2021 07:00  --------------------------------------------------------  IN: 560 mL / OUT: 790 mL / NET: -230 mL        Physical Exam:  General: NAD, resting comfortably in bed  Pulmonary: Nonlabored breathing, no respiratory distress  Extrem: warm and well perfused, no edema. R leg BKA amputation site with pus draining from lateral portion of incision +odor.  LLE- anterior ankle ulcer with pink healthy granulation tissue no discharge.        PLEASE CHECK WHEN PRESENT:     [  ] Heart Failure     [  ] Acute     [  ] Acute on Chronic     [  ] Chronic  -------------------------------------------------------------------     [  ]Diastolic [HFpEF]     [  ]Systolic [HFrEF]     [  ]Combined [HFpEF & HFrEF]     [  ] afib     [  ] hypertensive heart disease     [  ]Other:  -------------------------------------------------------------------  [ ] Respiratory failure  [ ] Acute cor pulmonale  [ ] Asthma/COPD Exacerbation  [ ] Pleural effusion  [ ] Aspiration pneumonia  -------------------------------------------------------------------  [  ]ANTONY     [  ]ATN     [  ]Reneal Medullary Necrosis     [  ]Renal Cortical Necrosis     [  ]Other Pathological Lesions:    [  ]CKD 1  [  ]CKD 2  [  ]CKD 3  [  ]CKD 4  [  ]CKD 5  [  ]Other  -------------------------------------------------------------------  [x]Diabetes - new DX on this admission  [x] Diabetic PVD Ulcer  [  ] Neuropathic ulcer to DM  [  ] Diabetes with Nephropathy  [  ] Osteomyelitis due to diabetes  [x ] Hyperglycemia  [ ] Hypoglycemia  --------------------------------------------------------------------  [  ]Malnutrition: See Nutrition Note  [  ]Cachexia  [  ]Other:   [  ]Supplement Ordered:  [  ]Morbid Obesity (BMI >=40]  ---------------------------------------------------------------------  [ ] Sepsis/severe sepsis/septic shock  [ ] UTI  [ ] Pneumonia  -----------------------------------------------------------------------  [ ] Acidosis/alkalosis  [ ] Fluid overload  [x ] Hypokalemia  [ ] Hyperkalemia  [x ] Hypomagnesemia  [x ] Hypophosphatemia  [ ] Hyperphosphatemia  [ ] Hyponatremia  [ ] Hypernatremia  ------------------------------------------------------------------------  [x ] Acute blood loss anemia- due to OR for BKA guillotine and closure  [ ] Post op blood loss anemia  [ ] Iron deficiency anemia  [ ] Anemia due to chronic disease  [ ] Hypercoagulable state  [x ] Leukocytosis  ----------------------------------------------------------------------  [ ] Cerebral infarction  [ ] Transient ischemia attack  [ ] Encephalopathy      Assessment/Plan;  75F PMHx UC, colon cancer s/p total abdominal colectomy with end ileostomy, found down at home, was admitted for DKA and R diabetic foot wound, s/p operative debridement by podiatry 3/14/21 and R foot guillotine amputation and L foot I&D by vascular surgery 3/15/21.    #R foot wound and Lt foot wound  s/p R foot operative debridement by podiatry 3/14 s/p R guillotine BKA, L ankle I&D  3/15  -s/p R below knee amputation closure 3/19  -pain control  -Nafcillin 3/16-3/29  -local wound care  -continue to monitor for fevers    #DKA  -f/u endo recs  -ISS  -lantus 14u HS, added lispro 7u TID with meals  -hgb A1C=16.1    Diet: consistent carb, NPO for poss OR today  DVT PPx: HSQ  Dispo: [facility]       24hr Event:  O/N: BETTIE MUSA  3/28: insulin changed to 14 lantus/7 lispro    nafcillin  IVPB 2  heparin   Injectable 5000  nafcillin  IVPB 2        Vital Signs Last 24 Hrs  T(C): 37.1 (29 Mar 2021 07:06), Max: 37.2 (28 Mar 2021 12:05)  T(F): 98.7 (29 Mar 2021 07:06), Max: 99 (28 Mar 2021 19:32)  HR: 79 (29 Mar 2021 06:20) (71 - 86)  BP: 117/56 (29 Mar 2021 06:20) (107/53 - 133/58)  BP(mean): 88 (28 Mar 2021 18:11) (76 - 88)  RR: 20 (29 Mar 2021 06:20) (15 - 20)  SpO2: 97% (29 Mar 2021 06:20) (97% - 98%)  I&O's Summary    28 Mar 2021 07:01  -  29 Mar 2021 07:00  --------------------------------------------------------  IN: 560 mL / OUT: 790 mL / NET: -230 mL        Physical Exam:  General: NAD, resting comfortably in bed  Pulmonary: Nonlabored breathing, no respiratory distress  Extrem: warm and well perfused, no edema. R leg BKA amputation site with pus draining from lateral portion of incision +odor.  LLE- anterior ankle ulcer with pink healthy granulation tissue no discharge.        PLEASE CHECK WHEN PRESENT:     [  ] Heart Failure     [  ] Acute     [  ] Acute on Chronic     [  ] Chronic  -------------------------------------------------------------------     [  ]Diastolic [HFpEF]     [  ]Systolic [HFrEF]     [  ]Combined [HFpEF & HFrEF]     [  ] afib     [  ] hypertensive heart disease     [  ]Other:  -------------------------------------------------------------------  [ ] Respiratory failure  [ ] Acute cor pulmonale  [ ] Asthma/COPD Exacerbation  [ ] Pleural effusion  [ ] Aspiration pneumonia  -------------------------------------------------------------------  [  ]ANTONY     [  ]ATN     [  ]Reneal Medullary Necrosis     [  ]Renal Cortical Necrosis     [  ]Other Pathological Lesions:    [  ]CKD 1  [  ]CKD 2  [  ]CKD 3  [  ]CKD 4  [  ]CKD 5  [  ]Other  -------------------------------------------------------------------  [x]Diabetes - new DX on this admission  [x] Diabetic PVD Ulcer  [  ] Neuropathic ulcer to DM  [  ] Diabetes with Nephropathy  [  ] Osteomyelitis due to diabetes  [x ] Hyperglycemia  [ ] Hypoglycemia  --------------------------------------------------------------------  [  ]Malnutrition: See Nutrition Note  [  ]Cachexia  [  ]Other:   [  ]Supplement Ordered:  [  ]Morbid Obesity (BMI >=40]  ---------------------------------------------------------------------  [ ] Sepsis/severe sepsis/septic shock  [ ] UTI  [ ] Pneumonia  -----------------------------------------------------------------------  [ ] Acidosis/alkalosis  [ ] Fluid overload  [x ] Hypokalemia  [ ] Hyperkalemia  [x ] Hypomagnesemia  [x ] Hypophosphatemia  [ ] Hyperphosphatemia  [ ] Hyponatremia  [ ] Hypernatremia  ------------------------------------------------------------------------  [x ] Acute blood loss anemia- due to OR for BKA guillotine and closure  [ ] Post op blood loss anemia  [ ] Iron deficiency anemia  [ ] Anemia due to chronic disease  [ ] Hypercoagulable state  [x ] Leukocytosis  ----------------------------------------------------------------------  [ ] Cerebral infarction  [ ] Transient ischemia attack  [ ] Encephalopathy      Assessment/Plan;  75F PMHx UC, colon cancer s/p total abdominal colectomy with end ileostomy, found down at home, was admitted for DKA and R diabetic foot wound, s/p operative debridement by podiatry 3/14/21 and R foot guillotine amputation and L foot I&D by vascular surgery 3/15/21.    #R foot wound and Lt foot wound  s/p R foot operative debridement by podiatry 3/14 s/p R guillotine BKA, L ankle I&D  3/15  -s/p R below knee amputation closure 3/19  -pain control  -Nafcillin 3/16-3/29  -local wound care  -continue to monitor for fevers  -f/u wound cx    #DKA  -f/u endo recs  -ISS  -lantus 14u HS, added lispro 7u TID with meals  -hgb A1C=16.1    Diet: consistent carb, NPO for poss OR today  DVT PPx: HSQ  Dispo: [facility]

## 2021-03-29 NOTE — CHART NOTE - NSCHARTNOTEFT_GEN_A_CORE
Admitting Diagnosis:   Patient is a 75y old  Female who presents with a chief complaint of Weakness (29 Mar 2021 12:04)      PAST MEDICAL & SURGICAL HISTORY:  H/O ulcerative colitis    Colon cancer    Status post Jag procedure    Colostomy present        Current Nutrition Order:  Dys 1 puree/thins Consistent Carbohydrate with evening snack     PO Intake: Good (%) [   ]  Fair (50-75%) [50% ] Poor (<25%) [   ]    GI Issues:   BM 3/29  soft NT     Pain:  R BKA pain    Skin Integrity:  No edema   Sx site noted s/p AMP     Labs:   03-29    139  |  102  |  7   ----------------------------<  100<H>  3.2<L>   |  24  |  0.51    Ca    8.8      29 Mar 2021 07:28  Phos  4.9     03-29  Mg     1.6     03-29      CAPILLARY BLOOD GLUCOSE      POCT Blood Glucose.: 142 mg/dL (29 Mar 2021 11:43)  POCT Blood Glucose.: 96 mg/dL (29 Mar 2021 07:19)  POCT Blood Glucose.: 127 mg/dL (28 Mar 2021 21:39)  POCT Blood Glucose.: 153 mg/dL (28 Mar 2021 16:28)      Medications:  MEDICATIONS  (STANDING):  dextrose 40% Gel 15 Gram(s) Oral once  dextrose 5%. 1000 milliLiter(s) (50 mL/Hr) IV Continuous <Continuous>  dextrose 5%. 1000 milliLiter(s) (100 mL/Hr) IV Continuous <Continuous>  dextrose 50% Injectable 25 Gram(s) IV Push once  dextrose 50% Injectable 12.5 Gram(s) IV Push once  dextrose 50% Injectable 25 Gram(s) IV Push once  glucagon  Injectable 1 milliGRAM(s) IntraMuscular once  heparin   Injectable 5000 Unit(s) SubCutaneous every 8 hours  insulin glargine Injectable (LANTUS) 14 Unit(s) SubCutaneous at bedtime  insulin lispro (ADMELOG) corrective regimen sliding scale   SubCutaneous Before meals and at bedtime  insulin lispro Injectable (ADMELOG) 7 Unit(s) SubCutaneous three times a day with meals  nafcillin  IVPB 2 Gram(s) IV Intermittent every 4 hours  nystatin Powder 1 Application(s) Topical two times a day  senna 2 Tablet(s) Oral at bedtime    MEDICATIONS  (PRN):  acetaminophen   Tablet .. 650 milliGRAM(s) Oral every 6 hours PRN Mild Pain (1 - 3)  ondansetron Injectable 4 milliGRAM(s) IV Push every 6 hours PRN Nausea  oxycodone    5 mG/acetaminophen 325 mG 1 Tablet(s) Oral every 6 hours PRN Moderate Pain (4 - 6)  oxycodone    5 mG/acetaminophen 325 mG 2 Tablet(s) Oral every 6 hours PRN Severe Pain (7 - 10)            5'2''  pounds  Weight 165 pounds, BMI 30.2 %ZAW=187    3/26 142.1 pounds   >> AMP s/p R BKA: 155 pounds, %ABW=92%    Estimated energy needs:   Adjusted-BW s/p Amp for EER   EER Adjsuted for age, Skin  1625-1950kcal/day 25-30kcal/kg  74-86gm/day 1.2-1.4gm/kg   Fluids per team     Subjective:   74 yo F poor historian with hx UC and colon cancer s/p colectomy and chemotherapy, chronic venous stasis with chronic LE wounds who presents due to frequent falls over the past several weeks at home. She states that overall she has been more homebound, but recently started having more falls. Found to be in DKA with bilateral lower extremity cellulitis and foot infection.  A1c found to be 16.1,  with blood sugar 400+ O/A. Pt s/p Deep incision and drainage of multiple areas of foot with debridement of all non- viable soft tissue and bone 3/14. S/p BKA and I/D 3/15 with closure 3/19.    Pt visited on 5 UR today, plan for D/C remains ongoing. Reports not feeling well and wanting to be left to rest. Pt ordered for consCHO (snack) thin liquids + PUREE/THINS, pt reports not eating well d/t c/o nasuea; PRN zofran 4mg IVP ordered. PCA reports pt ate ~50% of breakfast today (oatmeal, eggs), reports wants soup.  Noted bedside dys screen passed 3/14. During RD initial visit pt asking for soft foods d/t lack of teeth and had denied issues swallowing, reported she can tolerate things like egg salad and pasta - RD spoke with team about this prior.   Last 3 POCT 96 127 153; K 3.2, Phos 4.9.   Please see below for nutritions recommendations. Recs made with team. Pending order placed.     PRIOR PES:  Increased nutrient needs RT increased demands AEB Skin   ON GOING @ THIS TIME   Goal/Expected Outcome: Meet ~75% of EER via feasible route       Recommendations:  1. Recommend Consistent Carbohydrate with evening snack diet; Glucerna x1/day 220kcal/20gm prot.  >> Defer PO cons to team. Unclear if pt able to tolerate Mech Soft, team agreeable for puree diet + allow for egg salad per pt request.    2. Honor pt food preferences as able.  3. Monitor %PO intake, diet tolerance (s/s of issues chewing/swallowing).   4. Monitor Skin, Wts, Labs, GI (Cont with Zofran use as feasible), GOC.  5. Labs: monitor BMP, CBC, glucose, lytes, trend renal indices, LFts, POCT.  6. MVI daily, Vit C 500mg/day.   7. RD to remain available for additional nutrition interventions as needed.     Education:  NA pt declined RD visit     Risk Level: High [ x  ] Moderate [   ] Low [   ].

## 2021-03-30 ENCOUNTER — APPOINTMENT (OUTPATIENT)
Dept: INTERNAL MEDICINE | Facility: CLINIC | Age: 76
End: 2021-03-30

## 2021-03-30 LAB
ANION GAP SERPL CALC-SCNC: 11 MMOL/L — SIGNIFICANT CHANGE UP (ref 5–17)
BUN SERPL-MCNC: 8 MG/DL — SIGNIFICANT CHANGE UP (ref 7–23)
CALCIUM SERPL-MCNC: 8.8 MG/DL — SIGNIFICANT CHANGE UP (ref 8.4–10.5)
CHLORIDE SERPL-SCNC: 102 MMOL/L — SIGNIFICANT CHANGE UP (ref 96–108)
CO2 SERPL-SCNC: 23 MMOL/L — SIGNIFICANT CHANGE UP (ref 22–31)
CREAT SERPL-MCNC: 0.51 MG/DL — SIGNIFICANT CHANGE UP (ref 0.5–1.3)
CRP SERPL-MCNC: 99.7 MG/L — HIGH (ref 0–4)
ERYTHROCYTE [SEDIMENTATION RATE] IN BLOOD: >130 MM/HR — HIGH
GLUCOSE BLDC GLUCOMTR-MCNC: 114 MG/DL — HIGH (ref 70–99)
GLUCOSE BLDC GLUCOMTR-MCNC: 129 MG/DL — HIGH (ref 70–99)
GLUCOSE BLDC GLUCOMTR-MCNC: 158 MG/DL — HIGH (ref 70–99)
GLUCOSE BLDC GLUCOMTR-MCNC: 181 MG/DL — HIGH (ref 70–99)
GLUCOSE SERPL-MCNC: 148 MG/DL — HIGH (ref 70–99)
HCT VFR BLD CALC: 29.4 % — LOW (ref 34.5–45)
HGB BLD-MCNC: 9.3 G/DL — LOW (ref 11.5–15.5)
MAGNESIUM SERPL-MCNC: 1.8 MG/DL — SIGNIFICANT CHANGE UP (ref 1.6–2.6)
MCHC RBC-ENTMCNC: 29.9 PG — SIGNIFICANT CHANGE UP (ref 27–34)
MCHC RBC-ENTMCNC: 31.6 GM/DL — LOW (ref 32–36)
MCV RBC AUTO: 94.5 FL — SIGNIFICANT CHANGE UP (ref 80–100)
NRBC # BLD: 0 /100 WBCS — SIGNIFICANT CHANGE UP (ref 0–0)
PHOSPHATE SERPL-MCNC: 3.6 MG/DL — SIGNIFICANT CHANGE UP (ref 2.5–4.5)
PLATELET # BLD AUTO: 792 K/UL — HIGH (ref 150–400)
POTASSIUM SERPL-MCNC: 4.1 MMOL/L — SIGNIFICANT CHANGE UP (ref 3.5–5.3)
POTASSIUM SERPL-SCNC: 4.1 MMOL/L — SIGNIFICANT CHANGE UP (ref 3.5–5.3)
RBC # BLD: 3.11 M/UL — LOW (ref 3.8–5.2)
RBC # FLD: 17.1 % — HIGH (ref 10.3–14.5)
SODIUM SERPL-SCNC: 136 MMOL/L — SIGNIFICANT CHANGE UP (ref 135–145)
WBC # BLD: 7.86 K/UL — SIGNIFICANT CHANGE UP (ref 3.8–10.5)
WBC # FLD AUTO: 7.86 K/UL — SIGNIFICANT CHANGE UP (ref 3.8–10.5)

## 2021-03-30 PROCEDURE — 99231 SBSQ HOSP IP/OBS SF/LOW 25: CPT | Mod: GC

## 2021-03-30 PROCEDURE — 99233 SBSQ HOSP IP/OBS HIGH 50: CPT | Mod: GC

## 2021-03-30 PROCEDURE — 99232 SBSQ HOSP IP/OBS MODERATE 35: CPT

## 2021-03-30 RX ORDER — INSULIN LISPRO 100/ML
9 VIAL (ML) SUBCUTANEOUS
Refills: 0 | Status: DISCONTINUED | OUTPATIENT
Start: 2021-03-30 | End: 2021-03-31

## 2021-03-30 RX ORDER — PIPERACILLIN AND TAZOBACTAM 4; .5 G/20ML; G/20ML
3.38 INJECTION, POWDER, LYOPHILIZED, FOR SOLUTION INTRAVENOUS ONCE
Refills: 0 | Status: COMPLETED | OUTPATIENT
Start: 2021-03-30 | End: 2021-03-30

## 2021-03-30 RX ORDER — PIPERACILLIN AND TAZOBACTAM 4; .5 G/20ML; G/20ML
3.38 INJECTION, POWDER, LYOPHILIZED, FOR SOLUTION INTRAVENOUS EVERY 6 HOURS
Refills: 0 | Status: DISCONTINUED | OUTPATIENT
Start: 2021-03-30 | End: 2021-03-31

## 2021-03-30 RX ADMIN — Medication 500 MILLIGRAM(S): at 11:30

## 2021-03-30 RX ADMIN — PIPERACILLIN AND TAZOBACTAM 200 GRAM(S): 4; .5 INJECTION, POWDER, LYOPHILIZED, FOR SOLUTION INTRAVENOUS at 22:52

## 2021-03-30 RX ADMIN — HEPARIN SODIUM 5000 UNIT(S): 5000 INJECTION INTRAVENOUS; SUBCUTANEOUS at 06:27

## 2021-03-30 RX ADMIN — SENNA PLUS 2 TABLET(S): 8.6 TABLET ORAL at 21:53

## 2021-03-30 RX ADMIN — OXYCODONE AND ACETAMINOPHEN 1 TABLET(S): 5; 325 TABLET ORAL at 18:30

## 2021-03-30 RX ADMIN — Medication 7 UNIT(S): at 13:01

## 2021-03-30 RX ADMIN — OXYCODONE AND ACETAMINOPHEN 2 TABLET(S): 5; 325 TABLET ORAL at 01:39

## 2021-03-30 RX ADMIN — NAFCILLIN 100 GRAM(S): 10 INJECTION, POWDER, FOR SOLUTION INTRAVENOUS at 02:11

## 2021-03-30 RX ADMIN — OXYCODONE AND ACETAMINOPHEN 1 TABLET(S): 5; 325 TABLET ORAL at 17:30

## 2021-03-30 RX ADMIN — OXYCODONE AND ACETAMINOPHEN 1 TABLET(S): 5; 325 TABLET ORAL at 11:30

## 2021-03-30 RX ADMIN — HEPARIN SODIUM 5000 UNIT(S): 5000 INJECTION INTRAVENOUS; SUBCUTANEOUS at 13:02

## 2021-03-30 RX ADMIN — Medication 1 TABLET(S): at 11:30

## 2021-03-30 RX ADMIN — Medication 81 MILLIGRAM(S): at 11:30

## 2021-03-30 RX ADMIN — NAFCILLIN 100 GRAM(S): 10 INJECTION, POWDER, FOR SOLUTION INTRAVENOUS at 10:28

## 2021-03-30 RX ADMIN — Medication 650 MILLIGRAM(S): at 16:27

## 2021-03-30 RX ADMIN — Medication 9 UNIT(S): at 17:31

## 2021-03-30 RX ADMIN — INSULIN GLARGINE 12 UNIT(S): 100 INJECTION, SOLUTION SUBCUTANEOUS at 21:53

## 2021-03-30 RX ADMIN — OXYCODONE AND ACETAMINOPHEN 2 TABLET(S): 5; 325 TABLET ORAL at 00:38

## 2021-03-30 RX ADMIN — OXYCODONE AND ACETAMINOPHEN 2 TABLET(S): 5; 325 TABLET ORAL at 13:01

## 2021-03-30 RX ADMIN — OXYCODONE AND ACETAMINOPHEN 2 TABLET(S): 5; 325 TABLET ORAL at 06:26

## 2021-03-30 RX ADMIN — PIPERACILLIN AND TAZOBACTAM 200 GRAM(S): 4; .5 INJECTION, POWDER, LYOPHILIZED, FOR SOLUTION INTRAVENOUS at 16:26

## 2021-03-30 RX ADMIN — Medication 7 UNIT(S): at 07:45

## 2021-03-30 RX ADMIN — OXYCODONE AND ACETAMINOPHEN 1 TABLET(S): 5; 325 TABLET ORAL at 12:30

## 2021-03-30 RX ADMIN — Medication 650 MILLIGRAM(S): at 17:30

## 2021-03-30 RX ADMIN — HEPARIN SODIUM 5000 UNIT(S): 5000 INJECTION INTRAVENOUS; SUBCUTANEOUS at 22:51

## 2021-03-30 RX ADMIN — Medication 2: at 13:02

## 2021-03-30 RX ADMIN — Medication 2: at 17:30

## 2021-03-30 RX ADMIN — OXYCODONE AND ACETAMINOPHEN 2 TABLET(S): 5; 325 TABLET ORAL at 07:19

## 2021-03-30 RX ADMIN — NYSTATIN CREAM 1 APPLICATION(S): 100000 CREAM TOPICAL at 17:30

## 2021-03-30 RX ADMIN — OXYCODONE AND ACETAMINOPHEN 2 TABLET(S): 5; 325 TABLET ORAL at 14:00

## 2021-03-30 RX ADMIN — NYSTATIN CREAM 1 APPLICATION(S): 100000 CREAM TOPICAL at 06:27

## 2021-03-30 RX ADMIN — PIPERACILLIN AND TAZOBACTAM 200 GRAM(S): 4; .5 INJECTION, POWDER, LYOPHILIZED, FOR SOLUTION INTRAVENOUS at 10:45

## 2021-03-30 NOTE — PROGRESS NOTE ADULT - SUBJECTIVE AND OBJECTIVE BOX
INTERVAL HPI/OVERNIGHT EVENTS:    Patient is a 75y old  Female who presents with a chief complaint of Weakness (30 Mar 2021 12:43)  Pt was seen and examined at the bedside. She reports leg pain which improved with pain meds.   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  good appetite.     afebrile.   had some discharge from the hematoma stump site - cultures sent      FSG & Insulin received:    Yesterday:  pre-dinner fs  nutritional lispro  7 units +  4 units lispro SS  bedtime fs  lantus  12 units + 2    units lispro SS    Today:  pre-breakfast fs  nutritional lispro 7  units  pre-lunch fs  nutritional lispro 7  units+ 2  units lispro SS    Pt reports the following symptoms:  CONSTITUTIONAL:  Negative chills  CARDIOVASCULAR:  Negative for chest pain or palpitations  RESPIRATORY:  Negative for cough, wheezing, or SOB   GASTROINTESTINAL:  Negative for nausea, vomiting  GENITOURINARY:  Negative frequency, urgency or dysuria      MEDICATIONS  (STANDING):  ascorbic acid 500 milliGRAM(s) Oral daily  aspirin  chewable 81 milliGRAM(s) Oral daily  dextrose 40% Gel 15 Gram(s) Oral once  dextrose 5%. 1000 milliLiter(s) (50 mL/Hr) IV Continuous <Continuous>  dextrose 5%. 1000 milliLiter(s) (100 mL/Hr) IV Continuous <Continuous>  dextrose 50% Injectable 25 Gram(s) IV Push once  dextrose 50% Injectable 12.5 Gram(s) IV Push once  dextrose 50% Injectable 25 Gram(s) IV Push once  glucagon  Injectable 1 milliGRAM(s) IntraMuscular once  heparin   Injectable 5000 Unit(s) SubCutaneous every 8 hours  insulin glargine Injectable (LANTUS) 12 Unit(s) SubCutaneous at bedtime  insulin lispro (ADMELOG) corrective regimen sliding scale   SubCutaneous Before meals and at bedtime  insulin lispro Injectable (ADMELOG) 9 Unit(s) SubCutaneous three times a day with meals  multivitamin  Chewable 1 Tablet(s) Oral daily  nystatin Powder 1 Application(s) Topical two times a day  piperacillin/tazobactam IVPB.. 3.375 Gram(s) IV Intermittent every 6 hours  senna 2 Tablet(s) Oral at bedtime    MEDICATIONS  (PRN):  acetaminophen   Tablet .. 650 milliGRAM(s) Oral every 6 hours PRN Mild Pain (1 - 3)  ondansetron Injectable 4 milliGRAM(s) IV Push every 6 hours PRN Nausea  oxycodone    5 mG/acetaminophen 325 mG 1 Tablet(s) Oral every 6 hours PRN Moderate Pain (4 - 6)  oxycodone    5 mG/acetaminophen 325 mG 2 Tablet(s) Oral every 6 hours PRN Severe Pain (7 - 10)      Past medical history reviewed  Family history reviewed  Social history reviewed    PHYSICAL EXAM  Vital Signs Last 24 Hrs  T(C): 36.1 (30 Mar 2021 12:50), Max: 37.2 (29 Mar 2021 22:30)  T(F): 96.9 (30 Mar 2021 12:50), Max: 98.9 (29 Mar 2021 22:30)  HR: 92 (30 Mar 2021 15:19) (81 - 95)  BP: 123/55 (30 Mar 2021 15:19) (118/58 - 139/55)  BP(mean): --  RR: 17 (30 Mar 2021 11:29) (17 - 20)  SpO2: 97% (30 Mar 2021 11:29) (96% - 98%)    Constitutional: wn/wd in NAD.   Respiratory: lungs CTAB.  Cardiovascular: regular rhythm, normal S1 and S2  GI: soft, NT/ND, no masses/HSM appreciated.  EXT: right BKA    LABS:                        9.3    7.86  )-----------( 792      ( 30 Mar 2021 06:10 )             29.4     03-30    136  |  102  |  8   ----------------------------<  148<H>  4.1   |  23  |  0.51    Ca    8.8      30 Mar 2021 06:10  Phos  3.6     03-30  Mg     1.8     03-30              HbA1C: 16.1 % ( @ 13:30)      CAPILLARY BLOOD GLUCOSE      POCT Blood Glucose.: 181 mg/dL (30 Mar 2021 12:38)  POCT Blood Glucose.: 129 mg/dL (30 Mar 2021 06:42)  POCT Blood Glucose.: 196 mg/dL (29 Mar 2021 21:36)  POCT Blood Glucose.: 209 mg/dL (29 Mar 2021 16:50)      Cholesterol, Serum: 83 mg/dL (03-15-21 @ 04:51)  HDL Cholesterol, Serum: 28 mg/dL (03-15-21 @ 04:51)  Triglycerides, Serum: 89 mg/dL (03-15-21 @ 04:51)    A/P: 75F with PMHx UC s/p colostomy (s/p multiple abdominal surgeries including colostomy bag in ) BIBEMS from home complaining of weakness; presenting likely in DKA with AMS, with bilateral lower extremity open wounds. Of note, pt found to have a hx of DM2: A1C on admission is 16.1. Pt is now s/p I&D w/ washout and debridement of non- viable soft tissue.     1.  DM:   wt:75 KG, a1c 16.1  cr:0.6, GFR:89  Please give lantus 12 units at bedtime  pleas give lispro 9  Units  premeals TID   Continue lispro moderate dose scale with meals and at bedtime.   Continue consistent carb diet  FSG Goal 100-180    case seen and discussed with Dr. Yee and updated primary team

## 2021-03-30 NOTE — PROVIDER CONTACT NOTE (OTHER) - ACTION/TREATMENT ORDERED:
IVF initiated as ordered. Will continue to monitor.
No interventions at present per PA. IVF continued as ordered. Will continue to monitor.
Per LARISA william to given percocet 2 tabs PO now, in addition, per LARISA william to hold naficillin, pt to be started on new antibiotic

## 2021-03-30 NOTE — PROVIDER CONTACT NOTE (OTHER) - SITUATION
Low UO
Low UO
Pt c/o of 8/10 pain s/p dressing change, pain meds due at 0638. As per vascular rounds, pt to be started on a different antibiotic

## 2021-03-30 NOTE — PROVIDER CONTACT NOTE (OTHER) - ASSESSMENT
Pt c/o of 8/10 pain at RBKA site, pt's pain meds due at 8787
UO noted from indwelling catheter, 20-25 mL for two hours.
Low UO noted from indwelling catheter 15-20 mL for two hours.

## 2021-03-30 NOTE — PROGRESS NOTE ADULT - SUBJECTIVE AND OBJECTIVE BOX
24hr Events:  O/N: 6pm K+ 4.4, mag 1.9, VSS  3/29: Dr. Hartman - ID: continue nafcillin and replete K+ aggressively while on this. Repeat ESR and CRP. BKA culture sent, BCX NGTD, Removal of some stitches from BKA stump for drainage - wound packed. AM labs ordered, No fever. Per nutrition started MVI, vit c and added glucerna.          PLEASE CHECK WHEN PRESENT:     [  ] Heart Failure     [  ] Acute     [  ] Acute on Chronic     [  ] Chronic  -------------------------------------------------------------------     [  ]Diastolic [HFpEF]     [  ]Systolic [HFrEF]     [  ]Combined [HFpEF & HFrEF]     [  ] afib     [  ] hypertensive heart disease     [  ]Other:  -------------------------------------------------------------------  [ ] Respiratory failure  [ ] Acute cor pulmonale  [ ] Asthma/COPD Exacerbation  [ ] Pleural effusion  [ ] Aspiration pneumonia  -------------------------------------------------------------------  [  ]ANTONY     [  ]ATN     [  ]Reneal Medullary Necrosis     [  ]Renal Cortical Necrosis     [  ]Other Pathological Lesions:    [  ]CKD 1  [  ]CKD 2  [  ]CKD 3  [  ]CKD 4  [  ]CKD 5  [  ]Other  -------------------------------------------------------------------  [x]Diabetes - new DX on this admission  [x] Diabetic PVD Ulcer  [  ] Neuropathic ulcer to DM  [  ] Diabetes with Nephropathy  [  ] Osteomyelitis due to diabetes  [x ] Hyperglycemia  [ ] Hypoglycemia  --------------------------------------------------------------------  [  ]Malnutrition: See Nutrition Note  [  ]Cachexia  [  ]Other:   [  ]Supplement Ordered:  [  ]Morbid Obesity (BMI >=40]  ---------------------------------------------------------------------  [ ] Sepsis/severe sepsis/septic shock  [ ] UTI  [ ] Pneumonia  -----------------------------------------------------------------------  [ ] Acidosis/alkalosis  [ ] Fluid overload  [x ] Hypokalemia  [ ] Hyperkalemia  [x ] Hypomagnesemia  [x ] Hypophosphatemia  [ ] Hyperphosphatemia  [ ] Hyponatremia  [ ] Hypernatremia  ------------------------------------------------------------------------  [x ] Acute blood loss anemia- due to OR for BKA guillotine and closure  [ ] Post op blood loss anemia  [ ] Iron deficiency anemia  [ ] Anemia due to chronic disease  [ ] Hypercoagulable state  [x ] Leukocytosis  ----------------------------------------------------------------------  [ ] Cerebral infarction  [ ] Transient ischemia attack  [ ] Encephalopathy      Assessment/Plan;  75F PMHx UC, colon cancer s/p total abdominal colectomy with end ileostomy, found down at home, was admitted for DKA and R diabetic foot wound, s/p operative debridement by podiatry 3/14/21 and R foot guillotine amputation and L foot I&D by vascular surgery 3/15/21.    #R foot wound and Lt foot wound  s/p R foot operative debridement by podiatry 3/14 s/p R guillotine BKA, L ankle I&D  3/15  -s/p R below knee amputation closure 3/19  -pain control  -Nafcillin 3/16-3/29  -local wound care  -continue to monitor for fevers  -f/u wound cx    #DKA  -f/u endo recs  -ISS  -lantus 14u HS, added lispro 7u TID with meals  -hgb A1C=16.1    Diet: consistent carb, NPO for poss OR today  DVT PPx: HSQ  Dispo: [facility]     24hr Events:  O/N: 6pm K+ 4.4, mag 1.9, VSS  3/29: Dr. Hartman - ID: continue nafcillin and replete K+ aggressively while on this. Repeat ESR and CRP. BKA culture sent, BCX NGTD, Removal of some stitches from BKA stump for drainage - wound packed. AM labs ordered, No fever. Per nutrition started MVI, vit c and added glucerna.       Patient seen and examined bedside by chief resident.       aspirin  chewable 81 milliGRAM(s) Oral daily  heparin   Injectable 5000 Unit(s) SubCutaneous every 8 hours  nafcillin  IVPB 2 Gram(s) IV Intermittent every 4 hours      Vital Signs Last 24 Hrs  T(C): 36.9 (30 Mar 2021 06:09), Max: 37.2 (29 Mar 2021 22:30)  T(F): 98.4 (30 Mar 2021 06:09), Max: 98.9 (29 Mar 2021 22:30)  HR: 85 (30 Mar 2021 06:21) (77 - 95)  BP: 138/64 (30 Mar 2021 06:21) (118/58 - 138/64)  BP(mean): --  RR: 20 (30 Mar 2021 06:21) (17 - 20)  SpO2: 97% (30 Mar 2021 06:21) (96% - 98%)  I&O's Detail    29 Mar 2021 07:01  -  30 Mar 2021 07:00  --------------------------------------------------------  IN:    IV PiggyBack: 250 mL    IV PiggyBack: 50 mL    Oral Fluid: 840 mL  Total IN: 1140 mL    OUT:    Colostomy (mL): 550 mL    Voided (mL): 900 mL  Total OUT: 1450 mL    Total NET: -310 mL      PHYSICAL EXAMINATION   General: NAD  NEURO:  follows commands.   PULM: nonlabored breathing, no respiratory distress  ABD + Groin: Soft, no hematoma, resolving left ecchymosis   EXTREM: WWP, no edema, no calf tenderness, RLE: foot wrapped with dry dressing - by podiatry care  VASC: No cyanosis,  no pallor.   PSYCH: Appropriate affect, answers questions appropriately      LABS:                        9.3    7.86  )-----------( 792      ( 30 Mar 2021 06:10 )             29.4     03-30    136  |  102  |  8   ----------------------------<  148<H>  4.1   |  23  |  0.51    Ca    8.8      30 Mar 2021 06:10  Phos  3.6     03-30  Mg     1.8     03-30               PLEASE CHECK WHEN PRESENT:     [  ] Heart Failure     [  ] Acute     [  ] Acute on Chronic     [  ] Chronic  -------------------------------------------------------------------     [  ]Diastolic [HFpEF]     [  ]Systolic [HFrEF]     [  ]Combined [HFpEF & HFrEF]     [  ] afib     [  ] hypertensive heart disease     [  ]Other:  -------------------------------------------------------------------  [ ] Respiratory failure  [ ] Acute cor pulmonale  [ ] Asthma/COPD Exacerbation  [ ] Pleural effusion  [ ] Aspiration pneumonia  -------------------------------------------------------------------  [  ]ANTONY     [  ]ATN     [  ]Reneal Medullary Necrosis     [  ]Renal Cortical Necrosis     [  ]Other Pathological Lesions:    [  ]CKD 1  [  ]CKD 2  [  ]CKD 3  [  ]CKD 4  [  ]CKD 5  [  ]Other  -------------------------------------------------------------------  [x]Diabetes - new DX on this admission  [x] Diabetic PVD Ulcer  [  ] Neuropathic ulcer to DM  [  ] Diabetes with Nephropathy  [  ] Osteomyelitis due to diabetes  [x ] Hyperglycemia  [ ] Hypoglycemia  --------------------------------------------------------------------  [  ]Malnutrition: See Nutrition Note  [  ]Cachexia  [  ]Other:   [  ]Supplement Ordered:  [  ]Morbid Obesity (BMI >=40]  ---------------------------------------------------------------------  [ ] Sepsis/severe sepsis/septic shock  [ ] UTI  [ ] Pneumonia  -----------------------------------------------------------------------  [ ] Acidosis/alkalosis  [ ] Fluid overload  [x ] Hypokalemia  [ ] Hyperkalemia  [x ] Hypomagnesemia  [x ] Hypophosphatemia  [ ] Hyperphosphatemia  [ ] Hyponatremia  [ ] Hypernatremia  ------------------------------------------------------------------------  [x ] Acute blood loss anemia- due to OR for BKA guillotine and closure  [ ] Post op blood loss anemia  [ ] Iron deficiency anemia  [ ] Anemia due to chronic disease  [ ] Hypercoagulable state  [x ] Leukocytosis  ----------------------------------------------------------------------  [ ] Cerebral infarction  [ ] Transient ischemia attack  [ ] Encephalopathy

## 2021-03-30 NOTE — PROGRESS NOTE ADULT - SUBJECTIVE AND OBJECTIVE BOX
Infectious Disease Progress Note    SUBJECTIVE: Patient seen and examined at bedside in no acute distress. Says her pain is unchanged. Denies additional complaints. Would like to focus on changing her ostomy and does not want further questioning at present.    REVIEW OF SYSTEMS:    CONSTITUTIONAL: No weakness, fevers or chills  EYES/ENT: No visual changes;  No vertigo or throat pain   NECK: No pain or stiffness  RESPIRATORY: No cough, wheezing, hemoptysis; No shortness of breath  CARDIOVASCULAR: No chest pain or palpitations  GASTROINTESTINAL: No abdominal or epigastric pain. No nausea, vomiting, or hematemesis; No diarrhea or constipation. No melena or hematochezia.  GENITOURINARY: No dysuria, frequency or hematuria  NEUROLOGICAL: No numbness or weakness  SKIN: No itching, burning, rashes, or lesions   MSK: no joint pain, no joint swelling  All other review of systems is negative unless indicated above.    Allergies    No Known Allergies    Intolerances    ANTIBIOTICS/RELEVANT:  antimicrobials  nafcillin  IVPB 2 Gram(s) IV Intermittent every 4 hours    immunologic:    OTHER:  acetaminophen   Tablet .. 650 milliGRAM(s) Oral every 6 hours PRN  ascorbic acid 500 milliGRAM(s) Oral daily  dextrose 40% Gel 15 Gram(s) Oral once  dextrose 5%. 1000 milliLiter(s) IV Continuous <Continuous>  dextrose 5%. 1000 milliLiter(s) IV Continuous <Continuous>  dextrose 50% Injectable 25 Gram(s) IV Push once  dextrose 50% Injectable 12.5 Gram(s) IV Push once  dextrose 50% Injectable 25 Gram(s) IV Push once  glucagon  Injectable 1 milliGRAM(s) IntraMuscular once  heparin   Injectable 5000 Unit(s) SubCutaneous every 8 hours  insulin glargine Injectable (LANTUS) 14 Unit(s) SubCutaneous at bedtime  insulin lispro (ADMELOG) corrective regimen sliding scale   SubCutaneous Before meals and at bedtime  insulin lispro Injectable (ADMELOG) 7 Unit(s) SubCutaneous three times a day with meals  multivitamin  Chewable 1 Tablet(s) Oral daily  nystatin Powder 1 Application(s) Topical two times a day  ondansetron Injectable 4 milliGRAM(s) IV Push every 6 hours PRN  oxycodone    5 mG/acetaminophen 325 mG 1 Tablet(s) Oral every 6 hours PRN  oxycodone    5 mG/acetaminophen 325 mG 2 Tablet(s) Oral every 6 hours PRN  senna 2 Tablet(s) Oral at bedtime    Vital Signs Last 24 Hrs  T(C): 36.1 (30 Mar 2021 12:50), Max: 37.2 (29 Mar 2021 22:30)  T(F): 96.9 (30 Mar 2021 12:50), Max: 98.9 (29 Mar 2021 22:30)  HR: 92 (30 Mar 2021 15:19) (81 - 94)  BP: 123/55 (30 Mar 2021 15:19) (118/58 - 139/55)  BP(mean): --  RR: 17 (30 Mar 2021 11:29) (17 - 20)  SpO2: 97% (30 Mar 2021 11:29) (97% - 98%)    PHYSICAL EXAM:    Constitutional: WDWN resting comfortably in bed; NAD  Head: NC/AT  Eyes: PERRL, EOMI, anicteric sclera  ENT: no nasal discharge; MMM  Neck: supple, no JVD  Respiratory: CTA B/L; no W/R/R   Cardiac: +S1/S2; RRR; no M/R/G   Gastrointestinal: abdomen soft, NT/ND; no rebound or guarding; +BSx4; +LUQ ostomy with solid brown output  Extremities: WWP, S/p R BKA wrapped with site c/d/i; LLE dressing c/d/i  Vascular: 2+ radial RUE/LUE, 1+ DP/PT pulses LLE   Neurologic: AAOx3; CNII-XII grossly intact; no focal deficits    LABS:                        9.3    7.86  )-----------( 792      ( 30 Mar 2021 06:10 )             29.4     03-30    136  |  102  |  8   ----------------------------<  148<H>  4.1   |  23  |  0.51    Ca    8.8      30 Mar 2021 06:10  Phos  3.6     03-30  Mg     1.8     03-30          CAPILLARY BLOOD GLUCOSE      POCT Blood Glucose.: 181 mg/dL (30 Mar 2021 12:38)      RADIOLOGY & ADDITIONAL TESTS: Reviewed.

## 2021-03-30 NOTE — PROGRESS NOTE ADULT - SUBJECTIVE AND OBJECTIVE BOX
Subjective/Interval events:  -Reports increased pain at RLE wound site s/p cx and redressing by vascular team, chronic nausea     MEDICATIONS  (STANDING):  ascorbic acid 500 milliGRAM(s) Oral daily  aspirin  chewable 81 milliGRAM(s) Oral daily  dextrose 40% Gel 15 Gram(s) Oral once  dextrose 5%. 1000 milliLiter(s) (50 mL/Hr) IV Continuous <Continuous>  dextrose 5%. 1000 milliLiter(s) (100 mL/Hr) IV Continuous <Continuous>  dextrose 50% Injectable 25 Gram(s) IV Push once  dextrose 50% Injectable 12.5 Gram(s) IV Push once  dextrose 50% Injectable 25 Gram(s) IV Push once  glucagon  Injectable 1 milliGRAM(s) IntraMuscular once  heparin   Injectable 5000 Unit(s) SubCutaneous every 8 hours  insulin glargine Injectable (LANTUS) 12 Unit(s) SubCutaneous at bedtime  insulin lispro (ADMELOG) corrective regimen sliding scale   SubCutaneous Before meals and at bedtime  insulin lispro Injectable (ADMELOG) 7 Unit(s) SubCutaneous three times a day with meals  multivitamin  Chewable 1 Tablet(s) Oral daily  nystatin Powder 1 Application(s) Topical two times a day  piperacillin/tazobactam IVPB.. 3.375 Gram(s) IV Intermittent every 6 hours  senna 2 Tablet(s) Oral at bedtime    MEDICATIONS  (PRN):  acetaminophen   Tablet .. 650 milliGRAM(s) Oral every 6 hours PRN Mild Pain (1 - 3)  ondansetron Injectable 4 milliGRAM(s) IV Push every 6 hours PRN Nausea  oxycodone    5 mG/acetaminophen 325 mG 1 Tablet(s) Oral every 6 hours PRN Moderate Pain (4 - 6)  oxycodone    5 mG/acetaminophen 325 mG 2 Tablet(s) Oral every 6 hours PRN Severe Pain (7 - 10)      Vital Signs Last 24 Hrs  T(C): 36.7 (30 Mar 2021 09:21), Max: 37.2 (29 Mar 2021 22:30)  T(F): 98 (30 Mar 2021 09:21), Max: 98.9 (29 Mar 2021 22:30)  HR: 84 (30 Mar 2021 08:38) (81 - 95)  BP: 128/62 (30 Mar 2021 08:38) (118/58 - 138/64)  BP(mean): --  RR: 18 (30 Mar 2021 08:38) (17 - 20)  SpO2: 97% (30 Mar 2021 08:38) (96% - 98%)    PHYSICAL EXAM:  GENERAL: pleasant, NAD  NEURO: Aox3, intact strength/sensation all 4 ext   HEENT: clear op, mmm  NECK:  No JVD, no lymphadenopathy  CHEST/LUNG: Clear to auscultation bilaterally; No rales, rhonchi, wheezing. Normal work of breathing, not tachypneic  HEART: Regular rate and rhythm; No murmurs, rubs, or gallops  ABDOMEN: Soft, Nontender, Nondistended. ostomy site formed stool   EXTREMITIES:  No sig le edema, wwp, b/l legs wrapped without bleeding/purulence on bandages     LABS (reviewed)   -ESR elevated from prior, CRP down  -WBC still wnl vs few days ago when fever   -BMP fsg stable, creat stable  -wound cx done by vascular yest GNR- will confirm if deep swab     ASSESSMENT AND PLAN:  74yo F with PMH of UC and colon cancer s/p colectomy and chemotherapy and chronic venous stasis w/chronic LE wounds who presented with bilateral lower extremity cellulitis and abscesses c/b septic shock, DKA, and RLE necrotizing fascitis, s/p R-foot I&D on 3/14 with Podiatry and R-BKA w/L-foot I&D on 3/15 with Vascular, s/p BKA closure on 3/19, residual purulent drainage at site and fever/leukocytosis 2-3 days prior now resolved on abx, c/f residual skin/soft tissue infection at site    #BLE Cellulitis c/b Septic Shock and Nec Fasc s/p R-BKA, recent new cellulitis at site  -- Appreciate ID recs- will f/u recs today, given GNR on wound cx broadened to zosyn though clinically stable on nafcillin with improving fever curve/WBC count. Will f/u speciation, followup if deep cx or superficial   -- wound care per primary team  -- Percocet 1 tab q4h PRN and Tylenol 650mg q6h PRN w/Senna    #Newly Diagnosed IDDM2 c/b DKA and SSTI  -- f/u endo recs- fsg stable on lantus and ISS for now  -- continue moderate scale correction     #UC c/b Colon Ca s/p Ostomy  -- no active issues, ostomy outpt wnl    #Apthous Ulcer  -- R-inner lower lip  -- continue to offer topical analgesic such as Oragel or Lidocaine     #Acute Blood Loss Anemia   -- likely from multiple trips to the OR and BKA  -- currently stable and will continue to monitor     #Diet - Carb Controlled / Dysphagia 1   #DVT PPx - SQH  #Dispo- plan for [facility], once confirm abx will begin to discuss early with patient given hx of refusing discharge    Patient was seen and examined by me at bedside    Greater than 35 minutes spent on total encounter; more than 50% of the visit was spent counseling and/or coordinating care by the attending physician.    Giacomo Jenkins MD [id number]  Subjective/Interval events:  -Reports increased pain at RLE wound site s/p cx and redressing by vascular team, chronic nausea     MEDICATIONS  (STANDING):  ascorbic acid 500 milliGRAM(s) Oral daily  aspirin  chewable 81 milliGRAM(s) Oral daily  dextrose 40% Gel 15 Gram(s) Oral once  dextrose 5%. 1000 milliLiter(s) (50 mL/Hr) IV Continuous <Continuous>  dextrose 5%. 1000 milliLiter(s) (100 mL/Hr) IV Continuous <Continuous>  dextrose 50% Injectable 25 Gram(s) IV Push once  dextrose 50% Injectable 12.5 Gram(s) IV Push once  dextrose 50% Injectable 25 Gram(s) IV Push once  glucagon  Injectable 1 milliGRAM(s) IntraMuscular once  heparin   Injectable 5000 Unit(s) SubCutaneous every 8 hours  insulin glargine Injectable (LANTUS) 12 Unit(s) SubCutaneous at bedtime  insulin lispro (ADMELOG) corrective regimen sliding scale   SubCutaneous Before meals and at bedtime  insulin lispro Injectable (ADMELOG) 7 Unit(s) SubCutaneous three times a day with meals  multivitamin  Chewable 1 Tablet(s) Oral daily  nystatin Powder 1 Application(s) Topical two times a day  piperacillin/tazobactam IVPB.. 3.375 Gram(s) IV Intermittent every 6 hours  senna 2 Tablet(s) Oral at bedtime    MEDICATIONS  (PRN):  acetaminophen   Tablet .. 650 milliGRAM(s) Oral every 6 hours PRN Mild Pain (1 - 3)  ondansetron Injectable 4 milliGRAM(s) IV Push every 6 hours PRN Nausea  oxycodone    5 mG/acetaminophen 325 mG 1 Tablet(s) Oral every 6 hours PRN Moderate Pain (4 - 6)  oxycodone    5 mG/acetaminophen 325 mG 2 Tablet(s) Oral every 6 hours PRN Severe Pain (7 - 10)      Vital Signs Last 24 Hrs  T(C): 36.7 (30 Mar 2021 09:21), Max: 37.2 (29 Mar 2021 22:30)  T(F): 98 (30 Mar 2021 09:21), Max: 98.9 (29 Mar 2021 22:30)  HR: 84 (30 Mar 2021 08:38) (81 - 95)  BP: 128/62 (30 Mar 2021 08:38) (118/58 - 138/64)  BP(mean): --  RR: 18 (30 Mar 2021 08:38) (17 - 20)  SpO2: 97% (30 Mar 2021 08:38) (96% - 98%)    PHYSICAL EXAM:  GENERAL: pleasant, NAD  NEURO: Aox3, intact strength/sensation all 4 ext   HEENT: clear op, mmm  NECK:  No JVD, no lymphadenopathy  CHEST/LUNG: Clear to auscultation bilaterally; No rales, rhonchi, wheezing. Normal work of breathing, not tachypneic  HEART: Regular rate and rhythm; No murmurs, rubs, or gallops  ABDOMEN: Soft, Nontender, Nondistended. ostomy site formed stool   EXTREMITIES:  No sig le edema, wwp, b/l legs wrapped without bleeding/purulence on bandages     LABS (reviewed)   -ESR elevated from prior, CRP down  -WBC still wnl vs few days ago when fever   -BMP fsg stable, creat stable  -wound cx done by vascular yest GNR- will confirm if deep swab     ASSESSMENT AND PLAN:  76yo F with PMH of UC and colon cancer s/p colectomy and chemotherapy and chronic venous stasis w/chronic LE wounds who presented with bilateral lower extremity cellulitis and abscesses c/b septic shock, DKA, and RLE necrotizing fascitis, s/p R-foot I&D on 3/14 with Podiatry and R-BKA w/L-foot I&D on 3/15 with Vascular, s/p BKA closure on 3/19, residual purulent drainage at site and fever/leukocytosis 2-3 days prior now resolved on abx, c/f residual skin/soft tissue infection at site    #BLE Cellulitis c/b Septic Shock and Nec Fasc s/p R-BKA, recent new cellulitis at site  -- Appreciate ID recs- will f/u recs today, given GNR on deep wound cx (confirmed by vascular team it was deep cx), broadened to zosyn, f/u speciation and sensitivities to narrow and presumed skin/soft tissue infection.   -- wound care per primary team  -- Percocet 1 tab q4h PRN and Tylenol 650mg q6h PRN w/Senna    #Newly Diagnosed IDDM2 c/b DKA and SSTI  -- f/u endo recs- fsg stable on lantus and ISS for now  -- continue moderate scale correction     #UC c/b Colon Ca s/p Ostomy  -- no active issues, ostomy outpt wnl    #Apthous Ulcer  -- R-inner lower lip  -- continue to offer topical analgesic such as Oragel or Lidocaine     #Acute Blood Loss Anemia   -- likely from multiple trips to the OR and BKA  -- currently stable and will continue to monitor     #Diet - Carb Controlled / Dysphagia 1   #DVT PPx - SQH  #Dispo- plan for [facility], once confirm abx will begin to discuss early with patient given hx of refusing discharge    Patient was seen and examined by me at bedside    Greater than 35 minutes spent on total encounter; more than 50% of the visit was spent counseling and/or coordinating care by the attending physician.    Giacomo Jenkins MD [phone]

## 2021-03-30 NOTE — PROGRESS NOTE ADULT - ASSESSMENT
Assessment/Plan;  75F PMHx UC, colon cancer s/p total abdominal colectomy with end ileostomy, found down at home, was admitted for DKA and R diabetic foot wound, s/p operative debridement by podiatry 3/14/21 and R foot guillotine amputation and L foot I&D by vascular surgery 3/15/21.    #R foot wound and Lt foot wound  s/p R foot operative debridement by podiatry 3/14 s/p R guillotine BKA, L ankle I&D  3/15  -s/p R below knee amputation closure 3/19  -pain control  -Nafcillin 3/16-3/29 - will stop nafcilin today and start Zosyn due to gram neg rods growing on BKA stump  -local wound care  -continue to monitor for fevers  -f/u wound cx  -  Removal of alternating suture from the BKA for better drainage of the fluid collection    #DKA  -f/u endo recs  -ISS  -lantus 12u HS, added lispro 7u TID with meals  -hgb A1C=16.1    Diet: consistent carb, NPO for poss OR today  DVT PPx: HSQ  Dispo: [facility]

## 2021-03-30 NOTE — PROGRESS NOTE ADULT - ASSESSMENT
Patient is a 74yo F poor historian PMH uncontrolled DMT2, UC and colon cancer s/p colectomy and chemotherapy, chronic venous stasis w/ chronic LE wounds who presents due to frequent falls over the past several weeks at home. Admitted for DKA and b/l LE cellulits/abscess with c/f RLE necrotizing infection. She was started on Vanc/Zosyn and is s/p a R guillotine BKA and I&D of left ankle with vascular surgery on 3/15. LLE w/ tendonitis/myositis, no concern for OM. Left foot surgical swab (3/13) growing staph aureus, strept intermedius, strept constellatus, bifidobacterium breve, Candida (likely colonizer). ID consulted with c/f infected hematoma at R BKA stump site, R BKA wound cx obtained and growing moderate klebsiella pneumoniae with rare E. faecalis. Transitioned nafcillin to zosyn today. CRP significantly improving.    Recommendations:   - agree with empiric zosyn 3.375g IV q6h   - f/u R BKA wound cx susceptibilities  - Bcxs 3/13 neg, 3/26 NGTD     Plan discussed with ID attending Dr. Hartman. ID Team 1 will continue to follow.

## 2021-03-31 LAB
-  AMPICILLIN/SULBACTAM: SIGNIFICANT CHANGE UP
-  AMPICILLIN: SIGNIFICANT CHANGE UP
-  CEFAZOLIN: SIGNIFICANT CHANGE UP
-  CEFTRIAXONE: SIGNIFICANT CHANGE UP
-  CIPROFLOXACIN: SIGNIFICANT CHANGE UP
-  ERTAPENEM: SIGNIFICANT CHANGE UP
-  GENTAMICIN: SIGNIFICANT CHANGE UP
-  PIPERACILLIN/TAZOBACTAM: SIGNIFICANT CHANGE UP
-  TOBRAMYCIN: SIGNIFICANT CHANGE UP
-  TRIMETHOPRIM/SULFAMETHOXAZOLE: SIGNIFICANT CHANGE UP
ANION GAP SERPL CALC-SCNC: 13 MMOL/L — SIGNIFICANT CHANGE UP (ref 5–17)
BUN SERPL-MCNC: 6 MG/DL — LOW (ref 7–23)
CALCIUM SERPL-MCNC: 9 MG/DL — SIGNIFICANT CHANGE UP (ref 8.4–10.5)
CHLORIDE SERPL-SCNC: 103 MMOL/L — SIGNIFICANT CHANGE UP (ref 96–108)
CO2 SERPL-SCNC: 21 MMOL/L — LOW (ref 22–31)
CREAT SERPL-MCNC: 0.55 MG/DL — SIGNIFICANT CHANGE UP (ref 0.5–1.3)
CRP SERPL-MCNC: 83.3 MG/L — HIGH (ref 0–4)
CULTURE RESULTS: SIGNIFICANT CHANGE UP
CULTURE RESULTS: SIGNIFICANT CHANGE UP
ERYTHROCYTE [SEDIMENTATION RATE] IN BLOOD: >130 MM/HR — HIGH
GLUCOSE BLDC GLUCOMTR-MCNC: 127 MG/DL — HIGH (ref 70–99)
GLUCOSE BLDC GLUCOMTR-MCNC: 137 MG/DL — HIGH (ref 70–99)
GLUCOSE BLDC GLUCOMTR-MCNC: 161 MG/DL — HIGH (ref 70–99)
GLUCOSE BLDC GLUCOMTR-MCNC: 248 MG/DL — HIGH (ref 70–99)
GLUCOSE SERPL-MCNC: 121 MG/DL — HIGH (ref 70–99)
HCT VFR BLD CALC: 30.7 % — LOW (ref 34.5–45)
HGB BLD-MCNC: 9.6 G/DL — LOW (ref 11.5–15.5)
MAGNESIUM SERPL-MCNC: 1.7 MG/DL — SIGNIFICANT CHANGE UP (ref 1.6–2.6)
MCHC RBC-ENTMCNC: 29.8 PG — SIGNIFICANT CHANGE UP (ref 27–34)
MCHC RBC-ENTMCNC: 31.3 GM/DL — LOW (ref 32–36)
MCV RBC AUTO: 95.3 FL — SIGNIFICANT CHANGE UP (ref 80–100)
METHOD TYPE: SIGNIFICANT CHANGE UP
NRBC # BLD: 0 /100 WBCS — SIGNIFICANT CHANGE UP (ref 0–0)
PHOSPHATE SERPL-MCNC: 4.3 MG/DL — SIGNIFICANT CHANGE UP (ref 2.5–4.5)
PLATELET # BLD AUTO: 793 K/UL — HIGH (ref 150–400)
POTASSIUM SERPL-MCNC: 4.1 MMOL/L — SIGNIFICANT CHANGE UP (ref 3.5–5.3)
POTASSIUM SERPL-SCNC: 4.1 MMOL/L — SIGNIFICANT CHANGE UP (ref 3.5–5.3)
RBC # BLD: 3.22 M/UL — LOW (ref 3.8–5.2)
RBC # FLD: 17.1 % — HIGH (ref 10.3–14.5)
SODIUM SERPL-SCNC: 137 MMOL/L — SIGNIFICANT CHANGE UP (ref 135–145)
SPECIMEN SOURCE: SIGNIFICANT CHANGE UP
SPECIMEN SOURCE: SIGNIFICANT CHANGE UP
WBC # BLD: 8.18 K/UL — SIGNIFICANT CHANGE UP (ref 3.8–10.5)
WBC # FLD AUTO: 8.18 K/UL — SIGNIFICANT CHANGE UP (ref 3.8–10.5)

## 2021-03-31 PROCEDURE — 99231 SBSQ HOSP IP/OBS SF/LOW 25: CPT | Mod: GC

## 2021-03-31 PROCEDURE — 99232 SBSQ HOSP IP/OBS MODERATE 35: CPT

## 2021-03-31 PROCEDURE — 99233 SBSQ HOSP IP/OBS HIGH 50: CPT | Mod: GC

## 2021-03-31 RX ORDER — AMPICILLIN SODIUM AND SULBACTAM SODIUM 250; 125 MG/ML; MG/ML
1.5 INJECTION, POWDER, FOR SUSPENSION INTRAMUSCULAR; INTRAVENOUS EVERY 6 HOURS
Refills: 0 | Status: DISCONTINUED | OUTPATIENT
Start: 2021-03-31 | End: 2021-03-31

## 2021-03-31 RX ORDER — INSULIN LISPRO 100/ML
8 VIAL (ML) SUBCUTANEOUS
Refills: 0 | Status: DISCONTINUED | OUTPATIENT
Start: 2021-03-31 | End: 2021-04-01

## 2021-03-31 RX ORDER — HYDROMORPHONE HYDROCHLORIDE 2 MG/ML
0.5 INJECTION INTRAMUSCULAR; INTRAVENOUS; SUBCUTANEOUS ONCE
Refills: 0 | Status: DISCONTINUED | OUTPATIENT
Start: 2021-03-31 | End: 2021-03-31

## 2021-03-31 RX ORDER — AMPICILLIN SODIUM AND SULBACTAM SODIUM 250; 125 MG/ML; MG/ML
3 INJECTION, POWDER, FOR SUSPENSION INTRAMUSCULAR; INTRAVENOUS EVERY 6 HOURS
Refills: 0 | Status: DISCONTINUED | OUTPATIENT
Start: 2021-03-31 | End: 2021-04-06

## 2021-03-31 RX ORDER — INSULIN GLARGINE 100 [IU]/ML
11 INJECTION, SOLUTION SUBCUTANEOUS AT BEDTIME
Refills: 0 | Status: DISCONTINUED | OUTPATIENT
Start: 2021-03-31 | End: 2021-04-01

## 2021-03-31 RX ORDER — MAGNESIUM OXIDE 400 MG ORAL TABLET 241.3 MG
400 TABLET ORAL ONCE
Refills: 0 | Status: COMPLETED | OUTPATIENT
Start: 2021-03-31 | End: 2021-03-31

## 2021-03-31 RX ADMIN — PIPERACILLIN AND TAZOBACTAM 200 GRAM(S): 4; .5 INJECTION, POWDER, LYOPHILIZED, FOR SOLUTION INTRAVENOUS at 09:34

## 2021-03-31 RX ADMIN — Medication 9 UNIT(S): at 08:24

## 2021-03-31 RX ADMIN — Medication 4: at 17:02

## 2021-03-31 RX ADMIN — HEPARIN SODIUM 5000 UNIT(S): 5000 INJECTION INTRAVENOUS; SUBCUTANEOUS at 06:28

## 2021-03-31 RX ADMIN — Medication 1 TABLET(S): at 12:27

## 2021-03-31 RX ADMIN — Medication 81 MILLIGRAM(S): at 12:27

## 2021-03-31 RX ADMIN — OXYCODONE AND ACETAMINOPHEN 2 TABLET(S): 5; 325 TABLET ORAL at 06:28

## 2021-03-31 RX ADMIN — AMPICILLIN SODIUM AND SULBACTAM SODIUM 200 GRAM(S): 250; 125 INJECTION, POWDER, FOR SUSPENSION INTRAMUSCULAR; INTRAVENOUS at 16:27

## 2021-03-31 RX ADMIN — INSULIN GLARGINE 11 UNIT(S): 100 INJECTION, SOLUTION SUBCUTANEOUS at 22:12

## 2021-03-31 RX ADMIN — HYDROMORPHONE HYDROCHLORIDE 0.5 MILLIGRAM(S): 2 INJECTION INTRAMUSCULAR; INTRAVENOUS; SUBCUTANEOUS at 17:15

## 2021-03-31 RX ADMIN — OXYCODONE AND ACETAMINOPHEN 2 TABLET(S): 5; 325 TABLET ORAL at 01:30

## 2021-03-31 RX ADMIN — SENNA PLUS 2 TABLET(S): 8.6 TABLET ORAL at 22:12

## 2021-03-31 RX ADMIN — NYSTATIN CREAM 1 APPLICATION(S): 100000 CREAM TOPICAL at 17:04

## 2021-03-31 RX ADMIN — MAGNESIUM OXIDE 400 MG ORAL TABLET 400 MILLIGRAM(S): 241.3 TABLET ORAL at 09:34

## 2021-03-31 RX ADMIN — OXYCODONE AND ACETAMINOPHEN 2 TABLET(S): 5; 325 TABLET ORAL at 00:28

## 2021-03-31 RX ADMIN — Medication 500 MILLIGRAM(S): at 12:28

## 2021-03-31 RX ADMIN — AMPICILLIN SODIUM AND SULBACTAM SODIUM 200 GRAM(S): 250; 125 INJECTION, POWDER, FOR SUSPENSION INTRAMUSCULAR; INTRAVENOUS at 22:13

## 2021-03-31 RX ADMIN — NYSTATIN CREAM 1 APPLICATION(S): 100000 CREAM TOPICAL at 06:30

## 2021-03-31 RX ADMIN — HEPARIN SODIUM 5000 UNIT(S): 5000 INJECTION INTRAVENOUS; SUBCUTANEOUS at 22:12

## 2021-03-31 RX ADMIN — Medication 2: at 22:12

## 2021-03-31 RX ADMIN — OXYCODONE AND ACETAMINOPHEN 2 TABLET(S): 5; 325 TABLET ORAL at 19:34

## 2021-03-31 RX ADMIN — HEPARIN SODIUM 5000 UNIT(S): 5000 INJECTION INTRAVENOUS; SUBCUTANEOUS at 13:59

## 2021-03-31 RX ADMIN — OXYCODONE AND ACETAMINOPHEN 2 TABLET(S): 5; 325 TABLET ORAL at 13:28

## 2021-03-31 RX ADMIN — PIPERACILLIN AND TAZOBACTAM 200 GRAM(S): 4; .5 INJECTION, POWDER, LYOPHILIZED, FOR SOLUTION INTRAVENOUS at 04:06

## 2021-03-31 RX ADMIN — OXYCODONE AND ACETAMINOPHEN 2 TABLET(S): 5; 325 TABLET ORAL at 20:16

## 2021-03-31 RX ADMIN — HYDROMORPHONE HYDROCHLORIDE 0.5 MILLIGRAM(S): 2 INJECTION INTRAMUSCULAR; INTRAVENOUS; SUBCUTANEOUS at 17:00

## 2021-03-31 RX ADMIN — Medication 9 UNIT(S): at 17:10

## 2021-03-31 RX ADMIN — OXYCODONE AND ACETAMINOPHEN 2 TABLET(S): 5; 325 TABLET ORAL at 12:28

## 2021-03-31 RX ADMIN — Medication 9 UNIT(S): at 12:27

## 2021-03-31 RX ADMIN — OXYCODONE AND ACETAMINOPHEN 2 TABLET(S): 5; 325 TABLET ORAL at 07:30

## 2021-03-31 NOTE — PROGRESS NOTE ADULT - SUBJECTIVE AND OBJECTIVE BOX
A/P: 75F with PMHx UC s/p colostomy (s/p multiple abdominal surgeries including colostomy bag in 2005) BIBEMS from home complaining of weakness; presenting likely in DKA with AMS, with bilateral lower extremity open wounds. Of note, pt found to have a hx of DM2: A1C on admission is 16.1. Pt is now s/p I&D w/ washout and debridement of non- viable soft tissue.     1.  DM:   wt:75 KG, a1c 16.1  cr:0.6, GFR:89  Please give lantus 11 units at bedtime  pleas give lispro 8 Units  premeals TID   Continue lispro moderate dose scale with meals and at bedtime.   Continue consistent carb diet  FSG Goal 100-180    Pt prefers not to be discharged on insulin, and states she will not be checking FSG or is interested in CGM at home.  case seen and discussed with Dr. Yee and updated primary team

## 2021-03-31 NOTE — PROGRESS NOTE ADULT - SUBJECTIVE AND OBJECTIVE BOX
Subjective/Interval events:  -No new complaints, stable mild nausea and chronic headache present for 2 weeks now, reports presyncope with movement   -Per vascular team, purulence noted at RLE wound site still on exam    MEDICATIONS  (STANDING):  ascorbic acid 500 milliGRAM(s) Oral daily  aspirin  chewable 81 milliGRAM(s) Oral daily  dextrose 40% Gel 15 Gram(s) Oral once  dextrose 5%. 1000 milliLiter(s) (100 mL/Hr) IV Continuous <Continuous>  dextrose 5%. 1000 milliLiter(s) (50 mL/Hr) IV Continuous <Continuous>  dextrose 50% Injectable 25 Gram(s) IV Push once  dextrose 50% Injectable 12.5 Gram(s) IV Push once  dextrose 50% Injectable 25 Gram(s) IV Push once  glucagon  Injectable 1 milliGRAM(s) IntraMuscular once  heparin   Injectable 5000 Unit(s) SubCutaneous every 8 hours  insulin glargine Injectable (LANTUS) 12 Unit(s) SubCutaneous at bedtime  insulin lispro (ADMELOG) corrective regimen sliding scale   SubCutaneous Before meals and at bedtime  insulin lispro Injectable (ADMELOG) 9 Unit(s) SubCutaneous three times a day with meals  multivitamin  Chewable 1 Tablet(s) Oral daily  nystatin Powder 1 Application(s) Topical two times a day  piperacillin/tazobactam IVPB.. 3.375 Gram(s) IV Intermittent every 6 hours  senna 2 Tablet(s) Oral at bedtime    MEDICATIONS  (PRN):  acetaminophen   Tablet .. 650 milliGRAM(s) Oral every 6 hours PRN Mild Pain (1 - 3)  ondansetron Injectable 4 milliGRAM(s) IV Push every 6 hours PRN Nausea  oxycodone    5 mG/acetaminophen 325 mG 1 Tablet(s) Oral every 6 hours PRN Moderate Pain (4 - 6)  oxycodone    5 mG/acetaminophen 325 mG 2 Tablet(s) Oral every 6 hours PRN Severe Pain (7 - 10)      Vital Signs Last 24 Hrs  T(C): 36.9 (31 Mar 2021 09:03), Max: 37 (30 Mar 2021 18:42)  T(F): 98.5 (31 Mar 2021 09:03), Max: 98.6 (30 Mar 2021 18:42)  HR: 96 (31 Mar 2021 08:40) (72 - 96)  BP: 117/62 (31 Mar 2021 08:40) (117/62 - 141/65)  BP(mean): --  RR: 18 (31 Mar 2021 08:40) (16 - 18)  SpO2: 97% (31 Mar 2021 08:40) (94% - 97%)    PHYSICAL EXAM:  PHYSICAL EXAM:  GENERAL: pleasant, NAD  NEURO: Aox3, intact strength/sensation all 4 ext   HEENT: clear op, mmm  NECK:  No JVD, no lymphadenopathy  CHEST/LUNG: Clear to auscultation bilaterally; No rales, rhonchi, wheezing. Normal work of breathing, not tachypneic  HEART: Regular rate and rhythm; No murmurs, rubs, or gallops  ABDOMEN: Soft, Nontender, Nondistended. ostomy site formed stool   EXTREMITIES:  No sig le edema, wwp, b/l legs wrapped without bleeding/purulence on bandages     LABS (reviewed)   -CBC- no white count, Hg stable  -BMP- BMP wnl, fsg stable  -Cx sensitivities returned     ASSESSMENT AND PLAN: 74yo F with PMH of UC and colon cancer s/p colectomy and chemotherapy and chronic venous stasis w/chronic LE wounds who presented with bilateral lower extremity cellulitis and abscesses c/b septic shock, DKA, and RLE necrotizing fascitis, s/p R-foot I&D on 3/14 with Podiatry and R-BKA w/L-foot I&D on 3/15 with Vascular, s/p BKA closure on 3/19, residual purulent drainage at site and fever/leukocytosis 2-3 days prior now resolved on abx, c/f residual skin/soft tissue infection at site    #BLE Cellulitis c/b Septic Shock and Nec Fasc s/p R-BKA, recent new cellulitis at site  -- Appreciate ID recs- switch to unisyn, 2 week course recommended, may need midline close to dc time  -- may need to return to OR if continued purulence from site   -- Percocet 1 tab q4h PRN and Tylenol 650mg q6h PRN w/Senna    #Newly Diagnosed IDDM2 c/b DKA and SSTI  -- f/u endo recs- fsg stable on lantus and ISS for now  -- continue moderate scale correction     #UC c/b Colon Ca s/p Ostomy  -- no active issues, ostomy outpt wnl    #Apthous Ulcer  -- R-inner lower lip  -- continue to offer topical analgesic such as Oragel or Lidocaine     #Acute Blood Loss Anemia   -- likely from multiple trips to the OR and BKA  -- currently stable and will continue to monitor     #Diet - Carb Controlled / Dysphagia 1   #DVT PPx - SQH  #Dispo- eventual plan for [facility], pending wound infection management     Patient was seen and examined by me at bedside    Greater than 35 minutes spent on total encounter; more than 50% of the visit was spent counseling and/or coordinating care by the attending physician.    Giacomo Jenkins MD [phone]

## 2021-03-31 NOTE — PROGRESS NOTE ADULT - SUBJECTIVE AND OBJECTIVE BOX
Infectious Disease Progress Note    SUBJECTIVE: Patient seen and examined at bedside in no acute distress. Says her pain is unchanged. Denies additional complaints.     REVIEW OF SYSTEMS:    CONSTITUTIONAL: No weakness, fevers or chills  EYES/ENT: No visual changes;  No vertigo or throat pain   NECK: No pain or stiffness  RESPIRATORY: No cough, wheezing, hemoptysis; No shortness of breath  CARDIOVASCULAR: No chest pain or palpitations  GASTROINTESTINAL: No abdominal or epigastric pain. No nausea, vomiting, or hematemesis; No diarrhea or constipation. No melena or hematochezia.  GENITOURINARY: No dysuria, frequency or hematuria  NEUROLOGICAL: No numbness or weakness  SKIN: No itching, burning, rashes, or lesions   MSK: no joint pain, no joint swelling  All other review of systems is negative unless indicated above.    Allergies    No Known Allergies    Intolerances    ANTIBIOTICS/RELEVANT:  antimicrobials  nafcillin  IVPB 2 Gram(s) IV Intermittent every 4 hours    immunologic:    OTHER:  acetaminophen   Tablet .. 650 milliGRAM(s) Oral every 6 hours PRN  ascorbic acid 500 milliGRAM(s) Oral daily  dextrose 40% Gel 15 Gram(s) Oral once  dextrose 5%. 1000 milliLiter(s) IV Continuous <Continuous>  dextrose 5%. 1000 milliLiter(s) IV Continuous <Continuous>  dextrose 50% Injectable 25 Gram(s) IV Push once  dextrose 50% Injectable 12.5 Gram(s) IV Push once  dextrose 50% Injectable 25 Gram(s) IV Push once  glucagon  Injectable 1 milliGRAM(s) IntraMuscular once  heparin   Injectable 5000 Unit(s) SubCutaneous every 8 hours  insulin glargine Injectable (LANTUS) 14 Unit(s) SubCutaneous at bedtime  insulin lispro (ADMELOG) corrective regimen sliding scale   SubCutaneous Before meals and at bedtime  insulin lispro Injectable (ADMELOG) 7 Unit(s) SubCutaneous three times a day with meals  multivitamin  Chewable 1 Tablet(s) Oral daily  nystatin Powder 1 Application(s) Topical two times a day  ondansetron Injectable 4 milliGRAM(s) IV Push every 6 hours PRN  oxycodone    5 mG/acetaminophen 325 mG 1 Tablet(s) Oral every 6 hours PRN  oxycodone    5 mG/acetaminophen 325 mG 2 Tablet(s) Oral every 6 hours PRN  senna 2 Tablet(s) Oral at bedtime    Vital Signs Last 24 Hrs  T(C): 36.1 (30 Mar 2021 12:50), Max: 37.2 (29 Mar 2021 22:30)  T(F): 96.9 (30 Mar 2021 12:50), Max: 98.9 (29 Mar 2021 22:30)  HR: 92 (30 Mar 2021 15:19) (81 - 94)  BP: 123/55 (30 Mar 2021 15:19) (118/58 - 139/55)  BP(mean): --  RR: 17 (30 Mar 2021 11:29) (17 - 20)  SpO2: 97% (30 Mar 2021 11:29) (97% - 98%)    PHYSICAL EXAM:    Constitutional: WDWN resting comfortably in bed; NAD  Head: NC/AT  Eyes: PERRL, EOMI, anicteric sclera  ENT: no nasal discharge; MMM  Neck: supple, no JVD  Respiratory: CTA B/L; no W/R/R   Cardiac: +S1/S2; RRR; no M/R/G   Gastrointestinal: abdomen soft, NT/ND; no rebound or guarding; +BSx4; +LUQ ostomy with solid brown output  Extremities: WWP, S/p R BKA wrapped with site c/d/i; LLE dressing c/d/i  Vascular: 2+ radial RUE/LUE, 1+ DP/PT pulses LLE   Neurologic: AAOx3; CNII-XII grossly intact; no focal deficits    LABS:                        9.6    8.18  )-----------( 793      ( 31 Mar 2021 06:52 )             30.7     03-31    137  |  103  |  6<L>  ----------------------------<  121<H>  4.1   |  21<L>  |  0.55    Ca    9.0      31 Mar 2021 06:52  Phos  4.3     03-31  Mg     1.7     03-31          CAPILLARY BLOOD GLUCOSE      POCT Blood Glucose.: 137 mg/dL (31 Mar 2021 11:08)      RADIOLOGY & ADDITIONAL TESTS: Reviewed.         Infectious Disease Progress Note    SUBJECTIVE: Patient seen and examined at bedside in no acute distress. Says her pain is unchanged. Denies additional complaints.     REVIEW OF SYSTEMS:    CONSTITUTIONAL: No weakness, fevers or chills  EYES/ENT: No visual changes;  No vertigo or throat pain   NECK: No pain or stiffness  RESPIRATORY: No cough, wheezing, hemoptysis; No shortness of breath  CARDIOVASCULAR: No chest pain or palpitations  GASTROINTESTINAL: No abdominal or epigastric pain. No nausea, vomiting, or hematemesis; No diarrhea or constipation. No melena or hematochezia.  GENITOURINARY: No dysuria, frequency or hematuria  NEUROLOGICAL: No numbness or weakness  SKIN: No itching, burning, rashes, or lesions   MSK: no joint pain, no joint swelling  All other review of systems is negative unless indicated above.    Allergies    No Known Allergies    Intolerances    ANTIBIOTICS/RELEVANT:  antimicrobials  nafcillin  IVPB 2 Gram(s) IV Intermittent every 4 hours    immunologic:    OTHER:  acetaminophen   Tablet .. 650 milliGRAM(s) Oral every 6 hours PRN  ascorbic acid 500 milliGRAM(s) Oral daily  dextrose 40% Gel 15 Gram(s) Oral once  dextrose 5%. 1000 milliLiter(s) IV Continuous <Continuous>  dextrose 5%. 1000 milliLiter(s) IV Continuous <Continuous>  dextrose 50% Injectable 25 Gram(s) IV Push once  dextrose 50% Injectable 12.5 Gram(s) IV Push once  dextrose 50% Injectable 25 Gram(s) IV Push once  glucagon  Injectable 1 milliGRAM(s) IntraMuscular once  heparin   Injectable 5000 Unit(s) SubCutaneous every 8 hours  insulin glargine Injectable (LANTUS) 14 Unit(s) SubCutaneous at bedtime  insulin lispro (ADMELOG) corrective regimen sliding scale   SubCutaneous Before meals and at bedtime  insulin lispro Injectable (ADMELOG) 7 Unit(s) SubCutaneous three times a day with meals  multivitamin  Chewable 1 Tablet(s) Oral daily  nystatin Powder 1 Application(s) Topical two times a day  ondansetron Injectable 4 milliGRAM(s) IV Push every 6 hours PRN  oxycodone    5 mG/acetaminophen 325 mG 1 Tablet(s) Oral every 6 hours PRN  oxycodone    5 mG/acetaminophen 325 mG 2 Tablet(s) Oral every 6 hours PRN  senna 2 Tablet(s) Oral at bedtime    Vital Signs Last 24 Hrs  Vital Signs Last 24 Hrs  T(C): 36.9 (31 Mar 2021 09:03), Max: 37 (30 Mar 2021 18:42)  T(F): 98.5 (31 Mar 2021 09:03), Max: 98.6 (30 Mar 2021 18:42)  HR: 96 (31 Mar 2021 08:40) (72 - 96)  BP: 117/62 (31 Mar 2021 08:40) (117/62 - 141/65)  BP(mean): --  RR: 18 (31 Mar 2021 08:40) (16 - 18)  SpO2: 97% (31 Mar 2021 08:40) (94% - 97%)    PHYSICAL EXAM:    Constitutional: WDWN resting comfortably in bed; NAD  Head: NC/AT  Eyes: PERRL, EOMI, anicteric sclera  ENT: no nasal discharge; MMM  Neck: supple, no JVD  Respiratory: CTA B/L; no W/R/R   Cardiac: +S1/S2; RRR; no M/R/G   Gastrointestinal: abdomen soft, NT/ND; no rebound or guarding; +BSx4; +LUQ ostomy with solid brown output  Extremities: WWP, S/p R BKA wrapped with site c/d/i; LLE dressing c/d/i  Vascular: 2+ radial RUE/LUE, 1+ DP/PT pulses LLE   Neurologic: AAOx3; CNII-XII grossly intact; no focal deficits    LABS:                        9.6    8.18  )-----------( 793      ( 31 Mar 2021 06:52 )             30.7     03-31    137  |  103  |  6<L>  ----------------------------<  121<H>  4.1   |  21<L>  |  0.55    Ca    9.0      31 Mar 2021 06:52  Phos  4.3     03-31  Mg     1.7     03-31          CAPILLARY BLOOD GLUCOSE      POCT Blood Glucose.: 137 mg/dL (31 Mar 2021 11:08)      RADIOLOGY & ADDITIONAL TESTS: Reviewed.

## 2021-03-31 NOTE — PROGRESS NOTE ADULT - SUBJECTIVE AND OBJECTIVE BOX
24hr Events:  O/N: BETTIE MUSA  3/30: Switch Nafcilin to Zosyn, ESR > 130, CRP: 99.7; Primafit placed, incontinent count            PLEASE CHECK WHEN PRESENT:     [  ] Heart Failure     [  ] Acute     [  ] Acute on Chronic     [  ] Chronic  -------------------------------------------------------------------     [  ]Diastolic [HFpEF]     [  ]Systolic [HFrEF]     [  ]Combined [HFpEF & HFrEF]     [  ] afib     [  ] hypertensive heart disease     [  ]Other:  -------------------------------------------------------------------  [ ] Respiratory failure  [ ] Acute cor pulmonale  [ ] Asthma/COPD Exacerbation  [ ] Pleural effusion  [ ] Aspiration pneumonia  -------------------------------------------------------------------  [  ]ANTONY     [  ]ATN     [  ]Reneal Medullary Necrosis     [  ]Renal Cortical Necrosis     [  ]Other Pathological Lesions:    [  ]CKD 1  [  ]CKD 2  [  ]CKD 3  [  ]CKD 4  [  ]CKD 5  [  ]Other  -------------------------------------------------------------------  [x]Diabetes - new DX on this admission  [x] Diabetic PVD Ulcer  [  ] Neuropathic ulcer to DM  [  ] Diabetes with Nephropathy  [  ] Osteomyelitis due to diabetes  [x ] Hyperglycemia  [ ] Hypoglycemia  --------------------------------------------------------------------  [  ]Malnutrition: See Nutrition Note  [  ]Cachexia  [  ]Other:   [  ]Supplement Ordered:  [  ]Morbid Obesity (BMI >=40]  ---------------------------------------------------------------------  [ ] Sepsis/severe sepsis/septic shock  [ ] UTI  [ ] Pneumonia  -----------------------------------------------------------------------  [ ] Acidosis/alkalosis  [ ] Fluid overload  [x ] Hypokalemia  [ ] Hyperkalemia  [x ] Hypomagnesemia  [x ] Hypophosphatemia  [ ] Hyperphosphatemia  [ ] Hyponatremia  [ ] Hypernatremia  ------------------------------------------------------------------------  [x ] Acute blood loss anemia- due to OR for BKA guillotine and closure  [ ] Post op blood loss anemia  [ ] Iron deficiency anemia  [ ] Anemia due to chronic disease  [ ] Hypercoagulable state  [x ] Leukocytosis  ----------------------------------------------------------------------  [ ] Cerebral infarction  [ ] Transient ischemia attack  [ ] Encephalopathy          Assessment and Plan:     75F PMHx UC, colon cancer s/p total abdominal colectomy with end ileostomy, found down at home, was admitted for DKA and R diabetic foot wound, s/p operative debridement by podiatry 3/14/21 and R foot guillotine amputation and L foot I&D by vascular surgery 3/15/21.    #R foot wound and Lt foot wound  s/p R foot operative debridement by podiatry 3/14 s/p R guillotine BKA, L ankle I&D  3/15  -s/p R below knee amputation closure 3/19  -pain control  -Nafcillin 3/16-3/29 - will stop nafcilin today and start Zosyn due to gram neg rods growing on BKA stump  -local wound care  -continue to monitor for fevers  -f/u wound cx  -  Removal of alternating suture from the BKA for better drainage of the fluid collection    #DKA  -f/u endo recs  -ISS  -lantus 12u HS, added lispro 7u TID with meals  -hgb A1C=16.1    Diet: consistent carb, NPO for poss OR today  DVT PPx: HSQ  Dispo: [facility]     24hr Events:  O/N: LENCHO, VSS  3/30: Switch Nafcilin to Zosyn, ESR > 130, CRP: 99.7; Primafit placed, incontinent count    piperacillin/tazobactam IVPB.. 3.375  aspirin  chewable 81  heparin   Injectable 5000  piperacillin/tazobactam IVPB.. 3.375        Vital Signs Last 24 Hrs  T(C): 36.2 (31 Mar 2021 04:54), Max: 37 (30 Mar 2021 18:42)  T(F): 97.1 (31 Mar 2021 04:54), Max: 98.6 (30 Mar 2021 18:42)  HR: 92 (31 Mar 2021 00:23) (72 - 94)  BP: 141/65 (31 Mar 2021 00:23) (118/63 - 141/65)  BP(mean): --  RR: 18 (31 Mar 2021 00:23) (16 - 18)  SpO2: 96% (31 Mar 2021 00:23) (96% - 97%)  I&O's Summary    30 Mar 2021 07:01  -  31 Mar 2021 07:00  --------------------------------------------------------  IN: 1240 mL / OUT: 1150 mL / NET: 90 mL        Physical Exam:  General: NAD, resting comfortably in bed  Pulmonary: Nonlabored breathing, no respiratory distress  EXTREM: WWP, no edema RLE: R leg BKA amputation site with pus draining from lateral portion of incision +odor.  LLE- anterior ankle ulcer with pink healthy granulation tissue no discharge.      LABS:                        9.6    8.18  )-----------( 793      ( 31 Mar 2021 06:52 )             30.7     03-30    136  |  102  |  8   ----------------------------<  148<H>  4.1   |  23  |  0.51    Ca    8.8      30 Mar 2021 06:10  Phos  3.6     03-30  Mg     1.8     03-30            PLEASE CHECK WHEN PRESENT:     [  ] Heart Failure     [  ] Acute     [  ] Acute on Chronic     [  ] Chronic  -------------------------------------------------------------------     [  ]Diastolic [HFpEF]     [  ]Systolic [HFrEF]     [  ]Combined [HFpEF & HFrEF]     [  ] afib     [  ] hypertensive heart disease     [  ]Other:  -------------------------------------------------------------------  [ ] Respiratory failure  [ ] Acute cor pulmonale  [ ] Asthma/COPD Exacerbation  [ ] Pleural effusion  [ ] Aspiration pneumonia  -------------------------------------------------------------------  [  ]ANTONY     [  ]ATN     [  ]Reneal Medullary Necrosis     [  ]Renal Cortical Necrosis     [  ]Other Pathological Lesions:    [  ]CKD 1  [  ]CKD 2  [  ]CKD 3  [  ]CKD 4  [  ]CKD 5  [  ]Other  -------------------------------------------------------------------  [x]Diabetes - new DX on this admission  [x] Diabetic PVD Ulcer  [  ] Neuropathic ulcer to DM  [  ] Diabetes with Nephropathy  [  ] Osteomyelitis due to diabetes  [x ] Hyperglycemia  [ ] Hypoglycemia  --------------------------------------------------------------------  [  ]Malnutrition: See Nutrition Note  [  ]Cachexia  [  ]Other:   [  ]Supplement Ordered:  [  ]Morbid Obesity (BMI >=40]  ---------------------------------------------------------------------  [ ] Sepsis/severe sepsis/septic shock  [ ] UTI  [ ] Pneumonia  -----------------------------------------------------------------------  [ ] Acidosis/alkalosis  [ ] Fluid overload  [x ] Hypokalemia  [ ] Hyperkalemia  [x ] Hypomagnesemia  [x ] Hypophosphatemia  [ ] Hyperphosphatemia  [ ] Hyponatremia  [ ] Hypernatremia  ------------------------------------------------------------------------  [x ] Acute blood loss anemia- due to OR for BKA guillotine and closure  [ ] Post op blood loss anemia  [ ] Iron deficiency anemia  [ ] Anemia due to chronic disease  [ ] Hypercoagulable state  [x ] Leukocytosis  ----------------------------------------------------------------------  [ ] Cerebral infarction  [ ] Transient ischemia attack  [ ] Encephalopathy          Assessment and Plan:     75F PMHx UC, colon cancer s/p total abdominal colectomy with end ileostomy, found down at home, was admitted for DKA and R diabetic foot wound, s/p operative debridement by podiatry 3/14/21 and R foot guillotine amputation and L foot I&D by vascular surgery 3/15/21.    #R foot wound and Lt foot wound  s/p R foot operative debridement by podiatry 3/14 s/p R guillotine BKA, L ankle I&D  3/15  -s/p R below knee amputation closure 3/19  -pain control  -Nafcillin 3/16-3/29 - will stop nafcilin today and start Zosyn due to gram neg rods growing on BKA stump  -local wound care  -continue to monitor for fevers  -f/u wound cx  -  Removal of alternating suture from the BKA for better drainage of the fluid collection    #DKA  -f/u endo recs  -ISS  -lantus 12u HS, added lispro 7u TID with meals  -hgb A1C=16.1    Diet: consistent carb  DVT PPx: HSQ  Dispo: [facility], TBD     24hr Events:  O/N: LENCHO, VSS  3/30: Switch Nafcilin to Zosyn, ESR > 130, CRP: 99.7; Primafit placed, incontinent count    piperacillin/tazobactam IVPB.. 3.375  aspirin  chewable 81  heparin   Injectable 5000  piperacillin/tazobactam IVPB.. 3.375        Vital Signs Last 24 Hrs  T(C): 36.2 (31 Mar 2021 04:54), Max: 37 (30 Mar 2021 18:42)  T(F): 97.1 (31 Mar 2021 04:54), Max: 98.6 (30 Mar 2021 18:42)  HR: 92 (31 Mar 2021 00:23) (72 - 94)  BP: 141/65 (31 Mar 2021 00:23) (118/63 - 141/65)  BP(mean): --  RR: 18 (31 Mar 2021 00:23) (16 - 18)  SpO2: 96% (31 Mar 2021 00:23) (96% - 97%)  I&O's Summary    30 Mar 2021 07:01  -  31 Mar 2021 07:00  --------------------------------------------------------  IN: 1240 mL / OUT: 1150 mL / NET: 90 mL        Physical Exam:  General: NAD, resting comfortably in bed  Pulmonary: Nonlabored breathing, no respiratory distress  EXTREM: WWP, no edema RLE: R leg BKA amputation site with pus draining from lateral portion of incision +odor.  LLE- anterior ankle ulcer with pink healthy granulation tissue no discharge.      LABS:                        9.6    8.18  )-----------( 793      ( 31 Mar 2021 06:52 )             30.7     03-30    136  |  102  |  8   ----------------------------<  148<H>  4.1   |  23  |  0.51    Ca    8.8      30 Mar 2021 06:10  Phos  3.6     03-30  Mg     1.8     03-30            PLEASE CHECK WHEN PRESENT:     [  ] Heart Failure     [  ] Acute     [  ] Acute on Chronic     [  ] Chronic  -------------------------------------------------------------------     [  ]Diastolic [HFpEF]     [  ]Systolic [HFrEF]     [  ]Combined [HFpEF & HFrEF]     [  ] afib     [  ] hypertensive heart disease     [  ]Other:  -------------------------------------------------------------------  [ ] Respiratory failure  [ ] Acute cor pulmonale  [ ] Asthma/COPD Exacerbation  [ ] Pleural effusion  [ ] Aspiration pneumonia  -------------------------------------------------------------------  [  ]ANTONY     [  ]ATN     [  ]Reneal Medullary Necrosis     [  ]Renal Cortical Necrosis     [  ]Other Pathological Lesions:    [  ]CKD 1  [  ]CKD 2  [  ]CKD 3  [  ]CKD 4  [  ]CKD 5  [  ]Other  -------------------------------------------------------------------  [x]Diabetes - new DX on this admission  [x] Diabetic PVD Ulcer  [  ] Neuropathic ulcer to DM  [  ] Diabetes with Nephropathy  [  ] Osteomyelitis due to diabetes  [x ] Hyperglycemia  [ ] Hypoglycemia  --------------------------------------------------------------------  [  ]Malnutrition: See Nutrition Note  [  ]Cachexia  [  ]Other:   [  ]Supplement Ordered:  [  ]Morbid Obesity (BMI >=40]  ---------------------------------------------------------------------  [ ] Sepsis/severe sepsis/septic shock  [ ] UTI  [ ] Pneumonia  -----------------------------------------------------------------------  [ ] Acidosis/alkalosis  [ ] Fluid overload  [x ] Hypokalemia  [ ] Hyperkalemia  [x ] Hypomagnesemia  [x ] Hypophosphatemia  [ ] Hyperphosphatemia  [ ] Hyponatremia  [ ] Hypernatremia  ------------------------------------------------------------------------  [x ] Acute blood loss anemia- due to OR for BKA guillotine and closure  [ ] Post op blood loss anemia  [ ] Iron deficiency anemia  [ ] Anemia due to chronic disease  [ ] Hypercoagulable state  [x ] Leukocytosis  ----------------------------------------------------------------------  [ ] Cerebral infarction  [ ] Transient ischemia attack  [ ] Encephalopathy          Assessment and Plan:     75F PMHx UC, colon cancer s/p total abdominal colectomy with end ileostomy, found down at home, was admitted for DKA and R diabetic foot wound, s/p operative debridement by podiatry 3/14/21 and R foot guillotine amputation and L foot I&D by vascular surgery 3/15/21.    #R foot wound and Lt foot wound  s/p R foot operative debridement by podiatry 3/14 s/p R guillotine BKA, L ankle I&D  3/15  -s/p R below knee amputation closure 3/19  -pain control  -Nafcillin 3/16-3/29 - will stop nafcilin today and start Zosyn due to gram neg rods growing on BKA stump  -local wound care  -continue to monitor for fevers  -f/u wound cx  -  Removal of alternating suture from the BKA for better drainage of the fluid collection    #DKA  -f/u endo recs  -ISS  -lantus 12u HS, added lispro 9u TID with meals  -hgb A1C=16.1    Diet: consistent carb  DVT PPx: HSQ  Dispo: [facility], TBD

## 2021-04-01 LAB
-  AMPICILLIN: SIGNIFICANT CHANGE UP
-  VANCOMYCIN: SIGNIFICANT CHANGE UP
ANION GAP SERPL CALC-SCNC: 11 MMOL/L — SIGNIFICANT CHANGE UP (ref 5–17)
BUN SERPL-MCNC: 8 MG/DL — SIGNIFICANT CHANGE UP (ref 7–23)
CALCIUM SERPL-MCNC: 8.9 MG/DL — SIGNIFICANT CHANGE UP (ref 8.4–10.5)
CHLORIDE SERPL-SCNC: 105 MMOL/L — SIGNIFICANT CHANGE UP (ref 96–108)
CO2 SERPL-SCNC: 21 MMOL/L — LOW (ref 22–31)
CREAT SERPL-MCNC: 0.53 MG/DL — SIGNIFICANT CHANGE UP (ref 0.5–1.3)
CRP SERPL-MCNC: 57.2 MG/L — HIGH (ref 0–4)
CULTURE RESULTS: SIGNIFICANT CHANGE UP
GLUCOSE BLDC GLUCOMTR-MCNC: 110 MG/DL — HIGH (ref 70–99)
GLUCOSE BLDC GLUCOMTR-MCNC: 117 MG/DL — HIGH (ref 70–99)
GLUCOSE BLDC GLUCOMTR-MCNC: 142 MG/DL — HIGH (ref 70–99)
GLUCOSE SERPL-MCNC: 140 MG/DL — HIGH (ref 70–99)
MAGNESIUM SERPL-MCNC: 1.7 MG/DL — SIGNIFICANT CHANGE UP (ref 1.6–2.6)
METHOD TYPE: SIGNIFICANT CHANGE UP
ORGANISM # SPEC MICROSCOPIC CNT: SIGNIFICANT CHANGE UP
PHOSPHATE SERPL-MCNC: 3.9 MG/DL — SIGNIFICANT CHANGE UP (ref 2.5–4.5)
POTASSIUM SERPL-MCNC: 4 MMOL/L — SIGNIFICANT CHANGE UP (ref 3.5–5.3)
POTASSIUM SERPL-SCNC: 4 MMOL/L — SIGNIFICANT CHANGE UP (ref 3.5–5.3)
SODIUM SERPL-SCNC: 137 MMOL/L — SIGNIFICANT CHANGE UP (ref 135–145)
SPECIMEN SOURCE: SIGNIFICANT CHANGE UP

## 2021-04-01 PROCEDURE — 99231 SBSQ HOSP IP/OBS SF/LOW 25: CPT | Mod: GC

## 2021-04-01 PROCEDURE — 99233 SBSQ HOSP IP/OBS HIGH 50: CPT | Mod: GC

## 2021-04-01 RX ORDER — HYDROMORPHONE HYDROCHLORIDE 2 MG/ML
0.5 INJECTION INTRAMUSCULAR; INTRAVENOUS; SUBCUTANEOUS ONCE
Refills: 0 | Status: DISCONTINUED | OUTPATIENT
Start: 2021-04-01 | End: 2021-04-01

## 2021-04-01 RX ORDER — INSULIN GLARGINE 100 [IU]/ML
10 INJECTION, SOLUTION SUBCUTANEOUS AT BEDTIME
Refills: 0 | Status: DISCONTINUED | OUTPATIENT
Start: 2021-04-01 | End: 2021-04-02

## 2021-04-01 RX ORDER — MAGNESIUM SULFATE 500 MG/ML
1 VIAL (ML) INJECTION ONCE
Refills: 0 | Status: COMPLETED | OUTPATIENT
Start: 2021-04-01 | End: 2021-04-01

## 2021-04-01 RX ORDER — AMPICILLIN SODIUM AND SULBACTAM SODIUM 250; 125 MG/ML; MG/ML
3 INJECTION, POWDER, FOR SUSPENSION INTRAMUSCULAR; INTRAVENOUS
Qty: 0 | Refills: 0 | DISCHARGE
Start: 2021-04-01 | End: 2021-04-08

## 2021-04-01 RX ORDER — ASCORBIC ACID 60 MG
1 TABLET,CHEWABLE ORAL
Qty: 0 | Refills: 0 | DISCHARGE
Start: 2021-04-01

## 2021-04-01 RX ORDER — AMPICILLIN SODIUM AND SULBACTAM SODIUM 250; 125 MG/ML; MG/ML
3 INJECTION, POWDER, FOR SUSPENSION INTRAMUSCULAR; INTRAVENOUS
Qty: 0 | Refills: 0 | DISCHARGE
Start: 2021-04-01 | End: 2021-04-12

## 2021-04-01 RX ORDER — ASPIRIN/CALCIUM CARB/MAGNESIUM 324 MG
1 TABLET ORAL
Qty: 0 | Refills: 0 | DISCHARGE
Start: 2021-04-01

## 2021-04-01 RX ORDER — INSULIN LISPRO 100/ML
7 VIAL (ML) SUBCUTANEOUS
Refills: 0 | Status: DISCONTINUED | OUTPATIENT
Start: 2021-04-01 | End: 2021-04-02

## 2021-04-01 RX ADMIN — AMPICILLIN SODIUM AND SULBACTAM SODIUM 200 GRAM(S): 250; 125 INJECTION, POWDER, FOR SUSPENSION INTRAMUSCULAR; INTRAVENOUS at 03:30

## 2021-04-01 RX ADMIN — OXYCODONE AND ACETAMINOPHEN 2 TABLET(S): 5; 325 TABLET ORAL at 19:00

## 2021-04-01 RX ADMIN — Medication 500 MILLIGRAM(S): at 12:32

## 2021-04-01 RX ADMIN — HYDROMORPHONE HYDROCHLORIDE 0.5 MILLIGRAM(S): 2 INJECTION INTRAMUSCULAR; INTRAVENOUS; SUBCUTANEOUS at 06:38

## 2021-04-01 RX ADMIN — SENNA PLUS 2 TABLET(S): 8.6 TABLET ORAL at 22:08

## 2021-04-01 RX ADMIN — Medication 8 UNIT(S): at 16:59

## 2021-04-01 RX ADMIN — OXYCODONE AND ACETAMINOPHEN 1 TABLET(S): 5; 325 TABLET ORAL at 14:45

## 2021-04-01 RX ADMIN — OXYCODONE AND ACETAMINOPHEN 2 TABLET(S): 5; 325 TABLET ORAL at 10:55

## 2021-04-01 RX ADMIN — OXYCODONE AND ACETAMINOPHEN 2 TABLET(S): 5; 325 TABLET ORAL at 09:55

## 2021-04-01 RX ADMIN — NYSTATIN CREAM 1 APPLICATION(S): 100000 CREAM TOPICAL at 18:01

## 2021-04-01 RX ADMIN — Medication 100 GRAM(S): at 08:56

## 2021-04-01 RX ADMIN — AMPICILLIN SODIUM AND SULBACTAM SODIUM 200 GRAM(S): 250; 125 INJECTION, POWDER, FOR SUSPENSION INTRAMUSCULAR; INTRAVENOUS at 17:00

## 2021-04-01 RX ADMIN — OXYCODONE AND ACETAMINOPHEN 2 TABLET(S): 5; 325 TABLET ORAL at 18:01

## 2021-04-01 RX ADMIN — OXYCODONE AND ACETAMINOPHEN 2 TABLET(S): 5; 325 TABLET ORAL at 05:00

## 2021-04-01 RX ADMIN — Medication 81 MILLIGRAM(S): at 12:32

## 2021-04-01 RX ADMIN — HEPARIN SODIUM 5000 UNIT(S): 5000 INJECTION INTRAVENOUS; SUBCUTANEOUS at 13:47

## 2021-04-01 RX ADMIN — Medication 8 UNIT(S): at 12:32

## 2021-04-01 RX ADMIN — OXYCODONE AND ACETAMINOPHEN 1 TABLET(S): 5; 325 TABLET ORAL at 22:09

## 2021-04-01 RX ADMIN — OXYCODONE AND ACETAMINOPHEN 1 TABLET(S): 5; 325 TABLET ORAL at 22:45

## 2021-04-01 RX ADMIN — Medication 8 UNIT(S): at 08:18

## 2021-04-01 RX ADMIN — HEPARIN SODIUM 5000 UNIT(S): 5000 INJECTION INTRAVENOUS; SUBCUTANEOUS at 22:08

## 2021-04-01 RX ADMIN — HYDROMORPHONE HYDROCHLORIDE 0.5 MILLIGRAM(S): 2 INJECTION INTRAMUSCULAR; INTRAVENOUS; SUBCUTANEOUS at 07:40

## 2021-04-01 RX ADMIN — Medication 1 TABLET(S): at 12:32

## 2021-04-01 RX ADMIN — OXYCODONE AND ACETAMINOPHEN 1 TABLET(S): 5; 325 TABLET ORAL at 13:47

## 2021-04-01 RX ADMIN — INSULIN GLARGINE 10 UNIT(S): 100 INJECTION, SOLUTION SUBCUTANEOUS at 22:09

## 2021-04-01 RX ADMIN — AMPICILLIN SODIUM AND SULBACTAM SODIUM 200 GRAM(S): 250; 125 INJECTION, POWDER, FOR SUSPENSION INTRAMUSCULAR; INTRAVENOUS at 22:08

## 2021-04-01 RX ADMIN — OXYCODONE AND ACETAMINOPHEN 2 TABLET(S): 5; 325 TABLET ORAL at 03:31

## 2021-04-01 NOTE — PROGRESS NOTE ADULT - SUBJECTIVE AND OBJECTIVE BOX
O/N: LENCHO, VSS                        PLEASE CHECK WHEN PRESENT:     [  ] Heart Failure     [  ] Acute     [  ] Acute on Chronic     [  ] Chronic  -------------------------------------------------------------------     [  ]Diastolic [HFpEF]     [  ]Systolic [HFrEF]     [  ]Combined [HFpEF & HFrEF]     [  ] afib     [  ] hypertensive heart disease     [  ]Other:  -------------------------------------------------------------------  [ ] Respiratory failure  [ ] Acute cor pulmonale  [ ] Asthma/COPD Exacerbation  [ ] Pleural effusion  [ ] Aspiration pneumonia  -------------------------------------------------------------------  [  ]ANTONY     [  ]ATN     [  ]Reneal Medullary Necrosis     [  ]Renal Cortical Necrosis     [  ]Other Pathological Lesions:    [  ]CKD 1  [  ]CKD 2  [  ]CKD 3  [  ]CKD 4  [  ]CKD 5  [  ]Other  -------------------------------------------------------------------  [x]Diabetes - new DX on this admission  [x] Diabetic PVD Ulcer  [  ] Neuropathic ulcer to DM  [  ] Diabetes with Nephropathy  [  ] Osteomyelitis due to diabetes  [x ] Hyperglycemia  [ ] Hypoglycemia  --------------------------------------------------------------------  [  ]Malnutrition: See Nutrition Note  [  ]Cachexia  [  ]Other:   [  ]Supplement Ordered:  [  ]Morbid Obesity (BMI >=40]  ---------------------------------------------------------------------  [ ] Sepsis/severe sepsis/septic shock  [ ] UTI  [ ] Pneumonia  -----------------------------------------------------------------------  [ ] Acidosis/alkalosis  [ ] Fluid overload  [x ] Hypokalemia  [ ] Hyperkalemia  [x ] Hypomagnesemia  [x ] Hypophosphatemia  [ ] Hyperphosphatemia  [ ] Hyponatremia  [ ] Hypernatremia  ------------------------------------------------------------------------  [x ] Acute blood loss anemia- due to OR for BKA guillotine and closure  [ ] Post op blood loss anemia  [ ] Iron deficiency anemia  [ ] Anemia due to chronic disease  [ ] Hypercoagulable state  [x ] Leukocytosis  ----------------------------------------------------------------------  [ ] Cerebral infarction  [ ] Transient ischemia attack  [ ] Encephalopathy          Assessment and Plan:     75F PMHx UC, colon cancer s/p total abdominal colectomy with end ileostomy, found down at home, was admitted for DKA and R diabetic foot wound, s/p operative debridement by podiatry 3/14/21 and R foot guillotine amputation and L foot I&D by vascular surgery 3/15/21.    #R foot wound and Lt foot wound  s/p R foot operative debridement by podiatry 3/14 s/p R guillotine BKA, L ankle I&D  3/15  -s/p R below knee amputation closure 3/19  -pain control  -Nafcillin 3/16-3/29 - will stop nafcilin today and start Zosyn due to gram neg rods growing on BKA stump  -local wound care  -continue to monitor for fevers  -f/u wound cx  -  Removal of alternating suture from the BKA for better drainage of the fluid collection    #DKA  -f/u endo recs  -ISS  -lantus 12u HS, added lispro 9u TID with meals  -hgb A1C=16.1    Diet: consistent carb  DVT PPx: HSQ  Dispo: [facility], TBD         O/N: LENCHO, VSS    Subjective: Resting comfortably in bed. Reports not wanting to have PICC placed for IV abx given history of clots with PICCs. Otherwise tolerating diet without nausea or vomiting. Pain well controlled in b/l LEs.     PLEASE CHECK WHEN PRESENT:     [  ] Heart Failure     [  ] Acute     [  ] Acute on Chronic     [  ] Chronic  -------------------------------------------------------------------     [  ]Diastolic [HFpEF]     [  ]Systolic [HFrEF]     [  ]Combined [HFpEF & HFrEF]     [  ] afib     [  ] hypertensive heart disease     [  ]Other:  -------------------------------------------------------------------  [ ] Respiratory failure  [ ] Acute cor pulmonale  [ ] Asthma/COPD Exacerbation  [ ] Pleural effusion  [ ] Aspiration pneumonia  -------------------------------------------------------------------  [  ]ANTONY     [  ]ATN     [  ]Reneal Medullary Necrosis     [  ]Renal Cortical Necrosis     [  ]Other Pathological Lesions:    [  ]CKD 1  [  ]CKD 2  [  ]CKD 3  [  ]CKD 4  [  ]CKD 5  [  ]Other  -------------------------------------------------------------------  [x]Diabetes - new DX on this admission  [x] Diabetic PVD Ulcer  [  ] Neuropathic ulcer to DM  [  ] Diabetes with Nephropathy  [  ] Osteomyelitis due to diabetes  [x ] Hyperglycemia  [ ] Hypoglycemia  --------------------------------------------------------------------  [  ]Malnutrition: See Nutrition Note  [  ]Cachexia  [  ]Other:   [  ]Supplement Ordered:  [  ]Morbid Obesity (BMI >=40]  ---------------------------------------------------------------------  [ ] Sepsis/severe sepsis/septic shock  [ ] UTI  [ ] Pneumonia  -----------------------------------------------------------------------  [ ] Acidosis/alkalosis  [ ] Fluid overload  [x ] Hypokalemia  [ ] Hyperkalemia  [x ] Hypomagnesemia  [x ] Hypophosphatemia  [ ] Hyperphosphatemia  [ ] Hyponatremia  [ ] Hypernatremia  ------------------------------------------------------------------------  [x ] Acute blood loss anemia- due to OR for BKA guillotine and closure  [ ] Post op blood loss anemia  [ ] Iron deficiency anemia  [ ] Anemia due to chronic disease  [ ] Hypercoagulable state  [x ] Leukocytosis  ----------------------------------------------------------------------  [ ] Cerebral infarction  [ ] Transient ischemia attack  [ ] Encephalopathy          Assessment and Plan:     75F PMHx UC, colon cancer s/p total abdominal colectomy with end ileostomy, found down at home, was admitted for DKA and R diabetic foot wound, s/p operative debridement by podiatry 3/14/21 and R foot guillotine amputation and L foot I&D by vascular surgery 3/15/21.     #R foot wound and Lt foot wound  s/p R foot operative debridement by podiatry 3/14 s/p R guillotine BKA, L ankle I&D  3/15  -s/p R below knee amputation closure 3/19  -pain control  -Nafcillin 3/16-3/29 - will stop nafcilin today and start Zosyn due to gram neg rods growing on BKA stump  -local wound care  -continue to monitor for fevers  -f/u wound cx  -  Removal of alternating suture from the BKA for better drainage of the fluid collection    #DKA  -f/u endo recs  -ISS  -lantus 12u HS, added lispro 9u TID with meals  -hgb A1C=16.1    Diet: consistent carb  DVT PPx: HSQ  Dispo: [facility], TBD         O/N: LENCHO, VSS    Subjective: Resting comfortably in bed. Reports not wanting to have PICC placed for IV abx given history of clots with PICCs. Otherwise tolerating diet without nausea or vomiting. Pain well controlled in b/l LEs.     ICU Vital Signs Last 24 Hrs  T(C): 36.3 (01 Apr 2021 06:06), Max: 36.9 (31 Mar 2021 09:03)  T(F): 97.3 (01 Apr 2021 06:06), Max: 98.5 (31 Mar 2021 09:03)  HR: 70 (01 Apr 2021 00:05) (70 - 104)  BP: 143/59 (01 Apr 2021 00:05) (108/50 - 143/59)  BP(mean): --  ABP: --  ABP(mean): --  RR: 18 (01 Apr 2021 00:05) (18 - 18)  SpO2: 98% (01 Apr 2021 00:05) (94% - 98%)    I&O's Detail    31 Mar 2021 07:01  -  01 Apr 2021 07:00  --------------------------------------------------------  IN:    IV PiggyBack: 100 mL    Oral Fluid: 430 mL  Total IN: 530 mL    OUT:    Voided (mL): 650 mL  Total OUT: 650 mL    Total NET: -120 mL    Gen: NAD, resting comfortably in bed  CV: RRR  Resp: No conversational dyspnea, no SOB  Ext:     04-01    137  |  105  |  8   ----------------------------<  140<H>  4.0   |  21<L>  |  0.53    Ca    8.9      01 Apr 2021 07:16  Phos  3.9     04-01  Mg     1.7     04-01          PLEASE CHECK WHEN PRESENT:     [  ] Heart Failure     [  ] Acute     [  ] Acute on Chronic     [  ] Chronic  -------------------------------------------------------------------     [  ]Diastolic [HFpEF]     [  ]Systolic [HFrEF]     [  ]Combined [HFpEF & HFrEF]     [  ] afib     [  ] hypertensive heart disease     [  ]Other:  -------------------------------------------------------------------  [ ] Respiratory failure  [ ] Acute cor pulmonale  [ ] Asthma/COPD Exacerbation  [ ] Pleural effusion  [ ] Aspiration pneumonia  -------------------------------------------------------------------  [  ]ANTONY     [  ]ATN     [  ]Reneal Medullary Necrosis     [  ]Renal Cortical Necrosis     [  ]Other Pathological Lesions:    [  ]CKD 1  [  ]CKD 2  [  ]CKD 3  [  ]CKD 4  [  ]CKD 5  [  ]Other  -------------------------------------------------------------------  [x]Diabetes - new DX on this admission  [x] Diabetic PVD Ulcer  [  ] Neuropathic ulcer to DM  [  ] Diabetes with Nephropathy  [  ] Osteomyelitis due to diabetes  [x ] Hyperglycemia  [ ] Hypoglycemia  --------------------------------------------------------------------  [  ]Malnutrition: See Nutrition Note  [  ]Cachexia  [  ]Other:   [  ]Supplement Ordered:  [  ]Morbid Obesity (BMI >=40]  ---------------------------------------------------------------------  [ ] Sepsis/severe sepsis/septic shock  [ ] UTI  [ ] Pneumonia  -----------------------------------------------------------------------  [ ] Acidosis/alkalosis  [ ] Fluid overload  [x ] Hypokalemia  [ ] Hyperkalemia  [x ] Hypomagnesemia  [x ] Hypophosphatemia  [ ] Hyperphosphatemia  [ ] Hyponatremia  [ ] Hypernatremia  ------------------------------------------------------------------------  [x ] Acute blood loss anemia- due to OR for BKA guillotine and closure  [ ] Post op blood loss anemia  [ ] Iron deficiency anemia  [ ] Anemia due to chronic disease  [ ] Hypercoagulable state  [x ] Leukocytosis  ----------------------------------------------------------------------  [ ] Cerebral infarction  [ ] Transient ischemia attack  [ ] Encephalopathy          Assessment and Plan:     75F PMHx UC, colon cancer s/p total abdominal colectomy with end ileostomy, found down at home, was admitted for DKA and R diabetic foot wound, s/p operative debridement by podiatry 3/14/21 and R foot guillotine amputation and L foot I&D by vascular surgery 3/15/21.     #R foot wound and Lt foot wound  s/p R foot operative debridement by podiatry 3/14 s/p R guillotine BKA, L ankle I&D  3/15  -s/p R below knee amputation closure 3/19  -pain control  -Nafcillin 3/16-3/29 - will stop nafcilin today and start Zosyn due to gram neg rods growing on BKA stump  -local wound care  -continue to monitor for fevers  -f/u wound cx  -  Removal of alternating suture from the BKA for better drainage of the fluid collection    #DKA  -f/u endo recs  -ISS  -lantus 12u HS, added lispro 9u TID with meals  -hgb A1C=16.1    Diet: consistent carb  DVT PPx: HSQ  Dispo: [facility], TBD         O/N: LENCHO, VSS    Subjective: Resting comfortably in bed. Reports not wanting to have PICC placed for IV abx given history of clots with PICCs. Otherwise tolerating diet without nausea or vomiting. Pain well controlled in b/l LEs.             Vital Signs Last 24 Hrs  T(C): 36.3 (01 Apr 2021 06:06), Max: 36.9 (31 Mar 2021 09:03)  T(F): 97.3 (01 Apr 2021 06:06), Max: 98.5 (31 Mar 2021 09:03)  HR: 70 (01 Apr 2021 00:05) (70 - 104)  BP: 143/59 (01 Apr 2021 00:05) (108/50 - 143/59)  BP(mean): --  RR: 18 (01 Apr 2021 00:05) (18 - 18)  SpO2: 98% (01 Apr 2021 00:05) (94% - 98%)  I&O's Summary    31 Mar 2021 07:01  -  01 Apr 2021 07:00  --------------------------------------------------------  IN: 530 mL / OUT: 650 mL / NET: -120 mL      Physical Exam:  General: NAD, resting comfortably in bed  Pulmonary: Nonlabored breathing, no respiratory distress  EXTREM: WWP, no edema RLE: R leg BKA amputation site with serous drainage from lateral portion of incision +odor.  LLE- anterior ankle ulcer with pink healthy granulation tissue no discharge.      LABS:                        8.9    7.64  )-----------( 761      ( 01 Apr 2021 07:16 )             28.7     04-01    137  |  105  |  8   ----------------------------<  140<H>  4.0   |  21<L>  |  0.53    Ca    8.9      01 Apr 2021 07:16  Phos  3.9     04-01  Mg     1.7     04-01          Radiology and Additional Studies:    PLEASE CHECK WHEN PRESENT:     [  ] Heart Failure     [  ] Acute     [  ] Acute on Chronic     [  ] Chronic  -------------------------------------------------------------------     [  ]Diastolic [HFpEF]     [  ]Systolic [HFrEF]     [  ]Combined [HFpEF & HFrEF]     [  ] afib     [  ] hypertensive heart disease     [  ]Other:  -------------------------------------------------------------------  [ ] Respiratory failure  [ ] Acute cor pulmonale  [ ] Asthma/COPD Exacerbation  [ ] Pleural effusion  [ ] Aspiration pneumonia  -------------------------------------------------------------------  [  ]ANTONY     [  ]ATN     [  ]Reneal Medullary Necrosis     [  ]Renal Cortical Necrosis     [  ]Other Pathological Lesions:    [  ]CKD 1  [  ]CKD 2  [  ]CKD 3  [  ]CKD 4  [  ]CKD 5  [  ]Other  -------------------------------------------------------------------  [x]Diabetes - new DX on this admission  [x] Diabetic PVD Ulcer  [  ] Neuropathic ulcer to DM  [  ] Diabetes with Nephropathy  [  ] Osteomyelitis due to diabetes  [x ] Hyperglycemia  [ ] Hypoglycemia  --------------------------------------------------------------------  [  ]Malnutrition: See Nutrition Note  [  ]Cachexia  [  ]Other:   [  ]Supplement Ordered:  [  ]Morbid Obesity (BMI >=40]  ---------------------------------------------------------------------  [ ] Sepsis/severe sepsis/septic shock  [ ] UTI  [ ] Pneumonia  -----------------------------------------------------------------------  [ ] Acidosis/alkalosis  [ ] Fluid overload  [x ] Hypokalemia  [ ] Hyperkalemia  [x ] Hypomagnesemia  [x ] Hypophosphatemia  [ ] Hyperphosphatemia  [ ] Hyponatremia  [ ] Hypernatremia  ------------------------------------------------------------------------  [x ] Acute blood loss anemia- due to OR for BKA guillotine and closure  [ ] Post op blood loss anemia  [ ] Iron deficiency anemia  [ ] Anemia due to chronic disease  [ ] Hypercoagulable state  [x ] Leukocytosis  ----------------------------------------------------------------------  [ ] Cerebral infarction  [ ] Transient ischemia attack  [ ] Encephalopathy

## 2021-04-01 NOTE — PROGRESS NOTE ADULT - SUBJECTIVE AND OBJECTIVE BOX
Subjective/Interval events:  -No events overnight, no fevers/chills   -Patient endorses no further any headache, n/v improved today from days prior, eating better, no sig pain at wound site RLE, on in LLE   -Wound site clean per vascular eval this am, no plan for further surgical intervention  -I discussed at length plan for IV unisyn per ID for infection and given wound appears clean no surgery planned, therefore placement of midline or PICC to continue antibiotic course at [facility]. patient reports prior adverse event from PICC, what she describes as either infection from it or bleeding around it but has not tried midline and refusing to have done, wants antibiotics in hospital via IV at this time. I explained if she does not want midline/PICC, then alternative option is po augmentin (reviewed with CATRACHITA Hartman, in agreement) but patient refusing stating only wants IV antibiotics in house.     MEDICATIONS  (STANDING):  ampicillin/sulbactam  IVPB 3 Gram(s) IV Intermittent every 6 hours  ascorbic acid 500 milliGRAM(s) Oral daily  aspirin  chewable 81 milliGRAM(s) Oral daily  dextrose 40% Gel 15 Gram(s) Oral once  dextrose 5%. 1000 milliLiter(s) (100 mL/Hr) IV Continuous <Continuous>  dextrose 5%. 1000 milliLiter(s) (50 mL/Hr) IV Continuous <Continuous>  dextrose 50% Injectable 25 Gram(s) IV Push once  dextrose 50% Injectable 12.5 Gram(s) IV Push once  dextrose 50% Injectable 25 Gram(s) IV Push once  glucagon  Injectable 1 milliGRAM(s) IntraMuscular once  heparin   Injectable 5000 Unit(s) SubCutaneous every 8 hours  insulin glargine Injectable (LANTUS) 11 Unit(s) SubCutaneous at bedtime  insulin lispro (ADMELOG) corrective regimen sliding scale   SubCutaneous Before meals and at bedtime  insulin lispro Injectable (ADMELOG) 8 Unit(s) SubCutaneous three times a day with meals  multivitamin  Chewable 1 Tablet(s) Oral daily  nystatin Powder 1 Application(s) Topical two times a day  senna 2 Tablet(s) Oral at bedtime    MEDICATIONS  (PRN):  acetaminophen   Tablet .. 650 milliGRAM(s) Oral every 6 hours PRN Mild Pain (1 - 3)  ondansetron Injectable 4 milliGRAM(s) IV Push every 6 hours PRN Nausea  oxycodone    5 mG/acetaminophen 325 mG 1 Tablet(s) Oral every 6 hours PRN Moderate Pain (4 - 6)  oxycodone    5 mG/acetaminophen 325 mG 2 Tablet(s) Oral every 6 hours PRN Severe Pain (7 - 10)      Vital Signs Last 24 Hrs  T(C): 36.3 (01 Apr 2021 09:34), Max: 36.9 (31 Mar 2021 20:00)  T(F): 97.3 (01 Apr 2021 09:34), Max: 98.5 (31 Mar 2021 20:00)  HR: 86 (01 Apr 2021 08:30) (70 - 104)  BP: 146/67 (01 Apr 2021 08:30) (108/50 - 146/67)  BP(mean): --  RR: 18 (01 Apr 2021 08:30) (18 - 18)  SpO2: 97% (01 Apr 2021 08:30) (94% - 98%)    PHYSICAL EXAM:  GENERAL: pleasant, NAD  NEURO: Aox3, intact strength/sensation all 4 ext   HEENT: clear op, mmm  NECK:  No JVD, no lymphadenopathy  CHEST/LUNG: Clear to auscultation bilaterally; No rales, rhonchi, wheezing. Normal work of breathing, not tachypneic  HEART: Regular rate and rhythm; No murmurs, rubs, or gallops  ABDOMEN: Soft, Nontender, Nondistended. ostomy site formed stool   EXTREMITIES:  No sig le edema, wwp, RLE s/p amputation with wound site wrapped no bleeding or purulence, LLE wounds wrapped no bleeding/purulence       LABS (reviewed)   -WBC wnl  -BMP wnl  -CRP downtrending, ESR remains elevated  -Cx data reviewed with ID    74yo F with PMH of UC and colon cancer s/p colectomy and chemotherapy and chronic venous stasis w/chronic LE wounds who presented with bilateral lower extremity cellulitis and abscesses c/b septic shock, DKA, and RLE necrotizing fascitis, s/p R-foot I&D on 3/14 with Podiatry and R-BKA w/L-foot I&D on 3/15 with Vascular, s/p BKA closure on 3/19, residual purulent drainage at site and fever/leukocytosis 2-3 days prior now resolved on abx, wound site purulence resolved, no plan for further operative management with antiobiotic plan per ID. Patient now medically stable for [facility]AN but refusing midline/PICC placement and discharge at this time.     #BLE Cellulitis c/b Septic Shock and Nec Fasc s/p R-BKA, recent new cellulitis at site  -- Appreciate ID recs- plan for 2 weeks IV unisyn or if patient refusing midline/PICC then can be po augmentin (confirmed with ID attending Devan)  -- Wound site clean now s/p antibiotics, adequate blood flow, no plan for further surgical management  -- Percocet 1 tab q4h PRN and Tylenol 650mg q6h PRN w/Senna    #Newly Diagnosed IDDM2 c/b DKA and SSTI  -- f/u endo recs- fsg stable on lantus and ISS   -- continue moderate scale correction     #UC c/b Colon Ca s/p Ostomy  -- no active issues, ostomy outpt wnl    #Apthous Ulcer  -- R-inner lower lip  -- continue to offer topical analgesic such as Oragel or Lidocaine     #Acute Blood Loss Anemia   -- likely from multiple trips to the OR and BKA  -- has been stable for several days     #Diet - Carb Controlled / Dysphagia 1   #DVT PPx - SQH  #Dispo- accepted to [facility] but patient refusing transfer to further antibiotic care there, will provide patient option to appeal.     Patient was seen and examined by me at bedside    Greater than 35 minutes spent on total encounter; more than 50% of the visit was spent counseling and/or coordinating care by the attending physician.    Giacomo Jenkins MD [phone]

## 2021-04-01 NOTE — PROGRESS NOTE ADULT - SUBJECTIVE AND OBJECTIVE BOX
INTERVAL HPI/OVERNIGHT EVENTS:    Patient is a 75y old  Female who presents with a chief complaint of Weakness (2021 11:19)  Pt was seen and examined at the bedside. She reports leg pain.  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  good appetite.     afebrile.       FSG & Insulin received:    Yesterday:  pre-dinner fs  nutritional lispro  9 units +  4 units lispro SS  bedtime fs  lantus  11 units + 2    units lispro SS    Today:  pre-breakfast fs  nutritional lispro 8  units  pre-lunch fs  nutritional lispro 8  units    Pt reports the following symptoms:  CONSTITUTIONAL:  Negative chills  CARDIOVASCULAR:  Negative for chest pain or palpitations  RESPIRATORY:  Negative for cough, wheezing, or SOB   GASTROINTESTINAL:  Negative for nausea, vomiting  GENITOURINARY:  Negative frequency, urgency or dysuria    MEDICATIONS  (STANDING):  ampicillin/sulbactam  IVPB 3 Gram(s) IV Intermittent every 6 hours  ascorbic acid 500 milliGRAM(s) Oral daily  aspirin  chewable 81 milliGRAM(s) Oral daily  dextrose 40% Gel 15 Gram(s) Oral once  dextrose 5%. 1000 milliLiter(s) (100 mL/Hr) IV Continuous <Continuous>  dextrose 5%. 1000 milliLiter(s) (50 mL/Hr) IV Continuous <Continuous>  dextrose 50% Injectable 25 Gram(s) IV Push once  dextrose 50% Injectable 12.5 Gram(s) IV Push once  dextrose 50% Injectable 25 Gram(s) IV Push once  glucagon  Injectable 1 milliGRAM(s) IntraMuscular once  heparin   Injectable 5000 Unit(s) SubCutaneous every 8 hours  insulin glargine Injectable (LANTUS) 10 Unit(s) SubCutaneous at bedtime  insulin lispro (ADMELOG) corrective regimen sliding scale   SubCutaneous Before meals and at bedtime  insulin lispro Injectable (ADMELOG) 7 Unit(s) SubCutaneous three times a day with meals  multivitamin  Chewable 1 Tablet(s) Oral daily  nystatin Powder 1 Application(s) Topical two times a day  senna 2 Tablet(s) Oral at bedtime    MEDICATIONS  (PRN):  acetaminophen   Tablet .. 650 milliGRAM(s) Oral every 6 hours PRN Mild Pain (1 - 3)  ondansetron Injectable 4 milliGRAM(s) IV Push every 6 hours PRN Nausea  oxycodone    5 mG/acetaminophen 325 mG 2 Tablet(s) Oral every 6 hours PRN Severe Pain (7 - 10)  oxycodone    5 mG/acetaminophen 325 mG 1 Tablet(s) Oral every 6 hours PRN Moderate Pain (4 - 6)      Past medical history reviewed  Family history reviewed  Social history reviewed    PHYSICAL EXAM  Vital Signs Last 24 Hrs  T(C): 37.1 (2021 18:04), Max: 37.1 (2021 13:52)  T(F): 98.7 (2021 18:04), Max: 98.7 (2021 13:52)  HR: 92 (2021 17:58) (70 - 94)  BP: 125/61 (2021 17:58) (112/56 - 146/67)  BP(mean): --  RR: 17 (2021 17:58) (17 - 18)  SpO2: 97% (2021 17:58) (95% - 98%)    Constitutional: wn/wd in NAD.   Respiratory: lungs CTAB.  Cardiovascular: regular rhythm, normal S1 and S2  GI: soft, NT/ND, no masses/HSM appreciated.  EXT: right BKA  LABS:                        8.9    7.64  )-----------( 761      ( 2021 07:16 )             28.7     04-    137  |  105  |  8   ----------------------------<  140<H>  4.0   |  21<L>  |  0.53    Ca    8.9      2021 07:16  Phos  3.9       Mg     1.7     04-              HbA1C: 16.1 % ( @ 13:30)      CAPILLARY BLOOD GLUCOSE      POCT Blood Glucose.: 142 mg/dL (2021 16:39)  POCT Blood Glucose.: 117 mg/dL (2021 11:45)  POCT Blood Glucose.: 138 mg/dL (2021 06:33)  POCT Blood Glucose.: 161 mg/dL (31 Mar 2021 21:42)      Cholesterol, Serum: 83 mg/dL (03-15-21 @ 04:51)  HDL Cholesterol, Serum: 28 mg/dL (03-15-21 @ 04:51)  Triglycerides, Serum: 89 mg/dL (03-15-21 @ 04:51)    A/P: 75F with PMHx UC s/p colostomy (s/p multiple abdominal surgeries including colostomy bag in ) BIBEMS from home complaining of weakness; presenting likely in DKA with AMS, with bilateral lower extremity open wounds. Of note, pt found to have a hx of DM2: A1C on admission is 16.1. Pt is now s/p I&D w/ washout and debridement of non- viable soft tissue.     1.  DM:   wt:75 KG, a1c 16.1  cr:0.6, GFR:89  Please give lantus 10 units at bedtime  pleas give lispro 7 Units  premeals TID   Continue lispro moderate dose scale with meals and at bedtime.   Continue consistent carb diet  FSG Goal 100-180    Pt prefers not to be discharged on insulin, and states she will not be checking FSG or is interested in CGM at home.  case seen and discussed with Dr. Yee and updated primary team

## 2021-04-01 NOTE — CHART NOTE - NSCHARTNOTEFT_GEN_A_CORE
Admitting Diagnosis:   Patient is a 75y old  Female who presents with a chief complaint of Weakness (01 Apr 2021 11:19)      PAST MEDICAL & SURGICAL HISTORY:  Colon cancer    H/O ulcerative colitis    Colostomy present    Status post Jag procedure        Current Nutrition Order:  Dys 1 puree/thins Consistent Carbohydrate with evening snack   Glucerna x1/day     PO Intake: Good (%) [   ]  Fair (50-75%) [X ] Poor (<25%) [   ]    GI Issues:   BM 3/29 x2   Ostomy: 300ml OP 3/30, 450ml 3/29     Pain:  RLE pain 101    Skin Integrity:  No edema   Sx site noted s/p AMP   Ketan 14          Labs:   04-01    137  |  105  |  8   ----------------------------<  140<H>  4.0   |  21<L>  |  0.53    Ca    8.9      01 Apr 2021 07:16  Phos  3.9     04-01  Mg     1.7     04-01      CAPILLARY BLOOD GLUCOSE      POCT Blood Glucose.: 117 mg/dL (01 Apr 2021 11:45)  POCT Blood Glucose.: 138 mg/dL (01 Apr 2021 06:33)  POCT Blood Glucose.: 161 mg/dL (31 Mar 2021 21:42)  POCT Blood Glucose.: 248 mg/dL (31 Mar 2021 16:34)      Medications:  MEDICATIONS  (STANDING):  ampicillin/sulbactam  IVPB 3 Gram(s) IV Intermittent every 6 hours  ascorbic acid 500 milliGRAM(s) Oral daily  aspirin  chewable 81 milliGRAM(s) Oral daily  dextrose 40% Gel 15 Gram(s) Oral once  dextrose 5%. 1000 milliLiter(s) (100 mL/Hr) IV Continuous <Continuous>  dextrose 5%. 1000 milliLiter(s) (50 mL/Hr) IV Continuous <Continuous>  dextrose 50% Injectable 25 Gram(s) IV Push once  dextrose 50% Injectable 12.5 Gram(s) IV Push once  dextrose 50% Injectable 25 Gram(s) IV Push once  glucagon  Injectable 1 milliGRAM(s) IntraMuscular once  heparin   Injectable 5000 Unit(s) SubCutaneous every 8 hours  insulin glargine Injectable (LANTUS) 11 Unit(s) SubCutaneous at bedtime  insulin lispro (ADMELOG) corrective regimen sliding scale   SubCutaneous Before meals and at bedtime  insulin lispro Injectable (ADMELOG) 8 Unit(s) SubCutaneous three times a day with meals  multivitamin  Chewable 1 Tablet(s) Oral daily  nystatin Powder 1 Application(s) Topical two times a day  senna 2 Tablet(s) Oral at bedtime    MEDICATIONS  (PRN):  acetaminophen   Tablet .. 650 milliGRAM(s) Oral every 6 hours PRN Mild Pain (1 - 3)  ondansetron Injectable 4 milliGRAM(s) IV Push every 6 hours PRN Nausea  oxycodone    5 mG/acetaminophen 325 mG 2 Tablet(s) Oral every 6 hours PRN Severe Pain (7 - 10)  oxycodone    5 mG/acetaminophen 325 mG 1 Tablet(s) Oral every 6 hours PRN Moderate Pain (4 - 6)      5'2''  pounds  Weight 165 pounds, BMI 30.2 %JMP=248    3/26 142.1 pounds   >> AMP s/p R BKA: 155 pounds, %ABW=92%    Estimated energy needs:   Adjusted-BW s/p Amp for EER   EER Adjsuted for age, Skin  1625-1950kcal/day 25-30kcal/kg  74-86gm/day 1.2-1.4gm/kg   Fluids per team     Subjective:   74 yo F poor historian with hx UC and colon cancer s/p colectomy and chemotherapy, chronic venous stasis with chronic LE wounds who presents due to frequent falls over the past several weeks at home. She states that overall she has been more homebound, but recently started having more falls. Found to be in DKA with bilateral lower extremity cellulitis and foot infection.  A1c found to be 16.1,  with blood sugar 400+ O/A. Pt s/p Deep incision and drainage of multiple areas of foot with debridement of all non- viable soft tissue and bone 3/14. S/p BKA and I/D 3/15 with closure 3/19. Pt with purlence, may need OR if it does not resolve. PICC placement 4/1.     Pt visited on 5 UR today, plan for D/C remains ongoing. Pt ordered for consCHO (snack) thin liquids + PUREE/THINS, glucerna x1/day. Pt reports better PO intake today at breakfast, consumed most of items on blair. Did not consume glucerna, reprots she does not want, requesting order d/c. During RD initial visit pt asking for soft foods d/t lack of teeth and had denied issues swallowing, Noted bedside dys screen passed 3/14. Pt reports trying to consume tuna on bread 3/31 however was too hard to chew, wants to cont on puree for now. Denies N/V. Pt in better spritis today and willing to have education on DM diet - able to recall aspects of eduction provided by [facility]. Noted however does not want insulin on d/c and reports will not check BG s/p D/C. Last 3 POCT 138 161 248.   Please see below for nutritions recommendations. Recs made with team.    PRIOR PES:  Increased nutrient needs RT increased demands AEB Skin   ON GOING @ THIS TIME   Goal/Expected Outcome: Meet ~75% of EER via feasible route     Recommendations:  1. Recommend Consistent Carbohydrate with evening snack diet; Glucerna x1/day 220kcal/20gm prot.  >> Cont with puree d/t issues chewing.   2. Honor pt food preferences as able.  3. Monitor %PO intake, diet tolerance (s/s of issues chewing/swallowing).   4. Monitor Skin, Wts, Labs, GI, GOC.  5. Labs: monitor BMP, CBC, glucose, lytes, trend renal indices, LFts, POCT.  6. MVI daily, Vit C 500mg/day.   7. RD to remain available for additional nutrition interventions as needed.     Education:  DM diet education provided - written/verbal + contact info.   Encouraged PO intake/protein.   Understanding reported.     Risk Level: High [  ] Moderate [ X  ] Low [   ].

## 2021-04-01 NOTE — PROGRESS NOTE ADULT - ASSESSMENT
Assessment and Plan:     75F PMHx UC, colon cancer s/p total abdominal colectomy with end ileostomy, found down at home, was admitted for DKA and R diabetic foot wound, s/p operative debridement by podiatry 3/14/21 and R foot guillotine amputation and L foot I&D by vascular surgery 3/15/21.     #R foot wound and Lt foot wound  -s/p R foot operative debridement by podiatry 3/14 s/p R guillotine BKA, L ankle I&D  (3/15)  -s/p R below knee amputation closure (3/19)  - Wound cx- klebsiella pneumoniae  - Removal of alternating suture from the BKA for better drainage of the fluid collection  - Nafcillin (3/16-3/29), Zosyn (3/30 - 3/31), Unasyn (3/31- )  - PICC today 4/1  - Local wound care  -continue to monitor for fevers    -pain control    #DKA  -f/u endo recs  -ISS  -lantus 11u HS, added lispro 8U TID with meals  -hgb A1C=16.1    Diet: consistent carb  DVT PPx: HSQ  Dispo: [facility], TBD

## 2021-04-02 ENCOUNTER — APPOINTMENT (OUTPATIENT)
Dept: VASCULAR SURGERY | Facility: CLINIC | Age: 76
End: 2021-04-02

## 2021-04-02 LAB
GLUCOSE BLDC GLUCOMTR-MCNC: 127 MG/DL — HIGH (ref 70–99)
GLUCOSE BLDC GLUCOMTR-MCNC: 139 MG/DL — HIGH (ref 70–99)
GLUCOSE BLDC GLUCOMTR-MCNC: 140 MG/DL — HIGH (ref 70–99)
GLUCOSE BLDC GLUCOMTR-MCNC: 163 MG/DL — HIGH (ref 70–99)

## 2021-04-02 PROCEDURE — 99231 SBSQ HOSP IP/OBS SF/LOW 25: CPT | Mod: GC

## 2021-04-02 PROCEDURE — 99233 SBSQ HOSP IP/OBS HIGH 50: CPT | Mod: GC

## 2021-04-02 RX ORDER — INSULIN LISPRO 100/ML
6 VIAL (ML) SUBCUTANEOUS
Refills: 0 | Status: DISCONTINUED | OUTPATIENT
Start: 2021-04-02 | End: 2021-04-06

## 2021-04-02 RX ORDER — OXYCODONE AND ACETAMINOPHEN 5; 325 MG/1; MG/1
1 TABLET ORAL EVERY 6 HOURS
Refills: 0 | Status: DISCONTINUED | OUTPATIENT
Start: 2021-04-02 | End: 2021-04-06

## 2021-04-02 RX ORDER — INSULIN GLARGINE 100 [IU]/ML
8 INJECTION, SOLUTION SUBCUTANEOUS AT BEDTIME
Refills: 0 | Status: DISCONTINUED | OUTPATIENT
Start: 2021-04-02 | End: 2021-04-06

## 2021-04-02 RX ORDER — OXYCODONE AND ACETAMINOPHEN 5; 325 MG/1; MG/1
2 TABLET ORAL EVERY 6 HOURS
Refills: 0 | Status: DISCONTINUED | OUTPATIENT
Start: 2021-04-02 | End: 2021-04-06

## 2021-04-02 RX ADMIN — SENNA PLUS 2 TABLET(S): 8.6 TABLET ORAL at 21:43

## 2021-04-02 RX ADMIN — OXYCODONE AND ACETAMINOPHEN 2 TABLET(S): 5; 325 TABLET ORAL at 09:02

## 2021-04-02 RX ADMIN — Medication 1 TABLET(S): at 12:11

## 2021-04-02 RX ADMIN — HEPARIN SODIUM 5000 UNIT(S): 5000 INJECTION INTRAVENOUS; SUBCUTANEOUS at 21:44

## 2021-04-02 RX ADMIN — NYSTATIN CREAM 1 APPLICATION(S): 100000 CREAM TOPICAL at 05:04

## 2021-04-02 RX ADMIN — OXYCODONE AND ACETAMINOPHEN 2 TABLET(S): 5; 325 TABLET ORAL at 18:45

## 2021-04-02 RX ADMIN — OXYCODONE AND ACETAMINOPHEN 1 TABLET(S): 5; 325 TABLET ORAL at 21:43

## 2021-04-02 RX ADMIN — OXYCODONE AND ACETAMINOPHEN 2 TABLET(S): 5; 325 TABLET ORAL at 17:48

## 2021-04-02 RX ADMIN — AMPICILLIN SODIUM AND SULBACTAM SODIUM 200 GRAM(S): 250; 125 INJECTION, POWDER, FOR SUSPENSION INTRAMUSCULAR; INTRAVENOUS at 16:30

## 2021-04-02 RX ADMIN — OXYCODONE AND ACETAMINOPHEN 1 TABLET(S): 5; 325 TABLET ORAL at 06:05

## 2021-04-02 RX ADMIN — Medication 81 MILLIGRAM(S): at 12:11

## 2021-04-02 RX ADMIN — OXYCODONE AND ACETAMINOPHEN 1 TABLET(S): 5; 325 TABLET ORAL at 22:30

## 2021-04-02 RX ADMIN — Medication 500 MILLIGRAM(S): at 12:11

## 2021-04-02 RX ADMIN — Medication 7 UNIT(S): at 12:11

## 2021-04-02 RX ADMIN — Medication 2: at 16:30

## 2021-04-02 RX ADMIN — OXYCODONE AND ACETAMINOPHEN 1 TABLET(S): 5; 325 TABLET ORAL at 05:05

## 2021-04-02 RX ADMIN — Medication 6 UNIT(S): at 16:30

## 2021-04-02 RX ADMIN — OXYCODONE AND ACETAMINOPHEN 2 TABLET(S): 5; 325 TABLET ORAL at 10:00

## 2021-04-02 RX ADMIN — INSULIN GLARGINE 8 UNIT(S): 100 INJECTION, SOLUTION SUBCUTANEOUS at 21:44

## 2021-04-02 RX ADMIN — OXYCODONE AND ACETAMINOPHEN 2 TABLET(S): 5; 325 TABLET ORAL at 03:00

## 2021-04-02 RX ADMIN — AMPICILLIN SODIUM AND SULBACTAM SODIUM 200 GRAM(S): 250; 125 INJECTION, POWDER, FOR SUSPENSION INTRAMUSCULAR; INTRAVENOUS at 05:05

## 2021-04-02 RX ADMIN — OXYCODONE AND ACETAMINOPHEN 2 TABLET(S): 5; 325 TABLET ORAL at 01:59

## 2021-04-02 RX ADMIN — AMPICILLIN SODIUM AND SULBACTAM SODIUM 200 GRAM(S): 250; 125 INJECTION, POWDER, FOR SUSPENSION INTRAMUSCULAR; INTRAVENOUS at 10:06

## 2021-04-02 RX ADMIN — AMPICILLIN SODIUM AND SULBACTAM SODIUM 200 GRAM(S): 250; 125 INJECTION, POWDER, FOR SUSPENSION INTRAMUSCULAR; INTRAVENOUS at 21:44

## 2021-04-02 RX ADMIN — HEPARIN SODIUM 5000 UNIT(S): 5000 INJECTION INTRAVENOUS; SUBCUTANEOUS at 14:03

## 2021-04-02 RX ADMIN — NYSTATIN CREAM 1 APPLICATION(S): 100000 CREAM TOPICAL at 17:48

## 2021-04-02 RX ADMIN — HEPARIN SODIUM 5000 UNIT(S): 5000 INJECTION INTRAVENOUS; SUBCUTANEOUS at 05:05

## 2021-04-02 RX ADMIN — Medication 7 UNIT(S): at 08:24

## 2021-04-02 NOTE — PROGRESS NOTE ADULT - SUBJECTIVE AND OBJECTIVE BOX
INTERVAL HPI/OVERNIGHT EVENTS:    Patient is a 75y old  Female who presents with a chief complaint of Weakness (2021 11:42)  Pt was seen and examined at the bedside. She reports leg pain.  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  good appetite.     afebrile.       FSG & Insulin received:    Yesterday:  pre-dinner fs  nutritional lispro  8 units   bedtime fs  lantus  10 units     Today:  pre-breakfast fs  nutritional lispro 7  units  pre-lunch fs  nutritional lispro 7  units    Pt reports the following symptoms:  CONSTITUTIONAL:  Negative chills  CARDIOVASCULAR:  Negative for chest pain or palpitations  RESPIRATORY:  Negative for cough, wheezing, or SOB   GASTROINTESTINAL:  Negative for nausea, vomiting  GENITOURINARY:  Negative frequency, urgency or dysuria      MEDICATIONS  (STANDING):  ampicillin/sulbactam  IVPB 3 Gram(s) IV Intermittent every 6 hours  ascorbic acid 500 milliGRAM(s) Oral daily  aspirin  chewable 81 milliGRAM(s) Oral daily  dextrose 40% Gel 15 Gram(s) Oral once  dextrose 5%. 1000 milliLiter(s) (100 mL/Hr) IV Continuous <Continuous>  dextrose 5%. 1000 milliLiter(s) (50 mL/Hr) IV Continuous <Continuous>  dextrose 50% Injectable 25 Gram(s) IV Push once  dextrose 50% Injectable 12.5 Gram(s) IV Push once  dextrose 50% Injectable 25 Gram(s) IV Push once  glucagon  Injectable 1 milliGRAM(s) IntraMuscular once  heparin   Injectable 5000 Unit(s) SubCutaneous every 8 hours  insulin glargine Injectable (LANTUS) 10 Unit(s) SubCutaneous at bedtime  insulin lispro (ADMELOG) corrective regimen sliding scale   SubCutaneous Before meals and at bedtime  insulin lispro Injectable (ADMELOG) 7 Unit(s) SubCutaneous three times a day with meals  multivitamin  Chewable 1 Tablet(s) Oral daily  nystatin Powder 1 Application(s) Topical two times a day  senna 2 Tablet(s) Oral at bedtime    MEDICATIONS  (PRN):  acetaminophen   Tablet .. 650 milliGRAM(s) Oral every 6 hours PRN Mild Pain (1 - 3)  ondansetron Injectable 4 milliGRAM(s) IV Push every 6 hours PRN Nausea  oxycodone    5 mG/acetaminophen 325 mG 1 Tablet(s) Oral every 6 hours PRN Moderate Pain (4 - 6)  oxycodone    5 mG/acetaminophen 325 mG 2 Tablet(s) Oral every 6 hours PRN Severe Pain (7 - 10)      Past medical history reviewed  Family history reviewed  Social history reviewed    PHYSICAL EXAM  Vital Signs Last 24 Hrs  T(C): 36.4 (2021 06:12), Max: 37.1 (2021 13:52)  T(F): 97.6 (2021 06:12), Max: 98.7 (2021 13:52)  HR: 74 (2021 12:09) (74 - 94)  BP: 140/63 (2021 12:09) (118/61 - 151/67)  BP(mean): --  RR: 16 (2021 12:09) (16 - 17)  SpO2: 96% (2021 12:09) (95% - 99%)      Constitutional: wn/wd in NAD.   Respiratory: lungs CTAB.  Cardiovascular: regular rhythm, normal S1 and S2  GI: soft, NT/ND, no masses/HSM appreciated.  EXT: right BKA    LABS:                        8.9    7.64  )-----------( 761      ( 2021 07:16 )             28.7     04-01    137  |  105  |  8   ----------------------------<  140<H>  4.0   |  21<L>  |  0.53    Ca    8.9      2021 07:16  Phos  3.9     04-  Mg     1.7     04-01              HbA1C: 16.1 % ( @ 13:30)      CAPILLARY BLOOD GLUCOSE      POCT Blood Glucose.: 140 mg/dL (2021 12:03)  POCT Blood Glucose.: 127 mg/dL (2021 06:35)  POCT Blood Glucose.: 110 mg/dL (2021 21:25)  POCT Blood Glucose.: 142 mg/dL (2021 16:39)      Cholesterol, Serum: 83 mg/dL (03-15-21 @ 04:51)  HDL Cholesterol, Serum: 28 mg/dL (03-15-21 @ 04:51)  Triglycerides, Serum: 89 mg/dL (03-15-21 @ 04:51)    A/P: 75F with PMHx UC s/p colostomy (s/p multiple abdominal surgeries including colostomy bag in ) BIBEMS from home complaining of weakness; presenting likely in DKA with AMS, with bilateral lower extremity open wounds. Of note, pt found to have a hx of DM2: A1C on admission is 16.1. Pt is now s/p I&D w/ washout and debridement of non- viable soft tissue.     1.  DM:   wt:75 KG, a1c 16.1  cr:0.6, GFR:89  Please give lantus  units at bedtime  pleas give lispro  Units  premeals TID   Continue lispro moderate dose scale with meals and at bedtime.   Continue consistent carb diet  FSG Goal 100-180    Pt prefers not to be discharged on insulin, and states she will not be checking FSG or is interested in CGM at home.  case seen and discussed with Dr. Yee and updated primary team     INTERVAL HPI/OVERNIGHT EVENTS:    Patient is a 75y old  Female who presents with a chief complaint of Weakness (2021 11:42)  Pt was seen and examined at the bedside. She reports leg pain.  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  good appetite.          FSG & Insulin received:    Yesterday:  pre-dinner fs  nutritional lispro  8 units   bedtime fs  lantus  10 units     Today:  pre-breakfast fs  nutritional lispro 7  units  pre-lunch fs  nutritional lispro 7  units    Pt reports the following symptoms:  CONSTITUTIONAL:  Negative chills  CARDIOVASCULAR:  Negative for chest pain or palpitations  RESPIRATORY:  Negative for cough, wheezing, or SOB   GASTROINTESTINAL:  Negative for nausea, vomiting  GENITOURINARY:  Negative frequency, urgency or dysuria      MEDICATIONS  (STANDING):  ampicillin/sulbactam  IVPB 3 Gram(s) IV Intermittent every 6 hours  ascorbic acid 500 milliGRAM(s) Oral daily  aspirin  chewable 81 milliGRAM(s) Oral daily  dextrose 40% Gel 15 Gram(s) Oral once  dextrose 5%. 1000 milliLiter(s) (100 mL/Hr) IV Continuous <Continuous>  dextrose 5%. 1000 milliLiter(s) (50 mL/Hr) IV Continuous <Continuous>  dextrose 50% Injectable 25 Gram(s) IV Push once  dextrose 50% Injectable 12.5 Gram(s) IV Push once  dextrose 50% Injectable 25 Gram(s) IV Push once  glucagon  Injectable 1 milliGRAM(s) IntraMuscular once  heparin   Injectable 5000 Unit(s) SubCutaneous every 8 hours  insulin glargine Injectable (LANTUS) 10 Unit(s) SubCutaneous at bedtime  insulin lispro (ADMELOG) corrective regimen sliding scale   SubCutaneous Before meals and at bedtime  insulin lispro Injectable (ADMELOG) 7 Unit(s) SubCutaneous three times a day with meals  multivitamin  Chewable 1 Tablet(s) Oral daily  nystatin Powder 1 Application(s) Topical two times a day  senna 2 Tablet(s) Oral at bedtime    MEDICATIONS  (PRN):  acetaminophen   Tablet .. 650 milliGRAM(s) Oral every 6 hours PRN Mild Pain (1 - 3)  ondansetron Injectable 4 milliGRAM(s) IV Push every 6 hours PRN Nausea  oxycodone    5 mG/acetaminophen 325 mG 1 Tablet(s) Oral every 6 hours PRN Moderate Pain (4 - 6)  oxycodone    5 mG/acetaminophen 325 mG 2 Tablet(s) Oral every 6 hours PRN Severe Pain (7 - 10)      Past medical history reviewed  Family history reviewed  Social history reviewed    PHYSICAL EXAM  Vital Signs Last 24 Hrs  T(C): 36.4 (2021 06:12), Max: 37.1 (2021 13:52)  T(F): 97.6 (2021 06:12), Max: 98.7 (2021 13:52)  HR: 74 (2021 12:09) (74 - 94)  BP: 140/63 (2021 12:09) (118/61 - 151/67)  BP(mean): --  RR: 16 (2021 12:09) (16 - 17)  SpO2: 96% (2021 12:09) (95% - 99%)      Constitutional: wn/wd in NAD.   Respiratory: lungs CTAB.  Cardiovascular: regular rhythm, normal S1 and S2  GI: soft, NT/ND, no masses/HSM appreciated.  EXT: right BKA    LABS:                        8.9    7.64  )-----------( 761      ( 2021 07:16 )             28.7     04-01    137  |  105  |  8   ----------------------------<  140<H>  4.0   |  21<L>  |  0.53    Ca    8.9      2021 07:16  Phos  3.9     04-01  Mg     1.7     04-01              HbA1C: 16.1 % ( @ 13:30)      CAPILLARY BLOOD GLUCOSE      POCT Blood Glucose.: 140 mg/dL (2021 12:03)  POCT Blood Glucose.: 127 mg/dL (2021 06:35)  POCT Blood Glucose.: 110 mg/dL (2021 21:25)  POCT Blood Glucose.: 142 mg/dL (2021 16:39)      Cholesterol, Serum: 83 mg/dL (03-15-21 @ 04:51)  HDL Cholesterol, Serum: 28 mg/dL (03-15-21 @ 04:51)  Triglycerides, Serum: 89 mg/dL (03-15-21 @ 04:51)    A/P: 75F with PMHx UC s/p colostomy (s/p multiple abdominal surgeries including colostomy bag in ) BIBEMS from home complaining of weakness; presenting likely in DKA with AMS, with bilateral lower extremity open wounds. Of note, pt found to have a hx of DM2: A1C on admission is 16.1. Pt is now s/p I&D w/ washout and debridement of non- viable soft tissue.     1.  DM:   wt:75 KG, a1c 16.1  cr:0.6, GFR:89  Please give lantus 8 units at bedtime  pleas give lispro 6  Units  premeals TID   Continue lispro moderate dose scale with meals and at bedtime.   Continue consistent carb diet  FSG Goal 100-180    Pt prefers not to be discharged on insulin, and states she will not be checking FSG or is interested in CGM at home.  case seen and discussed with Dr. Yee and updated primary team

## 2021-04-02 NOTE — PROGRESS NOTE ADULT - SUBJECTIVE AND OBJECTIVE BOX
O/N: LENCHO, VSS                        PLEASE CHECK WHEN PRESENT:     [  ] Heart Failure     [  ] Acute     [  ] Acute on Chronic     [  ] Chronic  -------------------------------------------------------------------     [  ]Diastolic [HFpEF]     [  ]Systolic [HFrEF]     [  ]Combined [HFpEF & HFrEF]     [  ] afib     [  ] hypertensive heart disease     [  ]Other:  -------------------------------------------------------------------  [ ] Respiratory failure  [ ] Acute cor pulmonale  [ ] Asthma/COPD Exacerbation  [ ] Pleural effusion  [ ] Aspiration pneumonia  -------------------------------------------------------------------  [  ]ANTONY     [  ]ATN     [  ]Reneal Medullary Necrosis     [  ]Renal Cortical Necrosis     [  ]Other Pathological Lesions:    [  ]CKD 1  [  ]CKD 2  [  ]CKD 3  [  ]CKD 4  [  ]CKD 5  [  ]Other  -------------------------------------------------------------------  [x]Diabetes - new DX on this admission  [x] Diabetic PVD Ulcer  [  ] Neuropathic ulcer to DM  [  ] Diabetes with Nephropathy  [  ] Osteomyelitis due to diabetes  [x ] Hyperglycemia  [ ] Hypoglycemia  --------------------------------------------------------------------  [  ]Malnutrition: See Nutrition Note  [  ]Cachexia  [  ]Other:   [  ]Supplement Ordered:  [  ]Morbid Obesity (BMI >=40]  ---------------------------------------------------------------------  [ ] Sepsis/severe sepsis/septic shock  [ ] UTI  [ ] Pneumonia  -----------------------------------------------------------------------  [ ] Acidosis/alkalosis  [ ] Fluid overload  [x ] Hypokalemia  [ ] Hyperkalemia  [x ] Hypomagnesemia  [x ] Hypophosphatemia  [ ] Hyperphosphatemia  [ ] Hyponatremia  [ ] Hypernatremia  ------------------------------------------------------------------------  [x ] Acute blood loss anemia- due to OR for BKA guillotine and closure  [ ] Post op blood loss anemia  [ ] Iron deficiency anemia  [ ] Anemia due to chronic disease  [ ] Hypercoagulable state  [x ] Leukocytosis  ----------------------------------------------------------------------  [ ] Cerebral infarction  [ ] Transient ischemia attack  [ ] Encephalopathy      75F PMHx UC, colon cancer s/p total abdominal colectomy with end ileostomy, found down at home, was admitted for DKA and R diabetic foot wound, s/p operative debridement by podiatry 3/14/21 and R foot guillotine amputation and L foot I&D by vascular surgery 3/15/21.     #R foot wound and Lt foot wound  -s/p R foot operative debridement by podiatry 3/14 s/p R guillotine BKA, L ankle I&D  (3/15)  -s/p R below knee amputation closure (3/19)  - Wound cx- klebsiella pneumoniae  - Removal of alternating suture from the BKA for better drainage of the fluid collection  - Nafcillin (3/16-3/29), Zosyn (3/30 - 3/31), Unasyn (3/31- )  - PICC today 4/1  - Local wound care  -continue to monitor for fevers    -pain control    #DKA  -f/u endo recs  -ISS  -lantus 11u HS, added lispro 8U TID with meals  -hgb A1C=16.1    Diet: consistent carb  DVT PPx: HSQ  Dispo: [facility]        Electronic Signatures: O/N: LENCHO, VSS    ampicillin/sulbactam  IVPB 3  ampicillin/sulbactam  IVPB 3  aspirin  chewable 81  heparin   Injectable 5000      Allergies    No Known Allergies    Intolerances        Vital Signs Last 24 Hrs  T(C): 36.4 (02 Apr 2021 06:12), Max: 37.1 (01 Apr 2021 13:52)  T(F): 97.6 (02 Apr 2021 06:12), Max: 98.7 (01 Apr 2021 13:52)  HR: 88 (02 Apr 2021 05:03) (80 - 94)  BP: 151/67 (02 Apr 2021 05:03) (118/61 - 151/67)  BP(mean): --  RR: 16 (02 Apr 2021 05:03) (16 - 18)  SpO2: 95% (02 Apr 2021 05:03) (95% - 99%)  I&O's Summary    01 Apr 2021 07:01  -  02 Apr 2021 07:00  --------------------------------------------------------  IN: 360 mL / OUT: 1200 mL / NET: -840 mL        Physical Exam:  General:  Pulmonary:  Cardiovascular:  Abdominal:  Extremities:  Pulses:   Right:                                                                          Left:  FEM [ ]2+ [ ]1+ [ ]doppler                                             FEM [ ]2+ [ ]1+ [ ]doppler    POP [ ]2+ [ ]1+ [ ]doppler                                             POP [ ]2+ [ ]1+ [ ]doppler    DP [ ]2+ [ ]1+ [ ]doppler                                                DP [ ]2+ [ ]1+ [ ]doppler  PT[ ]2+ [ ]1+ [ ]doppler                                                  PT [ ]2+ [ ]1+ [ ]doppler      LABS:                        8.9    7.64  )-----------( 761      ( 01 Apr 2021 07:16 )             28.7     04-01    137  |  105  |  8   ----------------------------<  140<H>  4.0   |  21<L>  |  0.53    Ca    8.9      01 Apr 2021 07:16  Phos  3.9     04-01  Mg     1.7     04-01      PLEASE CHECK WHEN PRESENT:     [  ] Heart Failure     [  ] Acute     [  ] Acute on Chronic     [  ] Chronic  -------------------------------------------------------------------     [  ]Diastolic [HFpEF]     [  ]Systolic [HFrEF]     [  ]Combined [HFpEF & HFrEF]     [  ] afib     [  ] hypertensive heart disease     [  ]Other:  -------------------------------------------------------------------  [ ] Respiratory failure  [ ] Acute cor pulmonale  [ ] Asthma/COPD Exacerbation  [ ] Pleural effusion  [ ] Aspiration pneumonia  -------------------------------------------------------------------  [  ]ANTONY     [  ]ATN     [  ]Reneal Medullary Necrosis     [  ]Renal Cortical Necrosis     [  ]Other Pathological Lesions:    [  ]CKD 1  [  ]CKD 2  [  ]CKD 3  [  ]CKD 4  [  ]CKD 5  [  ]Other  -------------------------------------------------------------------  [x]Diabetes - new DX on this admission  [x] Diabetic PVD Ulcer  [  ] Neuropathic ulcer to DM  [  ] Diabetes with Nephropathy  [  ] Osteomyelitis due to diabetes  [x ] Hyperglycemia  [ ] Hypoglycemia  --------------------------------------------------------------------  [  ]Malnutrition: See Nutrition Note  [  ]Cachexia  [  ]Other:   [  ]Supplement Ordered:  [  ]Morbid Obesity (BMI >=40]  ---------------------------------------------------------------------  [ ] Sepsis/severe sepsis/septic shock  [ ] UTI  [ ] Pneumonia  -----------------------------------------------------------------------  [ ] Acidosis/alkalosis  [ ] Fluid overload  [x ] Hypokalemia  [ ] Hyperkalemia  [x ] Hypomagnesemia  [x ] Hypophosphatemia  [ ] Hyperphosphatemia  [ ] Hyponatremia  [ ] Hypernatremia  ------------------------------------------------------------------------  [x ] Acute blood loss anemia- due to OR for BKA guillotine and closure  [ ] Post op blood loss anemia  [ ] Iron deficiency anemia  [ ] Anemia due to chronic disease  [ ] Hypercoagulable state  [x ] Leukocytosis  ----------------------------------------------------------------------  [ ] Cerebral infarction  [ ] Transient ischemia attack  [ ] Encephalopathy      75F PMHx UC, colon cancer s/p total abdominal colectomy with end ileostomy, found down at home, was admitted for DKA and R diabetic foot wound, s/p operative debridement by podiatry 3/14/21 and R foot guillotine amputation and L foot I&D by vascular surgery 3/15/21. Now recovered well and is medically stable and cleared for discharge and next level of care ([facility])    #Right foot wound and left foot wound  -s/p R foot operative debridement by podiatry 3/14 s/p R guillotine BKA, L ankle I&D  (3/15)  -s/p R below knee amputation closure (3/19)  - Wound cx- klebsiella pneumoniae  - Removal of alternating suture from the BKA for better drainage of the fluid collection  - Nafcillin (3/16-3/29), Zosyn (3/30 - 3/31), Unasyn (3/31- )  - Refused PICC line  - Local wound care  -Pain control    #DKA  -f/u endo recs  -ISS  -lantus 10u HS, added lispro 7U TID with meals  -hgb A1C=16.1    Diet: consistent carb  DVT PPx: HSQ  Dispo: patient is medically ready for discharge but refusing to go to [facility] at this time, pending 24hour notice appeal       O/N: LENCHO, VSS        Vital Signs Last 24 Hrs  T(C): 36.4 (02 Apr 2021 06:12), Max: 37.1 (01 Apr 2021 13:52)  T(F): 97.6 (02 Apr 2021 06:12), Max: 98.7 (01 Apr 2021 13:52)  HR: 88 (02 Apr 2021 05:03) (80 - 94)  BP: 151/67 (02 Apr 2021 05:03) (118/61 - 151/67)  BP(mean): --  RR: 16 (02 Apr 2021 05:03) (16 - 18)  SpO2: 95% (02 Apr 2021 05:03) (95% - 99%)  I&O's Summary    01 Apr 2021 07:01  -  02 Apr 2021 07:00  --------------------------------------------------------  IN: 360 mL / OUT: 1200 mL / NET: -840 mL      Physical Exam:  General: NAD, resting comfortably in bed  Pulmonary: Nonlabored breathing, no respiratory distress  EXTREM: WWP, no edema RLE: R leg BKA amputation site without drainage. lateral portion of incision open but pink and clean.  LLE- anterior ankle ulcer with pink healthy granulation tissue, no discharge.      LABS:                        8.9    7.64  )-----------( 761      ( 01 Apr 2021 07:16 )             28.7     04-01    137  |  105  |  8   ----------------------------<  140<H>  4.0   |  21<L>  |  0.53    Ca    8.9      01 Apr 2021 07:16  Phos  3.9     04-01  Mg     1.7     04-01      PLEASE CHECK WHEN PRESENT:     [  ] Heart Failure     [  ] Acute     [  ] Acute on Chronic     [  ] Chronic  -------------------------------------------------------------------     [  ]Diastolic [HFpEF]     [  ]Systolic [HFrEF]     [  ]Combined [HFpEF & HFrEF]     [  ] afib     [  ] hypertensive heart disease     [  ]Other:  -------------------------------------------------------------------  [ ] Respiratory failure  [ ] Acute cor pulmonale  [ ] Asthma/COPD Exacerbation  [ ] Pleural effusion  [ ] Aspiration pneumonia  -------------------------------------------------------------------  [  ]ANTONY     [  ]ATN     [  ]Reneal Medullary Necrosis     [  ]Renal Cortical Necrosis     [  ]Other Pathological Lesions:    [  ]CKD 1  [  ]CKD 2  [  ]CKD 3  [  ]CKD 4  [  ]CKD 5  [  ]Other  -------------------------------------------------------------------  [x]Diabetes - new DX on this admission  [x] Diabetic PVD Ulcer  [  ] Neuropathic ulcer to DM  [  ] Diabetes with Nephropathy  [  ] Osteomyelitis due to diabetes  [x ] Hyperglycemia  [ ] Hypoglycemia  --------------------------------------------------------------------  [  ]Malnutrition: See Nutrition Note  [  ]Cachexia  [  ]Other:   [  ]Supplement Ordered:  [  ]Morbid Obesity (BMI >=40]  ---------------------------------------------------------------------  [ ] Sepsis/severe sepsis/septic shock  [ ] UTI  [ ] Pneumonia  -----------------------------------------------------------------------  [ ] Acidosis/alkalosis  [ ] Fluid overload  [x ] Hypokalemia  [ ] Hyperkalemia  [x ] Hypomagnesemia  [x ] Hypophosphatemia  [ ] Hyperphosphatemia  [ ] Hyponatremia  [ ] Hypernatremia  ------------------------------------------------------------------------  [x ] Acute blood loss anemia- due to OR for BKA guillotine and closure  [ ] Post op blood loss anemia  [ ] Iron deficiency anemia  [ ] Anemia due to chronic disease  [ ] Hypercoagulable state  [x ] Leukocytosis  ----------------------------------------------------------------------  [ ] Cerebral infarction  [ ] Transient ischemia attack  [ ] Encephalopathy      75F PMHx UC, colon cancer s/p total abdominal colectomy with end ileostomy, found down at home, was admitted for DKA and R diabetic foot wound, s/p operative debridement by podiatry 3/14/21 and R foot guillotine amputation and L foot I&D by vascular surgery 3/15/21. Now recovered well and is medically stable and cleared for discharge and next level of care ([facility])    #Right foot wound and left foot wound  -s/p R foot operative debridement by podiatry 3/14 s/p R guillotine BKA, L ankle I&D  (3/15)  -s/p R below knee amputation closure (3/19)  - Wound cx- klebsiella pneumoniae  - Removal of alternating suture from the BKA for better drainage of the fluid collection  - Nafcillin (3/16-3/29), Zosyn (3/30 - 3/31), Unasyn (3/31- )  - Refused PICC line  - Local wound care  -Pain control    #DKA  -f/u endo recs  -ISS  -lantus 10u HS, added lispro 7U TID with meals  -hgb A1C=16.1    Diet: consistent carb  DVT PPx: HSQ  Dispo: patient is medically ready for discharge but refusing to go to [facility] at this time, pending 24hour notice appeal

## 2021-04-02 NOTE — PROGRESS NOTE ADULT - SUBJECTIVE AND OBJECTIVE BOX
Subjective/Interval events:  -No events overnight- 24 hour appeal given yest  -This am patient endorses pain RLE and LLE at wounds sites- stable from day prior, otherwise eating well with mild nausea, no vomiting, no fevers/chills  -Per vascular team, wound exams no purulence noted at LLE or RLE wound sites     MEDICATIONS  (STANDING):  ampicillin/sulbactam  IVPB 3 Gram(s) IV Intermittent every 6 hours  ascorbic acid 500 milliGRAM(s) Oral daily  aspirin  chewable 81 milliGRAM(s) Oral daily  dextrose 40% Gel 15 Gram(s) Oral once  dextrose 5%. 1000 milliLiter(s) (100 mL/Hr) IV Continuous <Continuous>  dextrose 5%. 1000 milliLiter(s) (50 mL/Hr) IV Continuous <Continuous>  dextrose 50% Injectable 25 Gram(s) IV Push once  dextrose 50% Injectable 12.5 Gram(s) IV Push once  dextrose 50% Injectable 25 Gram(s) IV Push once  glucagon  Injectable 1 milliGRAM(s) IntraMuscular once  heparin   Injectable 5000 Unit(s) SubCutaneous every 8 hours  insulin glargine Injectable (LANTUS) 10 Unit(s) SubCutaneous at bedtime  insulin lispro (ADMELOG) corrective regimen sliding scale   SubCutaneous Before meals and at bedtime  insulin lispro Injectable (ADMELOG) 7 Unit(s) SubCutaneous three times a day with meals  multivitamin  Chewable 1 Tablet(s) Oral daily  nystatin Powder 1 Application(s) Topical two times a day  senna 2 Tablet(s) Oral at bedtime    MEDICATIONS  (PRN):  acetaminophen   Tablet .. 650 milliGRAM(s) Oral every 6 hours PRN Mild Pain (1 - 3)  ondansetron Injectable 4 milliGRAM(s) IV Push every 6 hours PRN Nausea  oxycodone    5 mG/acetaminophen 325 mG 1 Tablet(s) Oral every 6 hours PRN Moderate Pain (4 - 6)  oxycodone    5 mG/acetaminophen 325 mG 2 Tablet(s) Oral every 6 hours PRN Severe Pain (7 - 10)      Vital Signs Last 24 Hrs  T(C): 36.4 (02 Apr 2021 06:12), Max: 37.1 (01 Apr 2021 13:52)  T(F): 97.6 (02 Apr 2021 06:12), Max: 98.7 (01 Apr 2021 13:52)  HR: 74 (02 Apr 2021 08:23) (74 - 94)  BP: 138/57 (02 Apr 2021 08:23) (118/61 - 151/67)  BP(mean): --  RR: 16 (02 Apr 2021 08:23) (16 - 17)  SpO2: 98% (02 Apr 2021 08:23) (95% - 99%)    PHYSICAL EXAM:  GENERAL: pleasant, NAD  NEURO: Aox3, intact strength/sensation all 4 ext   HEENT: clear op, mmm  NECK:  No JVD, no lymphadenopathy  CHEST/LUNG: Clear to auscultation bilaterally; No rales, rhonchi, wheezing. Normal work of breathing, not tachypneic  HEART: Regular rate and rhythm; No murmurs, rubs, or gallops  ABDOMEN: Soft, Nontender, Nondistended. ostomy site formed stool   EXTREMITIES:  No sig le edema, wwp, RLE s/p amputation with wound site wrapped no bleeding or purulence, LLE wounds wrapped no bleeding/purulence     LABS (reviewed)   -fsg stable    ASSESSMENT AND PLAN: 76yo F with PMH of UC and colon cancer s/p colectomy and chemotherapy and chronic venous stasis w/chronic LE wounds who presented with bilateral lower extremity cellulitis and abscesses c/b septic shock, DKA, and RLE necrotizing fascitis, s/p R-foot I&D on 3/14 with Podiatry and R-BKA w/L-foot I&D on 3/15 with Vascular, s/p BKA closure on 3/19, residual purulent drainage at site and fever/leukocytosis 2-3 days prior now resolved on abx, wound site purulence resolved, no plan for further operative management with antiobiotic plan per ID. Patient now medically stable for [facility] but refusing midline/PICC placement, plan for IV abx via peripheral IV at [facility] then transition to po to complete course     #BLE Cellulitis c/b Septic Shock and Nec Fasc s/p R-BKA, recent new cellulitis at site  -- Appreciate ID recs- plan for 2 weeks IV unisyn- [facility] to be provide for upto 4 days then switch to po augmentin ok as patient refusing PICC/midline (confirmed with ID attending Devan)  -- Wound site clean now s/p antibiotics, adequate blood flow, no plan for further surgical management  -- Percocet 1 tab q4h PRN and Tylenol 650mg q6h PRN w/Senna    #Newly Diagnosed IDDM2 c/b DKA and SSTI  -- f/u endo recs- fsg stable on lantus and ISS   -- continue moderate scale correction     #UC c/b Colon Ca s/p Ostomy  -- no active issues, ostomy outpt wnl    #Apthous Ulcer  -- R-inner lower lip  -- continue to offer topical analgesic such as Oragel or Lidocaine     #Acute Blood Loss Anemia   -- likely from multiple trips to the OR and BKA  -- has been stable for several days     #Diet - Carb Controlled / Dysphagia 1   #DVT PPx - SQH  #Dispo- accepted to [facility] but patient refusing transfer- appeal made 4/2 pending outcome. I called HCP cousin to see if any questions, no response.       Patient was seen and examined by me at bedside    Greater than 35 minutes spent on total encounter; more than 50% of the visit was spent counseling and/or coordinating care by the attending physician.    Giacomo Jenkins MD [phone]

## 2021-04-03 DIAGNOSIS — M72.6 NECROTIZING FASCIITIS: ICD-10-CM

## 2021-04-03 DIAGNOSIS — Z93.3 COLOSTOMY STATUS: ICD-10-CM

## 2021-04-03 DIAGNOSIS — E11.10 TYPE 2 DIABETES MELLITUS WITH KETOACIDOSIS WITHOUT COMA: ICD-10-CM

## 2021-04-03 LAB
GLUCOSE BLDC GLUCOMTR-MCNC: 129 MG/DL — HIGH (ref 70–99)
GLUCOSE BLDC GLUCOMTR-MCNC: 130 MG/DL — HIGH (ref 70–99)
GLUCOSE BLDC GLUCOMTR-MCNC: 135 MG/DL — HIGH (ref 70–99)
GLUCOSE BLDC GLUCOMTR-MCNC: 136 MG/DL — HIGH (ref 70–99)
GLUCOSE BLDC GLUCOMTR-MCNC: 139 MG/DL — HIGH (ref 70–99)
SARS-COV-2 RNA SPEC QL NAA+PROBE: NEGATIVE — SIGNIFICANT CHANGE UP

## 2021-04-03 PROCEDURE — 93010 ELECTROCARDIOGRAM REPORT: CPT

## 2021-04-03 PROCEDURE — 99233 SBSQ HOSP IP/OBS HIGH 50: CPT

## 2021-04-03 RX ADMIN — SENNA PLUS 2 TABLET(S): 8.6 TABLET ORAL at 22:28

## 2021-04-03 RX ADMIN — Medication 6 UNIT(S): at 16:55

## 2021-04-03 RX ADMIN — AMPICILLIN SODIUM AND SULBACTAM SODIUM 200 GRAM(S): 250; 125 INJECTION, POWDER, FOR SUSPENSION INTRAMUSCULAR; INTRAVENOUS at 04:42

## 2021-04-03 RX ADMIN — OXYCODONE AND ACETAMINOPHEN 2 TABLET(S): 5; 325 TABLET ORAL at 10:08

## 2021-04-03 RX ADMIN — HEPARIN SODIUM 5000 UNIT(S): 5000 INJECTION INTRAVENOUS; SUBCUTANEOUS at 05:57

## 2021-04-03 RX ADMIN — OXYCODONE AND ACETAMINOPHEN 2 TABLET(S): 5; 325 TABLET ORAL at 09:16

## 2021-04-03 RX ADMIN — Medication 81 MILLIGRAM(S): at 09:00

## 2021-04-03 RX ADMIN — Medication 1 TABLET(S): at 16:07

## 2021-04-03 RX ADMIN — OXYCODONE AND ACETAMINOPHEN 2 TABLET(S): 5; 325 TABLET ORAL at 01:45

## 2021-04-03 RX ADMIN — Medication 6 UNIT(S): at 13:00

## 2021-04-03 RX ADMIN — Medication 500 MILLIGRAM(S): at 09:00

## 2021-04-03 RX ADMIN — NYSTATIN CREAM 1 APPLICATION(S): 100000 CREAM TOPICAL at 18:10

## 2021-04-03 RX ADMIN — AMPICILLIN SODIUM AND SULBACTAM SODIUM 200 GRAM(S): 250; 125 INJECTION, POWDER, FOR SUSPENSION INTRAMUSCULAR; INTRAVENOUS at 16:07

## 2021-04-03 RX ADMIN — INSULIN GLARGINE 8 UNIT(S): 100 INJECTION, SOLUTION SUBCUTANEOUS at 22:28

## 2021-04-03 RX ADMIN — AMPICILLIN SODIUM AND SULBACTAM SODIUM 200 GRAM(S): 250; 125 INJECTION, POWDER, FOR SUSPENSION INTRAMUSCULAR; INTRAVENOUS at 22:28

## 2021-04-03 RX ADMIN — AMPICILLIN SODIUM AND SULBACTAM SODIUM 200 GRAM(S): 250; 125 INJECTION, POWDER, FOR SUSPENSION INTRAMUSCULAR; INTRAVENOUS at 09:00

## 2021-04-03 RX ADMIN — HEPARIN SODIUM 5000 UNIT(S): 5000 INJECTION INTRAVENOUS; SUBCUTANEOUS at 16:06

## 2021-04-03 RX ADMIN — OXYCODONE AND ACETAMINOPHEN 2 TABLET(S): 5; 325 TABLET ORAL at 00:54

## 2021-04-03 RX ADMIN — NYSTATIN CREAM 1 APPLICATION(S): 100000 CREAM TOPICAL at 05:56

## 2021-04-03 RX ADMIN — HEPARIN SODIUM 5000 UNIT(S): 5000 INJECTION INTRAVENOUS; SUBCUTANEOUS at 22:28

## 2021-04-03 RX ADMIN — Medication 6 UNIT(S): at 08:40

## 2021-04-03 NOTE — PROGRESS NOTE ADULT - SUBJECTIVE AND OBJECTIVE BOX
O/N: LENCHO, VSS                  PLEASE CHECK WHEN PRESENT:     [  ] Heart Failure     [  ] Acute     [  ] Acute on Chronic     [  ] Chronic  -------------------------------------------------------------------     [  ]Diastolic [HFpEF]     [  ]Systolic [HFrEF]     [  ]Combined [HFpEF & HFrEF]     [  ] afib     [  ] hypertensive heart disease     [  ]Other:  -------------------------------------------------------------------  [ ] Respiratory failure  [ ] Acute cor pulmonale  [ ] Asthma/COPD Exacerbation  [ ] Pleural effusion  [ ] Aspiration pneumonia  -------------------------------------------------------------------  [  ]ANTONY     [  ]ATN     [  ]Reneal Medullary Necrosis     [  ]Renal Cortical Necrosis     [  ]Other Pathological Lesions:    [  ]CKD 1  [  ]CKD 2  [  ]CKD 3  [  ]CKD 4  [  ]CKD 5  [  ]Other  -------------------------------------------------------------------  [x]Diabetes - new DX on this admission  [x] Diabetic PVD Ulcer  [  ] Neuropathic ulcer to DM  [  ] Diabetes with Nephropathy  [  ] Osteomyelitis due to diabetes  [x ] Hyperglycemia  [ ] Hypoglycemia  --------------------------------------------------------------------  [  ]Malnutrition: See Nutrition Note  [  ]Cachexia  [  ]Other:   [  ]Supplement Ordered:  [  ]Morbid Obesity (BMI >=40]  ---------------------------------------------------------------------  [ ] Sepsis/severe sepsis/septic shock  [ ] UTI  [ ] Pneumonia  -----------------------------------------------------------------------  [ ] Acidosis/alkalosis  [ ] Fluid overload  [x ] Hypokalemia  [ ] Hyperkalemia  [x ] Hypomagnesemia  [x ] Hypophosphatemia  [ ] Hyperphosphatemia  [ ] Hyponatremia  [ ] Hypernatremia  ------------------------------------------------------------------------  [x ] Acute blood loss anemia- due to OR for BKA guillotine and closure  [ ] Post op blood loss anemia  [ ] Iron deficiency anemia  [ ] Anemia due to chronic disease  [ ] Hypercoagulable state  [x ] Leukocytosis  ----------------------------------------------------------------------  [ ] Cerebral infarction  [ ] Transient ischemia attack  [ ] Encephalopathy      75F PMHx UC, colon cancer s/p total abdominal colectomy with end ileostomy, found down at home, was admitted for DKA and R diabetic foot wound, s/p operative debridement by podiatry 3/14/21 and R foot guillotine amputation and L foot I&D by vascular surgery 3/15/21. Now recovered well and is medically stable and cleared for discharge and next level of care ([facility])    #Right foot wound and left foot wound  -s/p R foot operative debridement by podiatry 3/14 s/p R guillotine BKA, L ankle I&D  (3/15)  -s/p R below knee amputation closure (3/19)  - Wound cx- klebsiella pneumoniae  - Removal of alternating suture from the BKA for better drainage of the fluid collection  - Nafcillin (3/16-3/29), Zosyn (3/30 - 3/31), Unasyn (3/31- )  - Refused PICC line  - Local wound care  -Pain control    #DKA  -f/u endo recs  -ISS  -lantus 10u HS, added lispro 7U TID with meals  -hgb A1C=16.1    Diet: consistent carb  DVT PPx: HSQ  Dispo: patient is medically ready for discharge but refusing to go to [facility] at this time, pending 24hour notice appeal   O/N: LENCHO, VSS    STATUS POST:  POD#15 R BKA     SUBJECTIVE: Patient seen and examined bedside by vascular team. Tolerating diet, pain controlled.    ampicillin/sulbactam  IVPB 3 Gram(s) IV Intermittent every 6 hours  aspirin  chewable 81 milliGRAM(s) Oral daily  heparin   Injectable 5000 Unit(s) SubCutaneous every 8 hours      Vital Signs Last 24 Hrs  T(C): 35.9 (03 Apr 2021 05:48), Max: 36.9 (02 Apr 2021 22:19)  T(F): 96.6 (03 Apr 2021 05:48), Max: 98.4 (02 Apr 2021 22:19)  HR: 88 (03 Apr 2021 08:43) (66 - 88)  BP: 144/59 (03 Apr 2021 08:43) (124/58 - 144/59)  BP(mean): --  RR: 18 (03 Apr 2021 08:43) (16 - 18)  SpO2: 95% (03 Apr 2021 08:43) (95% - 97%)  I&O's Detail    02 Apr 2021 07:01  -  03 Apr 2021 07:00  --------------------------------------------------------  IN:    Oral Fluid: 180 mL  Total IN: 180 mL    OUT:    Intermittent Catheterization - Urethral (mL): 1350 mL  Total OUT: 1350 mL    Total NET: -1170 mL    Physical Exam:  General: NAD, resting comfortably in bed  Pulmonary: Nonlabored breathing, no respiratory distress  EXTREM: WWP, no edema RLE: R leg BKA amputation site without drainage. lateral portion of incision open but pink and clean.  LLE- anterior ankle ulcer with pink healthy granulation tissue, no discharge.    LABS:    RADIOLOGY & ADDITIONAL STUDIES:    PLEASE CHECK WHEN PRESENT:     [  ] Heart Failure     [  ] Acute     [  ] Acute on Chronic     [  ] Chronic  -------------------------------------------------------------------     [  ]Diastolic [HFpEF]     [  ]Systolic [HFrEF]     [  ]Combined [HFpEF & HFrEF]     [  ] afib     [  ] hypertensive heart disease     [  ]Other:  -------------------------------------------------------------------  [ ] Respiratory failure  [ ] Acute cor pulmonale  [ ] Asthma/COPD Exacerbation  [ ] Pleural effusion  [ ] Aspiration pneumonia  -------------------------------------------------------------------  [  ]ANTONY     [  ]ATN     [  ]Reneal Medullary Necrosis     [  ]Renal Cortical Necrosis     [  ]Other Pathological Lesions:    [  ]CKD 1  [  ]CKD 2  [  ]CKD 3  [  ]CKD 4  [  ]CKD 5  [  ]Other  -------------------------------------------------------------------  [x]Diabetes - new DX on this admission  [x] Diabetic PVD Ulcer  [  ] Neuropathic ulcer to DM  [  ] Diabetes with Nephropathy  [  ] Osteomyelitis due to diabetes  [x ] Hyperglycemia  [ ] Hypoglycemia  --------------------------------------------------------------------  [  ]Malnutrition: See Nutrition Note  [  ]Cachexia  [  ]Other:   [  ]Supplement Ordered:  [  ]Morbid Obesity (BMI >=40]  ---------------------------------------------------------------------  [ ] Sepsis/severe sepsis/septic shock  [ ] UTI  [ ] Pneumonia  -----------------------------------------------------------------------  [ ] Acidosis/alkalosis  [ ] Fluid overload  [x ] Hypokalemia  [ ] Hyperkalemia  [x ] Hypomagnesemia  [x ] Hypophosphatemia  [ ] Hyperphosphatemia  [ ] Hyponatremia  [ ] Hypernatremia  ------------------------------------------------------------------------  [x ] Acute blood loss anemia- due to OR for BKA guillotine and closure  [ ] Post op blood loss anemia  [ ] Iron deficiency anemia  [ ] Anemia due to chronic disease  [ ] Hypercoagulable state  [x ] Leukocytosis  ----------------------------------------------------------------------  [ ] Cerebral infarction  [ ] Transient ischemia attack  [ ] Encephalopathy      75F PMHx UC, colon cancer s/p total abdominal colectomy with end ileostomy, found down at home, was admitted for DKA and R diabetic foot wound, s/p operative debridement by podiatry 3/14/21 and R foot guillotine amputation and L foot I&D by vascular surgery 3/15/21. Now recovered well and is medically stable and cleared for discharge and next level of care ([facility])    #Right foot wound and left foot wound  -s/p R foot operative debridement by podiatry 3/14 s/p R guillotine BKA, L ankle I&D  (3/15)  -s/p R below knee amputation closure (3/19)  - Wound cx- klebsiella pneumoniae  - Removal of alternating suture from the BKA for better drainage of the fluid collection  - Nafcillin (3/16-3/29), Zosyn (3/30 - 3/31), Unasyn (3/31- 4/12 )  - Refused PICC line  - Local wound care  -Pain control    #DKA  -f/u endo recs  -ISS  -lantus 8u HS, added lispro 6U TID with meals  -hgb A1C=16.1    Diet: consistent carb  DVT PPx: HSQ  Dispo: patient is medically ready for discharge but refusing to go to [facility] at this time, pending 24hour notice appeal   O/N: LENCHO, VSS    STATUS POST:  POD#15 R BKA     SUBJECTIVE: Patient seen and examined bedside by vascular team. Tolerating diet, pain controlled.    ampicillin/sulbactam  IVPB 3 Gram(s) IV Intermittent every 6 hours  aspirin  chewable 81 milliGRAM(s) Oral daily  heparin   Injectable 5000 Unit(s) SubCutaneous every 8 hours      Vital Signs Last 24 Hrs  T(C): 35.9 (03 Apr 2021 05:48), Max: 36.9 (02 Apr 2021 22:19)  T(F): 96.6 (03 Apr 2021 05:48), Max: 98.4 (02 Apr 2021 22:19)  HR: 88 (03 Apr 2021 08:43) (66 - 88)  BP: 144/59 (03 Apr 2021 08:43) (124/58 - 144/59)  BP(mean): --  RR: 18 (03 Apr 2021 08:43) (16 - 18)  SpO2: 95% (03 Apr 2021 08:43) (95% - 97%)  I&O's Detail    02 Apr 2021 07:01  -  03 Apr 2021 07:00  --------------------------------------------------------  IN:    Oral Fluid: 180 mL  Total IN: 180 mL    OUT:    Intermittent Catheterization - Urethral (mL): 1350 mL  Total OUT: 1350 mL    Total NET: -1170 mL    Physical Exam:  General: NAD, resting comfortably in bed  Pulmonary: Nonlabored breathing, no respiratory distress  EXTREM: WWP, no edema RLE: R leg BKA amputation site without drainage. lateral portion of incision open but pink and clean.  LLE- anterior ankle ulcer with pink healthy granulation tissue, no discharge.    LABS:    RADIOLOGY & ADDITIONAL STUDIES:    PLEASE CHECK WHEN PRESENT:     [  ] Heart Failure     [  ] Acute     [  ] Acute on Chronic     [  ] Chronic  -------------------------------------------------------------------     [  ]Diastolic [HFpEF]     [  ]Systolic [HFrEF]     [  ]Combined [HFpEF & HFrEF]     [  ] afib     [  ] hypertensive heart disease     [  ]Other:  -------------------------------------------------------------------  [ ] Respiratory failure  [ ] Acute cor pulmonale  [ ] Asthma/COPD Exacerbation  [ ] Pleural effusion  [ ] Aspiration pneumonia  -------------------------------------------------------------------  [  ]ANTONY     [  ]ATN     [  ]Reneal Medullary Necrosis     [  ]Renal Cortical Necrosis     [  ]Other Pathological Lesions:    [  ]CKD 1  [  ]CKD 2  [  ]CKD 3  [  ]CKD 4  [  ]CKD 5  [  ]Other  -------------------------------------------------------------------  [x]Diabetes - new DX on this admission  [x] Diabetic PVD Ulcer  [  ] Neuropathic ulcer to DM  [  ] Diabetes with Nephropathy  [  ] Osteomyelitis due to diabetes  [x ] Hyperglycemia  [ ] Hypoglycemia  --------------------------------------------------------------------  [  ]Malnutrition: See Nutrition Note  [  ]Cachexia  [  ]Other:   [  ]Supplement Ordered:  [  ]Morbid Obesity (BMI >=40]  ---------------------------------------------------------------------  [ ] Sepsis/severe sepsis/septic shock  [ ] UTI  [ ] Pneumonia  -----------------------------------------------------------------------  [ ] Acidosis/alkalosis  [ ] Fluid overload  [x ] Hypokalemia  [ ] Hyperkalemia  [x ] Hypomagnesemia  [x ] Hypophosphatemia  [ ] Hyperphosphatemia  [ ] Hyponatremia  [ ] Hypernatremia  ------------------------------------------------------------------------  [x ] Acute blood loss anemia- due to OR for BKA guillotine and closure  [ ] Post op blood loss anemia  [ ] Iron deficiency anemia  [ ] Anemia due to chronic disease  [ ] Hypercoagulable state  [x ] Leukocytosis  ----------------------------------------------------------------------  [ ] Cerebral infarction  [ ] Transient ischemia attack  [ ] Encephalopathy      75F PMHx UC, colon cancer s/p total abdominal colectomy with end ileostomy, found down at home, was admitted for DKA and R diabetic foot wound, s/p operative debridement by podiatry 3/14/21 and R foot guillotine amputation and L foot I&D by vascular surgery 3/15/21. Now recovered well and is medically stable and cleared for discharge and next level of care ([facility])    #Right foot wound and left foot wound  -s/p R foot operative debridement by podiatry 3/14 s/p R guillotine BKA, L ankle I&D  (3/15)  -s/p R below knee amputation closure (3/19)  - Wound cx- klebsiella pneumoniae  - Removal of alternating suture from the BKA for better drainage of the fluid collection  - Nafcillin (3/16-3/29), Zosyn (3/30 - 3/31), Unasyn (3/31- 4/12 )  - Refused PICC line  - Local wound care  -Pain control    #DKA  -f/u endo recs  -ISS  -lantus 8u HS, added lispro 6U TID with meals  -hgb A1C=16.1    Diet: consistent carb  DVT PPx: HSQ  Dispo: patient is medically ready for discharge but refusing to go to [facility] at this time, pending 24hour notice appeal

## 2021-04-03 NOTE — PROGRESS NOTE ADULT - SUBJECTIVE AND OBJECTIVE BOX
Patient is a 75y old  Female who presents with a chief complaint of Weakness (03 Apr 2021 06:18)      INTERVAL HPI/OVERNIGHT EVENTS:    Pt. seen and examined earlier today  Pt. reports B/L LE pain is improved, controlled w/ Percocet  c/o mild sore throat  Tolerating PO  No F/C  Counseled Pt. on the benefits of [facility] placement; however, Pt. appealing her discharge, as she believes the care at [facility] is superior     Review of Systems: 12 point review of systems otherwise negative    MEDICATIONS  (STANDING):  ampicillin/sulbactam  IVPB 3 Gram(s) IV Intermittent every 6 hours  ascorbic acid 500 milliGRAM(s) Oral daily  aspirin  chewable 81 milliGRAM(s) Oral daily  dextrose 40% Gel 15 Gram(s) Oral once  dextrose 5%. 1000 milliLiter(s) (50 mL/Hr) IV Continuous <Continuous>  dextrose 5%. 1000 milliLiter(s) (100 mL/Hr) IV Continuous <Continuous>  dextrose 50% Injectable 12.5 Gram(s) IV Push once  dextrose 50% Injectable 25 Gram(s) IV Push once  dextrose 50% Injectable 25 Gram(s) IV Push once  glucagon  Injectable 1 milliGRAM(s) IntraMuscular once  heparin   Injectable 5000 Unit(s) SubCutaneous every 8 hours  insulin glargine Injectable (LANTUS) 8 Unit(s) SubCutaneous at bedtime  insulin lispro (ADMELOG) corrective regimen sliding scale   SubCutaneous Before meals and at bedtime  insulin lispro Injectable (ADMELOG) 6 Unit(s) SubCutaneous three times a day with meals  multivitamin  Chewable 1 Tablet(s) Oral daily  nystatin Powder 1 Application(s) Topical two times a day  senna 2 Tablet(s) Oral at bedtime    MEDICATIONS  (PRN):  acetaminophen   Tablet .. 650 milliGRAM(s) Oral every 6 hours PRN Mild Pain (1 - 3)  ondansetron Injectable 4 milliGRAM(s) IV Push every 6 hours PRN Nausea  oxycodone    5 mG/acetaminophen 325 mG 1 Tablet(s) Oral every 6 hours PRN Moderate Pain (4 - 6)  oxycodone    5 mG/acetaminophen 325 mG 2 Tablet(s) Oral every 6 hours PRN Severe Pain (7 - 10)      Allergies    No Known Allergies    Intolerances          Vital Signs Last 24 Hrs  T(C): 36.1 (03 Apr 2021 12:45), Max: 36.9 (02 Apr 2021 22:19)  T(F): 97 (03 Apr 2021 12:45), Max: 98.4 (02 Apr 2021 22:19)  HR: 88 (03 Apr 2021 08:43) (66 - 88)  BP: 144/59 (03 Apr 2021 08:43) (124/58 - 144/59)  BP(mean): --  RR: 18 (03 Apr 2021 08:43) (16 - 18)  SpO2: 95% (03 Apr 2021 08:43) (95% - 97%)  CAPILLARY BLOOD GLUCOSE      POCT Blood Glucose.: 130 mg/dL (03 Apr 2021 11:45)  POCT Blood Glucose.: 136 mg/dL (03 Apr 2021 09:11)  POCT Blood Glucose.: 129 mg/dL (03 Apr 2021 06:43)  POCT Blood Glucose.: 139 mg/dL (02 Apr 2021 21:25)  POCT Blood Glucose.: 163 mg/dL (02 Apr 2021 16:11)      04-02 @ 07:01 - 04-03 @ 07:00  --------------------------------------------------------  IN: 180 mL / OUT: 1350 mL / NET: -1170 mL    04-03 @ 07:01  -  04-03 @ 16:05  --------------------------------------------------------  IN: 360 mL / OUT: 1550 mL / NET: -1190 mL        Physical Exam:  (earlier today)  Daily     Daily   General:  comfortable-appearing in NAD  HEENT:  MMM, oropharynx unremarkable, no erythema  Abdomen:  soft NT ND +colostomy  Extremities:  s/p R BKA; B/L LE dressings C/D/I  Skin:  WWP  Neuro:  AAOx3    LABS:

## 2021-04-04 LAB
GLUCOSE BLDC GLUCOMTR-MCNC: 114 MG/DL — HIGH (ref 70–99)
GLUCOSE BLDC GLUCOMTR-MCNC: 127 MG/DL — HIGH (ref 70–99)
GLUCOSE BLDC GLUCOMTR-MCNC: 129 MG/DL — HIGH (ref 70–99)
GLUCOSE BLDC GLUCOMTR-MCNC: 130 MG/DL — HIGH (ref 70–99)

## 2021-04-04 PROCEDURE — 99232 SBSQ HOSP IP/OBS MODERATE 35: CPT

## 2021-04-04 RX ORDER — COLLAGENASE CLOSTRIDIUM HIST. 250 UNIT/G
1 OINTMENT (GRAM) TOPICAL DAILY
Refills: 0 | Status: DISCONTINUED | OUTPATIENT
Start: 2021-04-05 | End: 2021-04-06

## 2021-04-04 RX ADMIN — AMPICILLIN SODIUM AND SULBACTAM SODIUM 200 GRAM(S): 250; 125 INJECTION, POWDER, FOR SUSPENSION INTRAMUSCULAR; INTRAVENOUS at 22:22

## 2021-04-04 RX ADMIN — Medication 81 MILLIGRAM(S): at 09:34

## 2021-04-04 RX ADMIN — NYSTATIN CREAM 1 APPLICATION(S): 100000 CREAM TOPICAL at 06:52

## 2021-04-04 RX ADMIN — Medication 650 MILLIGRAM(S): at 18:43

## 2021-04-04 RX ADMIN — Medication 6 UNIT(S): at 12:00

## 2021-04-04 RX ADMIN — AMPICILLIN SODIUM AND SULBACTAM SODIUM 200 GRAM(S): 250; 125 INJECTION, POWDER, FOR SUSPENSION INTRAMUSCULAR; INTRAVENOUS at 09:34

## 2021-04-04 RX ADMIN — NYSTATIN CREAM 1 APPLICATION(S): 100000 CREAM TOPICAL at 17:03

## 2021-04-04 RX ADMIN — Medication 1 TABLET(S): at 09:34

## 2021-04-04 RX ADMIN — HEPARIN SODIUM 5000 UNIT(S): 5000 INJECTION INTRAVENOUS; SUBCUTANEOUS at 06:42

## 2021-04-04 RX ADMIN — OXYCODONE AND ACETAMINOPHEN 2 TABLET(S): 5; 325 TABLET ORAL at 06:41

## 2021-04-04 RX ADMIN — HEPARIN SODIUM 5000 UNIT(S): 5000 INJECTION INTRAVENOUS; SUBCUTANEOUS at 22:22

## 2021-04-04 RX ADMIN — Medication 6 UNIT(S): at 07:45

## 2021-04-04 RX ADMIN — HEPARIN SODIUM 5000 UNIT(S): 5000 INJECTION INTRAVENOUS; SUBCUTANEOUS at 13:39

## 2021-04-04 RX ADMIN — Medication 500 MILLIGRAM(S): at 09:34

## 2021-04-04 RX ADMIN — OXYCODONE AND ACETAMINOPHEN 2 TABLET(S): 5; 325 TABLET ORAL at 14:54

## 2021-04-04 RX ADMIN — AMPICILLIN SODIUM AND SULBACTAM SODIUM 200 GRAM(S): 250; 125 INJECTION, POWDER, FOR SUSPENSION INTRAMUSCULAR; INTRAVENOUS at 04:20

## 2021-04-04 RX ADMIN — SENNA PLUS 2 TABLET(S): 8.6 TABLET ORAL at 22:22

## 2021-04-04 RX ADMIN — Medication 650 MILLIGRAM(S): at 19:37

## 2021-04-04 RX ADMIN — INSULIN GLARGINE 8 UNIT(S): 100 INJECTION, SOLUTION SUBCUTANEOUS at 22:22

## 2021-04-04 RX ADMIN — OXYCODONE AND ACETAMINOPHEN 2 TABLET(S): 5; 325 TABLET ORAL at 16:00

## 2021-04-04 RX ADMIN — AMPICILLIN SODIUM AND SULBACTAM SODIUM 200 GRAM(S): 250; 125 INJECTION, POWDER, FOR SUSPENSION INTRAMUSCULAR; INTRAVENOUS at 17:02

## 2021-04-04 NOTE — PROGRESS NOTE ADULT - PROBLEM SELECTOR PLAN 2
DKA resolved; HbA1c 16.1%; Endocrine following; cont. basal-bolus insulin, diabetic diet; monitor blood glucose
DKA resolved; HbA1c 16.1%; cont. basal-bolus insulin regimen per Endocrine, diabetic diet; monitor blood glucose

## 2021-04-04 NOTE — PROGRESS NOTE ADULT - PROBLEM SELECTOR PLAN 3
h/o UC and colon cancer, s/p near-total colectomy, Lake's procedure; cont. ostomy care
h/o UC and colon cancer, s/p near-total colectomy, Lake's procedure; cont. ostomy care, monitor output

## 2021-04-04 NOTE — PROGRESS NOTE ADULT - PROBLEM SELECTOR PLAN 1
B/L LE cellulitis in setting of venous insufficiency, c/b RLE necrotizing fasciitis and septic shock; now clinically improved s/p R foot I&D (3/14) and guillotine BKA (3/15) w/ subsequent closure (3/19) and L foot I&D (3/15); cx growing Klebsiella; cont. Unasyn (last day 4/12) per ID recs, wound care per Vascular and Podiatry
B/L LE cellulitis in setting of venous insufficiency, c/b RLE necrotizing fasciitis and septic shock; now clinically improved s/p R foot I&D (3/14) and guillotine BKA (3/15) w/ subsequent closure (3/19) and L foot I&D (3/15); cx growing Klebsiella; cont. Unasyn (last day 4/12) per ID recs, wound care per Podiatry and Vascular

## 2021-04-04 NOTE — PROGRESS NOTE ADULT - PROBLEM SELECTOR PROBLEM 2
Type 2 diabetes mellitus with ketoacidosis without coma, without long-term current use of insulin
Type 2 diabetes mellitus with ketoacidosis without coma, without long-term current use of insulin

## 2021-04-04 NOTE — PROGRESS NOTE ADULT - SUBJECTIVE AND OBJECTIVE BOX
Patient is a 75y old  Female who presents with a chief complaint of Weakness (2021 11:37)      INTERVAL HPI/OVERNIGHT EVENTS:    Pt. seen and examined this morning  Pt. c/o palpitations O/N that self-resolved  Otherwise, denies F/C, CP, SOB  B/L LE pain controlled w/ Percocet  Pt. again concerned that the care at [facility] will be suboptimal; reassurance provided    Review of Systems: 12 point review of systems otherwise negative    MEDICATIONS  (STANDING):  ampicillin/sulbactam  IVPB 3 Gram(s) IV Intermittent every 6 hours  ascorbic acid 500 milliGRAM(s) Oral daily  aspirin  chewable 81 milliGRAM(s) Oral daily  dextrose 40% Gel 15 Gram(s) Oral once  dextrose 5%. 1000 milliLiter(s) (50 mL/Hr) IV Continuous <Continuous>  dextrose 5%. 1000 milliLiter(s) (100 mL/Hr) IV Continuous <Continuous>  dextrose 50% Injectable 12.5 Gram(s) IV Push once  dextrose 50% Injectable 25 Gram(s) IV Push once  dextrose 50% Injectable 25 Gram(s) IV Push once  glucagon  Injectable 1 milliGRAM(s) IntraMuscular once  heparin   Injectable 5000 Unit(s) SubCutaneous every 8 hours  insulin glargine Injectable (LANTUS) 8 Unit(s) SubCutaneous at bedtime  insulin lispro (ADMELOG) corrective regimen sliding scale   SubCutaneous Before meals and at bedtime  insulin lispro Injectable (ADMELOG) 6 Unit(s) SubCutaneous three times a day with meals  multivitamin  Chewable 1 Tablet(s) Oral daily  nystatin Powder 1 Application(s) Topical two times a day  senna 2 Tablet(s) Oral at bedtime    MEDICATIONS  (PRN):  acetaminophen   Tablet .. 650 milliGRAM(s) Oral every 6 hours PRN Mild Pain (1 - 3)  ondansetron Injectable 4 milliGRAM(s) IV Push every 6 hours PRN Nausea  oxycodone    5 mG/acetaminophen 325 mG 1 Tablet(s) Oral every 6 hours PRN Moderate Pain (4 - 6)  oxycodone    5 mG/acetaminophen 325 mG 2 Tablet(s) Oral every 6 hours PRN Severe Pain (7 - 10)      Allergies    No Known Allergies    Intolerances          Vital Signs Last 24 Hrs  T(C): 36.3 (2021 12:50), Max: 36.8 (2021 18:44)  T(F): 97.4 (2021 12:50), Max: 98.3 (2021 18:44)  HR: 82 (2021 08:30) (82 - 95)  BP: 129/58 (2021 08:30) (123/57 - 138/64)  BP(mean): --  RR: 18 (2021 08:30) (18 - 18)  SpO2: 95% (2021 08:30) (94% - 95%)  CAPILLARY BLOOD GLUCOSE      POCT Blood Glucose.: 130 mg/dL (2021 11:39)  POCT Blood Glucose.: 129 mg/dL (2021 06:42)  POCT Blood Glucose.: 135 mg/dL (2021 21:33)  POCT Blood Glucose.: 139 mg/dL (2021 17:05)       @ 07:01   @ 07:00  --------------------------------------------------------  IN: 1740 mL / OUT: 3150 mL / NET: -1410 mL     @ 07:01  -  04 @ 13:03  --------------------------------------------------------  IN: 360 mL / OUT: 0 mL / NET: 360 mL        Physical Exam:  (earlier today)  Daily     Daily Weight in k.8 (2021 06:39)  General:  comfortable-appearing in NAD  HEENT:  MMM  CV:  RRR, no murmur  Lungs:  CTA B/L  Abdomen:  soft NT ND +colostomy  Extremities:  s/p R BKA; B/L LE dressings C/D/I  Skin:  WWP  Neuro:  AAOx3    LABS:

## 2021-04-04 NOTE — PROGRESS NOTE ADULT - SUBJECTIVE AND OBJECTIVE BOX
INTERVAL HPI/OVERNIGHT EVENTS:    Patient is a 75y old  Female who presents with a chief complaint of Weakness (04 Apr 2021 06:48)      Pt reports the following symptoms:    CONSTITUTIONAL:  Negative fever or chills, feels well, good appetite  EYES:  Negative  blurry vision or double vision  CARDIOVASCULAR:  Negative for chest pain or palpitations  RESPIRATORY:  Negative for cough, wheezing, or SOB   GASTROINTESTINAL:  Negative for nausea, vomiting, diarrhea, constipation, or abdominal pain  GENITOURINARY:  Negative frequency, urgency or dysuria  NEUROLOGIC:  No headache, confusion, dizziness, lightheadedness    MEDICATIONS  (STANDING):  ampicillin/sulbactam  IVPB 3 Gram(s) IV Intermittent every 6 hours  ascorbic acid 500 milliGRAM(s) Oral daily  aspirin  chewable 81 milliGRAM(s) Oral daily  dextrose 40% Gel 15 Gram(s) Oral once  dextrose 5%. 1000 milliLiter(s) (50 mL/Hr) IV Continuous <Continuous>  dextrose 5%. 1000 milliLiter(s) (100 mL/Hr) IV Continuous <Continuous>  dextrose 50% Injectable 12.5 Gram(s) IV Push once  dextrose 50% Injectable 25 Gram(s) IV Push once  dextrose 50% Injectable 25 Gram(s) IV Push once  glucagon  Injectable 1 milliGRAM(s) IntraMuscular once  heparin   Injectable 5000 Unit(s) SubCutaneous every 8 hours  insulin glargine Injectable (LANTUS) 8 Unit(s) SubCutaneous at bedtime  insulin lispro (ADMELOG) corrective regimen sliding scale   SubCutaneous Before meals and at bedtime  insulin lispro Injectable (ADMELOG) 6 Unit(s) SubCutaneous three times a day with meals  multivitamin  Chewable 1 Tablet(s) Oral daily  nystatin Powder 1 Application(s) Topical two times a day  senna 2 Tablet(s) Oral at bedtime    MEDICATIONS  (PRN):  acetaminophen   Tablet .. 650 milliGRAM(s) Oral every 6 hours PRN Mild Pain (1 - 3)  ondansetron Injectable 4 milliGRAM(s) IV Push every 6 hours PRN Nausea  oxycodone    5 mG/acetaminophen 325 mG 1 Tablet(s) Oral every 6 hours PRN Moderate Pain (4 - 6)  oxycodone    5 mG/acetaminophen 325 mG 2 Tablet(s) Oral every 6 hours PRN Severe Pain (7 - 10)      PHYSICAL EXAM  Vital Signs Last 24 Hrs  T(C): 36.6 (04 Apr 2021 09:49), Max: 36.8 (03 Apr 2021 18:44)  T(F): 97.8 (04 Apr 2021 09:49), Max: 98.3 (03 Apr 2021 18:44)  HR: 82 (04 Apr 2021 08:30) (82 - 95)  BP: 129/58 (04 Apr 2021 08:30) (123/57 - 138/64)  BP(mean): --  RR: 18 (04 Apr 2021 08:30) (18 - 18)  SpO2: 95% (04 Apr 2021 08:30) (94% - 95%)    Constitutional: wn/wd in NAD.   HEENT: NCAT, MMM, OP clear, EOMI, no proptosis or lid retraction  Neck: no thyromegaly or palpable thyroid nodules   Respiratory: lungs CTAB.  Cardiovascular: regular rhythm, normal S1 and S2, no audible murmurs, no peripheral edema  GI: soft, NT/ND, no masses/HSM appreciated.  Neurology: no tremors, DTR 2+  Skin: no visible rashes/lesions  Psychiatric: AAO x 3, normal affect/mood.    LABS:                  HbA1C:   CAPILLARY BLOOD GLUCOSE      POCT Blood Glucose.: 129 mg/dL (04 Apr 2021 06:42)  POCT Blood Glucose.: 135 mg/dL (03 Apr 2021 21:33)  POCT Blood Glucose.: 139 mg/dL (03 Apr 2021 17:05)  POCT Blood Glucose.: 130 mg/dL (03 Apr 2021 11:45)      Insulin Sliding Scale requirements X 24 Hours:    RADIOLOGY & ADDITIONAL TESTS:    A/P: 75y Female with history of DM type II presenting for       1.  DM -     Please continue           units lantus at bedtime  / in the morning and        units lispro with meals and lispro moderate / low dose sliding scale 4 times daily with meals and at bedtime.  Please continue consistent carbohydrate diet.      Goal FSG is   Will continue to monitor   For discharge, pt can continue    Pt can follow up at discharge with [facility] by calling  to make an appointment.   Will discuss case with     and update primary team INTERVAL HPI/OVERNIGHT EVENTS:    Patient is a 75y old  Female who presents with a chief complaint of Weakness (2021 06:48)  Pt was seen and examined at the bedside. She reports leg pain.  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  good appetite.    pending [facility] placement but patient refuses    FSG & Insulin received:    Yesterday:  pre-dinner fs, nutritional lispro  6 units , had potatoes  bedtime fs, lantus  8 units     Today:  pre-breakfast fs, nutritional lispro 6  units, had eggs,   pre-lunch fs,     Pt reports the following symptoms:  CONSTITUTIONAL:  Negative chills  CARDIOVASCULAR:  Negative for chest pain or palpitations  RESPIRATORY:  Negative for cough, wheezing, or SOB   GASTROINTESTINAL:  Negative for nausea, vomiting  GENITOURINARY:  Negative frequency, urgency or dysuria        MEDICATIONS  (STANDING):  ampicillin/sulbactam  IVPB 3 Gram(s) IV Intermittent every 6 hours  ascorbic acid 500 milliGRAM(s) Oral daily  aspirin  chewable 81 milliGRAM(s) Oral daily  dextrose 40% Gel 15 Gram(s) Oral once  dextrose 5%. 1000 milliLiter(s) (50 mL/Hr) IV Continuous <Continuous>  dextrose 5%. 1000 milliLiter(s) (100 mL/Hr) IV Continuous <Continuous>  dextrose 50% Injectable 12.5 Gram(s) IV Push once  dextrose 50% Injectable 25 Gram(s) IV Push once  dextrose 50% Injectable 25 Gram(s) IV Push once  glucagon  Injectable 1 milliGRAM(s) IntraMuscular once  heparin   Injectable 5000 Unit(s) SubCutaneous every 8 hours  insulin glargine Injectable (LANTUS) 8 Unit(s) SubCutaneous at bedtime  insulin lispro (ADMELOG) corrective regimen sliding scale   SubCutaneous Before meals and at bedtime  insulin lispro Injectable (ADMELOG) 6 Unit(s) SubCutaneous three times a day with meals  multivitamin  Chewable 1 Tablet(s) Oral daily  nystatin Powder 1 Application(s) Topical two times a day  senna 2 Tablet(s) Oral at bedtime    MEDICATIONS  (PRN):  acetaminophen   Tablet .. 650 milliGRAM(s) Oral every 6 hours PRN Mild Pain (1 - 3)  ondansetron Injectable 4 milliGRAM(s) IV Push every 6 hours PRN Nausea  oxycodone    5 mG/acetaminophen 325 mG 1 Tablet(s) Oral every 6 hours PRN Moderate Pain (4 - 6)  oxycodone    5 mG/acetaminophen 325 mG 2 Tablet(s) Oral every 6 hours PRN Severe Pain (7 - 10)      PHYSICAL EXAM  Vital Signs Last 24 Hrs  T(C): 36.6 (2021 09:49), Max: 36.8 (2021 18:44)  T(F): 97.8 (2021 09:49), Max: 98.3 (2021 18:44)  HR: 82 (2021 08:30) (82 - 95)  BP: 129/58 (2021 08:30) (123/57 - 138/64)  BP(mean): --  RR: 18 (2021 08:30) (18 - 18)  SpO2: 95% (2021 08:30) (94% - 95%)    Constitutional: wn/wd in NAD.   Respiratory: lungs CTAB.  Cardiovascular: regular rhythm, normal S1 and S2  GI: soft, NT/ND, no masses/HSM appreciated.  EXT: right BKA      LABS:                  HbA1C:   CAPILLARY BLOOD GLUCOSE      POCT Blood Glucose.: 129 mg/dL (2021 06:42)  POCT Blood Glucose.: 135 mg/dL (2021 21:33)  POCT Blood Glucose.: 139 mg/dL (2021 17:05)  POCT Blood Glucose.: 130 mg/dL (2021 11:45)      Insulin Sliding Scale requirements X 24 Hours:    RADIOLOGY & ADDITIONAL TESTS:    A/P: 75y Female with history of DM type II presenting for       1.  DM -     Please continue           units lantus at bedtime  / in the morning and        units lispro with meals and lispro moderate / low dose sliding scale 4 times daily with meals and at bedtime.  Please continue consistent carbohydrate diet.      Goal FSG is   Will continue to monitor   For discharge, pt can continue    Pt can follow up at discharge with [facility] by calling  to make an appointment.   Will discuss case with     and update primary team

## 2021-04-04 NOTE — PROGRESS NOTE ADULT - SUBJECTIVE AND OBJECTIVE BOX
Overnight Events: LENCHO, VSS.    PLEASE CHECK WHEN PRESENT:     [  ] Heart Failure     [  ] Acute     [  ] Acute on Chronic     [  ] Chronic  -------------------------------------------------------------------     [  ]Diastolic [HFpEF]     [  ]Systolic [HFrEF]     [  ]Combined [HFpEF & HFrEF]     [  ] afib     [  ] hypertensive heart disease     [  ]Other:  -------------------------------------------------------------------  [ ] Respiratory failure  [ ] Acute cor pulmonale  [ ] Asthma/COPD Exacerbation  [ ] Pleural effusion  [ ] Aspiration pneumonia  -------------------------------------------------------------------  [  ]ANTONY     [  ]ATN     [  ]Reneal Medullary Necrosis     [  ]Renal Cortical Necrosis     [  ]Other Pathological Lesions:    [  ]CKD 1  [  ]CKD 2  [  ]CKD 3  [  ]CKD 4  [  ]CKD 5  [  ]Other  -------------------------------------------------------------------  [x]Diabetes - new DX on this admission  [x] Diabetic PVD Ulcer  [  ] Neuropathic ulcer to DM  [  ] Diabetes with Nephropathy  [  ] Osteomyelitis due to diabetes  [x ] Hyperglycemia  [ ] Hypoglycemia  --------------------------------------------------------------------  [  ]Malnutrition: See Nutrition Note  [  ]Cachexia  [  ]Other:   [  ]Supplement Ordered:  [  ]Morbid Obesity (BMI >=40]  ---------------------------------------------------------------------  [ ] Sepsis/severe sepsis/septic shock  [ ] UTI  [ ] Pneumonia  -----------------------------------------------------------------------  [ ] Acidosis/alkalosis  [ ] Fluid overload  [x ] Hypokalemia  [ ] Hyperkalemia  [x ] Hypomagnesemia  [x ] Hypophosphatemia  [ ] Hyperphosphatemia  [ ] Hyponatremia  [ ] Hypernatremia  ------------------------------------------------------------------------  [x ] Acute blood loss anemia- due to OR for BKA guillotine and closure  [ ] Post op blood loss anemia  [ ] Iron deficiency anemia  [ ] Anemia due to chronic disease  [ ] Hypercoagulable state  [x ] Leukocytosis  ----------------------------------------------------------------------  [ ] Cerebral infarction  [ ] Transient ischemia attack  [ ] Encephalopathy      Assessment  75F PMHx UC, colon cancer s/p total abdominal colectomy with end ileostomy, found down at home, was admitted for DKA and R diabetic foot wound, s/p operative debridement by podiatry 3/14/21 and R foot guillotine amputation and L foot I&D by vascular surgery 3/15/21. Now recovered well and is medically stable and cleared for discharge and next level of care ([facility])    Plan  #Right foot wound and left foot wound  -s/p R foot operative debridement by podiatry 3/14 s/p R guillotine BKA, L ankle I&D  (3/15)  -s/p R below knee amputation closure (3/19)  - Wound cx- klebsiella pneumoniae  - Removal of alternating suture from the BKA for better drainage of the fluid collection  - Nafcillin (3/16-3/29), Zosyn (3/30 - 3/31), Unasyn (3/31- 4/12 )  - Refused PICC line  - Local wound care  -Pain control    #DKA  -f/u endo recs  -ISS  -lantus 8u HS, added lispro 6U TID with meals  -hgb A1C=16.1    Diet: consistent carb  DVT PPx: HSQ  Dispo: patient is medically ready for discharge but refusing to go to [facility] at this time, pending 24hour notice appeal   Overnight Events: LENCHO, VSS.    Subjective: Resting comfortably in bed. Reports resting her RLE in extended position. Also concerned about red discharge from RLE lateral incision.     ampicillin/sulbactam  IVPB 3  ampicillin/sulbactam  IVPB 3  aspirin  chewable 81  heparin   Injectable 5000    Allergies    No Known Allergies    Intolerances    Vital Signs Last 24 Hrs  T(C): 36.7 (04 Apr 2021 04:28), Max: 36.8 (03 Apr 2021 18:44)  T(F): 98 (04 Apr 2021 04:28), Max: 98.3 (03 Apr 2021 18:44)  HR: 86 (04 Apr 2021 05:35) (83 - 95)  BP: 138/64 (04 Apr 2021 05:35) (123/57 - 138/64)  BP(mean): --  RR: 18 (04 Apr 2021 05:35) (18 - 18)  SpO2: 94% (03 Apr 2021 20:58) (94% - 95%)  I&O's Summary    03 Apr 2021 07:01  -  04 Apr 2021 07:00  --------------------------------------------------------  IN: 1740 mL / OUT: 3150 mL / NET: -1410 mL        Physical Exam:  General: NAD, resting comfortably in bed  Pulmonary: Nonlabored breathing, no respiratory distress  EXTREM: WWP, no edema RLE: R leg BKA amputation site with minimal SSF drainage. Lateral portion of incision open with minimal fibrinous tissue.  LLE- anterior ankle ulcer with pink healthy granulation tissue, no discharge.    PLEASE CHECK WHEN PRESENT:     [  ] Heart Failure     [  ] Acute     [  ] Acute on Chronic     [  ] Chronic  -------------------------------------------------------------------     [  ]Diastolic [HFpEF]     [  ]Systolic [HFrEF]     [  ]Combined [HFpEF & HFrEF]     [  ] afib     [  ] hypertensive heart disease     [  ]Other:  -------------------------------------------------------------------  [ ] Respiratory failure  [ ] Acute cor pulmonale  [ ] Asthma/COPD Exacerbation  [ ] Pleural effusion  [ ] Aspiration pneumonia  -------------------------------------------------------------------  [  ]ANTONY     [  ]ATN     [  ]Reneal Medullary Necrosis     [  ]Renal Cortical Necrosis     [  ]Other Pathological Lesions:    [  ]CKD 1  [  ]CKD 2  [  ]CKD 3  [  ]CKD 4  [  ]CKD 5  [  ]Other  -------------------------------------------------------------------  [x]Diabetes - new DX on this admission  [x] Diabetic PVD Ulcer  [  ] Neuropathic ulcer to DM  [  ] Diabetes with Nephropathy  [  ] Osteomyelitis due to diabetes  [x ] Hyperglycemia  [ ] Hypoglycemia  --------------------------------------------------------------------  [  ]Malnutrition: See Nutrition Note  [  ]Cachexia  [  ]Other:   [  ]Supplement Ordered:  [  ]Morbid Obesity (BMI >=40]  ---------------------------------------------------------------------  [ ] Sepsis/severe sepsis/septic shock  [ ] UTI  [ ] Pneumonia  -----------------------------------------------------------------------  [ ] Acidosis/alkalosis  [ ] Fluid overload  [x ] Hypokalemia  [ ] Hyperkalemia  [x ] Hypomagnesemia  [x ] Hypophosphatemia  [ ] Hyperphosphatemia  [ ] Hyponatremia  [ ] Hypernatremia  ------------------------------------------------------------------------  [x ] Acute blood loss anemia- due to OR for BKA guillotine and closure  [ ] Post op blood loss anemia  [ ] Iron deficiency anemia  [ ] Anemia due to chronic disease  [ ] Hypercoagulable state  [x ] Leukocytosis  ----------------------------------------------------------------------  [ ] Cerebral infarction  [ ] Transient ischemia attack  [ ] Encephalopathy      Assessment  75F PMHx UC, colon cancer s/p total abdominal colectomy with end ileostomy, found down at home, was admitted for DKA and R diabetic foot wound, s/p operative debridement by podiatry 3/14/21 and R foot guillotine amputation and L foot I&D by vascular surgery 3/15/21. Now recovered well and is medically stable and cleared for discharge and next level of care ([facility])    Plan  #Right foot wound and left foot wound  -s/p R foot operative debridement by podiatry 3/14 s/p R guillotine BKA, L ankle I&D  (3/15)  -s/p R below knee amputation closure (3/19)  - Wound cx- klebsiella pneumoniae  - Removal of alternating suture from the BKA for better drainage of the fluid collection (will remove sutures and staples prior to discharge)  - Nafcillin (3/16-3/29), Zosyn (3/30 - 3/31), Unasyn (3/31- 4/12 )  - Refused PICC line  - Local wound care  -Pain control    #DKA  -f/u endo recs  -ISS  -lantus 8u HS, added lispro 6U TID with meals  -hgb A1C=16.1    Diet: consistent carb  DVT PPx: HSQ  Dispo: patient is medically ready for discharge but refusing to go to [facility] at this time, pending second appeal (first appeal denied)

## 2021-04-05 LAB
GLUCOSE BLDC GLUCOMTR-MCNC: 118 MG/DL — HIGH (ref 70–99)
GLUCOSE BLDC GLUCOMTR-MCNC: 131 MG/DL — HIGH (ref 70–99)
GLUCOSE BLDC GLUCOMTR-MCNC: 140 MG/DL — HIGH (ref 70–99)
GLUCOSE BLDC GLUCOMTR-MCNC: 157 MG/DL — HIGH (ref 70–99)

## 2021-04-05 PROCEDURE — 99231 SBSQ HOSP IP/OBS SF/LOW 25: CPT | Mod: GC

## 2021-04-05 PROCEDURE — 99233 SBSQ HOSP IP/OBS HIGH 50: CPT | Mod: GC

## 2021-04-05 RX ORDER — SODIUM CHLORIDE 9 MG/ML
1000 INJECTION, SOLUTION INTRAVENOUS
Qty: 0 | Refills: 0 | DISCHARGE
Start: 2021-04-05

## 2021-04-05 RX ORDER — DEXTROSE 50 % IN WATER 50 %
50 SYRINGE (ML) INTRAVENOUS
Qty: 0 | Refills: 0 | DISCHARGE
Start: 2021-04-05

## 2021-04-05 RX ORDER — DEXTROSE 50 % IN WATER 50 %
0 SYRINGE (ML) INTRAVENOUS
Qty: 0 | Refills: 0 | DISCHARGE
Start: 2021-04-05

## 2021-04-05 RX ORDER — DEXTROSE 50 % IN WATER 50 %
25 SYRINGE (ML) INTRAVENOUS
Qty: 0 | Refills: 0 | DISCHARGE
Start: 2021-04-05

## 2021-04-05 RX ORDER — COLLAGENASE CLOSTRIDIUM HIST. 250 UNIT/G
1 OINTMENT (GRAM) TOPICAL
Qty: 0 | Refills: 0 | DISCHARGE
Start: 2021-04-05

## 2021-04-05 RX ADMIN — AMPICILLIN SODIUM AND SULBACTAM SODIUM 200 GRAM(S): 250; 125 INJECTION, POWDER, FOR SUSPENSION INTRAMUSCULAR; INTRAVENOUS at 16:25

## 2021-04-05 RX ADMIN — NYSTATIN CREAM 1 APPLICATION(S): 100000 CREAM TOPICAL at 06:52

## 2021-04-05 RX ADMIN — NYSTATIN CREAM 1 APPLICATION(S): 100000 CREAM TOPICAL at 18:56

## 2021-04-05 RX ADMIN — OXYCODONE AND ACETAMINOPHEN 2 TABLET(S): 5; 325 TABLET ORAL at 13:31

## 2021-04-05 RX ADMIN — Medication 81 MILLIGRAM(S): at 10:32

## 2021-04-05 RX ADMIN — Medication 6 UNIT(S): at 16:32

## 2021-04-05 RX ADMIN — AMPICILLIN SODIUM AND SULBACTAM SODIUM 200 GRAM(S): 250; 125 INJECTION, POWDER, FOR SUSPENSION INTRAMUSCULAR; INTRAVENOUS at 22:00

## 2021-04-05 RX ADMIN — Medication 6 UNIT(S): at 12:11

## 2021-04-05 RX ADMIN — AMPICILLIN SODIUM AND SULBACTAM SODIUM 200 GRAM(S): 250; 125 INJECTION, POWDER, FOR SUSPENSION INTRAMUSCULAR; INTRAVENOUS at 03:53

## 2021-04-05 RX ADMIN — HEPARIN SODIUM 5000 UNIT(S): 5000 INJECTION INTRAVENOUS; SUBCUTANEOUS at 13:31

## 2021-04-05 RX ADMIN — Medication 6 UNIT(S): at 08:29

## 2021-04-05 RX ADMIN — AMPICILLIN SODIUM AND SULBACTAM SODIUM 200 GRAM(S): 250; 125 INJECTION, POWDER, FOR SUSPENSION INTRAMUSCULAR; INTRAVENOUS at 10:32

## 2021-04-05 RX ADMIN — HEPARIN SODIUM 5000 UNIT(S): 5000 INJECTION INTRAVENOUS; SUBCUTANEOUS at 22:00

## 2021-04-05 RX ADMIN — OXYCODONE AND ACETAMINOPHEN 2 TABLET(S): 5; 325 TABLET ORAL at 23:17

## 2021-04-05 RX ADMIN — OXYCODONE AND ACETAMINOPHEN 2 TABLET(S): 5; 325 TABLET ORAL at 02:00

## 2021-04-05 RX ADMIN — OXYCODONE AND ACETAMINOPHEN 2 TABLET(S): 5; 325 TABLET ORAL at 08:00

## 2021-04-05 RX ADMIN — OXYCODONE AND ACETAMINOPHEN 2 TABLET(S): 5; 325 TABLET ORAL at 01:09

## 2021-04-05 RX ADMIN — OXYCODONE AND ACETAMINOPHEN 2 TABLET(S): 5; 325 TABLET ORAL at 14:30

## 2021-04-05 RX ADMIN — HEPARIN SODIUM 5000 UNIT(S): 5000 INJECTION INTRAVENOUS; SUBCUTANEOUS at 06:51

## 2021-04-05 RX ADMIN — Medication 500 MILLIGRAM(S): at 10:32

## 2021-04-05 RX ADMIN — Medication 1 TABLET(S): at 10:32

## 2021-04-05 RX ADMIN — INSULIN GLARGINE 8 UNIT(S): 100 INJECTION, SOLUTION SUBCUTANEOUS at 22:01

## 2021-04-05 RX ADMIN — OXYCODONE AND ACETAMINOPHEN 2 TABLET(S): 5; 325 TABLET ORAL at 07:23

## 2021-04-05 RX ADMIN — SENNA PLUS 2 TABLET(S): 8.6 TABLET ORAL at 22:00

## 2021-04-05 RX ADMIN — Medication 2: at 22:01

## 2021-04-05 NOTE — PROGRESS NOTE ADULT - SUBJECTIVE AND OBJECTIVE BOX
Subjective/Interval events:  -No new complaints this am, stable mild pain RLE wound site mostly, otherwise eating well with mild nausea no headache, and stable mild dizziness when ambulating   -Wound site per vascular team both LLE and RLE stable no bleeding/concern for infection     MEDICATIONS  (STANDING):  ampicillin/sulbactam  IVPB 3 Gram(s) IV Intermittent every 6 hours  ascorbic acid 500 milliGRAM(s) Oral daily  aspirin  chewable 81 milliGRAM(s) Oral daily  collagenase Ointment 1 Application(s) Topical daily  dextrose 40% Gel 15 Gram(s) Oral once  dextrose 5%. 1000 milliLiter(s) (50 mL/Hr) IV Continuous <Continuous>  dextrose 5%. 1000 milliLiter(s) (100 mL/Hr) IV Continuous <Continuous>  dextrose 50% Injectable 12.5 Gram(s) IV Push once  dextrose 50% Injectable 25 Gram(s) IV Push once  dextrose 50% Injectable 25 Gram(s) IV Push once  glucagon  Injectable 1 milliGRAM(s) IntraMuscular once  heparin   Injectable 5000 Unit(s) SubCutaneous every 8 hours  insulin glargine Injectable (LANTUS) 8 Unit(s) SubCutaneous at bedtime  insulin lispro (ADMELOG) corrective regimen sliding scale   SubCutaneous Before meals and at bedtime  insulin lispro Injectable (ADMELOG) 6 Unit(s) SubCutaneous three times a day with meals  multivitamin  Chewable 1 Tablet(s) Oral daily  nystatin Powder 1 Application(s) Topical two times a day  senna 2 Tablet(s) Oral at bedtime    MEDICATIONS  (PRN):  acetaminophen   Tablet .. 650 milliGRAM(s) Oral every 6 hours PRN Mild Pain (1 - 3)  ondansetron Injectable 4 milliGRAM(s) IV Push every 6 hours PRN Nausea  oxycodone    5 mG/acetaminophen 325 mG 1 Tablet(s) Oral every 6 hours PRN Moderate Pain (4 - 6)  oxycodone    5 mG/acetaminophen 325 mG 2 Tablet(s) Oral every 6 hours PRN Severe Pain (7 - 10)      Vital Signs Last 24 Hrs  T(C): 36.3 (05 Apr 2021 09:00), Max: 36.6 (05 Apr 2021 04:00)  T(F): 97.4 (05 Apr 2021 09:00), Max: 97.9 (05 Apr 2021 04:00)  HR: 96 (05 Apr 2021 08:36) (62 - 96)  BP: 116/67 (05 Apr 2021 08:36) (104/54 - 128/59)  BP(mean): --  RR: 18 (05 Apr 2021 08:36) (18 - 18)  SpO2: 94% (04 Apr 2021 20:10) (94% - 95%)    PHYSICAL EXAM:  GENERAL: pleasant, NAD  NEURO: Aox3, intact strength/sensation all 4 ext   HEENT: clear op, mmm  NECK:  No JVD, no lymphadenopathy  CHEST/LUNG: Clear to auscultation bilaterally; No rales, rhonchi, wheezing. Normal work of breathing, not tachypneic  HEART: Regular rate and rhythm; No murmurs, rubs, or gallops  ABDOMEN: Soft, Nontender, Nondistended. Normoactive bowel sounds  EXTREMITIES:  No sig le edema, wwp   LINES/DEVICES:     LABS (reviewed)   -fsg stable     ASSESSMENT AND PLAN:  74yo F with PMH of UC and colon cancer s/p colectomy and chemotherapy and chronic venous stasis w/chronic LE wounds who presented with bilateral lower extremity cellulitis and abscesses c/b septic shock, DKA, and RLE necrotizing fascitis, s/p R-foot I&D on 3/14 with Podiatry and R-BKA w/L-foot I&D on 3/15 with Vascular, s/p BKA closure on 3/19, mild site infection RLE resolved on abx and local wound care, stable for [facility] on extended abx course     #BLE Cellulitis c/b Septic Shock and Nec Fasc s/p R-BKA, recent new cellulitis at site improving on abx   -- Appreciate ID recs- plan for abx till 4/12- 4 days unisyn at [facility], then switch to po augmentin to complete course (ID aware of plan, patient refusing midline/PICC for extended IV abx course, aware of risks/benefits, ID ok with po switch if pt refusing picc/midline)   -- Wound sites RLE clean now s/p antibiotics, adequate blood flow, no plan for further surgical management. LLE wound site stable as well   -- Percocet 1 tab q4h PRN and Tylenol 650mg q6h PRN w/Senna    #Newly Diagnosed IDDM2 c/b DKA and SSTI  -- appreciate endo recs, fsg stable on lantus and ISS moderate scale    #UC c/b Colon Ca s/p Ostomy  -- no active issues, ostomy outpt wnl    #Acute Blood Loss Anemia   -- likely from multiple trips to the OR and BKA, stable for throughout last week with no further bleeding at wound sites    #Diet - Carb Controlled / Dysphagia 1   #DVT PPx - SQH  #Dispo- accepted to [facility], pt appealed and after review appeal denied, dc to [facility] today. HCP informed about the plan        Patient was seen and examined by me at bedside    Greater than 35 minutes spent on total encounter; more than 50% of the visit was spent counseling and/or coordinating care by the attending physician.    Giacomo Jenkins MD [phone]  Subjective/Interval events:  -No new complaints this am, stable mild pain RLE wound site mostly, otherwise eating well with mild nausea no headache, and stable mild dizziness when ambulating   -Wound site per vascular team both LLE and RLE stable no bleeding/concern for infection     MEDICATIONS  (STANDING):  ampicillin/sulbactam  IVPB 3 Gram(s) IV Intermittent every 6 hours  ascorbic acid 500 milliGRAM(s) Oral daily  aspirin  chewable 81 milliGRAM(s) Oral daily  collagenase Ointment 1 Application(s) Topical daily  dextrose 40% Gel 15 Gram(s) Oral once  dextrose 5%. 1000 milliLiter(s) (50 mL/Hr) IV Continuous <Continuous>  dextrose 5%. 1000 milliLiter(s) (100 mL/Hr) IV Continuous <Continuous>  dextrose 50% Injectable 12.5 Gram(s) IV Push once  dextrose 50% Injectable 25 Gram(s) IV Push once  dextrose 50% Injectable 25 Gram(s) IV Push once  glucagon  Injectable 1 milliGRAM(s) IntraMuscular once  heparin   Injectable 5000 Unit(s) SubCutaneous every 8 hours  insulin glargine Injectable (LANTUS) 8 Unit(s) SubCutaneous at bedtime  insulin lispro (ADMELOG) corrective regimen sliding scale   SubCutaneous Before meals and at bedtime  insulin lispro Injectable (ADMELOG) 6 Unit(s) SubCutaneous three times a day with meals  multivitamin  Chewable 1 Tablet(s) Oral daily  nystatin Powder 1 Application(s) Topical two times a day  senna 2 Tablet(s) Oral at bedtime    MEDICATIONS  (PRN):  acetaminophen   Tablet .. 650 milliGRAM(s) Oral every 6 hours PRN Mild Pain (1 - 3)  ondansetron Injectable 4 milliGRAM(s) IV Push every 6 hours PRN Nausea  oxycodone    5 mG/acetaminophen 325 mG 1 Tablet(s) Oral every 6 hours PRN Moderate Pain (4 - 6)  oxycodone    5 mG/acetaminophen 325 mG 2 Tablet(s) Oral every 6 hours PRN Severe Pain (7 - 10)      Vital Signs Last 24 Hrs  T(C): 36.3 (05 Apr 2021 09:00), Max: 36.6 (05 Apr 2021 04:00)  T(F): 97.4 (05 Apr 2021 09:00), Max: 97.9 (05 Apr 2021 04:00)  HR: 96 (05 Apr 2021 08:36) (62 - 96)  BP: 116/67 (05 Apr 2021 08:36) (104/54 - 128/59)  BP(mean): --  RR: 18 (05 Apr 2021 08:36) (18 - 18)  SpO2: 94% (04 Apr 2021 20:10) (94% - 95%)    PHYSICAL EXAM:  GENERAL: pleasant, NAD  NEURO: Aox3, intact strength/sensation all 4 ext   HEENT: clear op, mmm  NECK:  No JVD, no lymphadenopathy  CHEST/LUNG: Clear to auscultation bilaterally; No rales, rhonchi, wheezing. Normal work of breathing, not tachypneic  HEART: Regular rate and rhythm; No murmurs, rubs, or gallops  ABDOMEN: Soft, Nontender, Nondistended. Normoactive bowel sounds, ostomy bag with normal stool outpt  EXTREMITIES:  LLE and RLE wound sites wrapped, no bleeding/drainage from wound sites noted     LABS (reviewed)   -fsg stable     ASSESSMENT AND PLAN:  74yo F with PMH of UC and colon cancer s/p colectomy and chemotherapy and chronic venous stasis w/chronic LE wounds who presented with bilateral lower extremity cellulitis and abscesses c/b septic shock, DKA, and RLE necrotizing fascitis, s/p R-foot I&D on 3/14 with Podiatry and R-BKA w/L-foot I&D on 3/15 with Vascular, s/p BKA closure on 3/19, mild site infection RLE resolved on abx and local wound care, stable for [facility] on extended abx course     #BLE Cellulitis c/b Septic Shock and Nec Fasc s/p R-BKA, recent new cellulitis at site improving on abx   -- Appreciate ID recs- plan for abx till 4/12- 4 days unisyn at [facility], then switch to po augmentin to complete course (ID aware of plan, patient refusing midline/PICC for extended IV abx course, aware of risks/benefits, ID ok with po switch if pt refusing picc/midline)   -- Wound sites RLE clean now s/p antibiotics, adequate blood flow, no plan for further surgical management. LLE wound site stable as well   -- Percocet 1 tab q4h PRN and Tylenol 650mg q6h PRN w/Senna    #Newly Diagnosed IDDM2 c/b DKA and SSTI  -- appreciate endo recs, fsg stable on lantus and ISS moderate scale    #UC c/b Colon Ca s/p Ostomy  -- no active issues, ostomy outpt wnl    #Acute Blood Loss Anemia   -- likely from multiple trips to the OR and BKA, stable for throughout last week with no further bleeding at wound sites    #Diet - Carb Controlled / Dysphagia 1   #DVT PPx - SQH  #Dispo- accepted to [facility], pt appealed and after review appeal denied, dc to [facility] today. HCP informed about the plan        Patient was seen and examined by me at bedside    Greater than 35 minutes spent on total encounter; more than 50% of the visit was spent counseling and/or coordinating care by the attending physician.    Giacomo Jenkins MD [phone]

## 2021-04-05 NOTE — PROGRESS NOTE ADULT - SUBJECTIVE AND OBJECTIVE BOX
INTERVAL HPI/OVERNIGHT EVENTS:    Patient is a 75y old  Female who presents with a chief complaint of Weakness (2021 10:55)  Pt was seen and examined at the bedside.   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  good appetite.          FSG & Insulin received:    Yesterday:  pre-dinner fs  didnt eat  bedtime fs  lantus  8 units     Today:  pre-breakfast fs  nutritional lispro 6  units  pre-lunch fs  nutritional lispro 6  units    Pt reports the following symptoms:  CONSTITUTIONAL:  Negative chills  CARDIOVASCULAR:  Negative for chest pain or palpitations  RESPIRATORY:  Negative for cough, wheezing, or SOB   GASTROINTESTINAL:  Negative for nausea, vomiting  GENITOURINARY:  Negative frequency, urgency or dysuria    MEDICATIONS  (STANDING):  ampicillin/sulbactam  IVPB 3 Gram(s) IV Intermittent every 6 hours  ascorbic acid 500 milliGRAM(s) Oral daily  aspirin  chewable 81 milliGRAM(s) Oral daily  collagenase Ointment 1 Application(s) Topical daily  dextrose 40% Gel 15 Gram(s) Oral once  dextrose 5%. 1000 milliLiter(s) (100 mL/Hr) IV Continuous <Continuous>  dextrose 5%. 1000 milliLiter(s) (50 mL/Hr) IV Continuous <Continuous>  dextrose 50% Injectable 25 Gram(s) IV Push once  dextrose 50% Injectable 12.5 Gram(s) IV Push once  dextrose 50% Injectable 25 Gram(s) IV Push once  glucagon  Injectable 1 milliGRAM(s) IntraMuscular once  heparin   Injectable 5000 Unit(s) SubCutaneous every 8 hours  insulin glargine Injectable (LANTUS) 8 Unit(s) SubCutaneous at bedtime  insulin lispro (ADMELOG) corrective regimen sliding scale   SubCutaneous Before meals and at bedtime  insulin lispro Injectable (ADMELOG) 6 Unit(s) SubCutaneous three times a day with meals  multivitamin  Chewable 1 Tablet(s) Oral daily  nystatin Powder 1 Application(s) Topical two times a day  senna 2 Tablet(s) Oral at bedtime    MEDICATIONS  (PRN):  acetaminophen   Tablet .. 650 milliGRAM(s) Oral every 6 hours PRN Mild Pain (1 - 3)  ondansetron Injectable 4 milliGRAM(s) IV Push every 6 hours PRN Nausea  oxycodone    5 mG/acetaminophen 325 mG 1 Tablet(s) Oral every 6 hours PRN Moderate Pain (4 - 6)  oxycodone    5 mG/acetaminophen 325 mG 2 Tablet(s) Oral every 6 hours PRN Severe Pain (7 - 10)      Past medical history reviewed  Family history reviewed  Social history reviewed    PHYSICAL EXAM  Vital Signs Last 24 Hrs  T(C): 36.3 (2021 09:00), Max: 36.6 (2021 04:00)  T(F): 97.4 (2021 09:00), Max: 97.9 (2021 04:00)  HR: 87 (2021 12:13) (62 - 100)  BP: 110/57 (2021 12:13) (104/54 - 128/59)  BP(mean): --  RR: 18 (2021 12:13) (18 - 18)  SpO2: 94% (2021 20:10) (94% - 94%)    Constitutional: wn/wd in NAD.   Respiratory: lungs CTAB.  Cardiovascular: regular rhythm, normal S1 and S2  GI: soft, NT/ND, no masses/HSM appreciated.  EXT: right BKA              HbA1C: 16.1 % ( @ 13:30)      CAPILLARY BLOOD GLUCOSE      POCT Blood Glucose.: 140 mg/dL (2021 11:42)  POCT Blood Glucose.: 118 mg/dL (2021 07:00)  POCT Blood Glucose.: 127 mg/dL (2021 21:32)  POCT Blood Glucose.: 114 mg/dL (2021 17:06)      Cholesterol, Serum: 83 mg/dL (03-15-21 @ 04:51)  HDL Cholesterol, Serum: 28 mg/dL (03-15-21 @ 04:51)  Triglycerides, Serum: 89 mg/dL (03-15-21 @ 04:51)      A/P: 75F with PMHx UC s/p colostomy (s/p multiple abdominal surgeries including colostomy bag in ) BIBEMS from home complaining of weakness; presenting likely in DKA with AMS, with bilateral lower extremity open wounds. Of note, pt found to have a hx of DM2: A1C on admission is 16.1. Pt is now s/p I&D w/ washout and debridement of non- viable soft tissue.     1.  DM:   wt:75 KG, a1c 16.1  cr:0.6, GFR:89  Please give lantus  units at bedtime  pleas give lispro   Units  premeals TID   Continue lispro moderate dose scale with meals and at bedtime.   Continue consistent carb diet  FSG Goal 100-180    Pt prefers not to be discharged on insulin, and states she will not be checking FSG or is interested in CGM at home.  case seen and discussed with Dr. Yee and updated primary team     INTERVAL HPI/OVERNIGHT EVENTS:    Patient is a 75y old  Female who presents with a chief complaint of Weakness (2021 10:55)  Pt was seen and examined at the bedside.   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  good appetite.          FSG & Insulin received:    Yesterday:  pre-dinner fs  didnt eat  bedtime fs  lantus  8 units     Today:  pre-breakfast fs  nutritional lispro 6  units  pre-lunch fs  nutritional lispro 6  units    Pt reports the following symptoms:  CONSTITUTIONAL:  Negative chills  CARDIOVASCULAR:  Negative for chest pain or palpitations  RESPIRATORY:  Negative for cough, wheezing, or SOB   GASTROINTESTINAL:  Negative for nausea, vomiting  GENITOURINARY:  Negative frequency, urgency or dysuria    MEDICATIONS  (STANDING):  ampicillin/sulbactam  IVPB 3 Gram(s) IV Intermittent every 6 hours  ascorbic acid 500 milliGRAM(s) Oral daily  aspirin  chewable 81 milliGRAM(s) Oral daily  collagenase Ointment 1 Application(s) Topical daily  dextrose 40% Gel 15 Gram(s) Oral once  dextrose 5%. 1000 milliLiter(s) (100 mL/Hr) IV Continuous <Continuous>  dextrose 5%. 1000 milliLiter(s) (50 mL/Hr) IV Continuous <Continuous>  dextrose 50% Injectable 25 Gram(s) IV Push once  dextrose 50% Injectable 12.5 Gram(s) IV Push once  dextrose 50% Injectable 25 Gram(s) IV Push once  glucagon  Injectable 1 milliGRAM(s) IntraMuscular once  heparin   Injectable 5000 Unit(s) SubCutaneous every 8 hours  insulin glargine Injectable (LANTUS) 8 Unit(s) SubCutaneous at bedtime  insulin lispro (ADMELOG) corrective regimen sliding scale   SubCutaneous Before meals and at bedtime  insulin lispro Injectable (ADMELOG) 6 Unit(s) SubCutaneous three times a day with meals  multivitamin  Chewable 1 Tablet(s) Oral daily  nystatin Powder 1 Application(s) Topical two times a day  senna 2 Tablet(s) Oral at bedtime    MEDICATIONS  (PRN):  acetaminophen   Tablet .. 650 milliGRAM(s) Oral every 6 hours PRN Mild Pain (1 - 3)  ondansetron Injectable 4 milliGRAM(s) IV Push every 6 hours PRN Nausea  oxycodone    5 mG/acetaminophen 325 mG 1 Tablet(s) Oral every 6 hours PRN Moderate Pain (4 - 6)  oxycodone    5 mG/acetaminophen 325 mG 2 Tablet(s) Oral every 6 hours PRN Severe Pain (7 - 10)      Past medical history reviewed  Family history reviewed  Social history reviewed    PHYSICAL EXAM  Vital Signs Last 24 Hrs  T(C): 36.3 (2021 09:00), Max: 36.6 (2021 04:00)  T(F): 97.4 (2021 09:00), Max: 97.9 (2021 04:00)  HR: 87 (2021 12:13) (62 - 100)  BP: 110/57 (2021 12:13) (104/54 - 128/59)  BP(mean): --  RR: 18 (2021 12:13) (18 - 18)  SpO2: 94% (2021 20:10) (94% - 94%)    Constitutional: wn/wd in NAD.   Respiratory: lungs CTAB.  Cardiovascular: regular rhythm, normal S1 and S2  GI: soft, NT/ND, no masses/HSM appreciated.  EXT: right BKA              HbA1C: 16.1 % ( @ 13:30)      CAPILLARY BLOOD GLUCOSE      POCT Blood Glucose.: 140 mg/dL (2021 11:42)  POCT Blood Glucose.: 118 mg/dL (2021 07:00)  POCT Blood Glucose.: 127 mg/dL (2021 21:32)  POCT Blood Glucose.: 114 mg/dL (2021 17:06)      Cholesterol, Serum: 83 mg/dL (03-15-21 @ 04:51)  HDL Cholesterol, Serum: 28 mg/dL (03-15-21 @ 04:51)  Triglycerides, Serum: 89 mg/dL (03-15-21 @ 04:51)      A/P: 75F with PMHx UC s/p colostomy (s/p multiple abdominal surgeries including colostomy bag in ) BIBEMS from home complaining of weakness; presenting likely in DKA with AMS, with bilateral lower extremity open wounds. Of note, pt found to have a hx of DM2: A1C on admission is 16.1. Pt is now s/p I&D w/ washout and debridement of non- viable soft tissue.     1.  DM:   wt:75 KG, a1c 16.1  cr:0.6, GFR:89  Please give lantus  8 units at bedtime  pleas give lispro  6 Units  premeals TID   Continue lispro moderate dose scale with meals and at bedtime.   Continue consistent carb diet  FSG Goal 100-180    Pt prefers not to be discharged on insulin, and states she will not be checking FSG or is interested in CGM at home.  case seen and discussed with Dr. Yee and updated primary team

## 2021-04-05 NOTE — PROGRESS NOTE ADULT - SUBJECTIVE AND OBJECTIVE BOX
24hr Events:  O/N: LENCHO, VSS  4/4: 2nd appeal pending. Santyl ordered- to be applied to [facility] on next AM rounds. No changes to lantus or lispro            PLEASE CHECK WHEN PRESENT:     [  ] Heart Failure     [  ] Acute     [  ] Acute on Chronic     [  ] Chronic  -------------------------------------------------------------------     [  ]Diastolic [HFpEF]     [  ]Systolic [HFrEF]     [  ]Combined [HFpEF & HFrEF]     [  ] afib     [  ] hypertensive heart disease     [  ]Other:  -------------------------------------------------------------------  [ ] Respiratory failure  [ ] Acute cor pulmonale  [ ] Asthma/COPD Exacerbation  [ ] Pleural effusion  [ ] Aspiration pneumonia  -------------------------------------------------------------------  [  ]ANTONY     [  ]ATN     [  ]Reneal Medullary Necrosis     [  ]Renal Cortical Necrosis     [  ]Other Pathological Lesions:    [  ]CKD 1  [  ]CKD 2  [  ]CKD 3  [  ]CKD 4  [  ]CKD 5  [  ]Other  -------------------------------------------------------------------  [x]Diabetes - new DX on this admission  [x] Diabetic PVD Ulcer  [  ] Neuropathic ulcer to DM  [  ] Diabetes with Nephropathy  [  ] Osteomyelitis due to diabetes  [x ] Hyperglycemia  [ ] Hypoglycemia  --------------------------------------------------------------------  [  ]Malnutrition: See Nutrition Note  [  ]Cachexia  [  ]Other:   [  ]Supplement Ordered:  [  ]Morbid Obesity (BMI >=40]  ---------------------------------------------------------------------  [ ] Sepsis/severe sepsis/septic shock  [ ] UTI  [ ] Pneumonia  -----------------------------------------------------------------------  [ ] Acidosis/alkalosis  [ ] Fluid overload  [x ] Hypokalemia  [ ] Hyperkalemia  [x ] Hypomagnesemia  [x ] Hypophosphatemia  [ ] Hyperphosphatemia  [ ] Hyponatremia  [ ] Hypernatremia  ------------------------------------------------------------------------  [x ] Acute blood loss anemia- due to OR for BKA guillotine and closure  [ ] Post op blood loss anemia  [ ] Iron deficiency anemia  [ ] Anemia due to chronic disease  [ ] Hypercoagulable state  [x ] Leukocytosis  ----------------------------------------------------------------------  [ ] Cerebral infarction  [ ] Transient ischemia attack  [ ] Encephalopathy      Assessment  75F PMHx UC, colon cancer s/p total abdominal colectomy with end ileostomy, found down at home, was admitted for DKA and R diabetic foot wound, s/p operative debridement by podiatry 3/14/21 and R foot guillotine amputation and L foot I&D by vascular surgery 3/15/21. Now recovered well and is medically stable and cleared for discharge and next level of care ([facility])    Plan  #Right foot wound and left foot wound  -s/p R foot operative debridement by podiatry 3/14 s/p R guillotine BKA, L ankle I&D  (3/15)  -s/p R below knee amputation closure (3/19)  - Wound cx- klebsiella pneumoniae  - Removal of alternating suture from the BKA for better drainage of the fluid collection (will remove sutures and staples prior to discharge)  - Nafcillin (3/16-3/29), Zosyn (3/30 - 3/31), Unasyn (3/31- 4/12 )  - Refused PICC line  - Local wound care  -Pain control    #DKA  -f/u endo recs  -ISS  -lantus 8u HS, added lispro 6U TID with meals  -hgb A1C=16.1    Diet: consistent carb  DVT PPx: HSQ  Dispo: patient is medically ready for discharge but refusing to go to [facility] at this time, pending second appeal (first appeal denied)         24hr Events:  O/N: LENCHO, VSS  4/4: 2nd appeal pending. Santyl ordered- to be applied to [facility] on next AM rounds. No changes to lantus or lispro    Subjective: LENCHO ON, Denies bilateral foot pain at rest. Denies fevers or chills     ROS:   Denies Headache, blurred vision, Chest Pain, SOB, , nausea or vomiting     Social   ampicillin/sulbactam  IVPB 3  ampicillin/sulbactam  IVPB 3  aspirin  chewable 81  heparin   Injectable 5000      Allergies    No Known Allergies    Intolerances        Vital Signs Last 24 Hrs  T(C): 36.6 (05 Apr 2021 04:00), Max: 36.6 (04 Apr 2021 09:49)  T(F): 97.9 (05 Apr 2021 04:00), Max: 97.9 (05 Apr 2021 04:00)  HR: 62 (05 Apr 2021 06:54) (62 - 90)  BP: 117/56 (05 Apr 2021 06:54) (104/54 - 129/58)  BP(mean): --  RR: 18 (05 Apr 2021 06:54) (18 - 18)  SpO2: 94% (04 Apr 2021 20:10) (94% - 95%)  I&O's Summary    04 Apr 2021 07:01  -  05 Apr 2021 07:00  --------------------------------------------------------  IN: 1000 mL / OUT: 600 mL / NET: 400 mL        Physical Exam:  General: NAD  Pulmonary: b/l Breath sounds  Cardiovascular: s1s2 RRR  Extremities:Rt Lateral stump with exudate and pink tissue, rest of right stump clean and without erythema   Left Ankle wound clean and pink    Left DP pulse palpable e        LABS:            PLEASE CHECK WHEN PRESENT:     [  ] Heart Failure     [  ] Acute     [  ] Acute on Chronic     [  ] Chronic  -------------------------------------------------------------------     [  ]Diastolic [HFpEF]     [  ]Systolic [HFrEF]     [  ]Combined [HFpEF & HFrEF]     [  ] afib     [  ] hypertensive heart disease     [  ]Other:  -------------------------------------------------------------------  [ ] Respiratory failure  [ ] Acute cor pulmonale  [ ] Asthma/COPD Exacerbation  [ ] Pleural effusion  [ ] Aspiration pneumonia  -------------------------------------------------------------------  [  ]ANTONY     [  ]ATN     [  ]Reneal Medullary Necrosis     [  ]Renal Cortical Necrosis     [  ]Other Pathological Lesions:    [  ]CKD 1  [  ]CKD 2  [  ]CKD 3  [  ]CKD 4  [  ]CKD 5  [  ]Other  -------------------------------------------------------------------  [x]Diabetes - new DX on this admission  [x] Diabetic PVD Ulcer  [  ] Neuropathic ulcer to DM  [  ] Diabetes with Nephropathy  [  ] Osteomyelitis due to diabetes  [x ] Hyperglycemia  [ ] Hypoglycemia  --------------------------------------------------------------------  [  ]Malnutrition: See Nutrition Note  [  ]Cachexia  [  ]Other:   [  ]Supplement Ordered:  [  ]Morbid Obesity (BMI >=40]  ---------------------------------------------------------------------  [ ] Sepsis/severe sepsis/septic shock  [ ] UTI  [ ] Pneumonia  -----------------------------------------------------------------------  [ ] Acidosis/alkalosis  [ ] Fluid overload  [x ] Hypokalemia  [ ] Hyperkalemia  [x ] Hypomagnesemia  [x ] Hypophosphatemia  [ ] Hyperphosphatemia  [ ] Hyponatremia  [ ] Hypernatremia  ------------------------------------------------------------------------  [x ] Acute blood loss anemia- due to OR for BKA guillotine and closure  [ ] Post op blood loss anemia  [ ] Iron deficiency anemia  [ ] Anemia due to chronic disease  [ ] Hypercoagulable state  [x ] Leukocytosis  ----------------------------------------------------------------------  [ ] Cerebral infarction  [ ] Transient ischemia attack  [ ] Encephalopathy      Assessment  75F PMHx UC, colon cancer s/p total abdominal colectomy with end ileostomy, found down at home, was admitted for DKA and R diabetic foot wound, s/p operative debridement by podiatry 3/14/21 and R foot guillotine amputation and L foot I&D by vascular surgery 3/15/21. Now recovered well and is medically stable and cleared for discharge and next level of care ([facility])    Plan  #Right foot wound and left foot wound  -s/p R foot operative debridement by podiatry 3/14 s/p R guillotine BKA, L ankle I&D  (3/15)  -s/p R below knee amputation closure (3/19)  - Wound cx- klebsiella pneumoniae  - Removal of alternating suture from the BKA for better drainage of the fluid collection (will remove sutures and staples prior to discharge)  - Nafcillin (3/16-3/29), Zosyn (3/30 - 3/31), Unasyn (3/31- 4/12 )  - Refused PICC line  - Local wound care  -Pain control    #DKA  -f/u endo recs  -ISS  -lantus 8u HS, added lispro 6U TID with meals  -hgb A1C=16.1    Diet: consistent carb  DVT PPx: HSQ  Dispo: patient is medically ready for discharge but refusing to go to [facility] at this time, pending second appeal (first appeal denied)

## 2021-04-06 ENCOUNTER — TRANSCRIPTION ENCOUNTER (OUTPATIENT)
Age: 76
End: 2021-04-06

## 2021-04-06 VITALS
HEART RATE: 82 BPM | SYSTOLIC BLOOD PRESSURE: 115 MMHG | DIASTOLIC BLOOD PRESSURE: 58 MMHG | RESPIRATION RATE: 18 BRPM | OXYGEN SATURATION: 95 %

## 2021-04-06 LAB
GLUCOSE BLDC GLUCOMTR-MCNC: 137 MG/DL — HIGH (ref 70–99)
GLUCOSE BLDC GLUCOMTR-MCNC: 179 MG/DL — HIGH (ref 70–99)

## 2021-04-06 PROCEDURE — 93306 TTE W/DOPPLER COMPLETE: CPT

## 2021-04-06 PROCEDURE — 80053 COMPREHEN METABOLIC PANEL: CPT

## 2021-04-06 PROCEDURE — 97110 THERAPEUTIC EXERCISES: CPT

## 2021-04-06 PROCEDURE — 83605 ASSAY OF LACTIC ACID: CPT

## 2021-04-06 PROCEDURE — 88305 TISSUE EXAM BY PATHOLOGIST: CPT

## 2021-04-06 PROCEDURE — 87040 BLOOD CULTURE FOR BACTERIA: CPT

## 2021-04-06 PROCEDURE — 85025 COMPLETE CBC W/AUTO DIFF WBC: CPT

## 2021-04-06 PROCEDURE — 83735 ASSAY OF MAGNESIUM: CPT

## 2021-04-06 PROCEDURE — 87205 SMEAR GRAM STAIN: CPT

## 2021-04-06 PROCEDURE — C9399: CPT

## 2021-04-06 PROCEDURE — 36415 COLL VENOUS BLD VENIPUNCTURE: CPT

## 2021-04-06 PROCEDURE — 97530 THERAPEUTIC ACTIVITIES: CPT

## 2021-04-06 PROCEDURE — 82803 BLOOD GASES ANY COMBINATION: CPT

## 2021-04-06 PROCEDURE — 84295 ASSAY OF SERUM SODIUM: CPT

## 2021-04-06 PROCEDURE — 88311 DECALCIFY TISSUE: CPT

## 2021-04-06 PROCEDURE — 86850 RBC ANTIBODY SCREEN: CPT

## 2021-04-06 PROCEDURE — 87184 SC STD DISK METHOD PER PLATE: CPT

## 2021-04-06 PROCEDURE — 87186 SC STD MICRODIL/AGAR DIL: CPT

## 2021-04-06 PROCEDURE — 84484 ASSAY OF TROPONIN QUANT: CPT

## 2021-04-06 PROCEDURE — 82962 GLUCOSE BLOOD TEST: CPT

## 2021-04-06 PROCEDURE — 82010 KETONE BODYS QUAN: CPT

## 2021-04-06 PROCEDURE — 70450 CT HEAD/BRAIN W/O DYE: CPT

## 2021-04-06 PROCEDURE — 74177 CT ABD & PELVIS W/CONTRAST: CPT

## 2021-04-06 PROCEDURE — 0031A: CPT

## 2021-04-06 PROCEDURE — 85610 PROTHROMBIN TIME: CPT

## 2021-04-06 PROCEDURE — 93005 ELECTROCARDIOGRAM TRACING: CPT

## 2021-04-06 PROCEDURE — 88304 TISSUE EXAM BY PATHOLOGIST: CPT

## 2021-04-06 PROCEDURE — 70486 CT MAXILLOFACIAL W/O DYE: CPT

## 2021-04-06 PROCEDURE — 86140 C-REACTIVE PROTEIN: CPT

## 2021-04-06 PROCEDURE — 87635 SARS-COV-2 COVID-19 AMP PRB: CPT

## 2021-04-06 PROCEDURE — 84681 ASSAY OF C-PEPTIDE: CPT

## 2021-04-06 PROCEDURE — 97161 PT EVAL LOW COMPLEX 20 MIN: CPT

## 2021-04-06 PROCEDURE — 99231 SBSQ HOSP IP/OBS SF/LOW 25: CPT | Mod: GC

## 2021-04-06 PROCEDURE — 88307 TISSUE EXAM BY PATHOLOGIST: CPT

## 2021-04-06 PROCEDURE — 86803 HEPATITIS C AB TEST: CPT

## 2021-04-06 PROCEDURE — 84100 ASSAY OF PHOSPHORUS: CPT

## 2021-04-06 PROCEDURE — P9045: CPT

## 2021-04-06 PROCEDURE — 90715 TDAP VACCINE 7 YRS/> IM: CPT

## 2021-04-06 PROCEDURE — 86901 BLOOD TYPING SEROLOGIC RH(D): CPT

## 2021-04-06 PROCEDURE — 82550 ASSAY OF CK (CPK): CPT

## 2021-04-06 PROCEDURE — 82553 CREATINE MB FRACTION: CPT

## 2021-04-06 PROCEDURE — U0003: CPT

## 2021-04-06 PROCEDURE — 87070 CULTURE OTHR SPECIMN AEROBIC: CPT

## 2021-04-06 PROCEDURE — 71275 CT ANGIOGRAPHY CHEST: CPT

## 2021-04-06 PROCEDURE — 83036 HEMOGLOBIN GLYCOSYLATED A1C: CPT

## 2021-04-06 PROCEDURE — 73701 CT LOWER EXTREMITY W/DYE: CPT

## 2021-04-06 PROCEDURE — 83930 ASSAY OF BLOOD OSMOLALITY: CPT

## 2021-04-06 PROCEDURE — 80061 LIPID PANEL: CPT

## 2021-04-06 PROCEDURE — 90471 IMMUNIZATION ADMIN: CPT

## 2021-04-06 PROCEDURE — 99285 EMERGENCY DEPT VISIT HI MDM: CPT | Mod: 25

## 2021-04-06 PROCEDURE — 85652 RBC SED RATE AUTOMATED: CPT

## 2021-04-06 PROCEDURE — 71045 X-RAY EXAM CHEST 1 VIEW: CPT

## 2021-04-06 PROCEDURE — 87086 URINE CULTURE/COLONY COUNT: CPT

## 2021-04-06 PROCEDURE — 99233 SBSQ HOSP IP/OBS HIGH 50: CPT | Mod: GC

## 2021-04-06 PROCEDURE — 80202 ASSAY OF VANCOMYCIN: CPT

## 2021-04-06 PROCEDURE — 96374 THER/PROPH/DIAG INJ IV PUSH: CPT | Mod: XU

## 2021-04-06 PROCEDURE — 82330 ASSAY OF CALCIUM: CPT

## 2021-04-06 PROCEDURE — 87075 CULTR BACTERIA EXCEPT BLOOD: CPT

## 2021-04-06 PROCEDURE — 97162 PT EVAL MOD COMPLEX 30 MIN: CPT

## 2021-04-06 PROCEDURE — 86900 BLOOD TYPING SEROLOGIC ABO: CPT

## 2021-04-06 PROCEDURE — 87181 SC STD AGAR DILUTION PER AGT: CPT

## 2021-04-06 PROCEDURE — 81001 URINALYSIS AUTO W/SCOPE: CPT

## 2021-04-06 PROCEDURE — 85730 THROMBOPLASTIN TIME PARTIAL: CPT

## 2021-04-06 PROCEDURE — 72125 CT NECK SPINE W/O DYE: CPT

## 2021-04-06 PROCEDURE — 85027 COMPLETE CBC AUTOMATED: CPT

## 2021-04-06 PROCEDURE — U0005: CPT

## 2021-04-06 PROCEDURE — 80048 BASIC METABOLIC PNL TOTAL CA: CPT

## 2021-04-06 PROCEDURE — 84132 ASSAY OF SERUM POTASSIUM: CPT

## 2021-04-06 PROCEDURE — 81003 URINALYSIS AUTO W/O SCOPE: CPT

## 2021-04-06 PROCEDURE — 96375 TX/PRO/DX INJ NEW DRUG ADDON: CPT | Mod: XU

## 2021-04-06 PROCEDURE — 73630 X-RAY EXAM OF FOOT: CPT

## 2021-04-06 RX ORDER — METFORMIN HYDROCHLORIDE 850 MG/1
1 TABLET ORAL
Qty: 6 | Refills: 0
Start: 2021-04-06 | End: 2021-04-08

## 2021-04-06 RX ORDER — JNJ-78436735 50000000000 [PFU]/.5ML
0.5 SUSPENSION INTRAMUSCULAR ONCE
Refills: 0 | Status: COMPLETED | OUTPATIENT
Start: 2021-04-06 | End: 2021-04-06

## 2021-04-06 RX ADMIN — HEPARIN SODIUM 5000 UNIT(S): 5000 INJECTION INTRAVENOUS; SUBCUTANEOUS at 13:37

## 2021-04-06 RX ADMIN — HEPARIN SODIUM 5000 UNIT(S): 5000 INJECTION INTRAVENOUS; SUBCUTANEOUS at 05:17

## 2021-04-06 RX ADMIN — OXYCODONE AND ACETAMINOPHEN 2 TABLET(S): 5; 325 TABLET ORAL at 06:36

## 2021-04-06 RX ADMIN — Medication 6 UNIT(S): at 08:34

## 2021-04-06 RX ADMIN — AMPICILLIN SODIUM AND SULBACTAM SODIUM 200 GRAM(S): 250; 125 INJECTION, POWDER, FOR SUSPENSION INTRAMUSCULAR; INTRAVENOUS at 05:17

## 2021-04-06 RX ADMIN — NYSTATIN CREAM 1 APPLICATION(S): 100000 CREAM TOPICAL at 05:18

## 2021-04-06 RX ADMIN — Medication 6 UNIT(S): at 12:10

## 2021-04-06 RX ADMIN — JNJ-78436735 0.5 MILLILITER(S): 50000000000 SUSPENSION INTRAMUSCULAR at 16:59

## 2021-04-06 RX ADMIN — OXYCODONE AND ACETAMINOPHEN 2 TABLET(S): 5; 325 TABLET ORAL at 00:17

## 2021-04-06 RX ADMIN — ONDANSETRON 4 MILLIGRAM(S): 8 TABLET, FILM COATED ORAL at 13:37

## 2021-04-06 RX ADMIN — Medication 500 MILLIGRAM(S): at 10:44

## 2021-04-06 RX ADMIN — AMPICILLIN SODIUM AND SULBACTAM SODIUM 200 GRAM(S): 250; 125 INJECTION, POWDER, FOR SUSPENSION INTRAMUSCULAR; INTRAVENOUS at 10:43

## 2021-04-06 RX ADMIN — Medication 81 MILLIGRAM(S): at 10:44

## 2021-04-06 RX ADMIN — Medication 1 TABLET(S): at 10:43

## 2021-04-06 RX ADMIN — OXYCODONE AND ACETAMINOPHEN 1 TABLET(S): 5; 325 TABLET ORAL at 17:32

## 2021-04-06 RX ADMIN — Medication 2: at 12:10

## 2021-04-06 RX ADMIN — OXYCODONE AND ACETAMINOPHEN 1 TABLET(S): 5; 325 TABLET ORAL at 18:53

## 2021-04-06 NOTE — PROGRESS NOTE ADULT - TIME BILLING
Please refer to the assessment and numbered plan authored by me to explain the necessity.
will follow w/ you  case d/w Vascular resident  medically-clear for d/c to [facility]
Fever 3 days ago now resolved; leukocytosis resolved. Notified by vascular surgery re: concern for ?hematoma ?purulent drainage R BKA stump site; to f/u culture data; continue nafcillin and replete K+ aggressively while on this. Repeat ESR and CRP.  ID Team 1
Changing insulin requirements
Continued changes in insulin requirements
Pus cultured from RLE stump wound now growing Klebsiella and Enterococcus. f/u susceptibility. Continue Zosyn. CRP significantly improved.   ID Team 1
Insulin adjustments
Evaluation of discharge regimen
Insulin adjustment
Insulin adjustment and review of diet
Assessment of regimen for possible discharge
Insulin adjustment
Insulin adjustment
Please reconsult the ID service should questions or concerns arise. See Amion-->Medicine to see call schedule.
Tapering of insulin
Borderline AM hypoglycemia
Continued tapering of insulin doses
will follow w/ you  case d/w Vascular resident  medically-clear for d/c to [facility]

## 2021-04-06 NOTE — PROGRESS NOTE ADULT - SUBJECTIVE AND OBJECTIVE BOX
INTERVAL HPI/OVERNIGHT EVENTS:    Patient is a 75y old  Female who presents with a chief complaint of Weakness (2021 10:55)  Pt was seen and examined at the bedside. 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  good appetite.        FSG & Insulin received:    Yesterday:  pre-dinner fs. Lispro 6  thick red soup and some applesauce.  bedtime fs  lantus  8 units + lispro 2    Today:  pre-breakfast fs  nutritional lispro 6  units. Ate 1/2 cereal  pre-lunch fs  nutritional lispro 6  units + lispro 2    Pt reports the following symptoms:  CONSTITUTIONAL:  Negative chills  CARDIOVASCULAR:  Negative for chest pain or palpitations  RESPIRATORY:  Negative for cough, wheezing, or SOB   GASTROINTESTINAL:  Negative for vomiting  GENITOURINARY:  Negative frequency, urgency or dysuria    MEDICATIONS  (STANDING):  ampicillin/sulbactam  IVPB 3 Gram(s) IV Intermittent every 6 hours  ascorbic acid 500 milliGRAM(s) Oral daily  aspirin  chewable 81 milliGRAM(s) Oral daily  collagenase Ointment 1 Application(s) Topical daily  coronavirus (EUA) Vaccine ([employer]) 0.5 milliLiter(s) IntraMuscular once  dextrose 40% Gel 15 Gram(s) Oral once  dextrose 5%. 1000 milliLiter(s) (50 mL/Hr) IV Continuous <Continuous>  dextrose 5%. 1000 milliLiter(s) (100 mL/Hr) IV Continuous <Continuous>  dextrose 50% Injectable 12.5 Gram(s) IV Push once  dextrose 50% Injectable 25 Gram(s) IV Push once  dextrose 50% Injectable 25 Gram(s) IV Push once  glucagon  Injectable 1 milliGRAM(s) IntraMuscular once  heparin   Injectable 5000 Unit(s) SubCutaneous every 8 hours  insulin glargine Injectable (LANTUS) 8 Unit(s) SubCutaneous at bedtime  insulin lispro (ADMELOG) corrective regimen sliding scale   SubCutaneous Before meals and at bedtime  insulin lispro Injectable (ADMELOG) 6 Unit(s) SubCutaneous three times a day with meals  multivitamin  Chewable 1 Tablet(s) Oral daily  nystatin Powder 1 Application(s) Topical two times a day  senna 2 Tablet(s) Oral at bedtime    MEDICATIONS  (PRN):  acetaminophen   Tablet .. 650 milliGRAM(s) Oral every 6 hours PRN Mild Pain (1 - 3)  ondansetron Injectable 4 milliGRAM(s) IV Push every 6 hours PRN Nausea  oxycodone    5 mG/acetaminophen 325 mG 1 Tablet(s) Oral every 6 hours PRN Moderate Pain (4 - 6)  oxycodone    5 mG/acetaminophen 325 mG 2 Tablet(s) Oral every 6 hours PRN Severe Pain (7 - 10)      PHYSICAL EXAM  Vital Signs Last 24 Hrs  T(C): 36.4 (2021 14:12), Max: 36.9 (2021 18:17)  T(F): 97.6 (2021 14:12), Max: 98.4 (2021 18:17)  HR: 80 (2021 13:36) (62 - 82)  BP: 114/59 (2021 13:36) (95/52 - 125/61)  BP(mean): 81 (2021 05:11) (80 - 86)  RR: 18 (2021 13:36) (18 - 18)  SpO2: 95% (2021 13:36) (95% - 96%)    Constitutional: wn/wd in NAD.   Respiratory: lungs CTAB.  Cardiovascular: regular rhythm, normal S1 and S2  GI: soft, NT/ND, no masses/HSM appreciated.  EXT: right BKA      LABS:  HbA1C:   CAPILLARY BLOOD GLUCOSE  POCT Blood Glucose.: 179 mg/dL (2021 11:17)  POCT Blood Glucose.: 137 mg/dL (2021 06:56)  POCT Blood Glucose.: 157 mg/dL (2021 21:42)  POCT Blood Glucose.: 131 mg/dL (2021 16:27)        A/P: 75F with PMHx UC s/p colostomy (s/p multiple abdominal surgeries including colostomy bag in ) BIBEMS from home complaining of weakness; presenting likely in DKA with AMS, with bilateral lower extremity open wounds. Of note, pt found to have a hx of DM2: A1C on admission is 16.1. Pt is now s/p I&D w/ washout and debridement of non- viable soft tissue.     1.  DM:   wt:75 KG, a1c 16.1  cr:0.6, GFR:89  Please give lantus  8 units at bedtime  pleas give lispro  6 Units  premeals TID   Continue lispro moderate dose scale with meals and at bedtime.   Continue consistent carb diet  FSG Goal 100-180    Pt prefers not to be discharged on insulin, and states she will not be checking FSG or is interested in CGM at home.  case seen and discussed with Dr. Yee and updated primary team INTERVAL HPI/OVERNIGHT EVENTS:    Patient is a 75y old  Female who presents with a chief complaint of Weakness (2021 10:55)  Pt was seen and examined at the bedside. 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  good appetite.        FSG & Insulin received:    Yesterday:  pre-dinner fs. Lispro 6  thick red soup and some applesauce.  bedtime fs  lantus  8 units + lispro 2    Today:  pre-breakfast fs  nutritional lispro 6  units. Ate 1/2 cereal  pre-lunch fs  nutritional lispro 6  units + lispro 2    Pt reports the following symptoms:  CONSTITUTIONAL:  Negative chills  CARDIOVASCULAR:  Negative for chest pain or palpitations  RESPIRATORY:  Negative for cough, wheezing, or SOB   GASTROINTESTINAL:  Negative for vomiting  GENITOURINARY:  Negative frequency, urgency or dysuria    MEDICATIONS  (STANDING):  ampicillin/sulbactam  IVPB 3 Gram(s) IV Intermittent every 6 hours  ascorbic acid 500 milliGRAM(s) Oral daily  aspirin  chewable 81 milliGRAM(s) Oral daily  collagenase Ointment 1 Application(s) Topical daily  coronavirus (EUA) Vaccine ([employer]) 0.5 milliLiter(s) IntraMuscular once  dextrose 40% Gel 15 Gram(s) Oral once  dextrose 5%. 1000 milliLiter(s) (50 mL/Hr) IV Continuous <Continuous>  dextrose 5%. 1000 milliLiter(s) (100 mL/Hr) IV Continuous <Continuous>  dextrose 50% Injectable 12.5 Gram(s) IV Push once  dextrose 50% Injectable 25 Gram(s) IV Push once  dextrose 50% Injectable 25 Gram(s) IV Push once  glucagon  Injectable 1 milliGRAM(s) IntraMuscular once  heparin   Injectable 5000 Unit(s) SubCutaneous every 8 hours  insulin glargine Injectable (LANTUS) 8 Unit(s) SubCutaneous at bedtime  insulin lispro (ADMELOG) corrective regimen sliding scale   SubCutaneous Before meals and at bedtime  insulin lispro Injectable (ADMELOG) 6 Unit(s) SubCutaneous three times a day with meals  multivitamin  Chewable 1 Tablet(s) Oral daily  nystatin Powder 1 Application(s) Topical two times a day  senna 2 Tablet(s) Oral at bedtime    MEDICATIONS  (PRN):  acetaminophen   Tablet .. 650 milliGRAM(s) Oral every 6 hours PRN Mild Pain (1 - 3)  ondansetron Injectable 4 milliGRAM(s) IV Push every 6 hours PRN Nausea  oxycodone    5 mG/acetaminophen 325 mG 1 Tablet(s) Oral every 6 hours PRN Moderate Pain (4 - 6)  oxycodone    5 mG/acetaminophen 325 mG 2 Tablet(s) Oral every 6 hours PRN Severe Pain (7 - 10)      PHYSICAL EXAM  Vital Signs Last 24 Hrs  T(C): 36.4 (2021 14:12), Max: 36.9 (2021 18:17)  T(F): 97.6 (2021 14:12), Max: 98.4 (2021 18:17)  HR: 80 (2021 13:36) (62 - 82)  BP: 114/59 (2021 13:36) (95/52 - 125/61)  BP(mean): 81 (2021 05:11) (80 - 86)  RR: 18 (2021 13:36) (18 - 18)  SpO2: 95% (2021 13:36) (95% - 96%)    Constitutional: wn/wd in NAD.   Respiratory: lungs CTAB.  Cardiovascular: regular rhythm, normal S1 and S2  GI: soft, NT/ND, no masses/HSM appreciated.  EXT: right BKA      LABS:  HbA1C:   CAPILLARY BLOOD GLUCOSE  POCT Blood Glucose.: 179 mg/dL (2021 11:17)  POCT Blood Glucose.: 137 mg/dL (2021 06:56)  POCT Blood Glucose.: 157 mg/dL (2021 21:42)  POCT Blood Glucose.: 131 mg/dL (2021 16:27)        A/P: 75F with PMHx UC s/p colostomy (s/p multiple abdominal surgeries including colostomy bag in ) BIBEMS from home complaining of weakness; presenting likely in DKA with AMS, with bilateral lower extremity open wounds. Of note, pt found to have a hx of DM2: A1C on admission is 16.1. Pt is now s/p I&D w/ washout and debridement of non- viable soft tissue.     1.  DM:   wt:75 KG, a1c 16.1  cr:0.6, GFR:89  Please give lantus  8 units at bedtime  pleas give lispro  6 Units  premeals TID   Continue lispro moderate dose scale with meals and at bedtime.   Continue consistent carb diet  FSG Goal 100-180    For discharge: Metformin ER 500mg with breakfast and dinner x 3 days, and then increase to 1000mg with breakfast and dinner, and januvia 100mg daily before breakfast.  case seen and discussed with Dr. Yee and updated primary team

## 2021-04-06 NOTE — PROGRESS NOTE ADULT - SUBJECTIVE AND OBJECTIVE BOX
24hr Events:  O/N: LENCHO, VSS  4/5: Second appeal denied. Refusing discharge. Endo will put pt on oral regimen prior to discharge.         PLEASE CHECK WHEN PRESENT:     [  ] Heart Failure     [  ] Acute     [  ] Acute on Chronic     [  ] Chronic  -------------------------------------------------------------------     [  ]Diastolic [HFpEF]     [  ]Systolic [HFrEF]     [  ]Combined [HFpEF & HFrEF]     [  ] afib     [  ] hypertensive heart disease     [  ]Other:  -------------------------------------------------------------------  [ ] Respiratory failure  [ ] Acute cor pulmonale  [ ] Asthma/COPD Exacerbation  [ ] Pleural effusion  [ ] Aspiration pneumonia  -------------------------------------------------------------------  [  ]ANTONY     [  ]ATN     [  ]Reneal Medullary Necrosis     [  ]Renal Cortical Necrosis     [  ]Other Pathological Lesions:    [  ]CKD 1  [  ]CKD 2  [  ]CKD 3  [  ]CKD 4  [  ]CKD 5  [  ]Other  -------------------------------------------------------------------  [x]Diabetes - new DX on this admission  [x] Diabetic PVD Ulcer  [  ] Neuropathic ulcer to DM  [  ] Diabetes with Nephropathy  [  ] Osteomyelitis due to diabetes  [x ] Hyperglycemia  [ ] Hypoglycemia  --------------------------------------------------------------------  [  ]Malnutrition: See Nutrition Note  [  ]Cachexia  [  ]Other:   [  ]Supplement Ordered:  [  ]Morbid Obesity (BMI >=40]  ---------------------------------------------------------------------  [ ] Sepsis/severe sepsis/septic shock  [ ] UTI  [ ] Pneumonia  -----------------------------------------------------------------------  [ ] Acidosis/alkalosis  [ ] Fluid overload  [x ] Hypokalemia  [ ] Hyperkalemia  [x ] Hypomagnesemia  [x ] Hypophosphatemia  [ ] Hyperphosphatemia  [ ] Hyponatremia  [ ] Hypernatremia  ------------------------------------------------------------------------  [x ] Acute blood loss anemia- due to OR for BKA guillotine and closure  [ ] Post op blood loss anemia  [ ] Iron deficiency anemia  [ ] Anemia due to chronic disease  [ ] Hypercoagulable state  [x ] Leukocytosis  ----------------------------------------------------------------------  [ ] Cerebral infarction  [ ] Transient ischemia attack  [ ] Encephalopathy      Assessment  75F PMHx UC, colon cancer s/p total abdominal colectomy with end ileostomy, found down at home, was admitted for DKA and R diabetic foot wound, s/p operative debridement by podiatry 3/14/21 and R foot guillotine amputation and L foot I&D by vascular surgery 3/15/21. Now recovered well and is medically stable and cleared for discharge and next level of care ([facility])    Plan  #Right foot wound and left foot wound  -s/p R foot operative debridement by podiatry 3/14 s/p R guillotine BKA, L ankle I&D  (3/15)  -s/p R below knee amputation closure (3/19)  - Wound cx- klebsiella pneumoniae  - Removal of alternating suture from the BKA for better drainage of the fluid collection (will remove sutures and staples prior to discharge)  - Nafcillin (3/16-3/29), Zosyn (3/30 - 3/31), Unasyn (3/31- 4/12 )  - Refused PICC line  - Local wound care  -Pain control    #DKA  -f/u endo recs  -ISS  -lantus 8u HS, added lispro 6U TID with meals  -hgb A1C=16.1    Diet: consistent carb  DVT PPx: HSQ  Dispo: patient is medically ready for discharge but refusing to go to [facility] at this time, pending second appeal (first appeal denied)               24hr Events:  O/N: LENCHO, VSS  4/5: Second appeal denied. Refusing discharge. Endo will put pt on oral regimen prior to discharge.     Subjective: No acute events overnight. Reports plan to be picked up by family tomorrow despite plan yesterday being told discharge is planned for today evening. Otherwise reporting drainage from right lateral BKA incision.     ampicillin/sulbactam  IVPB 3  ampicillin/sulbactam  IVPB 3  aspirin  chewable 81  heparin   Injectable 5000      Allergies    No Known Allergies    Intolerances        Vital Signs Last 24 Hrs  T(C): 36.7 (06 Apr 2021 06:33), Max: 36.9 (05 Apr 2021 18:17)  T(F): 98.1 (06 Apr 2021 06:33), Max: 98.4 (05 Apr 2021 18:17)  HR: 62 (06 Apr 2021 05:11) (62 - 100)  BP: 122/56 (06 Apr 2021 05:11) (101/49 - 125/61)  BP(mean): 81 (06 Apr 2021 05:11) (80 - 86)  RR: 18 (06 Apr 2021 05:11) (18 - 18)  SpO2: 96% (06 Apr 2021 05:11) (95% - 96%)  I&O's Summary    05 Apr 2021 07:01  -  06 Apr 2021 07:00  --------------------------------------------------------  IN: 900 mL / OUT: 1600 mL / NET: -700 mL        Physical Exam:  Physical Exam:  General: NAD  Pulmonary: No conversational dyspnea. Equal chest rise b/l  Cardiovascular: s1s2 RRR  Extremities: Rt Lateral stump with exudate and pink tissue, rest of right stump clean and without erythema, minimal fibrinous tissue. Left Ankle wound clean and pink . Left DP pulse palpable    LABS:        Radiology and Additional Studies:        PLEASE CHECK WHEN PRESENT:     [  ] Heart Failure     [  ] Acute     [  ] Acute on Chronic     [  ] Chronic  -------------------------------------------------------------------     [  ]Diastolic [HFpEF]     [  ]Systolic [HFrEF]     [  ]Combined [HFpEF & HFrEF]     [  ] afib     [  ] hypertensive heart disease     [  ]Other:  -------------------------------------------------------------------  [ ] Respiratory failure  [ ] Acute cor pulmonale  [ ] Asthma/COPD Exacerbation  [ ] Pleural effusion  [ ] Aspiration pneumonia  -------------------------------------------------------------------  [  ]ANTONY     [  ]ATN     [  ]Reneal Medullary Necrosis     [  ]Renal Cortical Necrosis     [  ]Other Pathological Lesions:    [  ]CKD 1  [  ]CKD 2  [  ]CKD 3  [  ]CKD 4  [  ]CKD 5  [  ]Other  -------------------------------------------------------------------  [x]Diabetes - new DX on this admission  [x] Diabetic PVD Ulcer  [  ] Neuropathic ulcer to DM  [  ] Diabetes with Nephropathy  [  ] Osteomyelitis due to diabetes  [x ] Hyperglycemia  [ ] Hypoglycemia  --------------------------------------------------------------------  [  ]Malnutrition: See Nutrition Note  [  ]Cachexia  [  ]Other:   [  ]Supplement Ordered:  [  ]Morbid Obesity (BMI >=40]  ---------------------------------------------------------------------  [ ] Sepsis/severe sepsis/septic shock  [ ] UTI  [ ] Pneumonia  -----------------------------------------------------------------------  [ ] Acidosis/alkalosis  [ ] Fluid overload  [x ] Hypokalemia  [ ] Hyperkalemia  [x ] Hypomagnesemia  [x ] Hypophosphatemia  [ ] Hyperphosphatemia  [ ] Hyponatremia  [ ] Hypernatremia  ------------------------------------------------------------------------  [x ] Acute blood loss anemia- due to OR for BKA guillotine and closure  [ ] Post op blood loss anemia  [ ] Iron deficiency anemia  [ ] Anemia due to chronic disease  [ ] Hypercoagulable state  [x ] Leukocytosis  ----------------------------------------------------------------------  [ ] Cerebral infarction  [ ] Transient ischemia attack  [ ] Encephalopathy

## 2021-04-06 NOTE — DISCHARGE NOTE NURSING/CASE MANAGEMENT/SOCIAL WORK - PATIENT PORTAL LINK FT
You can access the FollowMyHealth Patient Portal offered by [facility] by registering at the following website: [email or web address]. By joining [employer]’s FollowMyHealth portal, you will also be able to view your health information using other applications (apps) compatible with our system.

## 2021-04-06 NOTE — PROGRESS NOTE ADULT - TIME-BASED BILLING (NON-CRITICAL CARE)
Time-based billing (NON-critical care)

## 2021-04-06 NOTE — PROGRESS NOTE ADULT - SUBJECTIVE AND OBJECTIVE BOX
Subjective/Interval events:  -Pt refusing discharge, aware she has lost the appeal and and is financially responsible for the stay, BRE/YARELY and their supervisors assisting   -I spoke to patient again today morning about her being safe for discharge to [facility] and patient still refusing  -No new complaints today, minimal pain at wound sites, tolerating po well    MEDICATIONS  (STANDING):  ampicillin/sulbactam  IVPB 3 Gram(s) IV Intermittent every 6 hours  ascorbic acid 500 milliGRAM(s) Oral daily  aspirin  chewable 81 milliGRAM(s) Oral daily  collagenase Ointment 1 Application(s) Topical daily  dextrose 40% Gel 15 Gram(s) Oral once  dextrose 5%. 1000 milliLiter(s) (50 mL/Hr) IV Continuous <Continuous>  dextrose 5%. 1000 milliLiter(s) (100 mL/Hr) IV Continuous <Continuous>  dextrose 50% Injectable 25 Gram(s) IV Push once  dextrose 50% Injectable 12.5 Gram(s) IV Push once  dextrose 50% Injectable 25 Gram(s) IV Push once  glucagon  Injectable 1 milliGRAM(s) IntraMuscular once  heparin   Injectable 5000 Unit(s) SubCutaneous every 8 hours  insulin glargine Injectable (LANTUS) 8 Unit(s) SubCutaneous at bedtime  insulin lispro (ADMELOG) corrective regimen sliding scale   SubCutaneous Before meals and at bedtime  insulin lispro Injectable (ADMELOG) 6 Unit(s) SubCutaneous three times a day with meals  multivitamin  Chewable 1 Tablet(s) Oral daily  nystatin Powder 1 Application(s) Topical two times a day  senna 2 Tablet(s) Oral at bedtime    MEDICATIONS  (PRN):  acetaminophen   Tablet .. 650 milliGRAM(s) Oral every 6 hours PRN Mild Pain (1 - 3)  ondansetron Injectable 4 milliGRAM(s) IV Push every 6 hours PRN Nausea  oxycodone    5 mG/acetaminophen 325 mG 1 Tablet(s) Oral every 6 hours PRN Moderate Pain (4 - 6)  oxycodone    5 mG/acetaminophen 325 mG 2 Tablet(s) Oral every 6 hours PRN Severe Pain (7 - 10)      Vital Signs Last 24 Hrs  T(C): 36.3 (06 Apr 2021 09:17), Max: 36.9 (05 Apr 2021 18:17)  T(F): 97.4 (06 Apr 2021 09:17), Max: 98.4 (05 Apr 2021 18:17)  HR: 74 (06 Apr 2021 08:24) (62 - 97)  BP: 95/52 (06 Apr 2021 08:24) (95/52 - 125/61)  BP(mean): 81 (06 Apr 2021 05:11) (80 - 86)  RR: 18 (06 Apr 2021 08:24) (18 - 18)  SpO2: 96% (06 Apr 2021 08:24) (95% - 96%)    PHYSICAL EXAM:  GENERAL: pleasant, NAD  NEURO: Aox3, intact strength/sensation all 4 ext   HEENT: clear op, mmm  NECK:  No JVD, no lymphadenopathy  CHEST/LUNG: Clear to auscultation bilaterally; No rales, rhonchi, wheezing. Normal work of breathing, not tachypneic  HEART: Regular rate and rhythm; No murmurs, rubs, or gallops  ABDOMEN: Soft, Nontender, Nondistended. Normoactive bowel sounds. osostomy bag with normal stool outpt  EXTREMITIES:  LLE and RLE wound sites wrapped, no bleeding/drainage from wound sites noted    LABS (reviewed)   -fsg stable    ASSESSMENT AND PLAN: 74yo F with PMH of UC and colon cancer s/p colectomy and chemotherapy and chronic venous stasis w/chronic LE wounds who presented with bilateral lower extremity cellulitis and abscesses c/b septic shock, DKA, and RLE necrotizing fascitis, s/p R-foot I&D on 3/14 with Podiatry and R-BKA w/L-foot I&D on 3/15 with Vascular, s/p BKA closure on 3/19, mild site infection RLE resolved on abx and local wound care, stable for [facility] on extended abx course     #BLE Cellulitis c/b Septic Shock and Nec Fasc s/p R-BKA, recent new cellulitis at site improving on abx   -- Appreciate ID recs- plan for abx till 4/12- 4 days unisyn at [facility], then switch to po augmentin to complete course (ID aware of plan, patient refusing midline/PICC for extended IV abx course, aware of risks/benefits, ID ok with po switch if pt refusing picc/midline)   -- Wound sites RLE clean now s/p antibiotics, adequate blood flow, no plan for further surgical management. LLE wound site stable as well   -- Percocet 1 tab q4h PRN and Tylenol 650mg q6h PRN w/Senna    #Newly Diagnosed IDDM2 c/b DKA and SSTI  -- appreciate endo recs, fsg stable on lantus and ISS- can convert to po regimen on discharge     #UC c/b Colon Ca s/p Ostomy  -- no active issues, ostomy outpt wnl    #Acute Blood Loss Anemia   -- likely from multiple trips to the OR and BKA, stable for throughout last week with no further bleeding at wound sites    #Diet - Carb Controlled / Dysphagia 1   #DVT PPx - SQH  #Dispo- accepted to [facility], patient appealed and denied, medically ready for discharge. I am speaking with SW and CM leadership on this case to expedite discharge, myself and vascular attending to see patient to continue to convince her of safety of dc plan      Patient was seen and examined by me at bedside    Greater than 35 minutes spent on total encounter; more than 50% of the visit was spent counseling and/or coordinating care by the attending physician.    Giacomo Jenkins MD [phone]

## 2021-04-06 NOTE — PROGRESS NOTE ADULT - PROVIDER SPECIALTY LIST ADULT
Hospitalist
Infectious Disease
Internal Medicine
Internal Medicine
Vascular Surgery
Endocrinology
Hospitalist
Infectious Disease
Internal Medicine
Podiatry
Podiatry
Vascular Surgery
Endocrinology
Hospitalist
Internal Medicine
Internal Medicine
Podiatry
SICU
Vascular Surgery
Endocrinology
Internal Medicine
Vascular Surgery
Podiatry
Vascular Surgery
Internal Medicine
Internal Medicine

## 2021-04-06 NOTE — DISCHARGE NOTE NURSING/CASE MANAGEMENT/SOCIAL WORK - NSDCVIVACCINE_GEN_ALL_CORE_FT
Tdap , 2021/3/13 13:42 , Geo Combs)   Severe acute respiratory syndrome coronavirus 2 (SARS-CoV-2) (Coronavirus disease [COVID-19]) vaccine , 2021/4/6 16:59 , Fanny Koo (SRINIVAS)  Tdap , 2021/3/13 13:42 , Geo Combs (SRINIVAS)

## 2021-04-06 NOTE — PROGRESS NOTE ADULT - REASON FOR ADMISSION
Weakness

## 2021-04-06 NOTE — PROGRESS NOTE ADULT - ATTENDING COMMENTS
3/13 L foot wound culture with MSSA, milleri group Streptococci, Bifidobacterium and Candida (probable colonizer). Continue nafcillin and aggressively replete K+ while on this agent. Remainder of plan as above.  Will arrange post hospital f/u with Dr. Medeiros.  Please reconsult with ?  ID Team 1
75yoF h/o poorly controlled DM (on oral meds at home), ulcerative colitis s/p colostomy, admitted for altered mental status and found to have DKA as well as bilateral lower extremity open wounds. On exam patient was under the impression that she was being discharged imminently (confirmed with primary team that this was not true) and was on the phone to "appeal" this decision. Postprandial FSGs still above goal. Nurse endorsed that pt is eating. Increase lantus to 10u and nutritional aspart to 8 units.     Aparna Harrell MD  Attending Endocrinologist    I was physically present for the key portions of the history, exam and evaluation and management (E/M) service provided.  I discussed this patient with the Fellow Dr. Lane today.  I agree with the above history, physical, and plan except where specified.    25 minutes spent on total encounter; more than 50% of the visit was spent counseling and/or coordinating care by the attending physician.
75yoF h/o poorly controlled DM (on oral meds at home), ulcerative colitis s/p colostomy, admitted for altered mental status and found to have DKA as well as bilateral lower extremity open wounds. Pt has been eating and postprandial FSGs are elevated. There is also rise in FSG overnight. Agree with increasing lantus to 12u and nutritional aspart to 10 units.     POCT Blood Glucose.: 287 mg/dL (03-21-21 @ 16:47)  POCT Blood Glucose.: 192 mg/dL (03-21-21 @ 11:53)  POCT Blood Glucose.: 193 mg/dL (03-21-21 @ 07:06)  POCT Blood Glucose.: 166 mg/dL (03-20-21 @ 21:38)    Aparna Harrell MD  Attending Endocrinologist    I discussed this patient with the Fellow Dr. Lane today.  I agree with the above history, physical, and plan.
Pt seen on rounds this afternoon.  Mental status continues to improve, though underlying cognitive impairment is still obvious.  Is quite funny in conversation.  Apparently was a  at [facility]  Glucoses have risen sharply since she started eating, with fingersticks in the 190-270 range.    Has phantom sensation in the R foot but no phantom pain.    Left food (pre surgeons) is apparently doing well.  PO intake has been excellent.  To increase the Latus back to 15 units, start 6 units lispro pre-meal
Pt seen on rounds this afternoon.  Will be staying in the hospital until completion of antibiotic therapy.    Glucoses have been in target range (130-180)  PO intake has been fairly good  Will continue the current regimen for now
I saw and evaluated the patient on the above date of service and discussed the care with the resident. I agree with the findings and plan as documented in the note above except for the following:      Awaiting L foot WCx to be finalized - per micro lab, another organism is growing.  Cont IV nafcillin.  Final rec pending L foot WCx result.      Team 1 will continue to follow you.  Dr. Hartman will take over the care tomorrow.    Tessy Medeiros MD, MS  Infectious disease attending  Work cell [phone]
Mild DKA, but managed with insuline gtt as patient is treated for necrotizing fasciitis.  Gap closed, will continue gtt as she remains npo for possible another debridement.  Endo eval for management and care of new diabetic.  Continue abx.
Pt seen on rounds this afternoon and events of the weekend were reviewed.  75-yo woman with type 2 DM admitted with borderline DKA and bilateral diabetic foot infections.  She was about to be taken to the OR for a guillotine amputation of the R lower leg, and also debridement of the necrotic tissue on the left leg.    She was treated with an insulin drip after admission, and started on Lantus last night.  Fingersticks today have been in the 200-250 range.  Will increase the Lantus to 14 units for tonight, hold off on premeal until diet started and her ability to take PO is verified
Pt seen on rounds this afternoon.  Eating has been somewhat more inconsistent--receiving pureed diet, but seems to be eating mostly soups  Fingersticks have been in target range, with values of 127/137 today.  Pt wants to return to oral agents, and I agree that it may be possible--it is doubtful that she could manage insulin  Will continue to try to taper the insulin--decrease to 11 Lantus, 8 units lispro premeal
Pt seen on rounds this afternoon.  In excellent spirits, continues with her "dry" sense of humor.  Has no complaints of pain.  PO intake remains excellent.   With AM fingerstick falling to 90 this morning, and with pt NPO for OR tomorrow, decrease the Lantus to 10 units.    Can continue the 6 units pre-meal when diet resumed post op, though may need slight increase
Pt seen on rounds this afternoon.  Underwent guillotine BK amputation RLE plus I & D of abscess L ankle yesterday.  Cognitive status significantly improved--is likely recovering from the delerium I have often seen in pts with necrotizing diabetic foot infections.  She was able to tell us that she was told of "borderline" DM in the past, even quoted an A1c level of 7%.  She admits to candida non-compliance with her diet, and was actually quite funny In her description of this.  She has not seen her previous internist for at least a few years--"he fired me."  Glucoses have been distinctly better today-- after 14 Lantus last night, pre-lunch 141--likely due to removal of septic source  To decrease the Lantus to 10 units, hold off starting pre-meal--it is possible that she is diet-controllable
Pt seen with team and awake and alert. She understands her current condition and the implications of a below the knee amputation including that she may not be able to return home. She spoke with her niece as well who is her HCP and her niece feels she understands the procedure as well as its implications.
Pt seen on rounds this afternoon and events of the weekend reviewed.  Glucoses have been higher, and insulin increased to 12 Lantus/10 units pre-meal lispro.  Although not febrile, her WBC increased to 14,000, though went down somewhat today.  Right BKA stump is Ace-wrapped, dressing to be removed on 3/24.  L foot is also dressed.  She complains of stump pain on the right (not phantom pain) as well as pain in the left foot.  (Per Vascular team, L foot looked good when dressing changed today).  The higher glucoses obviously raise the question of an incipient infection, which will be watched for  To increase the Lantus to 16 units, the premeal to 12 units
Pt seen on rounds this afternoon.  Had no new complaints.  Glucoses have been stable in the 110-150 range for the past 24 hours.  Will continue trying to taper the insulin to doses which would likely indicate that she can be transitioned to oral agents at the time of transfer to [facility].  To decrease the Lantus to 8 units, the lispro to 6 units
Pt seen on rounds this afternoon.  Post-prandial glucoses are higher once again, and continue to fluctuate due to pt's inconsistent carb intake.  Will increase the pre-meal lispro to 9 units  Her AM fingerstick was down to 129 today, so will leave the Lantus dose at 12 units.  She continues to complain of pain In the BKA stump, but this seems to have decreased
Glucose spiked to 248 before supper last night, but pt cannot recall what she ate yesterday afternoon.  She may have had two bowls of soup, however, one brought in from outside by a friend.  Today's fingersticks down to 138/117  Will continue trying to taper the insulin (to 10 Lantus/7 lispro beginning tonight) to see if doses can be lowered to a range where pt can be changed to oral agents
LARISA Soria has acted as my scribe. Pt doing well. continue broad spectrum abx.
Pt seen on rounds this afternoon and events of the weekend reviewed. Glucoses have been stable, and in the 115-145 range for the past 36 hours.  Her current insulin doses (which we will leave as is) are low enough that she is almost certainly controllable with oral agents, and would plan to send her to [facility] on a combination of metformin and Januvia
Pt seen on rounds this afternoon.  Despite the rise in WBC, glucoses have improved to 129/108 today, and will decrease the pre-meal insulin slightly to 8 units.  Keep Lantus at 14 units.  Her pain in the stump has decreased, Ace-wrap was changed today.    She is scheduled to be discharged to [facility] (with L foot infection making it impossible for her to do 3 hrs of PT/day for acute Rehab), and she is concerned that the MDs there will not be able to make the necessary insulin adjustments.
Pt seen on rounds this afternoon.  Was more alert than yesterday, though cognitive deficits persist.  She is not complaining of pain in the left foot today, only pain at the BKA stump.  The stump is still Ace-wrapped, without drainage.  The left foot is also Ace-wrapped, cannot be examined.    Glucoses were in the 200 range yesterday, decreased to 167/89 today.    WBC count has increased back to 14,000  Given the lower glucoses today will decrease the Lantus to 14, the premeal to 10 units--but these requirements are higher than last week, and could still indicate incipient infection
Pt seen on rounds this afternoon.  Evacuation of the stump hematoma seems to have decreased her pain.  She denies any pain in the left foot.  Ace-wrap over the stump has mild sanguinous drainage.  Glucoses have been in target range though the AM fingerstick fell to 96 this morning.  Will therefore decrease the Lantus to 12 units  Continue the pre-meal lispro at 7 units
Pt seen on rounds this afternoon.  To be transferred to [facility] later today.  Glucoses remain in target range of 130-180 on small doses of insulin, and will proceed with original plan to discharge on oral agents--Metformin and Januvia.  Emphasized to the pt the need for dietary compliance in order for the oral agents to work.

## 2021-04-06 NOTE — PROGRESS NOTE ADULT - NSICDXPILOT_GEN_ALL_CORE
[place]

## 2021-04-07 RX ORDER — SITAGLIPTIN 50 MG/1
1 TABLET, FILM COATED ORAL
Qty: 30 | Refills: 0
Start: 2021-04-07 | End: 2021-05-06

## 2021-04-09 DIAGNOSIS — R13.10 DYSPHAGIA, UNSPECIFIED: ICD-10-CM

## 2021-04-09 DIAGNOSIS — D62 ACUTE POSTHEMORRHAGIC ANEMIA: ICD-10-CM

## 2021-04-09 DIAGNOSIS — E11.65 TYPE 2 DIABETES MELLITUS WITH HYPERGLYCEMIA: ICD-10-CM

## 2021-04-09 DIAGNOSIS — K13.0 DISEASES OF LIPS: ICD-10-CM

## 2021-04-09 DIAGNOSIS — E11.10 TYPE 2 DIABETES MELLITUS WITH KETOACIDOSIS WITHOUT COMA: ICD-10-CM

## 2021-04-09 DIAGNOSIS — R53.1 WEAKNESS: ICD-10-CM
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
## 2021-04-09 DIAGNOSIS — R65.21 SEVERE SEPSIS WITH SEPTIC SHOCK: ICD-10-CM

## 2021-04-09 DIAGNOSIS — E83.39 OTHER DISORDERS OF PHOSPHORUS METABOLISM: ICD-10-CM

## 2021-04-09 DIAGNOSIS — R29.6 REPEATED FALLS: ICD-10-CM

## 2021-04-09 DIAGNOSIS — Z90.49 ACQUIRED ABSENCE OF OTHER SPECIFIED PARTS OF DIGESTIVE TRACT: ICD-10-CM

## 2021-04-09 DIAGNOSIS — A41.9 SEPSIS, UNSPECIFIED ORGANISM: ICD-10-CM

## 2021-04-09 DIAGNOSIS — K51.90 ULCERATIVE COLITIS, UNSPECIFIED, WITHOUT COMPLICATIONS: ICD-10-CM

## 2021-04-09 DIAGNOSIS — E83.42 HYPOMAGNESEMIA: ICD-10-CM

## 2021-04-09 DIAGNOSIS — R62.7 ADULT FAILURE TO THRIVE: ICD-10-CM

## 2021-04-09 DIAGNOSIS — B95.61 METHICILLIN SUSCEPTIBLE STAPHYLOCOCCUS AUREUS INFECTION AS THE CAUSE OF DISEASES CLASSIFIED ELSEWHERE: ICD-10-CM

## 2021-04-09 DIAGNOSIS — I87.8 OTHER SPECIFIED DISORDERS OF VEINS: ICD-10-CM

## 2021-04-09 DIAGNOSIS — E87.6 HYPOKALEMIA: ICD-10-CM

## 2021-04-09 DIAGNOSIS — I96 GANGRENE, NOT ELSEWHERE CLASSIFIED: ICD-10-CM

## 2021-04-09 DIAGNOSIS — E11.621 TYPE 2 DIABETES MELLITUS WITH FOOT ULCER: ICD-10-CM

## 2021-04-09 DIAGNOSIS — M72.6 NECROTIZING FASCIITIS: ICD-10-CM

## 2021-04-09 DIAGNOSIS — B95.4 OTHER STREPTOCOCCUS AS THE CAUSE OF DISEASES CLASSIFIED ELSEWHERE: ICD-10-CM

## 2021-04-09 DIAGNOSIS — Z93.3 COLOSTOMY STATUS: ICD-10-CM
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
## 2021-04-09 RX ORDER — METFORMIN HYDROCHLORIDE 850 MG/1
1 TABLET ORAL
Qty: 60 | Refills: 0
Start: 2021-04-09 | End: 2021-05-08

## 2021-04-12 ENCOUNTER — APPOINTMENT (OUTPATIENT)
Dept: VASCULAR SURGERY | Facility: CLINIC | Age: 76
End: 2021-04-12
Payer: MEDICARE

## 2021-04-12 PROCEDURE — 99024 POSTOP FOLLOW-UP VISIT: CPT

## 2021-04-14 NOTE — ADDENDUM
[FreeTextEntry1] : This note was written by Roxanne Morocho on 04/12/2021 acting as scribe for Harper Mijares M.D.\par \par I, Harper Barber have read and attest that all the information, medical decision making and discharge instructions within are true and accurate.

## 2021-04-14 NOTE — DISCUSSION/SUMMARY
[FreeTextEntry1] : 74 y/o F wet gangrene of the right foot, abscess of the left ankle s/p OR extensive debridement by podiatry on 3/13/2021. Pt with persistent wet gangrene s/p guillotine amputation of the right ankle and foot. I&D of the abscess over the L ankle on 3/15/2021 and complete R BKA on 3/19/2021 post op c/w purulent drainage form R BKA stump with low grade fever and leukocytosis s/p complete drainage of residual hematoma, infection and wound was packed. Antibiotics were started  Augmentin BID through 4/12/2021 presents for post op visit. \par \par On exam, Abdomen: left lower quadrant with stool surrounding pink ileostomy, ileostomy bag loose. R BKA site almost healed except for a very small spot over the medial aspect of the stump no active drainage and no signs of infection. L ant ankle midline incision healing nicely with islands of granulation tissue, no active drainage and no signs of infection. \par \par Ileostomy site cleaned and ostomy bag placed. Applied bacitracin over R BKA small open site wrapped with Kerlix and ace bandage. L ant ankle cleaned and bacitracin applied. Wrapped with Kerlix. \par \par Plan: \par Discussed findings and treatment options. Pt to continue current wound care regimen at [facility]. Wound care instructions updated. Pt referred to Dr. Melody Kent for ileostomy care. Continue current abx management and she will f/u here in 4 weeks for wound check. \par \par

## 2021-04-14 NOTE — PHYSICAL EXAM
[Respiratory Effort] : normal respiratory effort [Normal Rate and Rhythm] : normal rate and rhythm [Alert] : alert [Oriented to Person] : oriented to person [Oriented to Place] : oriented to place [Oriented to Time] : oriented to time [Calm] : calm [Ankle Swelling (On Exam)] : not present [Varicose Veins Of Lower Extremities] : not present [] : not present [de-identified] : Cooperative, on a wheelchair [de-identified] : NC/AT  [de-identified] : Supple  [FreeTextEntry1] : R BKA site almost healed except for a very small spot over the medial aspect of the stump no active drainage and no signs of infection. L ant ankle midline incision healing nicely with islands of granulation tissue, no active drainage and no signs of infection.  [de-identified] : On a wheelchair.  [de-identified] : See cardiovascular section.  [de-identified] : Grossly intact.

## 2021-04-14 NOTE — REASON FOR VISIT
[de-identified] : GIOVANNY ROMAN [de-identified] : 3/19/2021 [de-identified] : 76 y/o F w/hx of ulcerative colitis, colon cancer  s/p total abdominal colectomy with end ileostomy Pt recently admitted to [facility] for DKA with altered mental status found to have b/l lower extremity open wounds, cellulitis and wet gangrene of the right foot, abscess of the left ankle s/p OR extensive debridement by podiatry on 3/13/2021. She was found to have T2DM. At the time pt refused any kind of amputation surgery and per healthcare wishes. Pt was reevaluated with persistent wet gangrene given minimal progression and high morbidity risk pt agreed to move forward with surgical procedure now s/p guillotine amputation of the right ankle and foot. I&D of the abscess over the L ankle on 3/15/2021 and complete R BKA on 3/19/2021 post op c/w purulent drainage form R BKA stump with low grade fever and leukocytosis s/p complete drainage of residual hematoma, infection and wound was packed. Antibiotics were started  Augmentin BID through 4/12/2021. Pt presents for post op visit. She continues to be at [facility]. She has been undergoing wound care provided by nursing staff and notes improvement of R BKA stump now almost healed. She states the pain has subsided and L ankle incision is healing. \par \par Furthermore, pt reports having issues with her ileostomy bag and would like to be referred to a general surgeon as it has been leaking for the past few days. Otherwise pt denies any active drainage, foul smelling, from incisions, fever, chills or shortness of breath. \par \par

## 2021-04-19 ENCOUNTER — NON-APPOINTMENT (OUTPATIENT)
Age: 76
End: 2021-04-19

## 2021-04-19 ENCOUNTER — APPOINTMENT (OUTPATIENT)
Dept: INFECTIOUS DISEASE | Facility: CLINIC | Age: 76
End: 2021-04-19
Payer: MEDICARE

## 2021-04-19 VITALS
WEIGHT: 132 LBS | HEIGHT: 62 IN | DIASTOLIC BLOOD PRESSURE: 70 MMHG | OXYGEN SATURATION: 100 % | TEMPERATURE: 98 F | SYSTOLIC BLOOD PRESSURE: 119 MMHG | HEART RATE: 93 BPM | BODY MASS INDEX: 24.29 KG/M2

## 2021-04-19 DIAGNOSIS — L08.9 LOCAL INFECTION OF THE SKIN AND SUBCUTANEOUS TISSUE, UNSPECIFIED: ICD-10-CM

## 2021-04-19 PROCEDURE — 99214 OFFICE O/P EST MOD 30 MIN: CPT

## 2021-04-19 RX ORDER — AMOXICILLIN AND CLAVULANATE POTASSIUM 875; 125 MG/1; MG/1
875-125 TABLET, COATED ORAL
Qty: 14 | Refills: 0 | Status: DISCONTINUED | COMMUNITY
Start: 2021-04-06

## 2021-04-19 NOTE — ASSESSMENT
[FreeTextEntry1] : 76yo F poor historian PMH uncontrolled DMT2, UC and colon cancer s/p colectomy and chemotherapy, chronic venous stasis w/ chronic LE wounds who presents due to frequent falls over the past several weeks at home. Admitted for DKA and b/l LE cellulits/abscess with c/f RLE necrotizing infection. She was started on Vanc/Zosyn and is s/p a R guillotine BKA and I&D of left ankle with vascular surgery on 3/15. LLE w/ tendonitis/myositis, no concern for OM. Left foot surgical swab (3/13) growing staph aureus, strept intermedius, strept constellatus, bifidobacterium breve, Candida (likely colonizer). ID consulted with c/f infected hematoma at R BKA stump site, R BKA wound cx obtained and growing moderate klebsiella pneumoniae with rare E. faecalis. s/p course of Augmentin--ended 4/12 with interval resolution of infection. \par Patient demonstrates poor insight (believes her diabetes is under control when in fact her A1c is 16.1! and repeatedly asks for GI referral for hernia surgery.\par Given ongoing diarrhea off bowel regimen and in setting of recent antibiotic exposure, reasonable to test stool for C. diff (recommended provided to nursing facility).\par Referrals to endocrinology (should be seen urgently) and for geriatrics for new PCP provided.\par \par rtc prn [Risk Reduction] : risk reduction [Medical Care Issues] : medical care issues [Anticipatory Guidance] : anticipatory guidance

## 2021-04-19 NOTE — PHYSICAL EXAM
[General Appearance - Alert] : alert [General Appearance - In No Acute Distress] : in no acute distress [] : no respiratory distress [Auscultation Breath Sounds / Voice Sounds] : lungs were clear to auscultation bilaterally [Heart Rate And Rhythm] : heart rate was normal and rhythm regular [Heart Sounds] : normal S1 and S2 [Heart Sounds Gallop] : no gallops [Murmurs] : no murmurs [Heart Sounds Pericardial Friction Rub] : no pericardial rub [Musculoskeletal - Swelling] : no joint swelling [Nail Clubbing] : no clubbing  or cyanosis of the fingernails [Motor Tone] : muscle strength and tone were normal [Deep Tendon Reflexes (DTR)] : deep tendon reflexes were 2+ and symmetric [Sensation] : the sensory exam was normal to light touch and pinprick [No Focal Deficits] : no focal deficits [Oriented To Time, Place, And Person] : oriented to person, place, and time

## 2021-04-19 NOTE — HISTORY OF PRESENT ILLNESS
[FreeTextEntry1] : 74 yo female with out of control diabetes (A1c 16.1), vasculopathy s/p R AKA, recent admission for stump infection  (cx 3/19 grew Klebsiella and Enterococcus); she completed PO Augmentin 4/12; saw vascular surgery for f/u last week--RLE wound was nearly completely closed at that time. Denies drainage from wound. No fever or chills. She does not watery ostomy output since leaving hospital; was on senna which was discontinued one week ago. BM remain watery--she consumes mostly a liquid diet.

## 2021-04-26 ENCOUNTER — APPOINTMENT (OUTPATIENT)
Dept: VASCULAR SURGERY | Facility: CLINIC | Age: 76
End: 2021-04-26
Payer: MEDICARE

## 2021-04-26 ENCOUNTER — APPOINTMENT (OUTPATIENT)
Dept: COLORECTAL SURGERY | Facility: CLINIC | Age: 76
End: 2021-04-26

## 2021-04-26 PROCEDURE — 99024 POSTOP FOLLOW-UP VISIT: CPT

## 2021-04-27 ENCOUNTER — APPOINTMENT (OUTPATIENT)
Dept: ENDOCRINOLOGY | Facility: CLINIC | Age: 76
End: 2021-04-27

## 2021-04-29 NOTE — DISCUSSION/SUMMARY
[FreeTextEntry1] : 76 y/o F wet gangrene of the right foot, abscess of the left ankle s/p OR extensive debridement by podiatry on 3/13/2021. Pt with persistent wet gangrene s/p guillotine amputation of the right ankle and foot. I&D of the abscess over the L ankle on 3/15/2021 and complete R BKA on 3/19/2021 post op c/w purulent drainage form R BKA stump with low grade fever and leukocytosis s/p complete drainage of residual hematoma, infection and wound was packed. Antibiotics were started  Augmentin BID through 4/12/2021 presents for post op visit. \par \par On exam, Abdomen: left lower quadrant with stool surrounding pink ileostomy, ileostomy bag loose. R BKA site almost healed except for a very small spot over the medial aspect of the stump no active drainage and no signs of infection. L ant ankle midline incision healing nicely with islands of granulation tissue, no active drainage and no signs of infection. \par \par Ileostomy site cleaned and ostomy bag placed. Applied bacitracin over R BKA small open site wrapped with Kerlix and ace bandage. L ant ankle cleaned and bacitracin applied. Wrapped with Kerlix. \par \par Plan: \par Discussed findings and treatment options. Pt to continue current wound care regimen at [facility]. Wound care instructions updated. Pt referred to Dr. Melody Kent for ileostomy care. Continue current abx management and she will f/u here in 4 weeks for wound check. \par \par

## 2021-04-29 NOTE — HISTORY OF PRESENT ILLNESS
[FreeTextEntry1] : 76 y/o F wet gangrene of the right foot, abscess of the left ankle s/p OR extensive debridement by podiatry on 3/13/2021. Pt with persistent wet gangrene s/p guillotine amputation of the right ankle and foot. I&D of the abscess over the L ankle on 3/15/2021 and complete R BKA on 3/19/2021 post op c/w purulent drainage form R BKA stump with low grade fever and leukocytosis s/p complete drainage of residual hematoma, infection and wound was packed. Antibiotics were started Augmentin BID through 4/12/2021 presents for follow up visit. Patient reports feeling well overall. She has been undergoing wound care at [facility] with significant improvement of wounds. She denies any active drainage, foul smelling, fever or chills.

## 2021-04-29 NOTE — PHYSICAL EXAM
[Respiratory Effort] : normal respiratory effort [Normal Rate and Rhythm] : normal rate and rhythm [Alert] : alert [Oriented to Person] : oriented to person [Oriented to Place] : oriented to place [Oriented to Time] : oriented to time [Calm] : calm [Ankle Swelling (On Exam)] : not present [Varicose Veins Of Lower Extremities] : not present [] : not present [de-identified] : Cooperative, on a wheelchair [de-identified] : NC/AT  [de-identified] : Supple  [FreeTextEntry1] : R BKA site almost healed except for a very small spot over the medial aspect of the stump no active drainage and no signs of infection. L ant ankle midline incision healing nicely with islands of granulation tissue, no active drainage and no signs of infection.  [de-identified] : On a wheelchair.  [de-identified] : See cardiovascular section.  [de-identified] : Grossly intact.

## 2021-04-29 NOTE — ASSESSMENT
[FreeTextEntry1] : 76 y/o F wet gangrene of the right foot, abscess of the left ankle s/p OR extensive debridement by podiatry on 3/13/2021. Pt with persistent wet gangrene s/p guillotine amputation of the right ankle and foot. I&D of the abscess over the L ankle on 3/15/2021 and complete R BKA on 3/19/2021 post op c/w purulent drainage form R BKA stump with low grade fever and leukocytosis s/p complete drainage of residual hematoma, infection and wound was packed. Antibiotics were started Augmentin BID through 4/12/2021 presents for post op visit. \par \par On exam, Abdomen: left lower quadrant with stool surrounding pink ileostomy, ileostomy bag loose. R BKA site almost healed except for a very small spot over the medial aspect of the stump no active drainage and no signs of infection. L ant ankle midline incision healing nicely with islands of granulation tissue, no active drainage and no signs of infection. \par \par Plan: \par Discussed findings and treatment options. Pt encourage to see Dr. Melody Kent for ileostomy care. Continue  current wound care regimen at [facility]. Wound care instructions updated. Arranged appointment pt to see Dr. Kent at 1pm today. She will f/u here in 6 weeks for wound check. \par \par \par \par

## 2021-05-10 ENCOUNTER — APPOINTMENT (OUTPATIENT)
Dept: ENDOCRINOLOGY | Facility: CLINIC | Age: 76
End: 2021-05-10
Payer: MEDICARE

## 2021-05-10 VITALS
SYSTOLIC BLOOD PRESSURE: 108 MMHG | WEIGHT: 128 LBS | DIASTOLIC BLOOD PRESSURE: 66 MMHG | HEART RATE: 104 BPM | BODY MASS INDEX: 23.41 KG/M2

## 2021-05-10 LAB
GLUCOSE BLDC GLUCOMTR-MCNC: 134
HBA1C MFR BLD HPLC: 6.7

## 2021-05-10 PROCEDURE — 82962 GLUCOSE BLOOD TEST: CPT

## 2021-05-10 PROCEDURE — 83036 HEMOGLOBIN GLYCOSYLATED A1C: CPT | Mod: QW

## 2021-05-10 PROCEDURE — 99203 OFFICE O/P NEW LOW 30 MIN: CPT | Mod: 25

## 2021-05-10 NOTE — ASSESSMENT
[FreeTextEntry1] : As per conversation with RN from the Rehab, and POC A1C of 6.7% - diabetes is adequately controlled;\par Pt will continue the same tx.\par Labs declined  - pt states she'd get them done at the Rehab;\par Appt with RD -pt declined;\par PCP contacted - Dr. Cody could accommodate the pt today, but pt declined as well.\par Will schedule an appointment with PCP herself.\par F/U in a month with Dr. Yip.

## 2021-05-10 NOTE — HISTORY OF PRESENT ILLNESS
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.\par Diabetes diagnosed recently, at the hospital - right leg below the knee amputation, related to gangrene.\par Pt is not sure which meds she takes for diabetes - as "it's all new";\par She has been in a rehab after discharge, and thinks would stay there for at least another month, untill her apartment is ready for her.\par Phone number in rehab - [phone] and [phone]\par I called and spoke to SRINIVAS Tolliver;\par Pt is managed with Januvia 100 mg/day and metformin 1 g bid; Admelog on scale before meals - "last administered a long time ago".\par POC A1C - 6.7%\par POC BG - 134 mg/dl\par PMG includes Colon and breast Ca; both >20 years ago; Hiatal hernia.\par Pt needs PCP.\par Accompanied by Wilner Salinas - the superintendant of the building the pt reside; his ph# [phone]\par

## 2021-05-10 NOTE — PHYSICAL EXAM
[Alert] : alert [No Acute Distress] : no acute distress [Normal Sclera/Conjunctiva] : normal sclera/conjunctiva [No Proptosis] : no proptosis [No Lid Lag] : no lid lag [Thyroid Not Enlarged] : the thyroid was not enlarged [No Respiratory Distress] : no respiratory distress [Normal Rate] : heart rate was normal [No Edema] : no peripheral edema [No Stigmata of Cushings Syndrome] : no stigmata of Cushings Syndrome [No Involuntary Movements] : no involuntary movements were seen [Foot Ulcers] : foot ulcers present [Hirsutism] : hirsutism present [No Tremors] : no tremors [Oriented x3] : oriented to person, place, and time [de-identified] : pt is on a wheelchair [de-identified] : left has the dressing, and is in the orthopedic boot.

## 2021-05-18 ENCOUNTER — NON-APPOINTMENT (OUTPATIENT)
Age: 76
End: 2021-05-18

## 2021-05-20 ENCOUNTER — APPOINTMENT (OUTPATIENT)
Dept: COLORECTAL SURGERY | Facility: CLINIC | Age: 76
End: 2021-05-20
Payer: MEDICARE

## 2021-05-20 VITALS
TEMPERATURE: 97.9 F | WEIGHT: 135 LBS | BODY MASS INDEX: 24.84 KG/M2 | HEART RATE: 96 BPM | HEIGHT: 62 IN | SYSTOLIC BLOOD PRESSURE: 125 MMHG | DIASTOLIC BLOOD PRESSURE: 77 MMHG

## 2021-05-20 DIAGNOSIS — K43.5 PARASTOMAL HERNIA WITHOUT OBSTRUCTION OR GANGRENE: ICD-10-CM

## 2021-05-20 DIAGNOSIS — Z78.9 OTHER SPECIFIED HEALTH STATUS: ICD-10-CM

## 2021-05-20 DIAGNOSIS — Z80.3 FAMILY HISTORY OF MALIGNANT NEOPLASM OF BREAST: ICD-10-CM

## 2021-05-20 PROCEDURE — 99203 OFFICE O/P NEW LOW 30 MIN: CPT

## 2021-05-20 NOTE — ASSESSMENT
[FreeTextEntry1] : Exam findings and diagnosis were discussed at length with patient. \par Presents today for 2nd opinion consultation of parastomal hernia. Has had multiple hernia surgeries. Known to CRS group at [place]; will request outside records.\par \par Recent imaging of CTAP in March 2021 in [facility] PACS reviewed; no hernia described per report. Suspect possible component of abdominal wall weakness/hernia.\par No signs of obstruction.\par No issues with pouching\par Stoma functioning.\par \par Surgical repair would likely require abdominal wall reconstruction. Risk of recurrence after attempted surgical repair discussed.\par Agree with prior surgical consultation - risk of surgical repair outweighs benefit.\par Patient states she does not want surgical repair.\par Offered appointment with [facility] stoma clinic, patient declines at this time, states she feels comfortable with stoma care.\par All questions were answered, patient expressed understanding, and is agreeable to this plan.\par

## 2021-05-20 NOTE — HISTORY OF PRESENT ILLNESS
[FreeTextEntry1] : 74 y/o F presents for evaluation of possible parastomal hernia, referred by Dr. Leung\par H/o UC and colon cancer, s/p near-total colectomy, Lake's procedure and colostomy 2005 at [facility]\par Completed 6 month course of chemotherapy following surgery \par \par Patient recently diagnosed with DM II after being hospitalized with altered mental status and found to be in DKA. Patient also found to have bilateral lower extremity open wounds, cellulitis and wet gangrene of the right foot and abscesses of the left ankle. S/p right BKA on 3/19/21\par \par \par CT ABDOMEN AND PELVIS IC\par \par PROCEDURE DATE: 03/13/2021\par \par \par INTERPRETATION: CT of the ABDOMEN and PELVIS with intravenous contrast dated 3/13/2021 12:55 PM\par \par INDICATION: falls hx of UC vague abd pain\par \par TECHNIQUE: CT of the abdomen and pelvis was performed. Axial and coronal and sagittal images were produced and reviewed.\par \par CONTRAST USAGE:\par IV contrast: Optiray 350: 100 ml administered; ml discarded.\par \par Oral contrast: Not administered.\par \par PRIOR STUDIES: None.\par \par FINDINGS: Images of the lower chest demonstrate no abnormality.\par \par The liver is normal in appearance. No radiopaque stones are seen in the gallbladder. The gallbladder is contracted. The pancreas is normal in appearance. No splenic abnormalities are seen.\par \par The adrenal glands are unremarkable. The kidneys are normal in size. No hydronephrosis. 8.7 cm simple cortical cyst lower pole right kidney.\par \par No abdominal aortic aneurysm is seen. No lymphadenopathy is seen.\par \par Evaluation of the bowel demonstrates the patient is status post near-total colectomy. Lake's procedure. Submucosal fat in position involving rectum.. Left lower quadrant enterostomy. No bowel obstruction. No ascites is seen.\par \par Images of the pelvis demonstrate the uterus to be normal in size. The endometrial hypodensity measures up to 0.6 cm. Cannot rule out at least one small endometrial polyp.. Ovaries not well seen but no adnexal mass. The urinary bladder is somewhat distended. No bladder mass or calculus. No bladder wall thickening.\par \par Evaluation of the osseous structures demonstrates degenerative disc disease in the lumbar spine particularly L4-L5.\par \par IMPRESSION: Status post near total colectomy. Jag procedure. Left lower quadrant enterostomy. No bowel obstruction. Distended urinary bladder.\par \par Thank you for the opportunity to participate in the care of this patient.\par \par Patient  reports that almost immediately following initial surgery she developed a parastomal hernia. She has undergone ~ 6 surgeries to repair parastomal hernia. She reports last surgery was completed 2013. Patient states that one surgery included moving stoma to opposite of the abdomen with continued failure to repair hernia. All surgeries were completed with [facility] Dr. Oquendo, Dr. Thad Arriaga and Dr. Rausch \par Previously Dr. Rausch referred patient to a plastic surgeon for evaluation of repair of hernia. Patient states she was and is still only interested in repair of hernia with biologic material, has been offered in the past repair with non-biologic materials. \par Patient believes she has very thin fascia that predisposes her to hernias\par She has attended stoma organization meetings in the past. States surgeon who presented at the meeting said one of her surgeries was done incorrectly because surgeon didn't use material to prevent adhesions. Patient is very upset by this experience. \par \par \par Previously evaluated by [facility] at [facility]. Reports she is now beyond the help of a WOCN. She tried stoma belts in the past but doesn't use anymore stating she feels they provided little help due to the size of hernia. States she has taught herself techniques to help manage\par \par Reports occasional abdominal pain when hernia enlarges. She is also concern for possible intermittent bowel obstructions although she has never been diagnosed or hospitalized for obstruction\par Reports stoma opening is narrowed. States that in the past, surgeons have inserted 11 mm Rods, which she was told was unusually small. Also told her stoma twist and is not straight\par Denies N/V, fever, chills\par BH: 1-5 times daily, formed to watery. More painful when more formed\par Working on dietary fiber intake but limited. Currently drinking 2 cups of water daily and 1-2 cups of tea daily \par Last scope completed with Dr. Milsom in 2012 or 2013, both through stoma and per rectum per patient.

## 2021-05-20 NOTE — PHYSICAL EXAM
[FreeTextEntry1] : Medical assistant present for duration of physical examination\par \par General no acute distress, alert and oriented\par Psych calm, pleasant demeanor, responding appropriately to questions\par Nonlabored breathing\par Wheelchair\par Extremities - right leg amputation\par Skin normal color and pigment, no visible lesions or rashes\par \par Abdomen soft, nondistended, nontender, LLQ ileostomy pink and everted about level of skin about 2cm, productive of green liquidy output, abdominal bulge surrounding stoma encompassing left lower quadrant abdominal wall, no peristomal skin irritation, good appliance fit, lower midline and RLQ incisions scars\par \par

## 2021-05-21 ENCOUNTER — APPOINTMENT (OUTPATIENT)
Dept: VASCULAR SURGERY | Facility: CLINIC | Age: 76
End: 2021-05-21
Payer: MEDICARE

## 2021-05-21 PROCEDURE — 99024 POSTOP FOLLOW-UP VISIT: CPT

## 2021-05-25 NOTE — HISTORY OF PRESENT ILLNESS
[FreeTextEntry1] : 74 y/o F wet gangrene of the right foot, abscess of the left ankle s/p OR extensive debridement by podiatry on 3/13/2021. Pt with persistent wet gangrene s/p guillotine amputation of the right ankle and foot. I&D of the abscess over the L ankle on 3/15/2021 and complete R BKA on 3/19/2021 post op c/w purulent drainage form R BKA stump with low grade fever and leukocytosis s/p complete drainage of residual hematoma, infection and wound was packed. Antibiotics were started Augmentin BID through 4/12/202. Patient with parastomal hernia with multiple repairs last one completed in 2013 all completed at [facility] presents for follow up visit on wounds to phill/l  LEs.  Patient reports feeling well overall. She states she saw Dr. Kent whom offered surgical repair of the wound but preferably like to continue with conservative measures. She continues to be at [facility] were she has been undergoing wound care as instructed in the office with significant improvement of wounds. She denies any active drainage, foul smelling, fever or chills. \par

## 2021-05-25 NOTE — PHYSICAL EXAM
[Respiratory Effort] : normal respiratory effort [Normal Rate and Rhythm] : normal rate and rhythm [Alert] : alert [Oriented to Person] : oriented to person [Oriented to Place] : oriented to place [Oriented to Time] : oriented to time [Calm] : calm [Ankle Swelling (On Exam)] : present [Ankle Swelling On The Left] : of the left ankle [Ankle Swelling On The Right] : mild [Varicose Veins Of Lower Extremities] : not present [] : not present [de-identified] : Cooperative, on a wheelchair [de-identified] : NC/AT  [de-identified] : Supple  [FreeTextEntry1] : R BKA nicely healed  L ant ankle midline incision healing nicely. Mild swelling appreciated to the left leg.  [de-identified] : On a wheelchair.  [de-identified] : See cardiovascular section.  [de-identified] : Grossly intact.

## 2021-05-25 NOTE — ADDENDUM
[FreeTextEntry1] : This note was written by Roxanne Morocho on 05/21/2021 acting as scribe for Harper Mijares M.D.\par \par I, Dr. Harper Leung, personally performed the evaluation and management (E/M) services for this established patient who presents today with (an) existing condition(s).  That E/M includes conducting the examination, assessing all conditions, and (re)establishing/reinforcing a plan of care.  Today, my ACP, Sophy WORRELL, was here to observe my evaluation and management services for this condition to be followed going forward.\par \par \par

## 2021-05-25 NOTE — ASSESSMENT
[Arterial/Venous Disease] : arterial/venous disease [Medication Management] : medication management [Foot care/Footwear] : foot care/footwear [Ulcer Care] : ulcer care [FreeTextEntry1] : 74 y/o F wet gangrene of the right foot, abscess of the left ankle s/p OR extensive debridement by podiatry on 3/13/2021. Pt with persistent wet gangrene s/p guillotine amputation of the right ankle and foot. I&D of the abscess over the L ankle on 3/15/2021 and complete R BKA on 3/19/2021 post op c/w purulent drainage form R BKA stump with low grade fever and leukocytosis s/p complete drainage of residual hematoma, infection and wound was packed. Antibiotics were started Augmentin BID through 4/12/202. Patient with parastomal hernia with multiple repairs last one completed in 2013 all completed at [facility] presents for follow up visit on wounds to b/l  LEs. \par \par On exam, R BKA nicely healed  L ant ankle midline incision healing nicely. Mild swelling appreciated to the left leg. \par \par Plan: \par Discussed findings and treatment options. Patient advise to elevate left leg at the level of the heart to help reduce swelling. \par Will set up prosthesis fitting for [facility]AN. \par No vascular surgery interventions recommended at this time. \par Patient to continue moisturizing L leg and R BKA stump to avoid any skin cracks or open sores. \par [facility]AN paperwork provided. \par She will f/u here in 3 months.

## 2021-06-01 ENCOUNTER — NON-APPOINTMENT (OUTPATIENT)
Age: 76
End: 2021-06-01

## 2021-06-17 ENCOUNTER — APPOINTMENT (OUTPATIENT)
Dept: COLORECTAL SURGERY | Facility: CLINIC | Age: 76
End: 2021-06-17

## 2021-07-07 ENCOUNTER — APPOINTMENT (OUTPATIENT)
Dept: INTERNAL MEDICINE | Facility: CLINIC | Age: 76
End: 2021-07-07
Payer: MEDICARE

## 2021-07-07 VITALS
BODY MASS INDEX: 24.29 KG/M2 | HEIGHT: 62 IN | OXYGEN SATURATION: 97 % | TEMPERATURE: 99.1 F | WEIGHT: 132 LBS | DIASTOLIC BLOOD PRESSURE: 72 MMHG | HEART RATE: 95 BPM | SYSTOLIC BLOOD PRESSURE: 109 MMHG

## 2021-07-07 DIAGNOSIS — Z85.038 PERSONAL HISTORY OF OTHER MALIGNANT NEOPLASM OF LARGE INTESTINE: ICD-10-CM

## 2021-07-07 DIAGNOSIS — I72.9 ANEURYSM OF UNSPECIFIED SITE: ICD-10-CM

## 2021-07-07 PROCEDURE — 36415 COLL VENOUS BLD VENIPUNCTURE: CPT

## 2021-07-07 PROCEDURE — G0439: CPT

## 2021-07-07 RX ORDER — METFORMIN HYDROCHLORIDE 500 MG/1
500 TABLET, COATED ORAL
Qty: 6 | Refills: 0 | Status: COMPLETED | COMMUNITY
Start: 2021-04-06 | End: 2021-07-07

## 2021-07-07 RX ORDER — MULTIVITAMIN
TABLET ORAL DAILY
Qty: 30 | Refills: 5 | Status: ACTIVE | COMMUNITY
Start: 2021-07-07 | End: 1900-01-01

## 2021-07-07 NOTE — HISTORY OF PRESENT ILLNESS
[Formal Caregiver] : formal caregiver [FreeTextEntry1] : annual exam/est care [de-identified] : Mrs. Mariscal i s a 74 Y/O female \par - recent complicated history w/ nec. fasc. requireing R BKA\par - states she does not want to be labeled as not compliant\par - she states it is because her apt has 5 steps and now that she is in a wheelchair it makes it very difficult for her to leave home for MD appointments. 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 \par \par DM\par - states never had issues before being in hospital\par - is compliant w/ medications currently\par - states she will come off medication ones she is home as she can control her diet better. \par \par HCM\par - completed covid series

## 2021-07-07 NOTE — PHYSICAL EXAM
[Normal] : affect was normal and insight and judgment were intact [de-identified] : wheelchair bound [de-identified] : RIGHT BKA

## 2021-07-12 LAB
25(OH)D3 SERPL-MCNC: 19.1 NG/ML
ALBUMIN SERPL ELPH-MCNC: 3.9 G/DL
ALP BLD-CCNC: 72 U/L
ALT SERPL-CCNC: 12 U/L
ANION GAP SERPL CALC-SCNC: 16 MMOL/L
AST SERPL-CCNC: 18 U/L
BASOPHILS # BLD AUTO: 0.06 K/UL
BASOPHILS NFR BLD AUTO: 0.7 %
BILIRUB SERPL-MCNC: 0.4 MG/DL
BUN SERPL-MCNC: 10 MG/DL
CALCIUM SERPL-MCNC: 10 MG/DL
CHLORIDE SERPL-SCNC: 102 MMOL/L
CHOLEST SERPL-MCNC: 164 MG/DL
CO2 SERPL-SCNC: 20 MMOL/L
CREAT SERPL-MCNC: 0.8 MG/DL
EOSINOPHIL # BLD AUTO: 0.12 K/UL
EOSINOPHIL NFR BLD AUTO: 1.5 %
ESTIMATED AVERAGE GLUCOSE: 154 MG/DL
GLUCOSE SERPL-MCNC: 148 MG/DL
HBA1C MFR BLD HPLC: 7 %
HCT VFR BLD CALC: 39.1 %
HDLC SERPL-MCNC: 50 MG/DL
HGB BLD-MCNC: 12.4 G/DL
IMM GRANULOCYTES NFR BLD AUTO: 0.4 %
LDLC SERPL CALC-MCNC: 59 MG/DL
LYMPHOCYTES # BLD AUTO: 2.07 K/UL
LYMPHOCYTES NFR BLD AUTO: 25.5 %
MAN DIFF?: NORMAL
MCHC RBC-ENTMCNC: 30.2 PG
MCHC RBC-ENTMCNC: 31.7 GM/DL
MCV RBC AUTO: 95.1 FL
MONOCYTES # BLD AUTO: 0.51 K/UL
MONOCYTES NFR BLD AUTO: 6.3 %
NEUTROPHILS # BLD AUTO: 5.32 K/UL
NEUTROPHILS NFR BLD AUTO: 65.6 %
NONHDLC SERPL-MCNC: 114 MG/DL
PLATELET # BLD AUTO: 267 K/UL
POTASSIUM SERPL-SCNC: 4.2 MMOL/L
PROT SERPL-MCNC: 6.6 G/DL
RBC # BLD: 4.11 M/UL
RBC # FLD: 12.8 %
SODIUM SERPL-SCNC: 138 MMOL/L
TRIGL SERPL-MCNC: 274 MG/DL
TSH SERPL-ACNC: 1.73 UIU/ML
VIT B12 SERPL-MCNC: 316 PG/ML
WBC # FLD AUTO: 8.11 K/UL

## 2021-07-19 ENCOUNTER — APPOINTMENT (OUTPATIENT)
Dept: SURGERY | Facility: CLINIC | Age: 76
End: 2021-07-19

## 2021-07-20 ENCOUNTER — APPOINTMENT (OUTPATIENT)
Dept: ENDOCRINOLOGY | Facility: CLINIC | Age: 76
End: 2021-07-20

## 2021-07-22 ENCOUNTER — APPOINTMENT (OUTPATIENT)
Dept: COLORECTAL SURGERY | Facility: CLINIC | Age: 76
End: 2021-07-22

## 2021-07-22 NOTE — HISTORY OF PRESENT ILLNESS
[FreeTextEntry1] : 74 yo F presents for f/u parastomal hernia\par H/o UC and colon cancer, s/p near-total colectomy, Lake's procedure and colostomy 2005 at [facility]\par Completed 6 month course of chemotherapy following surgery \par \par Patient diagnosed with DM II after being hospitalized with altered mental status and found to be in DKA. Patient also found to have bilateral lower extremity open wounds, cellulitis and wet gangrene of the right foot and abscesses of the left ankle. S/p right BKA on 3/19/21. She is followed by Vascular Dr. Leung\par \par Seen May 2021 for 2nd opinion consultation of possible parastomal hernia, patient is known to CRS group at [place] and s/p multiple hernia surgeries w/ recent referral to plastic surgeon for evaluation of repair of hernia. She is interested only in repair w/ biologic material.\par Recent CT a/p from March hospitalization w/ no mention of hernia, suspected abdominal wall weakness/hernia.\par Patient advised likely require abdominal wall reconstruction and risk of surgical repair of hernia outweighs the benefit. Patient declined surgical repair and declined stoma clinic.\par \par In interim, patient called 6/1 and spoke w/ NP Lucas requesting surgical repair. She was advised to seek consultation w/ general surgeon Dr. Sanabria as abdominal wall reconstruction would be combined case.\par Patient cancelled 7/19 \par \par \par Veronica

## 2021-07-30 ENCOUNTER — NON-APPOINTMENT (OUTPATIENT)
Age: 76
End: 2021-07-30

## 2021-08-04 ENCOUNTER — NON-APPOINTMENT (OUTPATIENT)
Age: 76
End: 2021-08-04

## 2021-08-06 ENCOUNTER — RX RENEWAL (OUTPATIENT)
Age: 76
End: 2021-08-06

## 2021-08-20 ENCOUNTER — APPOINTMENT (OUTPATIENT)
Dept: VASCULAR SURGERY | Facility: CLINIC | Age: 76
End: 2021-08-20

## 2021-08-20 ENCOUNTER — APPOINTMENT (OUTPATIENT)
Dept: VASCULAR SURGERY | Facility: CLINIC | Age: 76
End: 2021-08-20
Payer: MEDICARE

## 2021-08-20 PROCEDURE — 99213 OFFICE O/P EST LOW 20 MIN: CPT

## 2021-08-23 NOTE — END OF VISIT
[FreeTextEntry3] : I, Dr. Harper Leung  have read and attest that all the information, medical decision making and discharge instructions within are true and accurate. I personally performed the evaluation and management (E/M) services for this established patient who presents today with (a) new problem(s)/exacerbation of (an) existing condition(s).  That E/M includes conducting the examination, assessing all new/exacerbated conditions, and establishing a new plan of care.  Today, my ACP, Mariela Quiles PA-C, was here to observe my evaluation and management services for this new problem/exacerbated condition to be followed going forward.\par

## 2021-08-23 NOTE — HISTORY OF PRESENT ILLNESS
[FreeTextEntry1] : 76 y/o F wet gangrene of the right foot, abscess of the left ankle s/p OR extensive debridement by podiatry on 3/13/2021. Pt with persistent wet gangrene s/p guillotine amputation of the right ankle and foot. I&D of the abscess over the L ankle on 3/15/2021 and complete R BKA on 3/19/2021 post op c/w purulent drainage form R BKA stump with low grade fever and leukocytosis s/p complete drainage of residual hematoma, infection and wound was packed. Antibiotics were started Augmentin BID through 4/12/202. Patient with parastomal hernia with multiple repairs last one completed in 2013 all completed at [facility] presents for follow up visit on wounds to b/l  LEs. Patient doing well in general. She explains a few days ago she had a small bleeding episode over the L ant ankle incision but no new episodes since then. She continues to complete PT exercises and recently started using prosthesis to the R leg with improvement in regaining muscle strength.

## 2021-08-23 NOTE — ADDENDUM
[FreeTextEntry1] : This note was written by Roxanne Morocho on 08/20/2021 acting as scribe for Harper Mijares M.D.\par \par

## 2021-08-23 NOTE — PROCEDURE
[FreeTextEntry1] : LLE: bacitracin applied over the ant ankle, padding and leg wrapped w/ace bandage.

## 2021-08-23 NOTE — ASSESSMENT
[Arterial/Venous Disease] : arterial/venous disease [Medication Management] : medication management [FreeTextEntry1] : 76 y/o F wet gangrene of the right foot, abscess of the left ankle s/p OR extensive debridement by podiatry on 3/13/2021. Pt with persistent wet gangrene s/p guillotine amputation of the right ankle and foot. I&D of the abscess over the L ankle on 3/15/2021 and complete R BKA on 3/19/2021 post op c/w purulent drainage form R BKA stump with low grade fever and leukocytosis s/p complete drainage of residual hematoma, infection and wound was packed. Antibiotics were started Augmentin BID through 4/12/202. Patient with parastomal hernia with multiple repairs last one completed in 2013 all completed at [facility] presents for follow up visit on wounds to b/l  LEs. On exam, R BKA nicely healed with prosthesis leg.  L ant ankle midline incision nicely healed no skin breakdown. Mild swelling of the foot. \par \par LLE: bacitracin applied over the ant ankle, padding and leg wrapped w/ace bandage. \par \par \par Plan: \par -Discussed findings and treatment options. She is advised to continue PT exercises. \par -Skin wounds have healed nicely and mild edema of the L foot. Pt recommended to continue elevating leg above the level of the heart to help reduce swelling and wear ACE bandage\par -Keep skin well moisturized to avoid any skin breakdown. \par -To f/u here in 3 months for BKA surveillance.

## 2021-08-23 NOTE — PHYSICAL EXAM
[Respiratory Effort] : normal respiratory effort [Normal Rate and Rhythm] : normal rate and rhythm [Ankle Swelling (On Exam)] : present [Ankle Swelling On The Left] : of the left ankle [Ankle Swelling On The Right] : mild [Alert] : alert [Oriented to Person] : oriented to person [Oriented to Place] : oriented to place [Oriented to Time] : oriented to time [Calm] : calm [Varicose Veins Of Lower Extremities] : not present [] : not present [de-identified] : Friendly, cooperative, NAD  [de-identified] : NC/AT  [de-identified] : Supple  [FreeTextEntry1] : R BKA nicely healed with prosthesis leg.  L ant ankle midline incision nicely healed no skin breakdown. Mild swelling of the foot.  [de-identified] : On a wheelchair.  [de-identified] : See cardiovascular section.  [de-identified] : Grossly intact.

## 2021-09-03 ENCOUNTER — NON-APPOINTMENT (OUTPATIENT)
Age: 76
End: 2021-09-03

## 2021-09-27 ENCOUNTER — APPOINTMENT (OUTPATIENT)
Dept: ENDOCRINOLOGY | Facility: CLINIC | Age: 76
End: 2021-09-27
Payer: MEDICARE

## 2021-09-27 VITALS — SYSTOLIC BLOOD PRESSURE: 101 MMHG | DIASTOLIC BLOOD PRESSURE: 52 MMHG | HEART RATE: 71 BPM

## 2021-09-27 LAB
GLUCOSE BLDC GLUCOMTR-MCNC: 94
HBA1C MFR BLD HPLC: 6

## 2021-09-27 PROCEDURE — 82962 GLUCOSE BLOOD TEST: CPT

## 2021-09-27 PROCEDURE — 83036 HEMOGLOBIN GLYCOSYLATED A1C: CPT | Mod: QW

## 2021-09-27 PROCEDURE — 99213 OFFICE O/P EST LOW 20 MIN: CPT | Mod: 25

## 2021-09-27 NOTE — PHYSICAL EXAM
[Alert] : alert [Well Nourished] : well nourished [No Acute Distress] : no acute distress [Normal Voice/Communication] : normal voice communication [Normal Sclera/Conjunctiva] : normal sclera/conjunctiva [No Proptosis] : no proptosis [No Neck Mass] : no neck mass was observed [No Respiratory Distress] : no respiratory distress [No Accessory Muscle Use] : no accessory muscle use [Normal Rate and Effort] : normal respiratory rate and effort [Clear to Auscultation] : lungs were clear to auscultation bilaterally [No Stigmata of Cushings Syndrome] : no stigmata of Cushings Syndrome [No Clubbing, Cyanosis] : no clubbing  or cyanosis of the fingernails [No Involuntary Movements] : no involuntary movements were seen [No Rash] : no rash [Oriented x3] : oriented to person, place, and time [de-identified] : wears glasses

## 2021-09-27 NOTE — ASSESSMENT
[FreeTextEntry1] : Diabetets - well controlled on current regimen - continue januvia and metformin;\par Vit d - will check at home if completed 50K course as prescribed -  will start taking 2000 IU daily.\par Referrred to ophthalmologist.\par Carb Counting explained. Recommended ~ 150 gr of carbs daily in 3 meals and 2-3 snacks.\par F/U with Dr. Yip in 3-4 months.

## 2021-09-27 NOTE — HISTORY OF PRESENT ILLNESS
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.\par Diabetes diagnosed recently, at the hospital - right leg below the knee amputation, related to gangrene.\par Pt is not sure which meds she takes for diabetes - as "it's all new";\par She has been in a rehab after discharge, and thinks would stay there for at least another month, untill her apartment is ready for her.\par Phone number in rehab - [phone] and [phone]\par I called and spoke to RN Gallo Tolliver;\par Pt is managed with Januvia 100 mg/day and metformin 1 g bid; Admelog on scale before meals - "last administered a long time ago".\par POC A1C - 6.7%\par POC BG - 134 mg/dl\par PMG includes Colon and breast Ca; both >20 years ago; Hiatal hernia.\par Pt needs PCP.\par Accompanied by Wilner Salinas - the superintendant of the building the pt reside; his ph# [phone]\par \par 9/27/21 MK\par came for f/u.\par has been at home for a few months. States eats differently from rehab - more carbs.\par POC A1C - 6%\par POC BG - 94 mg/dl\par Optho - due\par Recent lab results from Dr. Cody's office discussed - pt was not sure if she had taken vit D prescribed in July.\par Got a leg prosthesis - is satisfied with it.\par No new complains.\par

## 2021-10-06 ENCOUNTER — NON-APPOINTMENT (OUTPATIENT)
Age: 76
End: 2021-10-06

## 2021-10-11 ENCOUNTER — NON-APPOINTMENT (OUTPATIENT)
Age: 76
End: 2021-10-11

## 2021-10-11 ENCOUNTER — APPOINTMENT (OUTPATIENT)
Dept: OPHTHALMOLOGY | Facility: CLINIC | Age: 76
End: 2021-10-11
Payer: MEDICARE

## 2021-10-11 PROCEDURE — 92004 COMPRE OPH EXAM NEW PT 1/>: CPT

## 2021-10-11 PROCEDURE — 92134 CPTRZ OPH DX IMG PST SGM RTA: CPT

## 2021-10-11 PROCEDURE — 92201 OPSCPY EXTND RTA DRAW UNI/BI: CPT

## 2021-11-05 ENCOUNTER — APPOINTMENT (OUTPATIENT)
Dept: HEART AND VASCULAR | Facility: CLINIC | Age: 76
End: 2021-11-05

## 2021-11-15 ENCOUNTER — NON-APPOINTMENT (OUTPATIENT)
Age: 76
End: 2021-11-15

## 2021-11-19 ENCOUNTER — APPOINTMENT (OUTPATIENT)
Dept: HEART AND VASCULAR | Facility: CLINIC | Age: 76
End: 2021-11-19
Payer: MEDICARE

## 2021-11-19 VITALS
BODY MASS INDEX: 24.84 KG/M2 | HEIGHT: 62 IN | DIASTOLIC BLOOD PRESSURE: 67 MMHG | OXYGEN SATURATION: 98 % | SYSTOLIC BLOOD PRESSURE: 120 MMHG | HEART RATE: 67 BPM | WEIGHT: 135 LBS

## 2021-11-19 PROCEDURE — 93306 TTE W/DOPPLER COMPLETE: CPT

## 2021-11-23 ENCOUNTER — NON-APPOINTMENT (OUTPATIENT)
Age: 76
End: 2021-11-23

## 2021-11-23 ENCOUNTER — APPOINTMENT (OUTPATIENT)
Dept: HEART AND VASCULAR | Facility: CLINIC | Age: 76
End: 2021-11-23
Payer: MEDICARE

## 2021-11-23 VITALS
OXYGEN SATURATION: 98 % | HEART RATE: 64 BPM | BODY MASS INDEX: 24.84 KG/M2 | SYSTOLIC BLOOD PRESSURE: 112 MMHG | TEMPERATURE: 98.3 F | WEIGHT: 135 LBS | HEIGHT: 62 IN | DIASTOLIC BLOOD PRESSURE: 60 MMHG

## 2021-11-23 DIAGNOSIS — R94.31 ABNORMAL ELECTROCARDIOGRAM [ECG] [EKG]: ICD-10-CM

## 2021-11-23 PROCEDURE — 36415 COLL VENOUS BLD VENIPUNCTURE: CPT

## 2021-11-23 PROCEDURE — 99204 OFFICE O/P NEW MOD 45 MIN: CPT | Mod: 25

## 2021-11-23 PROCEDURE — 93000 ELECTROCARDIOGRAM COMPLETE: CPT | Mod: 59

## 2021-11-23 PROCEDURE — 93224 XTRNL ECG REC UP TO 48 HRS: CPT

## 2021-11-23 NOTE — HISTORY OF PRESENT ILLNESS
[FreeTextEntry1] : Tammie Mariscal is a 76 yo woman with a h/o DM s/p right BKA who was told in the hospital that she has an aneurysm seen on echo.  She also has a h/o of high cholesterol.\par \par She does not know where the aneurysm was but thinks it was in the aorta and was told by the doctor that it was "7 mm and nothing to worry about".  She came today for repeat echo.\par \par She is not able to walk very far bc her prosthesis does not fit well. Prior to the BKA (which was in April) - she was able to walk without any cp or sob.

## 2021-11-23 NOTE — ASSESSMENT
[FreeTextEntry1] : Tammie Mariscal is a 76 yo woman with a h/o DM s/p right BKA who was told in the hospital that she has an aneurysm seen on echo.  She also has a h/o of high cholesterol.\par \par \par She does not know where the aneurysm was but thinks it was in the aorta and was told by the doctor that it was "7 mm and nothing to worry about".  She came today for repeat echo.\par \par She is not able to walk very far bc her prosthesis does not fit well. Prior to the BKA (which was in April) - she was able to walk without any cp or sob.\par \par Her echo today shows normal LVF, normal aorta and no significant valvular disease. \par \par I have asked her to get her old records so that we can see the finding.\par \par In terms of her cholesterol, her most recent blood work showed TG = 274 (7/12/21).  She claims it was checked after not eating in the hospital and losing a lot of weight. We will recheck today.\par \par Her EKG shows bifasicular block - will get a holter monitor.\par \par Pt will return in 4-6 weeks.

## 2021-11-30 ENCOUNTER — NON-APPOINTMENT (OUTPATIENT)
Age: 76
End: 2021-11-30

## 2021-11-30 LAB
ALBUMIN SERPL ELPH-MCNC: 4.5 G/DL
ALP BLD-CCNC: 83 U/L
ALT SERPL-CCNC: 15 U/L
ANION GAP SERPL CALC-SCNC: 13 MMOL/L
AST SERPL-CCNC: 17 U/L
BILIRUB SERPL-MCNC: 0.3 MG/DL
BUN SERPL-MCNC: 18 MG/DL
CALCIUM SERPL-MCNC: 10.1 MG/DL
CHLORIDE SERPL-SCNC: 104 MMOL/L
CHOLEST SERPL-MCNC: 211 MG/DL
CO2 SERPL-SCNC: 23 MMOL/L
CREAT SERPL-MCNC: 0.86 MG/DL
GLUCOSE SERPL-MCNC: 109 MG/DL
HDLC SERPL-MCNC: 76 MG/DL
LDLC SERPL CALC-MCNC: 96 MG/DL
NONHDLC SERPL-MCNC: 135 MG/DL
POTASSIUM SERPL-SCNC: 4.7 MMOL/L
PROT SERPL-MCNC: 7.7 G/DL
SODIUM SERPL-SCNC: 139 MMOL/L
TRIGL SERPL-MCNC: 194 MG/DL

## 2021-12-14 ENCOUNTER — APPOINTMENT (OUTPATIENT)
Dept: HEART AND VASCULAR | Facility: CLINIC | Age: 76
End: 2021-12-14

## 2021-12-28 ENCOUNTER — APPOINTMENT (OUTPATIENT)
Dept: OTOLARYNGOLOGY | Facility: CLINIC | Age: 76
End: 2021-12-28

## 2022-01-03 ENCOUNTER — APPOINTMENT (OUTPATIENT)
Dept: VASCULAR SURGERY | Facility: CLINIC | Age: 77
End: 2022-01-03

## 2022-01-10 ENCOUNTER — APPOINTMENT (OUTPATIENT)
Dept: ENDOCRINOLOGY | Facility: CLINIC | Age: 77
End: 2022-01-10

## 2022-01-14 ENCOUNTER — APPOINTMENT (OUTPATIENT)
Dept: VASCULAR SURGERY | Facility: CLINIC | Age: 77
End: 2022-01-14
Payer: MEDICARE

## 2022-01-14 VITALS
HEIGHT: 62 IN | HEART RATE: 89 BPM | DIASTOLIC BLOOD PRESSURE: 68 MMHG | SYSTOLIC BLOOD PRESSURE: 122 MMHG | BODY MASS INDEX: 24.84 KG/M2 | WEIGHT: 135 LBS

## 2022-01-14 PROCEDURE — 93971 EXTREMITY STUDY: CPT

## 2022-01-14 PROCEDURE — 99024 POSTOP FOLLOW-UP VISIT: CPT

## 2022-01-19 NOTE — ADDENDUM
[FreeTextEntry1] : I, Dr. Harper Leung, personally performed the evaluation and management (E/M) services for this established patient who presents today with (an) existing condition(s).  That E/M includes conducting the examination, assessing all conditions, and (re)establishing/reinforcing a plan of care.  Today, my ACP, Sophy WORRELL, was here to observe my evaluation and management services for this condition to be followed going forward.\par \par \par

## 2022-01-19 NOTE — HISTORY OF PRESENT ILLNESS
[FreeTextEntry1] : 75 y/o F with hx of  wet gangrene of the right foot, s/p guillotine amputation below knee. I&D of the abscess of L ankle on 3/15/2021 returns for a routine follow up. Pt wears RLE prosthesis around the house. Patient is doing well in general. She developed L foot edema and she is concerned about her circulation. She denies leg pain, new skin breakdown, claudication.

## 2022-01-19 NOTE — PHYSICAL EXAM
[Respiratory Effort] : normal respiratory effort [Normal Rate and Rhythm] : normal rate and rhythm [Ankle Swelling (On Exam)] : present [Ankle Swelling On The Left] : of the left ankle [Ankle Swelling On The Right] : mild [Alert] : alert [Oriented to Person] : oriented to person [Oriented to Place] : oriented to place [Oriented to Time] : oriented to time [Calm] : calm [Varicose Veins Of Lower Extremities] : not present [] : not present [de-identified] : Friendly, cooperative, NAD  [de-identified] : NC/AT  [de-identified] : Supple  [FreeTextEntry1] : R BKA nicely healed with prosthesis leg.  L ant ankle midline incision nicely healed no skin breakdown. Mild swelling of the foot.  [de-identified] : On a wheelchair.  [de-identified] : See cardiovascular section.  [de-identified] : Grossly intact.

## 2022-01-19 NOTE — ASSESSMENT
[Arterial/Venous Disease] : arterial/venous disease [Medication Management] : medication management [FreeTextEntry1] : 77 y/o F with hx of  wet gangrene of the right foot, s/p guillotine amputation below knee. I&D of the abscess of L ankle on 3/15/2021 returns for a routine follow up.\par Well healed R BKA, wearing prosthesis.\par L foot edema, due to patient is sitting during a day and she is not moving much.\par LLE venous doppler demonstrated no evidence of DVT.\par Pt recommended to continue elevating leg above the level of the heart to help reduce swelling and wear ACE bandage\par Keep skin well moisturized to avoid any skin breakdown. \par Pt will f/u here in 6 months.

## 2022-02-11 ENCOUNTER — APPOINTMENT (OUTPATIENT)
Dept: VASCULAR SURGERY | Facility: CLINIC | Age: 77
End: 2022-02-11
Payer: MEDICARE

## 2022-02-11 VITALS
DIASTOLIC BLOOD PRESSURE: 82 MMHG | BODY MASS INDEX: 24.84 KG/M2 | HEART RATE: 87 BPM | WEIGHT: 135 LBS | SYSTOLIC BLOOD PRESSURE: 117 MMHG | HEIGHT: 62 IN

## 2022-02-11 PROCEDURE — 99213 OFFICE O/P EST LOW 20 MIN: CPT

## 2022-02-11 NOTE — ASSESSMENT
[Arterial/Venous Disease] : arterial/venous disease [Medication Management] : medication management [FreeTextEntry1] : 75 y/o F with hx of  wet gangrene of the right foot, s/p guillotine amputation below knee. I&D of the abscess of L ankle on 3/15/2021 returns for a routine follow up.\par Well healed R BKA, wearing prosthesis.\par L foot edema, due to patient is sitting during a day and she is not moving much.\par LLE venous doppler demonstrated no evidence of DVT.\par Pt recommended to continue elevating leg above the level of the heart to help reduce swelling and wear ACE bandage\par Keep skin well moisturized to avoid any skin breakdown. \par Pt will f/u here in 6 months.

## 2022-02-11 NOTE — PHYSICAL EXAM
[Respiratory Effort] : normal respiratory effort [Normal Rate and Rhythm] : normal rate and rhythm [Ankle Swelling (On Exam)] : present [Ankle Swelling On The Left] : of the left ankle [Ankle Swelling On The Right] : mild [Alert] : alert [Oriented to Person] : oriented to person [Oriented to Place] : oriented to place [Oriented to Time] : oriented to time [Calm] : calm [Varicose Veins Of Lower Extremities] : not present [] : not present [de-identified] : Friendly, cooperative, NAD  [de-identified] : NC/AT  [de-identified] : Supple  [FreeTextEntry1] : R BKA nicely healed with prosthesis leg.  L ant ankle midline incision nicely healed no skin breakdown. Mild swelling of the foot.  [de-identified] : On a wheelchair.  [de-identified] : See cardiovascular section.  [de-identified] : Grossly intact.

## 2022-02-25 ENCOUNTER — APPOINTMENT (OUTPATIENT)
Dept: OTOLARYNGOLOGY | Facility: CLINIC | Age: 77
End: 2022-02-25
Payer: MEDICARE

## 2022-02-25 VITALS
DIASTOLIC BLOOD PRESSURE: 76 MMHG | BODY MASS INDEX: 25.76 KG/M2 | HEIGHT: 62 IN | HEART RATE: 86 BPM | SYSTOLIC BLOOD PRESSURE: 102 MMHG | WEIGHT: 140 LBS

## 2022-02-25 DIAGNOSIS — H61.23 IMPACTED CERUMEN, BILATERAL: ICD-10-CM

## 2022-02-25 DIAGNOSIS — H91.93 UNSPECIFIED HEARING LOSS, BILATERAL: ICD-10-CM

## 2022-02-25 PROCEDURE — 92557 COMPREHENSIVE HEARING TEST: CPT

## 2022-02-25 PROCEDURE — 99203 OFFICE O/P NEW LOW 30 MIN: CPT | Mod: 25

## 2022-02-25 PROCEDURE — 92550 TYMPANOMETRY & REFLEX THRESH: CPT

## 2022-02-25 PROCEDURE — G0268 REMOVAL OF IMPACTED WAX MD: CPT

## 2022-02-25 NOTE — ASSESSMENT
[FreeTextEntry1] : 76F here for initial evaluation. She c/o diminished hearing. Both ears affected equally. She also has h/o wax buildup and was always told by her PCPs that there is lots of wax. There is no otorrhea, otalgia, tinnitus or vertigo. 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 On exam, there are narrow EACs occluded w hairs and wax, removed w curet. The rest of the exam is unremarkable.\par Cerumen removed. Hearing loss due to age-related changes. No hearing aid at this time. RTO 1 yr for ear exam/cleaning.

## 2022-02-25 NOTE — HISTORY OF PRESENT ILLNESS
[de-identified] : 76F here for initial evaluation.\par \par She c/o diminished hearing. Both ears affected equally. She also has h/o wax buildup and was always told by her PCPs that there is lots of wax. There is no otorrhea, otalgia, tinnitus or vertigo.\par \par Audiogram from today:\par -symmetric moderate high frequency SNHL\par -100% discrim\par -type A tymps\par \par ROS otherwise unremarkable.

## 2022-02-25 NOTE — PHYSICAL EXAM
[Midline] : trachea located in midline position [Normal] : no rashes [FreeTextEntry1] : Au: narrow EACs. EAC occluded w hairs and wax, removed w curet. TM intact and mobile, ME clear

## 2022-02-28 ENCOUNTER — APPOINTMENT (OUTPATIENT)
Dept: ENDOCRINOLOGY | Facility: CLINIC | Age: 77
End: 2022-02-28
Payer: MEDICARE

## 2022-02-28 VITALS
WEIGHT: 140 LBS | SYSTOLIC BLOOD PRESSURE: 112 MMHG | BODY MASS INDEX: 25.61 KG/M2 | DIASTOLIC BLOOD PRESSURE: 68 MMHG | HEART RATE: 81 BPM

## 2022-02-28 LAB
GLUCOSE BLDC GLUCOMTR-MCNC: 111
HBA1C MFR BLD HPLC: 6.5

## 2022-02-28 PROCEDURE — 82962 GLUCOSE BLOOD TEST: CPT

## 2022-02-28 PROCEDURE — 99214 OFFICE O/P EST MOD 30 MIN: CPT | Mod: 25

## 2022-02-28 PROCEDURE — 83036 HEMOGLOBIN GLYCOSYLATED A1C: CPT | Mod: QW

## 2022-03-03 LAB
25(OH)D3 SERPL-MCNC: 33.8 NG/ML
ALBUMIN SERPL ELPH-MCNC: 4.7 G/DL
ALP BLD-CCNC: 91 U/L
ALT SERPL-CCNC: 19 U/L
ANION GAP SERPL CALC-SCNC: 15 MMOL/L
AST SERPL-CCNC: 21 U/L
BASOPHILS # BLD AUTO: 0.04 K/UL
BASOPHILS NFR BLD AUTO: 0.5 %
BILIRUB SERPL-MCNC: 0.6 MG/DL
BUN SERPL-MCNC: 53 MG/DL
CALCIUM SERPL-MCNC: 10.6 MG/DL
CHLORIDE SERPL-SCNC: 100 MMOL/L
CO2 SERPL-SCNC: 20 MMOL/L
CREAT SERPL-MCNC: 1.8 MG/DL
EGFR: 29 ML/MIN/1.73M2
EOSINOPHIL # BLD AUTO: 0.05 K/UL
EOSINOPHIL NFR BLD AUTO: 0.7 %
ESTIMATED AVERAGE GLUCOSE: 137 MG/DL
GLUCOSE SERPL-MCNC: 111 MG/DL
HBA1C MFR BLD HPLC: 6.4 %
HCT VFR BLD CALC: 39.2 %
HGB BLD-MCNC: 12.6 G/DL
IMM GRANULOCYTES NFR BLD AUTO: 0.1 %
LYMPHOCYTES # BLD AUTO: 2.09 K/UL
LYMPHOCYTES NFR BLD AUTO: 28.2 %
MAN DIFF?: NORMAL
MCHC RBC-ENTMCNC: 29 PG
MCHC RBC-ENTMCNC: 32.1 GM/DL
MCV RBC AUTO: 90.1 FL
MONOCYTES # BLD AUTO: 0.53 K/UL
MONOCYTES NFR BLD AUTO: 7.2 %
NEUTROPHILS # BLD AUTO: 4.69 K/UL
NEUTROPHILS NFR BLD AUTO: 63.3 %
PLATELET # BLD AUTO: 387 K/UL
POTASSIUM SERPL-SCNC: 4.5 MMOL/L
PROT SERPL-MCNC: 7.9 G/DL
RBC # BLD: 4.35 M/UL
RBC # FLD: 14.4 %
SODIUM SERPL-SCNC: 134 MMOL/L
VIT B12 SERPL-MCNC: 313 PG/ML
WBC # FLD AUTO: 7.41 K/UL

## 2022-03-03 NOTE — PHYSICAL EXAM
[Alert] : alert [Well Nourished] : well nourished [No Acute Distress] : no acute distress [Normal Sclera/Conjunctiva] : normal sclera/conjunctiva [No Proptosis] : no proptosis [No Respiratory Distress] : no respiratory distress [No Accessory Muscle Use] : no accessory muscle use [Normal Rate and Effort] : normal respiratory rate and effort [Normal Rate] : heart rate was normal [No Stigmata of Cushings Syndrome] : no stigmata of Cushings Syndrome [No Clubbing, Cyanosis] : no clubbing  or cyanosis of the fingernails [No Involuntary Movements] : no involuntary movements were seen [No Tremors] : no tremors [Oriented x3] : oriented to person, place, and time [de-identified] : wears glasses [de-identified] : ambulates on wheelchair

## 2022-03-03 NOTE — ADDENDUM
[FreeTextEntry1] : 3/3/22 MK\par called pt to discuss lab results -LM, sent her a copy of the report by mail with the following comments:\par "Dear Mrs. Mariscal,\par Here are your lab results.\par Diabetes is well controlled - we do not need to change any medications at this time.\par The test showed that you are dehydrated, and your kidney function results are outside of the normal range. You need to schedule an appointment with the nephrologist. Please, call me to discuss the details".\par

## 2022-03-03 NOTE — ASSESSMENT
[FreeTextEntry1] : Diabetes - control at goal; continue the same tx.\par Statins discussed - pt will decide if she is to start Tx after getting test results.\par Labs today.\par f/u in 3-4 months with Dr. Yip.

## 2022-03-03 NOTE — HISTORY OF PRESENT ILLNESS
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.\par Diabetes diagnosed recently, at the hospital - right leg below the knee amputation, related to gangrene.\par Pt is not sure which meds she takes for diabetes - as "it's all new";\par She has been in a rehab after discharge, and thinks would stay there for at least another month, untill her apartment is ready for her.\par Phone number in rehab - [phone] and [phone]\par I called and spoke to RN Gallo Tolliver;\par Pt is managed with Januvia 100 mg/day and metformin 1 g bid; Admelog on scale before meals - "last administered a long time ago".\par POC A1C - 6.7%\par POC BG - 134 mg/dl\par PMG includes Colon and breast Ca; both >20 years ago; Hiatal hernia.\par Pt needs PCP.\par Accompanied by Wilner Salinas - the superintendant of the building the pt reside; his ph# [phone]\par \par 9/27/21 MK\par came for f/u.\par has been at home for a few months. States eats differently from rehab - more carbs.\par POC A1C - 6%\par POC BG - 94 mg/dl\par Optho - due\par Recent lab results from Dr. Cody's office discussed - pt was not sure if she had taken vit D prescribed in July.\par Got a leg prosthesis - is satisfied with it.\par No new complains.\par \par 2/28/22 MK\par Came fo f/u on DM management.\par Feels fine; has questions about d-se process, and possible change in meds;\par Would like to discuss diet.\par POC Bg - 111 mg/dl\par POC A1C - 6.5%\par Treated with metformin and Januvia.\par Plans to see a neurologist to discuss peripheral neuropathy.\par Plans to get a new prosthesis for her right leg.\par

## 2022-05-03 ENCOUNTER — RESULT REVIEW (OUTPATIENT)
Age: 77
End: 2022-05-03

## 2022-05-03 ENCOUNTER — LABORATORY RESULT (OUTPATIENT)
Age: 77
End: 2022-05-03

## 2022-05-03 ENCOUNTER — APPOINTMENT (OUTPATIENT)
Dept: NEPHROLOGY | Facility: CLINIC | Age: 77
End: 2022-05-03
Payer: MEDICARE

## 2022-05-03 VITALS — HEART RATE: 72 BPM | SYSTOLIC BLOOD PRESSURE: 109 MMHG | DIASTOLIC BLOOD PRESSURE: 68 MMHG

## 2022-05-03 DIAGNOSIS — R19.7 DIARRHEA, UNSPECIFIED: ICD-10-CM

## 2022-05-03 PROCEDURE — 36415 COLL VENOUS BLD VENIPUNCTURE: CPT

## 2022-05-03 PROCEDURE — 99204 OFFICE O/P NEW MOD 45 MIN: CPT | Mod: 25

## 2022-05-03 RX ORDER — METFORMIN HYDROCHLORIDE 1000 MG/1
1000 TABLET, COATED ORAL
Qty: 180 | Refills: 3 | Status: DISCONTINUED | COMMUNITY
Start: 2021-04-06 | End: 2022-05-03

## 2022-05-03 RX ORDER — MULTIVIT-MIN/FOLIC/VIT K/LYCOP 400-300MCG
500 TABLET ORAL DAILY
Qty: 30 | Refills: 2 | Status: DISCONTINUED | COMMUNITY
Start: 2021-07-07 | End: 2022-05-03

## 2022-05-03 RX ORDER — ERGOCALCIFEROL 1.25 MG/1
1.25 MG CAPSULE, LIQUID FILLED ORAL
Qty: 12 | Refills: 0 | Status: DISCONTINUED | COMMUNITY
Start: 2021-07-12 | End: 2022-05-03

## 2022-05-03 NOTE — PHYSICAL EXAM
[General Appearance - Alert] : alert [General Appearance - In No Acute Distress] : in no acute distress [] : no respiratory distress [Auscultation Breath Sounds / Voice Sounds] : lungs were clear to auscultation bilaterally [Heart Rate And Rhythm] : heart rate was normal and rhythm regular [Heart Sounds] : normal S1 and S2 [Heart Sounds Gallop] : no gallops [Murmurs] : no murmurs [Heart Sounds Pericardial Friction Rub] : no pericardial rub [Bowel Sounds] : normal bowel sounds [Abdomen Soft] : soft [Abdomen Tenderness] : non-tender [Abdomen Mass (___ Cm)] : no abdominal mass palpated [Cervical Lymph Nodes Enlarged Posterior Bilaterally] : posterior cervical [Cervical Lymph Nodes Enlarged Anterior Bilaterally] : anterior cervical [Supraclavicular Lymph Nodes Enlarged Bilaterally] : supraclavicular [FreeTextEntry1] : right BKA, left ankle edema

## 2022-05-03 NOTE — ASSESSMENT
[FreeTextEntry1] : # Worsening renal function over last 6 months of unclear cause.\par * Possibly related to hypovolemia from high ostomy output and high calcium levels. Oxalate nephropathy is also possible.\par * Stop metformin. Stop calcium and vitamin D. Stop vitamin C. \par * Recheck labs, including cystatin C, monoclonal protein evaluation, urinalysis, and urine albumin quantification.\par * Check renal ultrasound.\par * Will consider SGLT2i.\par * Therapies for kidney disease: blood pressure control; other evidence-based therapies including exercise, a plant-based lower oxalate diet, and 400 mcg folic acid daily\par * Cardiovascular disease prevention: counseling on healthy diet, physical activity, weight loss, alcohol limitation, blood pressure control\par * A counseling information sheet has been given (today). All their questions were answered.\par * The patient has been counseled that chronic kidney disease is a significant condition and regular office followup with me (at least every 3 weeks for now) is important for monitoring and their health, and that it is their responsibility to make a follow up appointment.\par * The patient has been counseled never to stop taking their medications without discussing it with me or another doctor.\par * The patient has been counseled on avoiding NSAIDs.\par * The patient has been counseled on risk of acute renal failure and instructed to immediately call and speak with me or go immediately to ER with any severe symptoms, nausea, vomiting, diarrhea, chest pain, or shortness of breath.\par \par \par # Borderline hypercalcemia.\par * Possibly due to calcium and vitamin D.\par * Recheck workup.\par \par

## 2022-05-03 NOTE — HISTORY OF PRESENT ILLNESS
[FreeTextEntry1] : Kindly referred by Kristi Burgos for elevated creatinine. \par \par Labs reviewed. Creatinine was 0.86 on Nov 21 and increased to 1.8 on 28Feb22 with calcium of 10.6. She felt that she had large ileostomy output and wasn't drinking enough and has now increased fluid intake. She takes nexium only several times weekly (and will not stop). The patient denies exposure to chronic NSAIDs, green smoothies, creatine, or herbal supplements. The patient denies a history of kidney stones or pyelonephritis. No significant nocturia. No recent renal ultrasound. They are unaware of proteinuria or hematuria.\par \par No illness (including Covid) since November 2021. No change in medications. \par \par She has a known right renal cyst. This is reportedly benign. \par \par On metformin (which she will now stop). \par \par Calcium 10.6. She has been treated with vitamin D (but not calcium carbonate).

## 2022-05-04 ENCOUNTER — NON-APPOINTMENT (OUTPATIENT)
Age: 77
End: 2022-05-04

## 2022-05-04 LAB
24R-OH-CALCIDIOL SERPL-MCNC: 19.3 PG/ML
25(OH)D3 SERPL-MCNC: 31.4 NG/ML
ALBUMIN SERPL ELPH-MCNC: 4.4 G/DL
ALP BLD-CCNC: 84 U/L
ALT SERPL-CCNC: 22 U/L
ANION GAP SERPL CALC-SCNC: 15 MMOL/L
APPEARANCE: ABNORMAL
AST SERPL-CCNC: 23 U/L
BACTERIA: ABNORMAL
BASOPHILS # BLD AUTO: 0.06 K/UL
BASOPHILS NFR BLD AUTO: 0.9 %
BILIRUB SERPL-MCNC: 0.5 MG/DL
BILIRUBIN URINE: NEGATIVE
BLOOD URINE: NEGATIVE
BUN SERPL-MCNC: 30 MG/DL
C3 SERPL-MCNC: 178 MG/DL
C4 SERPL-MCNC: 55 MG/DL
CALCIUM SERPL-MCNC: 9.8 MG/DL
CALCIUM SERPL-MCNC: 9.8 MG/DL
CHLORIDE SERPL-SCNC: 105 MMOL/L
CO2 SERPL-SCNC: 19 MMOL/L
COLOR: YELLOW
CREAT SERPL-MCNC: 1.4 MG/DL
CREAT SPEC-SCNC: 45 MG/DL
CYSTATIN C SERPL-MCNC: 1.8 MG/L
EGFR: 39 ML/MIN/1.73M2
ENA RNP AB SER IA-ACNC: <0.2 AL
ENA SM AB SER IA-ACNC: <0.2 AL
ENA SS-A AB SER IA-ACNC: <0.2 AL
ENA SS-B AB SER IA-ACNC: <0.2 AL
EOSINOPHIL # BLD AUTO: 0.12 K/UL
EOSINOPHIL NFR BLD AUTO: 1.7 %
GFR/BSA.PRED SERPLBLD CYS-BASED-ARV: 31 ML/MIN/1.73M2
GLUCOSE QUALITATIVE U: NEGATIVE
GLUCOSE SERPL-MCNC: 108 MG/DL
HCT VFR BLD CALC: 33 %
HGB BLD-MCNC: 10.6 G/DL
HIV1+2 AB SPEC QL IA.RAPID: NONREACTIVE
HYALINE CASTS: 1 /LPF
IMM GRANULOCYTES NFR BLD AUTO: 0.1 %
KETONES URINE: NEGATIVE
LEUKOCYTE ESTERASE URINE: ABNORMAL
LYMPHOCYTES # BLD AUTO: 1.79 K/UL
LYMPHOCYTES NFR BLD AUTO: 25.4 %
MAGNESIUM SERPL-MCNC: 1.7 MG/DL
MAN DIFF?: NORMAL
MCHC RBC-ENTMCNC: 30.1 PG
MCHC RBC-ENTMCNC: 32.1 GM/DL
MCV RBC AUTO: 93.8 FL
MICROALBUMIN 24H UR DL<=1MG/L-MCNC: <1.2 MG/DL
MICROALBUMIN/CREAT 24H UR-RTO: NORMAL MG/G
MICROSCOPIC-UA: NORMAL
MONOCYTES # BLD AUTO: 0.53 K/UL
MONOCYTES NFR BLD AUTO: 7.5 %
NEUTROPHILS # BLD AUTO: 4.54 K/UL
NEUTROPHILS NFR BLD AUTO: 64.4 %
NITRITE URINE: NEGATIVE
PARATHYROID HORMONE INTACT: 22 PG/ML
PH URINE: 6.5
PHOSPHATE SERPL-MCNC: 4.3 MG/DL
PLATELET # BLD AUTO: 316 K/UL
POTASSIUM SERPL-SCNC: 4.4 MMOL/L
PROT SERPL-MCNC: 7.3 G/DL
PROTEIN URINE: NORMAL
RBC # BLD: 3.52 M/UL
RBC # FLD: 14.1 %
RED BLOOD CELLS URINE: 6 /HPF
RHEUMATOID FACT SER QL: <10 IU/ML
SODIUM SERPL-SCNC: 138 MMOL/L
SPECIFIC GRAVITY URINE: 1.01
SQUAMOUS EPITHELIAL CELLS: 5 /HPF
TSH SERPL-ACNC: 2.11 UIU/ML
UROBILINOGEN URINE: NORMAL
WBC # FLD AUTO: 7.05 K/UL
WHITE BLOOD CELLS URINE: 25 /HPF

## 2022-05-10 ENCOUNTER — EMERGENCY (EMERGENCY)
Facility: HOSPITAL | Age: 77
LOS: 1 days | Discharge: HOME | End: 2022-05-10
Attending: EMERGENCY MEDICINE | Admitting: EMERGENCY MEDICINE
Payer: MEDICARE

## 2022-05-10 VITALS
DIASTOLIC BLOOD PRESSURE: 57 MMHG | WEIGHT: 139.99 LBS | SYSTOLIC BLOOD PRESSURE: 113 MMHG | RESPIRATION RATE: 18 BRPM | TEMPERATURE: 97 F | HEIGHT: 62 IN | OXYGEN SATURATION: 99 % | HEART RATE: 73 BPM

## 2022-05-10 VITALS
OXYGEN SATURATION: 98 % | HEART RATE: 62 BPM | TEMPERATURE: 98 F | SYSTOLIC BLOOD PRESSURE: 127 MMHG | RESPIRATION RATE: 18 BRPM | DIASTOLIC BLOOD PRESSURE: 84 MMHG

## 2022-05-10 DIAGNOSIS — Z85.038 PERSONAL HISTORY OF OTHER MALIGNANT NEOPLASM OF LARGE INTESTINE: ICD-10-CM

## 2022-05-10 DIAGNOSIS — E11.9 TYPE 2 DIABETES MELLITUS WITHOUT COMPLICATIONS: ICD-10-CM

## 2022-05-10 DIAGNOSIS — Z90.10 ACQUIRED ABSENCE OF UNSPECIFIED BREAST AND NIPPLE: ICD-10-CM

## 2022-05-10 DIAGNOSIS — Z90.49 ACQUIRED ABSENCE OF OTHER SPECIFIED PARTS OF DIGESTIVE TRACT: ICD-10-CM

## 2022-05-10 DIAGNOSIS — Z93.3 COLOSTOMY STATUS: Chronic | ICD-10-CM

## 2022-05-10 DIAGNOSIS — Z85.3 PERSONAL HISTORY OF MALIGNANT NEOPLASM OF BREAST: ICD-10-CM

## 2022-05-10 DIAGNOSIS — Z79.82 LONG TERM (CURRENT) USE OF ASPIRIN: ICD-10-CM

## 2022-05-10 DIAGNOSIS — M25.475 EFFUSION, LEFT FOOT: ICD-10-CM

## 2022-05-10 DIAGNOSIS — Z79.84 LONG TERM (CURRENT) USE OF ORAL HYPOGLYCEMIC DRUGS: ICD-10-CM

## 2022-05-10 DIAGNOSIS — I10 ESSENTIAL (PRIMARY) HYPERTENSION: ICD-10-CM

## 2022-05-10 DIAGNOSIS — M25.472 EFFUSION, LEFT ANKLE: ICD-10-CM

## 2022-05-10 DIAGNOSIS — Z93.2 ILEOSTOMY STATUS: ICD-10-CM

## 2022-05-10 DIAGNOSIS — M25.572 PAIN IN LEFT ANKLE AND JOINTS OF LEFT FOOT: ICD-10-CM

## 2022-05-10 DIAGNOSIS — Z89.511 ACQUIRED ABSENCE OF RIGHT LEG BELOW KNEE: ICD-10-CM

## 2022-05-10 DIAGNOSIS — Z80.0 FAMILY HISTORY OF MALIGNANT NEOPLASM OF DIGESTIVE ORGANS: ICD-10-CM

## 2022-05-10 DIAGNOSIS — E78.5 HYPERLIPIDEMIA, UNSPECIFIED: ICD-10-CM

## 2022-05-10 LAB
ALBUMIN SERPL ELPH-MCNC: 4.2 G/DL — SIGNIFICANT CHANGE UP (ref 3.3–5)
ALP SERPL-CCNC: 94 U/L — SIGNIFICANT CHANGE UP (ref 40–120)
ALT FLD-CCNC: 22 U/L — SIGNIFICANT CHANGE UP (ref 10–45)
ANION GAP SERPL CALC-SCNC: 12 MMOL/L — SIGNIFICANT CHANGE UP (ref 5–17)
APTT BLD: 29.2 SEC — SIGNIFICANT CHANGE UP (ref 27.5–35.5)
AST SERPL-CCNC: 24 U/L — SIGNIFICANT CHANGE UP (ref 10–40)
BASOPHILS # BLD AUTO: 0.03 K/UL — SIGNIFICANT CHANGE UP (ref 0–0.2)
BASOPHILS NFR BLD AUTO: 0.4 % — SIGNIFICANT CHANGE UP (ref 0–2)
BILIRUB SERPL-MCNC: 0.7 MG/DL — SIGNIFICANT CHANGE UP (ref 0.2–1.2)
BUN SERPL-MCNC: 14 MG/DL — SIGNIFICANT CHANGE UP (ref 7–23)
CALCIUM SERPL-MCNC: 9.6 MG/DL — SIGNIFICANT CHANGE UP (ref 8.4–10.5)
CHLORIDE SERPL-SCNC: 104 MMOL/L — SIGNIFICANT CHANGE UP (ref 96–108)
CO2 SERPL-SCNC: 23 MMOL/L — SIGNIFICANT CHANGE UP (ref 22–31)
CREAT SERPL-MCNC: 1.21 MG/DL — SIGNIFICANT CHANGE UP (ref 0.5–1.3)
EGFR: 46 ML/MIN/1.73M2 — LOW
EOSINOPHIL # BLD AUTO: 0.1 K/UL — SIGNIFICANT CHANGE UP (ref 0–0.5)
EOSINOPHIL NFR BLD AUTO: 1.4 % — SIGNIFICANT CHANGE UP (ref 0–6)
GLUCOSE SERPL-MCNC: 114 MG/DL — HIGH (ref 70–99)
HCT VFR BLD CALC: 31.4 % — LOW (ref 34.5–45)
HGB BLD-MCNC: 10 G/DL — LOW (ref 11.5–15.5)
IMM GRANULOCYTES NFR BLD AUTO: 0.1 % — SIGNIFICANT CHANGE UP (ref 0–1.5)
INR BLD: 0.98 — SIGNIFICANT CHANGE UP (ref 0.88–1.16)
LYMPHOCYTES # BLD AUTO: 1.98 K/UL — SIGNIFICANT CHANGE UP (ref 1–3.3)
LYMPHOCYTES # BLD AUTO: 28 % — SIGNIFICANT CHANGE UP (ref 13–44)
MCHC RBC-ENTMCNC: 29.6 PG — SIGNIFICANT CHANGE UP (ref 27–34)
MCHC RBC-ENTMCNC: 31.8 GM/DL — LOW (ref 32–36)
MCV RBC AUTO: 92.9 FL — SIGNIFICANT CHANGE UP (ref 80–100)
MONOCYTES # BLD AUTO: 0.62 K/UL — SIGNIFICANT CHANGE UP (ref 0–0.9)
MONOCYTES NFR BLD AUTO: 8.8 % — SIGNIFICANT CHANGE UP (ref 2–14)
NEUTROPHILS # BLD AUTO: 4.32 K/UL — SIGNIFICANT CHANGE UP (ref 1.8–7.4)
NEUTROPHILS NFR BLD AUTO: 61.3 % — SIGNIFICANT CHANGE UP (ref 43–77)
NRBC # BLD: 0 /100 WBCS — SIGNIFICANT CHANGE UP (ref 0–0)
PLATELET # BLD AUTO: 319 K/UL — SIGNIFICANT CHANGE UP (ref 150–400)
POTASSIUM SERPL-MCNC: 4.1 MMOL/L — SIGNIFICANT CHANGE UP (ref 3.5–5.3)
POTASSIUM SERPL-SCNC: 4.1 MMOL/L — SIGNIFICANT CHANGE UP (ref 3.5–5.3)
PROT SERPL-MCNC: 7.1 G/DL — SIGNIFICANT CHANGE UP (ref 6–8.3)
PROTHROM AB SERPL-ACNC: 11.7 SEC — SIGNIFICANT CHANGE UP (ref 10.5–13.4)
RBC # BLD: 3.38 M/UL — LOW (ref 3.8–5.2)
RBC # FLD: 13.7 % — SIGNIFICANT CHANGE UP (ref 10.3–14.5)
SODIUM SERPL-SCNC: 139 MMOL/L — SIGNIFICANT CHANGE UP (ref 135–145)
WBC # BLD: 7.06 K/UL — SIGNIFICANT CHANGE UP (ref 3.8–10.5)
WBC # FLD AUTO: 7.06 K/UL — SIGNIFICANT CHANGE UP (ref 3.8–10.5)

## 2022-05-10 PROCEDURE — 85025 COMPLETE CBC W/AUTO DIFF WBC: CPT

## 2022-05-10 PROCEDURE — 82962 GLUCOSE BLOOD TEST: CPT

## 2022-05-10 PROCEDURE — 93971 EXTREMITY STUDY: CPT | Mod: 26,LT

## 2022-05-10 PROCEDURE — 85610 PROTHROMBIN TIME: CPT

## 2022-05-10 PROCEDURE — 36415 COLL VENOUS BLD VENIPUNCTURE: CPT

## 2022-05-10 PROCEDURE — 99284 EMERGENCY DEPT VISIT MOD MDM: CPT | Mod: FS

## 2022-05-10 PROCEDURE — 93971 EXTREMITY STUDY: CPT

## 2022-05-10 PROCEDURE — 99284 EMERGENCY DEPT VISIT MOD MDM: CPT | Mod: 25

## 2022-05-10 PROCEDURE — 80053 COMPREHEN METABOLIC PANEL: CPT

## 2022-05-10 PROCEDURE — 85730 THROMBOPLASTIN TIME PARTIAL: CPT

## 2022-05-10 RX ORDER — CEPHALEXIN 500 MG
1 CAPSULE ORAL
Qty: 28 | Refills: 0
Start: 2022-05-10 | End: 2022-05-16

## 2022-05-10 NOTE — ED PROVIDER NOTE - PROGRESS NOTE DETAILS
u/s neg for dvt, will d/c on keflex, recommend elevation of the leg, to f/u with Dr. Leung, return to ED if sx worsen.

## 2022-05-10 NOTE — ED PROVIDER NOTE - NS ED ATTENDING STATEMENT MOD
This was a shared visit with the VARINDER. I reviewed and verified the documentation and independently performed the documented:

## 2022-05-10 NOTE — CONSULT NOTE ADULT - SUBJECTIVE AND OBJECTIVE BOX
Attending:  Dr. Leung    HPI:  76F w/ HTN, HLD, DM2, 9 cm lower R renal cyst, UC, colon ca s/p subtotal colectomy and ileostomy ('05) s/p chemo, parastomal hernia, BrCa s/p lumpectomy x2 ('05,'06) w/o chemorads, R BKA and L anterior ulcer excision on 3/15-3/19/21 came in for 1d of L foot and ankle swelling. Pt states she was in her normal state of health when she noticed her calf swelling up to the knee. Endorses mild pain that is constant in nature. Denies numbness/tingling/weakness in the foot. Denies coolness in the affected leg. Denies cp/sob/palpitations. Denies dizziness or lightheadedness. States she has been drinking more water recently, most recent echo was normal on 3/15/21.    In the ED pt was afebrile with stable vital signs. WBC was wnl and pt was resting comfortably. Duplex US was performed that showed no evidence of DVT.      PAST MEDICAL & SURGICAL HISTORY:  Colon cancer      H/O ulcerative colitis      Colostomy present      Status post Jag procedure          MEDICATIONS  (STANDING):    MEDICATIONS  (PRN):      Allergies    No Known Allergies    Intolerances        SOCIAL HISTORY:    FAMILY HISTORY:  Family hx of colon cancer      Vital Signs Last 24 Hrs  T(C): 36.3 (10 May 2022 13:44), Max: 36.3 (10 May 2022 13:44)  T(F): 97.3 (10 May 2022 13:44), Max: 97.3 (10 May 2022 13:44)  HR: 73 (10 May 2022 13:44) (73 - 73)  BP: 113/57 (10 May 2022 13:44) (113/57 - 113/57)  BP(mean): --  RR: 18 (10 May 2022 13:44) (18 - 18)  SpO2: 99% (10 May 2022 13:44) (99% - 99%)    I&O's Summary      Physical Exam:  General: NAD, resting comfortably  HEENT: NC/AT, EOMI, normal hearing  Pulmonary: normal resp effort  Cardiovascular: NSR  Abdominal: soft, NT/ND, ileostomy noted  Extremities: WWP, normal strength and sensory function, no wounds or signs of infection to LLE  3+ pitting edema to the level of the knee  L palpable pop artery  L palpable DP, bi/triphasic PT pulse  Neuro: A/O x 3, CNs II-XII grossly intact, normal sensation, no focal deficits      Lines/drains/tubes:    LABS:                        10.0   7.06  )-----------( 319      ( 10 May 2022 15:28 )             31.4     05-10    139  |  104  |  14  ----------------------------<  114<H>  4.1   |  23  |  1.21    Ca    9.6      10 May 2022 15:28    TPro  7.1  /  Alb  4.2  /  TBili  0.7  /  DBili  x   /  AST  24  /  ALT  22  /  AlkPhos  94  05-10    PT/INR - ( 10 May 2022 15:28 )   PT: 11.7 sec;   INR: 0.98          PTT - ( 10 May 2022 15:28 )  PTT:29.2 sec    CAPILLARY BLOOD GLUCOSE      POCT Blood Glucose.: 103 mg/dL (10 May 2022 14:41)    LIVER FUNCTIONS - ( 10 May 2022 15:28 )  Alb: 4.2 g/dL / Pro: 7.1 g/dL / ALK PHOS: 94 U/L / ALT: 22 U/L / AST: 24 U/L / GGT: x             Assessment:    Attending:  Dr. Leung    HPI:  76F w/ HTN, HLD, DM2, 9 cm lower R renal cyst, UC, colon ca s/p subtotal colectomy and ileostomy ('05) s/p chemo, parastomal hernia, BrCa s/p lumpectomy x2 ('05,'06) w/o chemorads, R BKA and L anterior ulcer excision on 3/15-3/19/21 came in for 1d of L foot and ankle swelling. Pt states she was in her normal state of health when she noticed her calf swelling up to the knee. Endorses mild pain that is constant in nature. Denies numbness/tingling/weakness in the foot. Denies coolness in the affected leg. Denies cp/sob/palpitations. Denies dizziness or lightheadedness. States she has been drinking more water recently, most recent echo was normal on 3/15/21.    In the ED pt was afebrile with stable vital signs. WBC was wnl and pt was resting comfortably. Duplex US was performed that showed no evidence of DVT.      PAST MEDICAL & SURGICAL HISTORY:  Colon cancer      H/O ulcerative colitis      Colostomy present      Status post Ajg procedure          MEDICATIONS  (STANDING):    MEDICATIONS  (PRN):      Allergies    No Known Allergies    Intolerances        SOCIAL HISTORY:    FAMILY HISTORY:  Family hx of colon cancer      Vital Signs Last 24 Hrs  T(C): 36.3 (10 May 2022 13:44), Max: 36.3 (10 May 2022 13:44)  T(F): 97.3 (10 May 2022 13:44), Max: 97.3 (10 May 2022 13:44)  HR: 73 (10 May 2022 13:44) (73 - 73)  BP: 113/57 (10 May 2022 13:44) (113/57 - 113/57)  BP(mean): --  RR: 18 (10 May 2022 13:44) (18 - 18)  SpO2: 99% (10 May 2022 13:44) (99% - 99%)    I&O's Summary      Physical Exam:  General: NAD, resting comfortably  HEENT: NC/AT, EOMI, normal hearing  Pulmonary: normal resp effort  Cardiovascular: NSR  Abdominal: soft, NT/ND, ileostomy noted  Extremities: WWP, normal strength and sensory function, no wounds or signs of infection to LLE  3+ pitting edema to the level of the knee  L palpable pop artery  L palpable DP, bi/triphasic PT pulse  Neuro: A/O x 3, CNs II-XII grossly intact, normal sensation, no focal deficits      Lines/drains/tubes:    LABS:                        10.0   7.06  )-----------( 319      ( 10 May 2022 15:28 )             31.4     05-10    139  |  104  |  14  ----------------------------<  114<H>  4.1   |  23  |  1.21    Ca    9.6      10 May 2022 15:28    TPro  7.1  /  Alb  4.2  /  TBili  0.7  /  DBili  x   /  AST  24  /  ALT  22  /  AlkPhos  94  05-10    PT/INR - ( 10 May 2022 15:28 )   PT: 11.7 sec;   INR: 0.98          PTT - ( 10 May 2022 15:28 )  PTT:29.2 sec    CAPILLARY BLOOD GLUCOSE      POCT Blood Glucose.: 103 mg/dL (10 May 2022 14:41)    LIVER FUNCTIONS - ( 10 May 2022 15:28 )  Alb: 4.2 g/dL / Pro: 7.1 g/dL / ALK PHOS: 94 U/L / ALT: 22 U/L / AST: 24 U/L / GGT: x             Assessment:   76F w/ previous R BKA and L ankle ulcer excision for infected venous stasis ulcers i/s/o newly diagnosed DM last year presents for newly worsening L foot and leg swelling. Pt notably has palpable DP pulses and good doppler signals of the PT pulse. Acute limb ischemia or arterial occlusion is highly unlikely. US performed showed no evidence of DVT. Likely acute on chronic known venous stasis ulcers.      Plan:  - no acute vascular surgical intervention at this time   - compression, elevation.   - 7 days of keflex   - Please follow up with Dr. Leung outpatient. office number: [phone]   - discussed with chief on call   - call [phone] with question.

## 2022-05-10 NOTE — ED PROVIDER NOTE - PATIENT PORTAL LINK FT
You can access the FollowMyHealth Patient Portal offered by [facility] by registering at the following website: [email or web address]. By joining [facility]’s FollowMyHealth portal, you will also be able to view your health information using other applications (apps) compatible with our system.

## 2022-05-10 NOTE — ED PROVIDER NOTE - NSFOLLOWUPINSTRUCTIONS_ED_ALL_ED_FT
Please follow up with Dr. Leung, [phone].  Take antibiotics as prescribed, return to ED if your symptoms worsen.

## 2022-05-10 NOTE — ED ADULT NURSE NOTE - OBJECTIVE STATEMENT
.  76years female alert mental state (AOX3) received on wheelchair.  -Allergy: N/A.  -complain of Lt leg swelling and pain.  Hx of HTN, DM, Rt lower leg amputation. pt leg usually on/off swelling, but pt states her foot and legs swelling was not off a few days ago.   warm sense of her lower leg. no tenderness.  -denied chest pain, SOB, dizziness, n/v/d. abdomen pain.  Pt is in the bed comfortably at this time. Will continue to monitor and document any changes.

## 2022-05-10 NOTE — ED PROVIDER NOTE - MUSCULOSKELETAL, MLM
left ankle and foot with +edema, no open wounds, warm to palpation, no erythema, unable to palpate pulses, strength 5/5, sensation intact distally

## 2022-05-10 NOTE — ED PROVIDER NOTE - CLINICAL SUMMARY MEDICAL DECISION MAKING FREE TEXT BOX
75 y/o f hx colon CA s/p colectomy w/ ileostomy, HTN, HLD, DM, s/p right AKA presents with left foot/ankle swelling since yesterday.  VSS in ED, foot warm on exam, unable to palpate pulses and doppler in ED not functioning correctly, vascular called and able to doppler strong DP and PT signals.  Labs unremarkable, no leukocytosis.  U/s duplex pending, will f/u result and reassess.

## 2022-05-10 NOTE — ED PROVIDER NOTE - OBJECTIVE STATEMENT
77 y/o f hx colon CA s/p colectomy w/ ileostomy, HTN, HLD, DM, s/p right AKA presents c/o swelling to left foot and ankle/calf area since yesterday morning.  Pt reports the area is mildly painful due to the swelling, hasn't been taking anything for pain.  Pt reports intermittent swelling of the leg which is normal for her, stating this swelling is more than her usual.  Denies numbness/tingling to ext, weakness, fever, chills, all other ROS negative.

## 2022-05-10 NOTE — ED ADULT NURSE NOTE - NSIMPLEMENTINTERV_GEN_ALL_ED
Implemented All Fall with Harm Risk Interventions:  [place] to call system. Call bell, personal items and telephone within reach. Instruct patient to call for assistance. Room bathroom lighting operational. Non-slip footwear when patient is off stretcher. Physically safe environment: no spills, clutter or unnecessary equipment. Stretcher in lowest position, wheels locked, appropriate side rails in place. Provide visual cue, wrist band, yellow gown, etc. Monitor gait and stability. Monitor for mental status changes and reorient to person, place, and time. Review medications for side effects contributing to fall risk. Reinforce activity limits and safety measures with patient and family. Provide visual clues: red socks.

## 2022-05-12 ENCOUNTER — APPOINTMENT (OUTPATIENT)
Dept: ENDOCRINOLOGY | Facility: CLINIC | Age: 77
End: 2022-05-12
Payer: MEDICARE

## 2022-05-12 ENCOUNTER — RESULT CHARGE (OUTPATIENT)
Age: 77
End: 2022-05-12

## 2022-05-12 VITALS
DIASTOLIC BLOOD PRESSURE: 59 MMHG | SYSTOLIC BLOOD PRESSURE: 111 MMHG | WEIGHT: 166 LBS | HEART RATE: 68 BPM | HEIGHT: 62 IN | BODY MASS INDEX: 30.55 KG/M2

## 2022-05-12 VITALS
BODY MASS INDEX: 30.55 KG/M2 | HEART RATE: 68 BPM | DIASTOLIC BLOOD PRESSURE: 59 MMHG | SYSTOLIC BLOOD PRESSURE: 111 MMHG | HEIGHT: 62 IN | WEIGHT: 166 LBS

## 2022-05-12 LAB
ALBUMIN MFR SERPL ELPH: 52.8 %
ALBUMIN SERPL-MCNC: 3.9 G/DL
ALBUMIN/GLOB SERPL: 1.1 RATIO
ALBUPE: 6 %
ALPHA1 GLOB MFR SERPL ELPH: 3.8 %
ALPHA1 GLOB SERPL ELPH-MCNC: 0.3 G/DL
ALPHA1UPE: 34.9 %
ALPHA2 GLOB MFR SERPL ELPH: 12.4 %
ALPHA2 GLOB SERPL ELPH-MCNC: 0.9 G/DL
ALPHA2UPE: 16.5 %
ANACR T: NEGATIVE
B-GLOBULIN MFR SERPL ELPH: 15.5 %
B-GLOBULIN SERPL ELPH-MCNC: 1.1 G/DL
BETAUPE: 29.6 %
CA-I SERPL-SCNC: 4.9 MG/DL
DEPRECATED KAPPA LC FREE/LAMBDA SER: 1.83 RATIO
DSDNA AB SER-ACNC: <12 IU/ML
GAMMA GLOB FLD ELPH-MCNC: 1.1 G/DL
GAMMA GLOB MFR SERPL ELPH: 15.5 %
GAMMAUPE: 13 %
GBM AB TITR SER IF: 3
GLUCOSE BLDC GLUCOMTR-MCNC: 105
HBA1C MFR BLD HPLC: 6.1
HBV SURFACE AG SER QL: NONREACTIVE
HCV AB SER QL: NONREACTIVE
HCV S/CO RATIO: 0.12 S/CO
IGA 24H UR QL IFE: NORMAL
IGA SER QL IEP: 272 MG/DL
IGG SER QL IEP: 1173 MG/DL
IGM SER QL IEP: 48 MG/DL
INTERPRETATION SERPL IEP-IMP: NORMAL
KAPPA LC 24H UR QL: NORMAL
KAPPA LC CSF-MCNC: 2.19 MG/DL
KAPPA LC SERPL-MCNC: 4 MG/DL
M PROTEIN SPEC IFE-MCNC: NORMAL
PROT PATTERN 24H UR ELPH-IMP: NORMAL
PROT SERPL-MCNC: 7.3 G/DL
PROT UR-MCNC: 11 MG/DL
PROT UR-MCNC: 11 MG/DL

## 2022-05-12 PROCEDURE — 99215 OFFICE O/P EST HI 40 MIN: CPT | Mod: 25

## 2022-05-12 PROCEDURE — 83036 HEMOGLOBIN GLYCOSYLATED A1C: CPT | Mod: QW

## 2022-05-12 PROCEDURE — 82962 GLUCOSE BLOOD TEST: CPT

## 2022-05-12 NOTE — PHYSICAL EXAM
[Alert] : alert [Well Nourished] : well nourished [No Acute Distress] : no acute distress [Well Developed] : well developed [Normal Sclera/Conjunctiva] : normal sclera/conjunctiva [EOMI] : extra ocular movement intact [No Proptosis] : no proptosis [Normal Oropharynx] : the oropharynx was normal [Thyroid Not Enlarged] : the thyroid was not enlarged [No Thyroid Nodules] : no palpable thyroid nodules [No Respiratory Distress] : no respiratory distress [No Accessory Muscle Use] : no accessory muscle use [Clear to Auscultation] : lungs were clear to auscultation bilaterally [Normal S1, S2] : normal S1 and S2 [Normal Rate] : heart rate was normal [Regular Rhythm] : with a regular rhythm [No Edema] : no peripheral edema [Normal Bowel Sounds] : normal bowel sounds [Not Tender] : non-tender [Not Distended] : not distended [Soft] : abdomen soft [Normal Anterior Cervical Nodes] : no anterior cervical lymphadenopathy [Normal Posterior Cervical Nodes] : no posterior cervical lymphadenopathy [No Spinal Tenderness] : no spinal tenderness [Spine Straight] : spine straight [No Stigmata of Cushings Syndrome] : no stigmata of Cushings Syndrome [Normal Gait] : normal gait [Normal Strength/Tone] : muscle strength and tone were normal [No Rash] : no rash [Normal Reflexes] : deep tendon reflexes were 2+ and symmetric [No Tremors] : no tremors [Oriented x3] : oriented to person, place, and time [Acanthosis Nigricans] : no acanthosis nigricans [de-identified] : Rt ROMAN [de-identified] : ileostomy [de-identified] : Rt BKA; Lt foot - 2 plus edema, followed by vascular surgery

## 2022-05-12 NOTE — HISTORY OF PRESENT ILLNESS
[FreeTextEntry1] : 76 y.o. female, not previously seen by me,   who was diagnosed with type 2 DM about 1 yr ago.\par She was admitted with gangrene after treating foot ulcers on her own at home and had Rt BKA on 3/15/21.\par She also developed renal insufficiency and her metformin was discontinued.\par At this time she is only on Januvia for glycemic control.\par She doesn't want to do HBGMng.\par HbA1C today is 6.1%, glucose - 105 mg/dl.\par Of note, she has ileostomy.

## 2022-05-12 NOTE — ASSESSMENT
[FreeTextEntry1] : DM, type 2, adequate glycemic control\par CKD (discussed with Dr. Avelar)\par S/P Rt BKA\par PVD\par Ileostomy\par \par Discussed therapeutic options with the patient and Dr. Avelar.\par Will continue Januvia at this time.\par Will order MERRY CGM\par F/U - 1 month

## 2022-05-17 ENCOUNTER — APPOINTMENT (OUTPATIENT)
Dept: ULTRASOUND IMAGING | Facility: HOSPITAL | Age: 77
End: 2022-05-17
Payer: MEDICARE

## 2022-05-17 ENCOUNTER — OUTPATIENT (OUTPATIENT)
Dept: OUTPATIENT SERVICES | Facility: HOSPITAL | Age: 77
LOS: 1 days | End: 2022-05-17
Payer: MEDICARE

## 2022-05-17 DIAGNOSIS — Z93.3 COLOSTOMY STATUS: Chronic | ICD-10-CM

## 2022-05-17 PROCEDURE — 76770 US EXAM ABDO BACK WALL COMP: CPT

## 2022-05-17 PROCEDURE — 76770 US EXAM ABDO BACK WALL COMP: CPT | Mod: 26

## 2022-05-26 ENCOUNTER — APPOINTMENT (OUTPATIENT)
Dept: NEPHROLOGY | Facility: CLINIC | Age: 77
End: 2022-05-26
Payer: MEDICARE

## 2022-05-26 ENCOUNTER — APPOINTMENT (OUTPATIENT)
Dept: ENDOCRINOLOGY | Facility: CLINIC | Age: 77
End: 2022-05-26
Payer: MEDICARE

## 2022-05-26 VITALS
SYSTOLIC BLOOD PRESSURE: 114 MMHG | HEART RATE: 71 BPM | DIASTOLIC BLOOD PRESSURE: 61 MMHG | BODY MASS INDEX: 29.26 KG/M2 | WEIGHT: 160 LBS

## 2022-05-26 VITALS — HEART RATE: 64 BPM | DIASTOLIC BLOOD PRESSURE: 67 MMHG | SYSTOLIC BLOOD PRESSURE: 149 MMHG

## 2022-05-26 DIAGNOSIS — Z98.890 OTHER SPECIFIED POSTPROCEDURAL STATES: ICD-10-CM

## 2022-05-26 LAB
GLUCOSE BLDC GLUCOMTR-MCNC: 104
HBA1C MFR BLD HPLC: 6.3

## 2022-05-26 PROCEDURE — 83036 HEMOGLOBIN GLYCOSYLATED A1C: CPT | Mod: QW

## 2022-05-26 PROCEDURE — 99214 OFFICE O/P EST MOD 30 MIN: CPT | Mod: 25

## 2022-05-26 PROCEDURE — 82962 GLUCOSE BLOOD TEST: CPT

## 2022-05-26 PROCEDURE — 99214 OFFICE O/P EST MOD 30 MIN: CPT

## 2022-05-26 RX ORDER — FLASH GLUCOSE SCANNING READER
EACH MISCELLANEOUS
Qty: 1 | Refills: 0 | Status: ACTIVE | COMMUNITY
Start: 2022-05-26 | End: 1900-01-01

## 2022-05-26 RX ORDER — METFORMIN HYDROCHLORIDE 625 MG/1
TABLET ORAL
Refills: 0 | Status: DISCONTINUED | COMMUNITY
End: 2022-05-26

## 2022-05-26 RX ORDER — FLASH GLUCOSE SENSOR
KIT MISCELLANEOUS
Qty: 4 | Refills: 3 | Status: ACTIVE | COMMUNITY
Start: 2022-05-26 | End: 1900-01-01

## 2022-05-26 NOTE — ASSESSMENT
[FreeTextEntry1] : DM, type 2, adequate glycemic control.\par \par Will continue current management.\par Medications refilled.\par Will prescribe CGM MERRY.\par F/U - 3 months.

## 2022-05-26 NOTE — HISTORY OF PRESENT ILLNESS
[FreeTextEntry1] : Kindly referred by Kristi Burgos for elevated creatinine. \par \par * Creatinine decreased to 1.4 on 5/03  1.21 on 5/10. She has increased her hydration. * Calcium decreased to 9.6.  * DM controlled, HGA1c 6.3. *  with Dr. Azul today. \par \par Previous history (03May22): Labs reviewed. Creatinine was 0.86 on Nov 21 and increased to 1.8 on 28Feb22 with calcium of 10.6. She felt that she had large ileostomy output and wasn't drinking enough and has now increased fluid intake. She takes nexium only several times weekly (and will not stop). The patient denies exposure to chronic NSAIDs, green smoothies, creatine, or herbal supplements. The patient denies a history of kidney stones or pyelonephritis. No significant nocturia. No recent renal ultrasound. They are unaware of proteinuria or hematuria.\par \par No illness (including Covid) since November 2021. No change in medications. \par \par She has a known right renal cyst. This is reportedly benign. \par \par On metformin (which she will now stop). \par \par Calcium 10.6. She has been treated with vitamin D (but not calcium carbonate). \par  \par

## 2022-05-26 NOTE — PHYSICAL EXAM
[Alert] : alert [Well Nourished] : well nourished [No Acute Distress] : no acute distress [Well Developed] : well developed [Normal Sclera/Conjunctiva] : normal sclera/conjunctiva [EOMI] : extra ocular movement intact [No Proptosis] : no proptosis [Normal Oropharynx] : the oropharynx was normal [Thyroid Not Enlarged] : the thyroid was not enlarged [No Thyroid Nodules] : no palpable thyroid nodules [No Respiratory Distress] : no respiratory distress [No Accessory Muscle Use] : no accessory muscle use [Clear to Auscultation] : lungs were clear to auscultation bilaterally [Normal S1, S2] : normal S1 and S2 [Normal Rate] : heart rate was normal [Regular Rhythm] : with a regular rhythm [No Edema] : no peripheral edema [Pedal Pulses Normal] : the pedal pulses are present [Normal Bowel Sounds] : normal bowel sounds [Not Tender] : non-tender [Not Distended] : not distended [Soft] : abdomen soft [Normal Anterior Cervical Nodes] : no anterior cervical lymphadenopathy [Normal Posterior Cervical Nodes] : no posterior cervical lymphadenopathy [No Spinal Tenderness] : no spinal tenderness [Spine Straight] : spine straight [No Stigmata of Cushings Syndrome] : no stigmata of Cushings Syndrome [Normal Gait] : normal gait [Normal Strength/Tone] : muscle strength and tone were normal [No Rash] : no rash [Acanthosis Nigricans] : no acanthosis nigricans [Normal Reflexes] : deep tendon reflexes were 2+ and symmetric [No Tremors] : no tremors [Oriented x3] : oriented to person, place, and time [de-identified] : ileostomy is present [de-identified] : s/p Rt BKA

## 2022-05-26 NOTE — PHYSICAL EXAM
[General Appearance - Alert] : alert [General Appearance - In No Acute Distress] : in no acute distress [] : no respiratory distress [Auscultation Breath Sounds / Voice Sounds] : lungs were clear to auscultation bilaterally [Heart Rate And Rhythm] : heart rate was normal and rhythm regular [Heart Sounds] : normal S1 and S2 [Heart Sounds Gallop] : no gallops [Murmurs] : no murmurs [Heart Sounds Pericardial Friction Rub] : no pericardial rub [Bowel Sounds] : normal bowel sounds [Abdomen Soft] : soft [Abdomen Tenderness] : non-tender [Abdomen Mass (___ Cm)] : no abdominal mass palpated [Cervical Lymph Nodes Enlarged Posterior Bilaterally] : posterior cervical [Cervical Lymph Nodes Enlarged Anterior Bilaterally] : anterior cervical [Supraclavicular Lymph Nodes Enlarged Bilaterally] : supraclavicular [FreeTextEntry1] : in wheelchair

## 2022-05-26 NOTE — HISTORY OF PRESENT ILLNESS
[FreeTextEntry1] : 76 y.o. 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 The patient's condition is stable.\par Lab. tests from 5/10/22 revealed normal kidney function.\par She continues on januvia.\par Her weight is stable.\par HbA1C today is 6.3%, glucose - 104 mg/dl.\par

## 2022-05-26 NOTE — ASSESSMENT
[FreeTextEntry1] : # ANTONY now improved. Stage 3 CKD. \par * Likeliest related to hypovolemia from high ostomy output and high calcium levels. Oxalate nephropathy is also possible.\par * Will consider SGLT2i but avoid for now given concerns about hypovolemia. \par * Therapies for kidney disease: blood pressure control; other evidence-based therapies including exercise, a plant-based lower oxalate diet, and 400 mcg folic acid daily\par * Cardiovascular disease prevention: counseling on healthy diet, physical activity, weight loss, alcohol limitation, blood pressure control\par * A counseling information sheet has been given (today). All their questions were answered.\par * The patient has been counseled that chronic kidney disease is a significant condition and regular office followup with me (at least every 3 months for now) is important for monitoring and their health, and that it is their responsibility to make a follow up appointment.\par * The patient has been counseled never to stop taking their medications without discussing it with me or another doctor.\par * The patient has been counseled on avoiding NSAIDs.\par * The patient has been counseled on risk of acute renal failure and instructed to immediately call and speak with me or go immediately to ER with any severe symptoms, nausea, vomiting, diarrhea, chest pain, or shortness of breath.\par \par # Borderline hypercalcemia.\par * Possibly due to calcium and vitamin D.\par * Stop vitamin D.\par * Continue to follow.\par \par # Elevated BP today but usually low. LIkely white coat effect.\par * The patient's blood pressure was checked with the [employer] [id number]-[id number] using the SPRINT trial protocol after sitting quietly in an empty room with arm supported, back supported, and feet on the floor for 5 minutes. The average of 3 readings were taken.\par * A counseling information sheet has been given (today or previously). All their questions were answered.\par * The patient has been counseled to check their BP at home with an automatic arm cuff, write down the readings, and reach me directly on the phone immediately if they are persistently > 180 systolic or if SBP is less than 100 or if lightheadedness develops. They were counseled to bring in all blood pressure readings and medications next visit.\par * The patient has been counseled that regular office followup (at least every 3 months for now)  is important for monitoring and for their health, and that it is their responsibility to make follow up appointments.\par * The patient also has been counseled that they must never stop or change any medications without discussing this with me (or another physician). \par \par \par

## 2022-05-27 ENCOUNTER — NON-APPOINTMENT (OUTPATIENT)
Age: 77
End: 2022-05-27

## 2022-06-08 ENCOUNTER — APPOINTMENT (OUTPATIENT)
Dept: NEUROLOGY | Facility: CLINIC | Age: 77
End: 2022-06-08

## 2022-06-14 ENCOUNTER — APPOINTMENT (OUTPATIENT)
Dept: INTERNAL MEDICINE | Facility: CLINIC | Age: 77
End: 2022-06-14
Payer: MEDICARE

## 2022-06-14 VITALS
HEART RATE: 62 BPM | BODY MASS INDEX: 30.55 KG/M2 | DIASTOLIC BLOOD PRESSURE: 70 MMHG | HEIGHT: 62 IN | SYSTOLIC BLOOD PRESSURE: 127 MMHG | WEIGHT: 166 LBS | OXYGEN SATURATION: 99 % | RESPIRATION RATE: 16 BRPM | TEMPERATURE: 98.4 F

## 2022-06-14 PROCEDURE — 36415 COLL VENOUS BLD VENIPUNCTURE: CPT

## 2022-06-14 PROCEDURE — 99214 OFFICE O/P EST MOD 30 MIN: CPT | Mod: 25

## 2022-06-16 NOTE — HISTORY OF PRESENT ILLNESS
[FreeTextEntry1] : LLE edema f/u [de-identified] : Pt is a 77 y/o F with PMHx of DM, ileostomy, anemia PAD, BKA, who presents to the office today for LLE edema f/u\par \par Pt states that she was evaluated in the ED and by vascular about LLE.  She was informed that she does not have DVT and has saphenous vein incompetence. She was recommended to have ablation by vascular.  Pt is concerned about surgical complications from the procedure.

## 2022-06-16 NOTE — PHYSICAL EXAM
[No Carotid Bruits] : no carotid bruits [Normal] : affect was normal and insight and judgment were intact [No Rash] : no rash [de-identified] : + 1 pitting LLE edema up to knee, mild tenderness to palpation [de-identified] : + ALESSANDRAE JESSIE

## 2022-06-23 LAB
ALBUMIN SERPL ELPH-MCNC: 4.4 G/DL
ALP BLD-CCNC: 96 U/L
ALT SERPL-CCNC: 19 U/L
ANION GAP SERPL CALC-SCNC: 16 MMOL/L
AST SERPL-CCNC: 20 U/L
BASOPHILS # BLD AUTO: 0.05 K/UL
BASOPHILS NFR BLD AUTO: 0.7 %
BILIRUB SERPL-MCNC: 0.9 MG/DL
BUN SERPL-MCNC: 17 MG/DL
CALCIUM SERPL-MCNC: 9.9 MG/DL
CHLORIDE SERPL-SCNC: 103 MMOL/L
CO2 SERPL-SCNC: 22 MMOL/L
CREAT SERPL-MCNC: 1.29 MG/DL
EGFR: 43 ML/MIN/1.73M2
EOSINOPHIL # BLD AUTO: 0.12 K/UL
EOSINOPHIL NFR BLD AUTO: 1.7 %
GLUCOSE SERPL-MCNC: 114 MG/DL
HCT VFR BLD CALC: 33 %
HGB BLD-MCNC: 10 G/DL
IMM GRANULOCYTES NFR BLD AUTO: 0.3 %
LYMPHOCYTES # BLD AUTO: 1.75 K/UL
LYMPHOCYTES NFR BLD AUTO: 24.2 %
MAN DIFF?: NORMAL
MCHC RBC-ENTMCNC: 29.6 PG
MCHC RBC-ENTMCNC: 30.3 GM/DL
MCV RBC AUTO: 97.6 FL
MONOCYTES # BLD AUTO: 0.59 K/UL
MONOCYTES NFR BLD AUTO: 8.2 %
NEUTROPHILS # BLD AUTO: 4.69 K/UL
NEUTROPHILS NFR BLD AUTO: 64.9 %
PLATELET # BLD AUTO: 310 K/UL
POTASSIUM SERPL-SCNC: 4.3 MMOL/L
PROT SERPL-MCNC: 7.3 G/DL
RBC # BLD: 3.38 M/UL
RBC # FLD: 13 %
SODIUM SERPL-SCNC: 141 MMOL/L
WBC # FLD AUTO: 7.22 K/UL

## 2022-07-28 ENCOUNTER — NON-APPOINTMENT (OUTPATIENT)
Age: 77
End: 2022-07-28

## 2022-08-16 ENCOUNTER — NON-APPOINTMENT (OUTPATIENT)
Age: 77
End: 2022-08-16

## 2022-08-17 ENCOUNTER — LABORATORY RESULT (OUTPATIENT)
Age: 77
End: 2022-08-17

## 2022-08-17 ENCOUNTER — APPOINTMENT (OUTPATIENT)
Dept: INTERNAL MEDICINE | Facility: CLINIC | Age: 77
End: 2022-08-17

## 2022-08-17 VITALS
DIASTOLIC BLOOD PRESSURE: 71 MMHG | OXYGEN SATURATION: 98 % | SYSTOLIC BLOOD PRESSURE: 115 MMHG | TEMPERATURE: 98.2 F | HEIGHT: 62 IN | RESPIRATION RATE: 16 BRPM | HEART RATE: 61 BPM

## 2022-08-17 DIAGNOSIS — Z93.2 ILEOSTOMY STATUS: ICD-10-CM

## 2022-08-17 DIAGNOSIS — Z23 ENCOUNTER FOR IMMUNIZATION: ICD-10-CM

## 2022-08-17 PROCEDURE — 36415 COLL VENOUS BLD VENIPUNCTURE: CPT

## 2022-08-17 PROCEDURE — G0444 DEPRESSION SCREEN ANNUAL: CPT

## 2022-08-17 PROCEDURE — G0439: CPT

## 2022-08-18 NOTE — PHYSICAL EXAM
[Normal Sclera/Conjunctiva] : normal sclera/conjunctiva [Normal Outer Ear/Nose] : the outer ears and nose were normal in appearance [No JVD] : no jugular venous distention [No Carotid Bruits] : no carotid bruits [Soft] : abdomen soft [Non Tender] : non-tender [No CVA Tenderness] : no CVA  tenderness [No Focal Deficits] : no focal deficits [Normal] : affect was normal and insight and judgment were intact [de-identified] : BRANDON FAN in ACE bandage,

## 2022-08-18 NOTE — HEALTH RISK ASSESSMENT
[0] : 2) Feeling down, depressed, or hopeless: Not at all (0) [PHQ-2 Negative - No further assessment needed] : PHQ-2 Negative - No further assessment needed [Patient reported mammogram was normal] : Patient reported mammogram was normal [Patient reported colonoscopy was abnormal] : Patient reported colonoscopy was abnormal [HIV test declined] : HIV test declined [Hepatitis C test declined] : Hepatitis C test declined [No falls in past year] : Patient reported no falls in the past year [[id number]] : 0 [MammogramDate] : 11/2021 [BoneDensityDate] : Never [ColonoscopyDate] : <5 years ago [ColonoscopyComments] : f/u with GI in [facility]

## 2022-08-19 LAB
25(OH)D3 SERPL-MCNC: 28.3 NG/ML
ALBUMIN SERPL ELPH-MCNC: 4.2 G/DL
ALP BLD-CCNC: 93 U/L
ALT SERPL-CCNC: 22 U/L
ANION GAP SERPL CALC-SCNC: 13 MMOL/L
APPEARANCE: CLEAR
AST SERPL-CCNC: 22 U/L
BASOPHILS # BLD AUTO: 0.04 K/UL
BASOPHILS NFR BLD AUTO: 0.5 %
BILIRUB SERPL-MCNC: 0.6 MG/DL
BILIRUBIN URINE: NEGATIVE
BLOOD URINE: NEGATIVE
BUN SERPL-MCNC: 26 MG/DL
CALCIUM SERPL-MCNC: 9.3 MG/DL
CHLORIDE SERPL-SCNC: 106 MMOL/L
CHOLEST SERPL-MCNC: 142 MG/DL
CO2 SERPL-SCNC: 20 MMOL/L
COLOR: NORMAL
CREAT SERPL-MCNC: 1.19 MG/DL
EGFR: 47 ML/MIN/1.73M2
EOSINOPHIL # BLD AUTO: 0.11 K/UL
EOSINOPHIL NFR BLD AUTO: 1.5 %
ESTIMATED AVERAGE GLUCOSE: 157 MG/DL
GLUCOSE QUALITATIVE U: NEGATIVE
GLUCOSE SERPL-MCNC: 117 MG/DL
HBA1C MFR BLD HPLC: 7.1 %
HCT VFR BLD CALC: 34.5 %
HDLC SERPL-MCNC: 65 MG/DL
HGB BLD-MCNC: 11.1 G/DL
IMM GRANULOCYTES NFR BLD AUTO: 0.4 %
KETONES URINE: NEGATIVE
LDLC SERPL CALC-MCNC: 48 MG/DL
LEUKOCYTE ESTERASE URINE: ABNORMAL
LYMPHOCYTES # BLD AUTO: 1.92 K/UL
LYMPHOCYTES NFR BLD AUTO: 25.8 %
MAN DIFF?: NORMAL
MCHC RBC-ENTMCNC: 29.8 PG
MCHC RBC-ENTMCNC: 32.2 GM/DL
MCV RBC AUTO: 92.7 FL
MONOCYTES # BLD AUTO: 0.65 K/UL
MONOCYTES NFR BLD AUTO: 8.7 %
NEUTROPHILS # BLD AUTO: 4.68 K/UL
NEUTROPHILS NFR BLD AUTO: 63.1 %
NITRITE URINE: NEGATIVE
NONHDLC SERPL-MCNC: 77 MG/DL
PH URINE: 5
PLATELET # BLD AUTO: 290 K/UL
POTASSIUM SERPL-SCNC: 4.8 MMOL/L
PROT SERPL-MCNC: 7.2 G/DL
PROTEIN URINE: NEGATIVE
RBC # BLD: 3.72 M/UL
RBC # FLD: 13.3 %
SODIUM SERPL-SCNC: 139 MMOL/L
SPECIFIC GRAVITY URINE: 1.01
TRIGL SERPL-MCNC: 146 MG/DL
TSH SERPL-ACNC: 2.08 UIU/ML
UROBILINOGEN URINE: NORMAL
WBC # FLD AUTO: 7.43 K/UL

## 2022-08-30 DIAGNOSIS — Z87.39 PERSONAL HISTORY OF OTHER DISEASES OF THE MUSCULOSKELETAL SYSTEM AND CONNECTIVE TISSUE: ICD-10-CM

## 2022-08-30 DIAGNOSIS — Z87.19 PERSONAL HISTORY OF OTHER DISEASES OF THE DIGESTIVE SYSTEM: ICD-10-CM

## 2022-08-30 DIAGNOSIS — G62.9 POLYNEUROPATHY, UNSPECIFIED: ICD-10-CM

## 2022-09-06 ENCOUNTER — RX RENEWAL (OUTPATIENT)
Age: 77
End: 2022-09-06

## 2022-09-26 ENCOUNTER — APPOINTMENT (OUTPATIENT)
Dept: ENDOCRINOLOGY | Facility: CLINIC | Age: 77
End: 2022-09-26

## 2022-09-30 ENCOUNTER — APPOINTMENT (OUTPATIENT)
Dept: OBGYN | Facility: CLINIC | Age: 77
End: 2022-09-30

## 2022-10-12 ENCOUNTER — APPOINTMENT (OUTPATIENT)
Dept: OBGYN | Facility: CLINIC | Age: 77
End: 2022-10-12

## 2022-10-12 VITALS
HEART RATE: 83 BPM | WEIGHT: 172 LBS | OXYGEN SATURATION: 97 % | BODY MASS INDEX: 31.65 KG/M2 | SYSTOLIC BLOOD PRESSURE: 136 MMHG | HEIGHT: 62 IN | DIASTOLIC BLOOD PRESSURE: 78 MMHG

## 2022-10-12 DIAGNOSIS — N84.0 POLYP OF CORPUS UTERI: ICD-10-CM

## 2022-10-12 PROCEDURE — 99203 OFFICE O/P NEW LOW 30 MIN: CPT

## 2022-10-12 NOTE — PHYSICAL EXAM
[Chaperone Present] : A chaperone was present in the examining room during all aspects of the physical examination [Appropriately responsive] : appropriately responsive [Alert] : alert [No Acute Distress] : no acute distress [Regular Rate Rhythm] : regular rate rhythm [No Murmurs] : no murmurs [Clear to Auscultation B/L] : clear to auscultation bilaterally [Soft] : soft [Non-tender] : non-tender [Non-distended] : non-distended [No Mass] : no mass [Oriented x3] : oriented x3

## 2022-10-12 NOTE — HISTORY OF PRESENT ILLNESS
[Patient reported mammogram was normal] : Patient reported mammogram was normal [postmenopausal] : postmenopausal [N] : Patient denies prior pregnancies [LMP unknown] : LMP unknown [unknown] : Patient is unsure of the date of her LMP [No] : Patient does not have concerns regarding sex [FreeTextEntry1] : 77 yo G0 who presents to discuss endometrial polyp.  \par \par Patient states she had polyp removed in 2010 with previous gynecologist. She had PCOS during reproductive years. \par Patient said March 2021 was admitted into emergency department for foot ulcers and a CT scan of the body was performed. Patient was told she had thickening of the endometrium wall and potential uterine polyps. \par Patient did not follow up at that time. She presents today to discuss. She recently had amputation d/t gas gangrene  of foot. She is not sexually active, has not had any PMPB since menopause nor since polypectomy. Hx of normal pap smears.  [Mammogramdate] : 11/09/21 [TextBox_9] : 13

## 2022-10-12 NOTE — PLAN
[FreeTextEntry1] : 77 yo G0 who presents to discuss endometrial polyp.  \par \par - Reviewed CT scan and history with patient\par - Recommend TVUS for better imaging of pelvis especially as been 1+ year since CT scan\par - Asymptomatic, no PMPB\par - Recommend either hysteroscopy polypectomy D&C if in fact polyp vs monitoring annual with TVUS to evaluate uterus (patient poor surgical candidate); pt opts for the later\par - TVUS ordered\par \par RTO in 1 year or PRN.\par \par Katiuska Castellanos MD

## 2022-10-24 ENCOUNTER — APPOINTMENT (OUTPATIENT)
Dept: ENDOCRINOLOGY | Facility: CLINIC | Age: 77
End: 2022-10-24

## 2022-10-24 VITALS — HEART RATE: 73 BPM | DIASTOLIC BLOOD PRESSURE: 65 MMHG | SYSTOLIC BLOOD PRESSURE: 141 MMHG

## 2022-10-24 LAB
GLUCOSE BLDC GLUCOMTR-MCNC: 143
HBA1C MFR BLD HPLC: 7

## 2022-10-24 PROCEDURE — 99213 OFFICE O/P EST LOW 20 MIN: CPT | Mod: 25

## 2022-10-24 PROCEDURE — 83036 HEMOGLOBIN GLYCOSYLATED A1C: CPT | Mod: QW

## 2022-10-24 PROCEDURE — 82962 GLUCOSE BLOOD TEST: CPT

## 2022-10-24 RX ORDER — FLASH GLUCOSE SCANNING READER
EACH MISCELLANEOUS
Qty: 1 | Refills: 0 | Status: ACTIVE | COMMUNITY
Start: 2022-10-24 | End: 1900-01-01

## 2022-10-24 RX ORDER — FLASH GLUCOSE SENSOR
KIT MISCELLANEOUS
Qty: 6 | Refills: 3 | Status: ACTIVE | COMMUNITY
Start: 2022-10-24 | End: 1900-01-01

## 2022-10-24 NOTE — HISTORY OF PRESENT ILLNESS
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.\par Pt is not sure which meds she takes for diabetes - as "it's all new";\par She has been in a rehab after discharge, and thinks would stay there for at least another month, untill her apartment is ready for her.\par Phone number in rehab - [phone] and [phone]\par I called and spoke to SRINIVAS Tolliver;\par Pt is managed with Januvia 100 mg/day and metformin 1 g bid; Admelog on scale before meals - "last administered a long time ago".\par POC A1C - 6.7%\par POC BG - 134 mg/dl\par PMG includes Colon and breast Ca; both >20 years ago; Hiatal hernia.\par Pt needs PCP.\par Accompanied by Wilner Salinas - the superintendant of the building the pt reside; his ph# [phone]\par \par 9/27/21 MK\par came for f/u.\par has been at home for a few months. States eats differently from rehab - more carbs.\par POC A1C - 6%\par POC BG - 94 mg/dl\par Optho - due\par Recent lab results from Dr. Cody's office discussed - pt was not sure if she had taken vit D prescribed in July.\par Got a leg prosthesis - is satisfied with it.\par No new complains.\par \par 2/28/22 MK\par Came fo f/u on DM management.\par Feels fine; has questions about d-se process, and possible change in meds;\par Would like to discuss diet.\par POC Bg - 111 mg/dl\par POC A1C - 6.5%\par Treated with metformin and Januvia.\par Plans to see a neurologist to discuss peripheral neuropathy.\par Plans to get a new prosthesis for her right leg.\par  \par 10/24/22 MK\par F/U on DM\par Feels fine, but states started to eat a lot of carbs - ice-cream, bagels, etc.\par Still on Januvia only.\par POC A1C - 7.0\par Got new Prosthesis, plans to go to PT - looking for an outpatient program.\par Feels fine.\par No new complains, has questions about diet.\par Interested in Getting CGM.

## 2022-10-24 NOTE — ASSESSMENT
[FreeTextEntry1] : Diabetes - control is at goal;\par Will continue the same Tx, but will modify diet - limit bagels, ice cream, increase healthy veggies, whole food, legumes. \par D-se process and possible need for adding meds explained - will consider rybelsus or Prandin, if glycemic control gets worse.\par CGM - Rx sent for Sharita.\par Will get labs done at the next visit.\par F/U in 2 weeks.

## 2022-10-24 NOTE — PHYSICAL EXAM
[Alert] : alert [Well Nourished] : well nourished [No Acute Distress] : no acute distress [Normal Voice/Communication] : normal voice communication [No Respiratory Distress] : no respiratory distress [No Accessory Muscle Use] : no accessory muscle use [Normal Rate and Effort] : normal respiratory rate and effort [Normal Rate] : heart rate was normal [No Stigmata of Cushings Syndrome] : no stigmata of Cushings Syndrome [Acanthosis Nigricans] : no acanthosis nigricans [No Tremors] : no tremors [Oriented x3] : oriented to person, place, and time [de-identified] : Left leg is in compression stockings  [de-identified] : in wheel chair

## 2022-10-26 ENCOUNTER — APPOINTMENT (OUTPATIENT)
Dept: NEPHROLOGY | Facility: CLINIC | Age: 77
End: 2022-10-26

## 2022-10-26 VITALS — DIASTOLIC BLOOD PRESSURE: 63 MMHG | SYSTOLIC BLOOD PRESSURE: 120 MMHG | HEART RATE: 61 BPM

## 2022-10-26 DIAGNOSIS — N17.9 ACUTE KIDNEY FAILURE, UNSPECIFIED: ICD-10-CM

## 2022-10-26 PROCEDURE — 90662 IIV NO PRSV INCREASED AG IM: CPT

## 2022-10-26 PROCEDURE — G0008: CPT

## 2022-10-26 PROCEDURE — 99214 OFFICE O/P EST MOD 30 MIN: CPT | Mod: 25

## 2022-10-26 NOTE — ASSESSMENT
[FreeTextEntry1] : # ANTONY now improved. Stage 3 CKD. \par * Likeliest related to hypovolemia from high ostomy output and high calcium levels. Oxalate nephropathy is also possible.\par * Recheck U/A next visit. \par * Will consider SGLT2i but avoid for now given concerns about hypovolemia. Absolute renal benefits are uncertain. \par * Therapies for kidney disease: blood pressure control; other evidence-based therapies including exercise, a plant-based lower oxalate diet, and 400 mcg folic acid daily\par * Cardiovascular disease prevention: counseling on healthy diet, physical activity, weight loss, alcohol limitation, blood pressure control\par * A counseling information sheet has been given (today). All their questions were answered.\par * The patient has been counseled that chronic kidney disease is a significant condition and regular office followup with me (at least every 4 months for now) is important for monitoring and their health, and that it is their responsibility to make a follow up appointment.\par * The patient has been counseled never to stop taking their medications without discussing it with me or another doctor.\par * The patient has been counseled on avoiding NSAIDs.\par * The patient has been counseled on risk of acute renal failure and instructed to immediately call and speak with me or go immediately to ER with any severe symptoms, nausea, vomiting, diarrhea, chest pain, or shortness of breath.\par \par # Borderline hypercalcemia improved. \par * Possibly due to calcium and vitamin D.\par * Stop vitamin D.\par * Continue to follow.\par \par # Elevated BP today but usually low. LIkely white coat effect.\par * The patient's blood pressure was checked with the [employer] [id number]-907XL using the SPRINT trial protocol after sitting quietly in an empty room with arm supported, back supported, and feet on the floor for 5 minutes. The average of 3 readings were taken.\par * A counseling information sheet has been given (today or previously). All their questions were answered.\par * The patient has been counseled to check their BP at home with an automatic arm cuff, write down the readings, and reach me directly on the phone immediately if they are persistently > 180 systolic or if SBP is less than 100 or if lightheadedness develops. They were counseled to bring in all blood pressure readings and medications next visit.\par * The patient has been counseled that regular office followup (at least every 4 months for now) is important for monitoring and for their health, and that it is their responsibility to make follow up appointments.\par * The patient also has been counseled that they must never stop or change any medications without discussing this with me (or another physician). \par \par

## 2022-10-26 NOTE — PHYSICAL EXAM
[General Appearance - Alert] : alert [General Appearance - In No Acute Distress] : in no acute distress [Neck Appearance] : the appearance of the neck was normal [Neck Cervical Mass (___cm)] : no neck mass was observed [Jugular Venous Distention Increased] : there was no jugular-venous distention [Thyroid Diffuse Enlargement] : the thyroid was not enlarged [Thyroid Nodule] : there were no palpable thyroid nodules [] : no respiratory distress [Auscultation Breath Sounds / Voice Sounds] : lungs were clear to auscultation bilaterally [Heart Rate And Rhythm] : heart rate was normal and rhythm regular [Heart Sounds] : normal S1 and S2 [Heart Sounds Gallop] : no gallops [Murmurs] : no murmurs [Heart Sounds Pericardial Friction Rub] : no pericardial rub [Bowel Sounds] : normal bowel sounds [Abdomen Soft] : soft [Abdomen Mass (___ Cm)] : no abdominal mass palpated [Abdomen Tenderness] : non-tender [Cervical Lymph Nodes Enlarged Posterior Bilaterally] : posterior cervical [Cervical Lymph Nodes Enlarged Anterior Bilaterally] : anterior cervical [Supraclavicular Lymph Nodes Enlarged Bilaterally] : supraclavicular [FreeTextEntry1] : right BKA, left ankle edema

## 2022-11-17 ENCOUNTER — OUTPATIENT (OUTPATIENT)
Dept: OUTPATIENT SERVICES | Facility: HOSPITAL | Age: 77
LOS: 1 days | End: 2022-11-17
Payer: MEDICARE

## 2022-11-17 ENCOUNTER — APPOINTMENT (OUTPATIENT)
Dept: ULTRASOUND IMAGING | Facility: HOSPITAL | Age: 77
End: 2022-11-17

## 2022-11-17 DIAGNOSIS — Z93.3 COLOSTOMY STATUS: Chronic | ICD-10-CM

## 2022-11-17 PROCEDURE — 76830 TRANSVAGINAL US NON-OB: CPT

## 2022-11-17 PROCEDURE — 76830 TRANSVAGINAL US NON-OB: CPT | Mod: 26

## 2022-11-17 PROCEDURE — 76856 US EXAM PELVIC COMPLETE: CPT | Mod: 26

## 2022-11-17 PROCEDURE — 76856 US EXAM PELVIC COMPLETE: CPT

## 2022-11-18 ENCOUNTER — NON-APPOINTMENT (OUTPATIENT)
Age: 77
End: 2022-11-18

## 2022-11-20 ENCOUNTER — NON-APPOINTMENT (OUTPATIENT)
Age: 77
End: 2022-11-20

## 2022-11-21 ENCOUNTER — NON-APPOINTMENT (OUTPATIENT)
Age: 77
End: 2022-11-21

## 2022-11-28 ENCOUNTER — NON-APPOINTMENT (OUTPATIENT)
Age: 77
End: 2022-11-28

## 2022-12-01 ENCOUNTER — APPOINTMENT (OUTPATIENT)
Dept: OBGYN | Facility: CLINIC | Age: 77
End: 2022-12-01

## 2022-12-20 ENCOUNTER — NON-APPOINTMENT (OUTPATIENT)
Age: 77
End: 2022-12-20

## 2022-12-20 ENCOUNTER — APPOINTMENT (OUTPATIENT)
Dept: ENDOCRINOLOGY | Facility: CLINIC | Age: 77
End: 2022-12-20

## 2022-12-20 VITALS — HEART RATE: 67 BPM | DIASTOLIC BLOOD PRESSURE: 69 MMHG | SYSTOLIC BLOOD PRESSURE: 118 MMHG

## 2022-12-20 LAB
GLUCOSE BLDC GLUCOMTR-MCNC: 124
HBA1C MFR BLD HPLC: 7.6

## 2022-12-20 PROCEDURE — 99213 OFFICE O/P EST LOW 20 MIN: CPT | Mod: 25

## 2022-12-20 PROCEDURE — 82962 GLUCOSE BLOOD TEST: CPT

## 2022-12-20 PROCEDURE — 83036 HEMOGLOBIN GLYCOSYLATED A1C: CPT | Mod: QW

## 2022-12-20 NOTE — HISTORY OF PRESENT ILLNESS
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.\par Diabetes diagnosed recently, at the hospital - right leg below the knee amputation, related to gangrene.\par Pt is not sure which meds she takes for diabetes - as "it's all new";\par She has been in a rehab after discharge, and thinks would stay there for at least another month, untill her apartment is ready for her.\par Phone number in rehab - [phone] and [phone]\par I called and spoke to SRINIVAS Tolliver;\par Pt is managed with Januvia 100 mg/day and metformin 1 g bid; Admelog on scale before meals - "last administered a long time ago".\par POC A1C - 6.7%\par POC BG - 134 mg/dl\par PMG includes Colon and breast Ca; both >20 years ago; Hiatal hernia.\par Pt needs PCP.\par Accompanied by Wilner Salinas - the superintendant of the building the pt reside; his ph# [phone]\par \par 9/27/21 MK\par came for f/u.\par has been at home for a few months. States eats differently from rehab - more carbs.\par POC A1C - 6%\par POC BG - 94 mg/dl\par Optho - due\par Recent lab results from Dr. Cody's office discussed - pt was not sure if she had taken vit D prescribed in July.\par Got a leg prosthesis - is satisfied with it.\par No new complains.\par \par 2/28/22 MK\par Came fo f/u on DM management.\par Feels fine; has questions about d-se process, and possible change in meds;\par Would like to discuss diet.\par POC Bg - 111 mg/dl\par POC A1C - 6.5%\par Treated with metformin and Januvia.\par Plans to see a neurologist to discuss peripheral neuropathy.\par Plans to get a new prosthesis for her right leg.\par  \par 10/24/22 MK\par F/U on DM\par Feels fine, but states started to eat a lot of carbs - ice-cream, bagels, etc.\par Still on Januvia only.\par POC A1C - 7.0\par Got new Prosthesis, plans to go to PT - looking for an outpatient program.\par Feels fine.\par No new complains, has questions about diet.\par Interested in Getting CGM.\par \par 12/20/22 MK\par Pt came for f/u on DM management.\par POC A1C - 7.6%\par  mg/dl\par Pt states it was difficult to modify diet, as she is under stress because of GYN problems.\par Has strong intent to decrease sweets.\par No other changes.

## 2022-12-20 NOTE — PHYSICAL EXAM
[Alert] : alert [Obese] : obese [Normal Sclera/Conjunctiva] : normal sclera/conjunctiva [No Proptosis] : no proptosis [No Lid Lag] : no lid lag [No Respiratory Distress] : no respiratory distress [No Accessory Muscle Use] : no accessory muscle use [Normal Rate and Effort] : normal respiratory rate and effort [Normal Rate] : heart rate was normal [No Stigmata of Cushings Syndrome] : no stigmata of Cushings Syndrome [No Clubbing, Cyanosis] : no clubbing  or cyanosis of the fingernails [No Involuntary Movements] : no involuntary movements were seen [No Tremors] : no tremors [Oriented x3] : oriented to person, place, and time

## 2022-12-20 NOTE — ASSESSMENT
[FreeTextEntry1] : Diabetes - control is getting worse - \par detailed explanation of d-se progression and need for meds;\par as well as risk of complications and uncontrolled diabetes provided.\par Rybelsus and Prandidn as the initial tx options discussed.\par pt would like to try lifestyle modifications for the next 2-3 months, and would consider medical Tx of DM at that time.\par Labs today.\par F/U in 2 mo.

## 2022-12-21 LAB
25(OH)D3 SERPL-MCNC: 24.2 NG/ML
ALBUMIN SERPL ELPH-MCNC: 4 G/DL
ALP BLD-CCNC: 112 U/L
ALT SERPL-CCNC: 16 U/L
ANION GAP SERPL CALC-SCNC: 14 MMOL/L
AST SERPL-CCNC: 18 U/L
BASOPHILS # BLD AUTO: 0.04 K/UL
BASOPHILS NFR BLD AUTO: 0.5 %
BILIRUB SERPL-MCNC: 0.7 MG/DL
BUN SERPL-MCNC: 20 MG/DL
C PEPTIDE SERPL-MCNC: 4.2 NG/ML
CALCIUM SERPL-MCNC: 9.8 MG/DL
CHLORIDE SERPL-SCNC: 104 MMOL/L
CHOLEST SERPL-MCNC: 149 MG/DL
CO2 SERPL-SCNC: 23 MMOL/L
CREAT SERPL-MCNC: 1.21 MG/DL
CREAT SPEC-SCNC: 102 MG/DL
EGFR: 46 ML/MIN/1.73M2
EOSINOPHIL # BLD AUTO: 0.15 K/UL
EOSINOPHIL NFR BLD AUTO: 1.8 %
ESTIMATED AVERAGE GLUCOSE: 171 MG/DL
GLUCOSE SERPL-MCNC: 132 MG/DL
HBA1C MFR BLD HPLC: 7.6 %
HCT VFR BLD CALC: 35.9 %
HDLC SERPL-MCNC: 63 MG/DL
HGB BLD-MCNC: 11.3 G/DL
IMM GRANULOCYTES NFR BLD AUTO: 0.5 %
LDLC SERPL CALC-MCNC: 57 MG/DL
LYMPHOCYTES # BLD AUTO: 1.69 K/UL
LYMPHOCYTES NFR BLD AUTO: 20.3 %
MAN DIFF?: NORMAL
MCHC RBC-ENTMCNC: 29.1 PG
MCHC RBC-ENTMCNC: 31.5 GM/DL
MCV RBC AUTO: 92.5 FL
MICROALBUMIN 24H UR DL<=1MG/L-MCNC: 9.6 MG/DL
MICROALBUMIN/CREAT 24H UR-RTO: 94 MG/G
MONOCYTES # BLD AUTO: 0.72 K/UL
MONOCYTES NFR BLD AUTO: 8.7 %
NEUTROPHILS # BLD AUTO: 5.68 K/UL
NEUTROPHILS NFR BLD AUTO: 68.2 %
NONHDLC SERPL-MCNC: 86 MG/DL
PLATELET # BLD AUTO: 335 K/UL
POTASSIUM SERPL-SCNC: 4.3 MMOL/L
PROT SERPL-MCNC: 7.1 G/DL
RBC # BLD: 3.88 M/UL
RBC # FLD: 13.8 %
SODIUM SERPL-SCNC: 140 MMOL/L
T3 SERPL-MCNC: 88 NG/DL
T4 FREE SERPL-MCNC: 1.1 NG/DL
TRIGL SERPL-MCNC: 145 MG/DL
TSH SERPL-ACNC: 2.31 UIU/ML
WBC # FLD AUTO: 8.32 K/UL

## 2022-12-25 ENCOUNTER — NON-APPOINTMENT (OUTPATIENT)
Age: 77
End: 2022-12-25

## 2022-12-28 ENCOUNTER — APPOINTMENT (OUTPATIENT)
Dept: OBGYN | Facility: CLINIC | Age: 77
End: 2022-12-28
Payer: MEDICARE

## 2022-12-28 DIAGNOSIS — N95.0 POSTMENOPAUSAL BLEEDING: ICD-10-CM

## 2022-12-28 PROCEDURE — 58100 BIOPSY OF UTERUS LINING: CPT

## 2023-01-05 ENCOUNTER — NON-APPOINTMENT (OUTPATIENT)
Age: 78
End: 2023-01-05

## 2023-01-09 ENCOUNTER — NON-APPOINTMENT (OUTPATIENT)
Age: 78
End: 2023-01-09

## 2023-01-10 ENCOUNTER — APPOINTMENT (OUTPATIENT)
Dept: GYNECOLOGIC ONCOLOGY | Facility: CLINIC | Age: 78
End: 2023-01-10
Payer: MEDICARE

## 2023-01-10 VITALS
HEART RATE: 79 BPM | DIASTOLIC BLOOD PRESSURE: 78 MMHG | SYSTOLIC BLOOD PRESSURE: 145 MMHG | HEIGHT: 62 IN | TEMPERATURE: 97.3 F | OXYGEN SATURATION: 95 %

## 2023-01-10 PROCEDURE — 99205 OFFICE O/P NEW HI 60 MIN: CPT

## 2023-01-10 NOTE — ASSESSMENT
[FreeTextEntry1] : I discussed with the patient, with the aid of diagrams, reviewed the findings on history and physical examination, and reviewed available imaging studies in detail. Pathogenesis of endometrial cancer was explained. \par \par Recommendation is for surgical removal of the uterus, bilateral fallopian tubes and ovaries. Different surgical approaches discussed including minimally invasive and open approaches. We discussed an increased likelihood of needing an exlap/DENICE/BSO. The NCCN guidelines with regards to endometrial cancer staging were discussed. If sentinel lymph node biopsy is unsuccessful, or if the sentinel lymph node is found to be positive for disease, a full lymph node dissection will be performed on that side. \par \par Complications that include, but are not limited to: bleeding, infection, injury to other organs including bowel, bladder, ureters, blood vessels, nerves; infections, blood clots, lymphedema, pneumonia, wound complications and prolonged hospital stay have all been discussed with the patient. Whenever minimally invasive surgery is attempted, there is a chance of needing to convert to laparotomy. The risk of occult injury requiring additional surgery also discussed. I have also provided her with the diagrams.  \par \par Surgical scheduling was discussed and instructions for optimization prior to surgery were given. will follow the Enhanced Recovery After Surgery (ERAS) protocol.  \par No aspirin or NSAID products for 1 week prior. \par She will choose a surgical date.\par \par Will obtain additional imaging, CT abdomen/pelvis and chest x-ray prior to surgery to assess for disease outside of the uterus.\par \par Patient is seeing her colorectal surgeon for evaluations and biopsies and would like to defer surgery until after. I strongly encouraged her to at least schedule the procedure as to not delay. High grade cancer has a higher risk of metastasis and worse prognosis with delays in treatment. Delay in treatment can result in death.\par \par [] Refer to Dr. Price\par [] CT chest, abdomen, pelvis\par [] Medical clearance\par [] COVID-19 pre-op\par [] Request methylation studies of tumor\par [x] Slide review not needed, path at [facility]\par [] TLH/BSO/SLNB vs. Exlap/DENICE/BSO/staging

## 2023-01-10 NOTE — PHYSICAL EXAM
[Normal] : Anus and perineum: Normal sphincter tone, no masses, no prolapse. [de-identified] : multiple abdominal scars, ostomy at LLQ, normal output, healthy appearance [de-identified] : palpated normal, could not visualize patient would not tolerate speculum exam

## 2023-01-10 NOTE — CHIEF COMPLAINT
[FreeTextEntry1] : 78 y/o referred by Dr. Castellanos for endometrial cancer management.\par \par LMP: Early 60s \par OBHX: Never pregnant\par GYNHX: s/p Uterine polyp removed 2011 WNL benign, (Due to no bleeding, incidental founding in CTAP)\par \par PMHX: DM, BKA 2021 (s/p Necrotizing fascitis of lower leg and DKA), right leg below knee amputation 2021 foot ulcer, Stage 3 CKD, UC diagnosed age 30s, Adenocarcinoma in colon 2005 due to UC ileostomy /resection 2005. 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 \par \par MED: Januvia, Atorvastatin, Pepsid, MV. 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 No colon cancer. \par \par Health Maintenance\par Last pap: Many years ago, not sexually active many years 10 years. \par Last mammogram:  February 2022 WNL. \par Last colonoscopy: Not recently. She is due for colonoscopy for active UC and she will go for colonoscopy [place] soon \par

## 2023-01-10 NOTE — HISTORY OF PRESENT ILLNESS
[FreeTextEntry1] : Problem List\par 1. S/P EMB 12/28/22- High grade endometrial adenocarcinoma, endometrioid type with clear cell\par  change, squamous metaplasia and necrosis, FIGO grade 3.\par 2. HER- 2 pending \par \par Previous Therapy\par 1. Pelvic US 11/17/22\par     a) Uterus: 8.9 cm x 2.1 cm x 4.2 cm. Within normal limits.\par     b) Endometrium: Thickened, measuring 0.9 cm. No discrete masses or polyps. Heterogenous echotexture. Trace amount of fluid within the endometrial cavity.\par     c) Right ovary: 1.3 cm x 0.9 cm x 1.4 cm. No focal lesion. Adequate arterial and venous flow on Doppler evaluation.\par Left ovary: Not visualized.\par \par

## 2023-01-12 ENCOUNTER — NON-APPOINTMENT (OUTPATIENT)
Age: 78
End: 2023-01-12

## 2023-01-13 ENCOUNTER — NON-APPOINTMENT (OUTPATIENT)
Age: 78
End: 2023-01-13

## 2023-01-19 ENCOUNTER — APPOINTMENT (OUTPATIENT)
Dept: RADIOLOGY | Facility: HOSPITAL | Age: 78
End: 2023-01-19

## 2023-01-23 ENCOUNTER — RESULT REVIEW (OUTPATIENT)
Age: 78
End: 2023-01-23

## 2023-01-23 ENCOUNTER — OUTPATIENT (OUTPATIENT)
Dept: OUTPATIENT SERVICES | Facility: HOSPITAL | Age: 78
LOS: 1 days | End: 2023-01-23
Payer: MEDICARE

## 2023-01-23 ENCOUNTER — APPOINTMENT (OUTPATIENT)
Dept: CT IMAGING | Facility: HOSPITAL | Age: 78
End: 2023-01-23
Payer: MEDICARE

## 2023-01-23 DIAGNOSIS — Z93.3 COLOSTOMY STATUS: Chronic | ICD-10-CM

## 2023-01-23 LAB — POCT ISTAT CREATININE: 1.2 MG/DL — SIGNIFICANT CHANGE UP (ref 0.5–1.3)

## 2023-01-23 PROCEDURE — 71260 CT THORAX DX C+: CPT | Mod: 26,MH

## 2023-01-23 PROCEDURE — 74177 CT ABD & PELVIS W/CONTRAST: CPT | Mod: MH

## 2023-01-23 PROCEDURE — 82565 ASSAY OF CREATININE: CPT

## 2023-01-23 PROCEDURE — 74177 CT ABD & PELVIS W/CONTRAST: CPT | Mod: 26,MH

## 2023-01-23 PROCEDURE — 71260 CT THORAX DX C+: CPT

## 2023-01-24 LAB — CORE LAB BIOPSY: NORMAL

## 2023-01-25 ENCOUNTER — NON-APPOINTMENT (OUTPATIENT)
Age: 78
End: 2023-01-25

## 2023-01-25 ENCOUNTER — TRANSCRIPTION ENCOUNTER (OUTPATIENT)
Age: 78
End: 2023-01-25

## 2023-02-14 ENCOUNTER — APPOINTMENT (OUTPATIENT)
Dept: NEPHROLOGY | Facility: CLINIC | Age: 78
End: 2023-02-14

## 2023-02-28 ENCOUNTER — APPOINTMENT (OUTPATIENT)
Dept: INTERNAL MEDICINE | Facility: CLINIC | Age: 78
End: 2023-02-28
Payer: MEDICARE

## 2023-02-28 VITALS
BODY MASS INDEX: 29.44 KG/M2 | HEART RATE: 68 BPM | TEMPERATURE: 98 F | DIASTOLIC BLOOD PRESSURE: 71 MMHG | RESPIRATION RATE: 16 BRPM | SYSTOLIC BLOOD PRESSURE: 127 MMHG | HEIGHT: 62 IN | WEIGHT: 160 LBS | OXYGEN SATURATION: 99 %

## 2023-02-28 DIAGNOSIS — L97.909 NON-PRESSURE CHRONIC ULCER OF UNSPECIFIED PART OF UNSPECIFIED LOWER LEG WITH UNSPECIFIED SEVERITY: ICD-10-CM

## 2023-02-28 PROCEDURE — 83014 H PYLORI DRUG ADMIN: CPT

## 2023-02-28 PROCEDURE — 99214 OFFICE O/P EST MOD 30 MIN: CPT | Mod: 25

## 2023-02-28 PROCEDURE — 36415 COLL VENOUS BLD VENIPUNCTURE: CPT

## 2023-02-28 NOTE — HISTORY OF PRESENT ILLNESS
[Formal Caregiver] : formal caregiver [FreeTextEntry1] : follow up [de-identified] : chrnoic LLE edema w/ new ulceration\par - was evaluated at [facility] vascular\par - is now in unna boot\par - she has f/u appt\par - concerned about chronic DVT found on US\par - US from [facility] 2021 and 2022 did not show any DVTs\par - does not have a clear answer if she should be on AC or at least ASA\par \par GERD\par - chronic issue\par - does not recall her last EGD\par - was on nexium w/ mild improvement in sx, but d/c'ed by nephro recently for elevated Cr.\par - has been on prilosec w/o improvement\par \par DM2\par - a1c has been trending up\par - will f/u w/ endo\par - is compliant w/ medications\par \par endometrial CA\par - scheduled for hysterectomy in april

## 2023-02-28 NOTE — PHYSICAL EXAM
[No Acute Distress] : no acute distress [No Respiratory Distress] : no respiratory distress  [Normal] : affect was normal and insight and judgment were intact [de-identified] : in wheelchair [de-identified] : LLE in unna boot

## 2023-03-13 LAB
ALBUMIN SERPL ELPH-MCNC: 4.1 G/DL
ALP BLD-CCNC: 102 U/L
ALT SERPL-CCNC: 10 U/L
ANION GAP SERPL CALC-SCNC: 19 MMOL/L
AST SERPL-CCNC: 17 U/L
BILIRUB SERPL-MCNC: 0.6 MG/DL
BUN SERPL-MCNC: 18 MG/DL
CALCIUM SERPL-MCNC: 9.4 MG/DL
CHLORIDE SERPL-SCNC: 104 MMOL/L
CO2 SERPL-SCNC: 17 MMOL/L
CREAT SERPL-MCNC: 1.11 MG/DL
EGFR: 51 ML/MIN/1.73M2
ESTIMATED AVERAGE GLUCOSE: 189 MG/DL
GLUCOSE SERPL-MCNC: 139 MG/DL
HBA1C MFR BLD HPLC: 8.2 %
POTASSIUM SERPL-SCNC: 4.6 MMOL/L
PROT SERPL-MCNC: 7.2 G/DL
SODIUM SERPL-SCNC: 141 MMOL/L
UREA BREATH TEST QL: NEGATIVE

## 2023-03-14 ENCOUNTER — APPOINTMENT (OUTPATIENT)
Dept: HEART AND VASCULAR | Facility: CLINIC | Age: 78
End: 2023-03-14

## 2023-03-16 ENCOUNTER — APPOINTMENT (OUTPATIENT)
Dept: HEART AND VASCULAR | Facility: CLINIC | Age: 78
End: 2023-03-16
Payer: MEDICARE

## 2023-03-16 VITALS — DIASTOLIC BLOOD PRESSURE: 82 MMHG | SYSTOLIC BLOOD PRESSURE: 146 MMHG

## 2023-03-16 VITALS
TEMPERATURE: 97.7 F | DIASTOLIC BLOOD PRESSURE: 80 MMHG | WEIGHT: 160 LBS | BODY MASS INDEX: 29.44 KG/M2 | HEART RATE: 65 BPM | HEIGHT: 62 IN | SYSTOLIC BLOOD PRESSURE: 160 MMHG

## 2023-03-16 VITALS — DIASTOLIC BLOOD PRESSURE: 78 MMHG | SYSTOLIC BLOOD PRESSURE: 152 MMHG

## 2023-03-16 DIAGNOSIS — I35.1 NONRHEUMATIC AORTIC (VALVE) INSUFFICIENCY: ICD-10-CM

## 2023-03-16 DIAGNOSIS — I73.9 PERIPHERAL VASCULAR DISEASE, UNSPECIFIED: ICD-10-CM

## 2023-03-16 DIAGNOSIS — R03.0 ELEVATED BLOOD-PRESSURE READING, W/OUT DIAGNOSIS OF HYPERTENSION: ICD-10-CM

## 2023-03-16 DIAGNOSIS — E11.52 TYPE 2 DIABETES MELLITUS WITH DIABETIC PERIPHERAL ANGIOPATHY WITH GANGRENE: ICD-10-CM

## 2023-03-16 PROCEDURE — 99215 OFFICE O/P EST HI 40 MIN: CPT | Mod: 25

## 2023-03-16 PROCEDURE — 93306 TTE W/DOPPLER COMPLETE: CPT

## 2023-03-16 PROCEDURE — 93000 ELECTROCARDIOGRAM COMPLETE: CPT

## 2023-03-16 RX ORDER — LACTULOSE 10 G/15ML
20 SOLUTION ORAL DAILY
Qty: 1 | Refills: 0 | Status: DISCONTINUED | COMMUNITY
Start: 2021-07-07 | End: 2023-03-16

## 2023-03-20 PROBLEM — R03.0 BLOOD PRESSURE ELEVATED WITHOUT HISTORY OF HTN: Status: ACTIVE | Noted: 2023-03-20

## 2023-03-20 PROBLEM — I73.9 PAD (PERIPHERAL ARTERY DISEASE): Status: ACTIVE | Noted: 2021-08-20

## 2023-03-20 PROBLEM — I35.1 MILD AORTIC INSUFFICIENCY: Status: ACTIVE | Noted: 2023-03-20

## 2023-03-20 PROBLEM — E11.52 DIABETIC WET GANGRENE OF THE FOOT: Status: RESOLVED | Noted: 2021-07-07 | Resolved: 2023-03-20

## 2023-04-10 ENCOUNTER — APPOINTMENT (OUTPATIENT)
Dept: GASTROENTEROLOGY | Facility: CLINIC | Age: 78
End: 2023-04-10
Payer: MEDICARE

## 2023-04-10 VITALS
OXYGEN SATURATION: 97 % | BODY MASS INDEX: 29.44 KG/M2 | DIASTOLIC BLOOD PRESSURE: 73 MMHG | TEMPERATURE: 97.6 F | RESPIRATION RATE: 14 BRPM | HEIGHT: 62 IN | WEIGHT: 160 LBS | HEART RATE: 78 BPM | SYSTOLIC BLOOD PRESSURE: 151 MMHG

## 2023-04-10 DIAGNOSIS — D64.9 ANEMIA, UNSPECIFIED: ICD-10-CM

## 2023-04-10 PROCEDURE — 99204 OFFICE O/P NEW MOD 45 MIN: CPT

## 2023-04-10 NOTE — ASSESSMENT
[FreeTextEntry1] : Impression:\par #Reflux \par #Small hiatal hernia\par #UC and colon cancer s/p subtotal colectomy \par \par Plan:\par - We discussed at length the anatomic and physiologic etiologies of reflux, including hiatal hernia. Given the severity of her symptoms, lack of ever being on daily PPI, I recommend a 2 month trial of high dose PPI. Dexilant 60 mg daily, followed by 30 mg daily the second month. I discussed the data behind concern for long-term consequences of PPI (such as CKD, dementia, etc). This data is low quality and mixed, but that is not to say there is zero concern for these long-term effects. In this patient's case, I believe the benefits of a short term PPI trail vastly outweigh any potential benefits (there is minimal to no data that I am aware of for significant PPI side effects  in the short term). Additionally, her overall medical issues (including new diagnosis of endometrial cancer) make long-term issues less of a concern than her acute daily distress.\par - Discussed the role of weight loss and improving her diabetes in improving her reflux.\par - F/u 2 months, can be in person or TTM based on pt's functional status after her surgery. \par \par \par

## 2023-04-10 NOTE — HISTORY OF PRESENT ILLNESS
[FreeTextEntry1] : Preferred Language: English\par \par DANNI CALDERON is a 77 year F here to discuss reflux. She has PMHx ?atrial septal aneurysm (on echo from 2013), UC and colon cancer s/p colectomy and chemotherapy, chronic venous stasis w/ chronic LE wounds, uncontrolled Type 2 diabetes mellitus (followed by Endocrine, Dr. 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 Marybeth Burris) who presents today to discuss GERD. \par \par GI History: UC and colon cancer s/p subtotal colectomy and chemo.\par Unclear if she has had an endoscopy in the past. \par \par Current symptoms: Gets reflux/heartburn when bending over especially, leads to retching and coughing and vomiting. Avoids eating after 8 pm, goes to bed about midnight. Drinks lots of water. Pepcid helps but very temporarily. Eats a "large" meal once a day. Eats pureed food due to parastomal hernia and small opening - food used to get stuck there. \par \par When she was previously on Nexium intermittently, sx were better. Stopped 1 year ago on advice of nephrologist. \par \par GI ROS negative for unintentional weight loss, fevers/chills, dysphagia, abdominal pain, bloating, nausea, vomiting, early satiety, diarrhea, constipation, melena, hematochezia. 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 \par

## 2023-04-24 DIAGNOSIS — R21 RASH AND OTHER NONSPECIFIC SKIN ERUPTION: ICD-10-CM

## 2023-04-25 ENCOUNTER — NON-APPOINTMENT (OUTPATIENT)
Age: 78
End: 2023-04-25

## 2023-05-02 ENCOUNTER — APPOINTMENT (OUTPATIENT)
Dept: ENDOCRINOLOGY | Facility: CLINIC | Age: 78
End: 2023-05-02

## 2023-05-22 ENCOUNTER — APPOINTMENT (OUTPATIENT)
Dept: GASTROENTEROLOGY | Facility: CLINIC | Age: 78
End: 2023-05-22
Payer: MEDICARE

## 2023-05-22 DIAGNOSIS — K21.9 GASTRO-ESOPHAGEAL REFLUX DISEASE W/OUT ESOPHAGITIS: ICD-10-CM

## 2023-05-22 DIAGNOSIS — K94.00 COLOSTOMY COMPLICATION, UNSPECIFIED: ICD-10-CM

## 2023-05-22 PROCEDURE — 99214 OFFICE O/P EST MOD 30 MIN: CPT | Mod: 95

## 2023-05-22 NOTE — HISTORY OF PRESENT ILLNESS
[Home] : at home, [unfilled] , at the time of the visit. [Medical Office: ([facility])___] : at the medical office located in  [Verbal consent obtained from patient] : the patient, [unfilled] [FreeTextEntry1] : DANNI CALDERON is a 77 year F here for follow up. She has a history of ?atrial septal aneurysm (on echo from 2013), UC and colon cancer s/p colectomy and chemotherapy, chronic venous stasis w/ chronic LE wounds, uncontrolled Type 2 diabetes mellitus (followed by Endocrine, Dr. 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 Marybeth Burris).\par \par Brief Summary: Seen by me 4/10/2023 for reflux. Reflux triggered by bending over especially. Mildly improved on low dose intermittent PPI, not responsive to pepcid. Imaging shows small HH and sidebranch IPMN. She is on a pureed diet due to a small stoma - food used to get stuck there previously, no issues w/ dysphagia. No prior EGD that she can recall.\par \par Plan at last visit: High dose PPI (Dexilant 60mg x 1 month, 30mg x 1 month). Discussed weight loss and control of DMII. \par \par Current symptoms: "Immediate relief" with PPI. Only 2-3 episodes of reflux since starting medication. \par Pt is feeling MUCH better from a GI perspective.\par Surgery planned for June. Wants to avoid complications like she had last time. \par \par GI ROS negative for unintentional weight loss, fevers/chills, dysphagia, reflux, abdominal pain, bloating, nausea, vomiting, early satiety, diarrhea, constipation, melena, hematochezia. Patient denies NSAID use.\par \par Current Meds: Reviewed\par All: Reviewed\par \par Relevant Exam:\par Well appearing\par \par \par

## 2023-05-22 NOTE — ASSESSMENT
[FreeTextEntry1] : Impression:\par #Chronic GERD - likely related to obesity, diabetes, decreased mobility, hiatal hernia, and decreased LES tone. Improved significantly on PPI.\par \par Plan:\par - Again spent a significant time discussing risks/benefits of PPI, especially related to CKD, and especially in the context of her upcoming major surgery. Pt decided that mental and physical improvement that PPI is providing is worth any mild risk at this time, and I agree.\par - C/w PPI at current dose (Dexilant 60mg) for now.\par - F/u 3-6 months post-op when pt has recovered. Can consider reducing dose at that time. \par - Monitor Cr in the meantime, no more frequently than otherwise indicated.\par

## 2023-05-23 ENCOUNTER — APPOINTMENT (OUTPATIENT)
Dept: ENDOCRINOLOGY | Facility: CLINIC | Age: 78
End: 2023-05-23
Payer: MEDICARE

## 2023-05-23 VITALS — HEART RATE: 71 BPM | DIASTOLIC BLOOD PRESSURE: 63 MMHG | SYSTOLIC BLOOD PRESSURE: 138 MMHG

## 2023-05-23 LAB
GLUCOSE BLDC GLUCOMTR-MCNC: 134
HBA1C MFR BLD HPLC: 8.3

## 2023-05-23 PROCEDURE — 82962 GLUCOSE BLOOD TEST: CPT

## 2023-05-23 PROCEDURE — 99215 OFFICE O/P EST HI 40 MIN: CPT | Mod: 25

## 2023-05-23 PROCEDURE — 83036 HEMOGLOBIN GLYCOSYLATED A1C: CPT | Mod: QW

## 2023-05-23 NOTE — ASSESSMENT
[Carbohydrate Consistent Diet] : carbohydrate consistent diet [Self Monitoring of Blood Glucose] : self monitoring of blood glucose [Other____] : [unfilled] [FreeTextEntry1] : Diabetes - uncontrolled.\par Pre-surgical goal for A1C is < 8% - rasons explained.\par Various Tx options discussed, including SGLT-2; sulfanilylurea and/or insulin.\par Pt will try farxiga 10 mg, and call me if has problems.\par Consider Prandin or Glipizide if can't tolerate Farxiga or BG is not at goal.\par Sharita 2 sensor placed, evelyn downloaded during this visit.\par Explained could be cleared for surgery if Fructosamine or CGM report are at target, as it takes 2-3 mo for A1C to reflect real change.\par F/U in 2 weeks.

## 2023-05-23 NOTE — HISTORY OF PRESENT ILLNESS
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.\par Diabetes diagnosed recently, at the hospital - right leg below the knee amputation, related to gangrene.\par Pt is not sure which meds she takes for diabetes - as "it's all new";\par She has been in a rehab after discharge, and thinks would stay there for at least another month, untill her apartment is ready for her.\par Phone number in rehab - [phone] and [phone]\par I called and spoke to SRINIVAS Tolliver;\par Pt is managed with Januvia 100 mg/day and metformin 1 g bid; Admelog on scale before meals - "last administered a long time ago".\par POC A1C - 6.7%\par POC BG - 134 mg/dl\par PMG includes Colon and breast Ca; both >20 years ago; Hiatal hernia.\par Pt needs PCP.\par Accompanied by Wilner Salinas - the superintendant of the building the pt reside; his ph# [phone]\par \par 9/27/21 MK\par came for f/u.\par has been at home for a few months. States eats differently from rehab - more carbs.\par POC A1C - 6%\par POC BG - 94 mg/dl\par Optho - due\par Recent lab results from Dr. Cody's office discussed - pt was not sure if she had taken vit D prescribed in July.\par Got a leg prosthesis - is satisfied with it.\par No new complains.\par \par 2/28/22 MK\par Came fo f/u on DM management.\par Feels fine; has questions about d-se process, and possible change in meds;\par Would like to discuss diet.\par POC Bg - 111 mg/dl\par POC A1C - 6.5%\par Treated with metformin and Januvia.\par Plans to see a neurologist to discuss peripheral neuropathy.\par Plans to get a new prosthesis for her right leg.\par  \par 10/24/22 MK\par F/U on DM\par Feels fine, but states started to eat a lot of carbs - ice-cream, bagels, etc.\par Still on Januvia only.\par POC A1C - 7.0\par Got new Prosthesis, plans to go to PT - looking for an outpatient program.\par Feels fine.\par No new complains, has questions about diet.\par Interested in Getting CGM.\par \par 12/20/22 MK\par Pt came for f/u on DM management.\par POC A1C - 7.6%\par  mg/dl\par Pt states it was difficult to modify diet, as she is under stress because of GYN problems.\par Has strong intent to decrease sweets.\par No other changes.\par \par 5/23/23 MK\par Type 2 DM - needs clearance for surgery - \par Uterine ca; surgery is planned for mid June.\par POC A1C - 8.3%\par POC BG - 134 mg/dl.\par Currently on Januvia 100 mg  for DM.\par Does not SMBG.\par came with a friend - nursing student.

## 2023-05-23 NOTE — PHYSICAL EXAM
[Alert] : alert [Well Nourished] : well nourished [No Acute Distress] : no acute distress [Well Developed] : well developed [Normal Sclera/Conjunctiva] : normal sclera/conjunctiva [No Proptosis] : no proptosis [No Lid Lag] : no lid lag [No Respiratory Distress] : no respiratory distress [No Accessory Muscle Use] : no accessory muscle use [Normal Rate and Effort] : normal respiratory rate and effort [Normal Rate] : heart rate was normal [No Stigmata of Cushings Syndrome] : no stigmata of Cushings Syndrome [No Clubbing, Cyanosis] : no clubbing  or cyanosis of the fingernails [No Involuntary Movements] : no involuntary movements were seen [No Rash] : no rash [Acanthosis Nigricans] : no acanthosis nigricans [No Tremors] : no tremors [Oriented x3] : oriented to person, place, and time [de-identified] : on wheelchair

## 2023-05-26 ENCOUNTER — APPOINTMENT (OUTPATIENT)
Dept: ENDOCRINOLOGY | Facility: CLINIC | Age: 78
End: 2023-05-26

## 2023-05-30 ENCOUNTER — APPOINTMENT (OUTPATIENT)
Dept: INTERNAL MEDICINE | Facility: CLINIC | Age: 78
End: 2023-05-30
Payer: MEDICARE

## 2023-05-30 ENCOUNTER — NON-APPOINTMENT (OUTPATIENT)
Age: 78
End: 2023-05-30

## 2023-05-30 VITALS
HEART RATE: 64 BPM | WEIGHT: 160 LBS | OXYGEN SATURATION: 95 % | SYSTOLIC BLOOD PRESSURE: 137 MMHG | TEMPERATURE: 97.9 F | DIASTOLIC BLOOD PRESSURE: 75 MMHG | BODY MASS INDEX: 29.44 KG/M2 | HEIGHT: 62 IN

## 2023-05-30 DIAGNOSIS — K46.9 UNSPECIFIED ABDOMINAL HERNIA W/OUT OBSTRUCTION OR GANGRENE: ICD-10-CM

## 2023-05-30 DIAGNOSIS — Z01.818 ENCOUNTER FOR OTHER PREPROCEDURAL EXAMINATION: ICD-10-CM

## 2023-05-30 PROCEDURE — 99213 OFFICE O/P EST LOW 20 MIN: CPT

## 2023-05-30 RX ORDER — DEXLANSOPRAZOLE 30 MG/1
30 CAPSULE, DELAYED RELEASE ORAL
Qty: 30 | Refills: 0 | Status: COMPLETED | COMMUNITY
Start: 2023-04-10 | End: 2023-05-30

## 2023-05-30 RX ORDER — BLOOD-GLUCOSE SENSOR
EACH MISCELLANEOUS
Qty: 2 | Refills: 3 | Status: ACTIVE | COMMUNITY
Start: 2023-05-30 | End: 1900-01-01

## 2023-05-30 RX ORDER — ASPIRIN ENTERIC COATED TABLETS 81 MG 81 MG/1
81 TABLET, DELAYED RELEASE ORAL DAILY
Refills: 4 | Status: ACTIVE | COMMUNITY
Start: 2023-05-30

## 2023-05-30 RX ORDER — FAMOTIDINE 20 MG/1
20 TABLET, FILM COATED ORAL
Qty: 60 | Refills: 3 | Status: COMPLETED | COMMUNITY
Start: 2023-02-28 | End: 2023-05-30

## 2023-05-31 ENCOUNTER — NON-APPOINTMENT (OUTPATIENT)
Age: 78
End: 2023-05-31

## 2023-05-31 LAB
ALBUMIN SERPL ELPH-MCNC: 4.4 G/DL
ALP BLD-CCNC: 119 U/L
ALT SERPL-CCNC: 26 U/L
ANION GAP SERPL CALC-SCNC: 15 MMOL/L
APPEARANCE: ABNORMAL
APTT BLD: 30.7 SEC
AST SERPL-CCNC: 29 U/L
BACTERIA: NEGATIVE /HPF
BILIRUB SERPL-MCNC: 0.7 MG/DL
BILIRUBIN URINE: NEGATIVE
BLOOD URINE: ABNORMAL
BUN SERPL-MCNC: 21 MG/DL
CALCIUM SERPL-MCNC: 10.2 MG/DL
CAST: 2 /LPF
CHLORIDE SERPL-SCNC: 103 MMOL/L
CHOLEST SERPL-MCNC: 163 MG/DL
CO2 SERPL-SCNC: 21 MMOL/L
COLOR: YELLOW
CREAT SERPL-MCNC: 1.14 MG/DL
EGFR: 50 ML/MIN/1.73M2
EPITHELIAL CELLS: 1 /HPF
GLUCOSE QUALITATIVE U: >=1000 MG/DL
GLUCOSE SERPL-MCNC: 142 MG/DL
HBA1C MFR BLD HPLC: NORMAL
HDLC SERPL-MCNC: 67 MG/DL
HYALINE CASTS: PRESENT
INR PPP: 1.03 RATIO
KETONES URINE: NEGATIVE MG/DL
LDLC SERPL CALC-MCNC: 61 MG/DL
LEUKOCYTE ESTERASE URINE: NEGATIVE
MICROSCOPIC-UA: NORMAL
NITRITE URINE: NEGATIVE
NONHDLC SERPL-MCNC: 95 MG/DL
PH URINE: 5.5
POTASSIUM SERPL-SCNC: 4.1 MMOL/L
PROT SERPL-MCNC: 7.7 G/DL
PROTEIN URINE: 30 MG/DL
PT BLD: 11.9 SEC
RED BLOOD CELLS URINE: 89 /HPF
REVIEW: NORMAL
SODIUM SERPL-SCNC: 140 MMOL/L
SPECIFIC GRAVITY URINE: 1.02
TRIGL SERPL-MCNC: 174 MG/DL
UROBILINOGEN URINE: 0.2 MG/DL
WHITE BLOOD CELLS URINE: 30 /HPF

## 2023-05-31 NOTE — END OF VISIT
[FreeTextEntry3] : I, Dr. Cody, personally performed the evaluation and management (E/M) services for this established patient who presents today with (a) new problem(s)/exacerbation of (an) existing condition(s).  That E/M includes conducting the examination, assessing all new/exacerbated conditions, and establishing a new plan of care.  Today, my PA, Linda Valdez, was here to observe my evaluation and management services for this new problem/exacerbated condition to be followed going forward.

## 2023-05-31 NOTE — HISTORY OF PRESENT ILLNESS
[No Pertinent Cardiac History] : no history of aortic stenosis, atrial fibrillation, coronary artery disease, recent myocardial infarction, or implantable device/pacemaker [No Pertinent Pulmonary History] : no history of asthma, COPD, sleep apnea, or smoking [No Adverse Anesthesia Reaction] : no adverse anesthesia reaction in self or family member [Chronic Kidney Disease] : chronic kidney disease [Diabetes] : diabetes [(Patient denies any chest pain, claudication, dyspnea on exertion, orthopnea, palpitations or syncope)] : Patient denies any chest pain, claudication, dyspnea on exertion, orthopnea, palpitations or syncope [Unable to assess] : unable to assess [Chronic Anticoagulation] : no chronic anticoagulation [FreeTextEntry1] : DENICE and colorectal surgery [FreeTextEntry2] : 6/14/23 [FreeTextEntry3] : Dr. Dorman and Dr. Salcido [FreeTextEntry4] : Pt is a 78 y/o F who presents to the office today for medical clearance for  DENICE and colorectal surgery on 6/14/23 \par \par Pt reports she is doing well. She is in need of extra HomeCare hours after her procedure [FreeTextEntry8] : pt has BKA

## 2023-05-31 NOTE — RESULTS/DATA
[] : results reviewed [de-identified] : H/H 11.8/37.2 [de-identified] : PT/INR 11.9/ 1.03, APTT 30.7 [de-identified] : BUN/CR 21/1.14 [de-identified] : NSR 61 bpm, RBBB 5/25/23

## 2023-05-31 NOTE — ASSESSMENT
[High Risk Surgery - Intraperitoneal, Intrathoracic or Supringuinal Vascular Procedures] : High Risk Surgery - Intraperitoneal, Intrathoracic or Supringuinal Vascular Procedures - No (0) [Ischemic Heart Disease] : Ischemic Heart Disease - No (0) [Congestive Heart Failure] : Congestive Heart Failure - No (0) [Prior Cerebrovascular Accident or TIA] : Prior Cerebrovascular Accident or TIA - No (0) [Creatinine >= 2mg/dL (1 Point)] : Creatinine >= 2mg/dL - No (0) [Insulin-dependent Diabetic (1 Point)] : Insulin-dependent Diabetic - No (0) [Patient Optimized for Surgery] : Patient optimized for surgery [No Further Testing Recommended] : no further testing recommended [Modify anti-platelet treatment prior to procedure] : Modify anti-platelet treatment prior to procedure [Modify medications prior to procedure] : Modify medications prior to procedure [As per surgery] : as per surgery [0] : 0 , RCRI Class: I, Risk of Post-Op Cardiac Complications: 3.9%, 95% CI for Risk Estimate: 2.8% - 5.4% [FreeTextEntry4] : Pt is LOW risk for MODERATE risk procedure. Pt may proceed with elective surgery.  [FreeTextEntry6] : Hold AS A 1 week prior to surgery [FreeTextEntry7] : hold oral DM medication morning of surgery

## 2023-05-31 NOTE — PHYSICAL EXAM
[No Acute Distress] : no acute distress [Normal] : affect was normal and insight and judgment were intact [Normal Sclera/Conjunctiva] : normal sclera/conjunctiva [de-identified] : in wheelchair

## 2023-06-01 LAB — BACTERIA UR CULT: NORMAL

## 2023-06-08 ENCOUNTER — APPOINTMENT (OUTPATIENT)
Dept: ENDOCRINOLOGY | Facility: CLINIC | Age: 78
End: 2023-06-08
Payer: MEDICARE

## 2023-06-08 PROCEDURE — G0108 DIAB MANAGE TRN  PER INDIV: CPT | Mod: 95

## 2023-06-09 NOTE — ED ADULT NURSE NOTE - DISTAL EXTREMITY COLOR
[FreeTextEntry1] : The patient has come for the follow-up of his left lower extremity swelling.  The patient has been seen by me and since then has been wearing knee-high surgical support stockings and has been feeling better.  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  Patient since the last office visit has seen Dr. Lopez and is scheduled to have an MRI of the left knee as well as the lumbosacral spine tomorrow.  Patient will continue to follow-up with him.\par As regards to the left lower extremity he is significantly improved with the stockings and the swelling has slightly improved.
color consistent with ethnicity/race

## 2023-06-12 ENCOUNTER — APPOINTMENT (OUTPATIENT)
Dept: ENDOCRINOLOGY | Facility: CLINIC | Age: 78
End: 2023-06-12
Payer: MEDICARE

## 2023-06-12 ENCOUNTER — RESULT CHARGE (OUTPATIENT)
Age: 78
End: 2023-06-12

## 2023-06-12 VITALS
HEART RATE: 56 BPM | BODY MASS INDEX: 29.26 KG/M2 | WEIGHT: 160 LBS | DIASTOLIC BLOOD PRESSURE: 71 MMHG | SYSTOLIC BLOOD PRESSURE: 135 MMHG

## 2023-06-12 LAB — GLUCOSE BLDC GLUCOMTR-MCNC: 138

## 2023-06-12 PROCEDURE — 99213 OFFICE O/P EST LOW 20 MIN: CPT | Mod: 25

## 2023-06-12 PROCEDURE — 83036 HEMOGLOBIN GLYCOSYLATED A1C: CPT | Mod: QW

## 2023-06-12 PROCEDURE — 82962 GLUCOSE BLOOD TEST: CPT

## 2023-06-12 NOTE — PHYSICAL EXAM
[Alert] : alert [Obese] : obese [No Acute Distress] : no acute distress [Normal Sclera/Conjunctiva] : normal sclera/conjunctiva [No Proptosis] : no proptosis [No Lid Lag] : no lid lag [No Respiratory Distress] : no respiratory distress [No Accessory Muscle Use] : no accessory muscle use [Normal Rate and Effort] : normal respiratory rate and effort [Normal Rate] : heart rate was normal [No Stigmata of Cushings Syndrome] : no stigmata of Cushings Syndrome [No Clubbing, Cyanosis] : no clubbing  or cyanosis of the fingernails [No Involuntary Movements] : no involuntary movements were seen [No Rash] : no rash [No Tremors] : no tremors [Oriented x3] : oriented to person, place, and time [de-identified] : in a wheelchair

## 2023-06-12 NOTE — HISTORY OF PRESENT ILLNESS
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.\par Pt is not sure which meds she takes for diabetes - as "it's all new";\par She has been in a rehab after discharge, and thinks would stay there for at least another month, untill her apartment is ready for her.\par Phone number in rehab - [phone] and [phone]\par I called and spoke to SRINIVAS Tolliver;\par Pt is managed with Januvia 100 mg/day and metformin 1 g bid; Admelog on scale before meals - "last administered a long time ago".\par POC A1C - 6.7%\par POC BG - 134 mg/dl\par PMG includes Colon and breast Ca; both >20 years ago; Hiatal hernia.\par Pt needs PCP.\par Accompanied by Wilner Salinas - the superintendant of the building the pt reside; his ph# [phone]\par \par 9/27/21 MK\par came for f/u.\par has been at home for a few months. States eats differently from rehab - more carbs.\par POC A1C - 6%\par POC BG - 94 mg/dl\par Optho - due\par Recent lab results from Dr. Cody's office discussed - pt was not sure if she had taken vit D prescribed in July.\par Got a leg prosthesis - is satisfied with it.\par No new complains.\par \par 2/28/22 MK\par Came fo f/u on DM management.\par Feels fine; has questions about d-se process, and possible change in meds;\par Would like to discuss diet.\par POC Bg - 111 mg/dl\par POC A1C - 6.5%\par Treated with metformin and Januvia.\par Plans to see a neurologist to discuss peripheral neuropathy.\par Plans to get a new prosthesis for her right leg.\par  \par 10/24/22 MK\par F/U on DM\par Feels fine, but states started to eat a lot of carbs - ice-cream, bagels, etc.\par Still on Januvia only.\par POC A1C - 7.0\par Got new Prosthesis, plans to go to PT - looking for an outpatient program.\par Feels fine.\par No new complains, has questions about diet.\par Interested in Getting CGM.\par \par 12/20/22 MK\par Pt came for f/u on DM management.\par POC A1C - 7.6%\par  mg/dl\par Pt states it was difficult to modify diet, as she is under stress because of GYN problems.\par Has strong intent to decrease sweets.\par No other changes.\par \par 5/23/23 MK\par Type 2 DM - needs clearance for surgery - \par Uterine ca; surgery is planned for mid June.\par POC A1C - 8.3%\par POC BG - 134 mg/dl.\par Currently on Januvia 100 mg  for DM.\par Does not SMBG.\par came with a friend - nursing student.\par \par 6/12/23 MK\par pt came for f/u before her surgery.\par Hysterectomy as well as hernia repair is scheduled for 6/14 at [facility].\par Her CGM Sharita 3 did not work with android, however, there was a week of the readings on her phone - post-prandial hyperglycemia.\par pt started farxiga a few days ago.\par Has questions about food, medications, glycemic control;\par POC Bg - 138 mg/dl.\par \par \par \par

## 2023-06-12 NOTE — ASSESSMENT
[Carbohydrate Consistent Diet] : carbohydrate consistent diet [Hypoglycemia Management] : hypoglycemia management [Self Monitoring of Blood Glucose] : self monitoring of blood glucose [FreeTextEntry1] : Diabetes - control is not at goal.\par She should be on a liquid diet tomorrow - will skip farxiga - instructed to have not sweetened clear liquids - water, tea, broth.\par Detailed explanation about simple vs complex carbs provided; \par glycemic goals discussed;\par various Tx options  postsurgically discussed - \par Pt stated she would not be able to inject insulin or SMBF - dexterity problems as well as neuropathic pain in fingers. Sulfanilylurea and repaglinide - action explained.\par Will address re-starting CGM post-surgically - possibly Dexcom.\par gave her my direct # and asked to call after the surgery.\par \par

## 2023-06-16 LAB — HBA1C MFR BLD HPLC: 8.5

## 2023-06-19 RX ORDER — BLOOD-GLUCOSE SENSOR
EACH MISCELLANEOUS
Qty: 9 | Refills: 3 | Status: ACTIVE | COMMUNITY
Start: 2023-06-19 | End: 1900-01-01

## 2023-06-19 RX ORDER — BLOOD-GLUCOSE,RECEIVER,CONT
EACH MISCELLANEOUS
Qty: 1 | Refills: 3 | Status: ACTIVE | COMMUNITY
Start: 2023-06-19 | End: 1900-01-01

## 2023-08-07 ENCOUNTER — APPOINTMENT (OUTPATIENT)
Dept: ENDOCRINOLOGY | Facility: CLINIC | Age: 78
End: 2023-08-07
Payer: MEDICARE

## 2023-08-07 VITALS — SYSTOLIC BLOOD PRESSURE: 103 MMHG | DIASTOLIC BLOOD PRESSURE: 57 MMHG | HEART RATE: 64 BPM

## 2023-08-07 LAB
GLUCOSE BLDC GLUCOMTR-MCNC: 141
HBA1C MFR BLD HPLC: 8

## 2023-08-07 PROCEDURE — 82962 GLUCOSE BLOOD TEST: CPT

## 2023-08-07 PROCEDURE — 83036 HEMOGLOBIN GLYCOSYLATED A1C: CPT | Mod: QW

## 2023-08-07 PROCEDURE — 99213 OFFICE O/P EST LOW 20 MIN: CPT | Mod: 25

## 2023-08-07 NOTE — PHYSICAL EXAM
[Alert] : alert [No Acute Distress] : no acute distress [Well Developed] : well developed [No Respiratory Distress] : no respiratory distress [No Accessory Muscle Use] : no accessory muscle use [Normal Rate and Effort] : normal respiratory rate and effort [Normal Rate] : heart rate was normal [No Stigmata of Cushings Syndrome] : no stigmata of Cushings Syndrome [No Tremors] : no tremors [Oriented x3] : oriented to person, place, and time [de-identified] : on wheelchair

## 2023-08-07 NOTE — HISTORY OF PRESENT ILLNESS
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 Pt is not sure which meds she takes for diabetes - as "it's all new"; She has been in a rehab after discharge, and thinks would stay there for at least another month, untill her apartment is ready for her. Phone number in rehab - [phone] and [phone] I called and spoke to RN - Sharee; Pt is managed with Januvia 100 mg/day and metformin 1 g bid; Admelog on scale before meals - "last administered a long time ago". POC A1C - 6.7% POC BG - 134 mg/dl PMG includes Colon and breast Ca; both >20 years ago; Hiatal hernia. Pt needs PCP. Accompanied by Wilner Salinas - the superintendant of the building the pt reside; his ph# [phone]  9/27/21 MK came for f/u. has been at home for a few months. States eats differently from rehab - more carbs. POC A1C - 6% POC BG - 94 mg/dl Optho - due Recent lab results from Dr. Cody's office discussed - pt was not sure if she had taken vit D prescribed in July. Got a leg prosthesis - is satisfied with it. No new complains.  2/28/22 MK Came fo f/u on DM management. Feels fine; has questions about d-se process, and possible change in meds; Would like to discuss diet. POC Bg - 111 mg/dl POC A1C - 6.5% Treated with metformin and Januvia. Plans to see a neurologist to discuss peripheral neuropathy. Plans to get a new prosthesis for her right leg.   10/24/22  F/U on DM Feels fine, but states started to eat a lot of carbs - ice-cream, bagels, etc. Still on Januvia only. POC A1C - 7.0 Got new Prosthesis, plans to go to PT - looking for an outpatient program. Feels fine. No new complains, has questions about diet. Interested in Getting CGM.  12/20/22  Pt came for f/u on DM management. POC A1C - 7.6%  mg/dl Pt states it was difficult to modify diet, as she is under stress because of GYN problems. Has strong intent to decrease sweets. No other changes.  5/23/23 GABRIELA Type 2 DM - needs clearance for surgery -  Uterine ca; surgery is planned for mid June. POC A1C - 8.3% POC BG - 134 mg/dl. Currently on Januvia 100 mg  for DM. Does not SMBG. came with a friend - nursing student.  6/12/23 GABRIELA pt came for f/u before her surgery. Hysterectomy as well as hernia repair is scheduled for 6/14 at [facility]. Her CGM Sharita 3 did not work with android, however, there was a week of the readings on her phone - post-prandial hyperglycemia. pt started farxiga a few days ago. Has questions about food, medications, glycemic control; POC Bg - 138 mg/dl.  8/7/23  Came for instructions on use of Dexcom G7. Bought sensors; needs reader to be set up, and instructions on use. Currently on farxiga POC A1C 8% POC BG - 141 mg/dl Interested in nutritional councelling.

## 2023-08-07 NOTE — ASSESSMENT
[Carbohydrate Consistent Diet] : carbohydrate consistent diet [FreeTextEntry1] : Diabetes - control is suboptimal. farxiga - action, SE discussed. Will come for cooking demonstration and will schedule an appt with RD. Dexcom G-7 -  set up; sensor inserted by ARTURO under my supervision., F/U in 2-3 weeks for possible adjustment of meds.

## 2023-09-13 ENCOUNTER — APPOINTMENT (OUTPATIENT)
Dept: HEART AND VASCULAR | Facility: CLINIC | Age: 78
End: 2023-09-13

## 2023-09-21 ENCOUNTER — NON-APPOINTMENT (OUTPATIENT)
Age: 78
End: 2023-09-21

## 2023-09-27 ENCOUNTER — APPOINTMENT (OUTPATIENT)
Dept: INTERNAL MEDICINE | Facility: CLINIC | Age: 78
End: 2023-09-27

## 2023-09-28 ENCOUNTER — APPOINTMENT (OUTPATIENT)
Dept: GASTROENTEROLOGY | Facility: CLINIC | Age: 78
End: 2023-09-28
Payer: MEDICARE

## 2023-09-28 PROCEDURE — 99448 NTRPROF PH1/NTRNET/EHR 21-30: CPT

## 2023-10-12 ENCOUNTER — APPOINTMENT (OUTPATIENT)
Dept: ENDOCRINOLOGY | Facility: CLINIC | Age: 78
End: 2023-10-12
Payer: MEDICARE

## 2023-10-12 PROCEDURE — 99442: CPT | Mod: 95

## 2023-10-19 ENCOUNTER — APPOINTMENT (OUTPATIENT)
Dept: INTERNAL MEDICINE | Facility: CLINIC | Age: 78
End: 2023-10-19
Payer: MEDICARE

## 2023-10-19 VITALS
BODY MASS INDEX: 29.44 KG/M2 | SYSTOLIC BLOOD PRESSURE: 123 MMHG | DIASTOLIC BLOOD PRESSURE: 71 MMHG | HEIGHT: 62 IN | OXYGEN SATURATION: 98 % | HEART RATE: 58 BPM | WEIGHT: 160 LBS | TEMPERATURE: 97.6 F

## 2023-10-19 DIAGNOSIS — Z00.00 ENCOUNTER FOR GENERAL ADULT MEDICAL EXAMINATION W/OUT ABNORMAL FINDINGS: ICD-10-CM

## 2023-10-19 DIAGNOSIS — E55.9 VITAMIN D DEFICIENCY, UNSPECIFIED: ICD-10-CM

## 2023-10-19 PROCEDURE — G0439: CPT

## 2023-10-19 PROCEDURE — 36415 COLL VENOUS BLD VENIPUNCTURE: CPT

## 2023-10-19 PROCEDURE — G0444 DEPRESSION SCREEN ANNUAL: CPT

## 2023-10-19 PROCEDURE — 90662 IIV NO PRSV INCREASED AG IM: CPT

## 2023-10-19 PROCEDURE — G0008: CPT

## 2023-10-19 RX ORDER — CUSHION
EACH MISCELLANEOUS
Qty: 1 | Refills: 0 | Status: ACTIVE | OUTPATIENT
Start: 2023-10-19

## 2023-10-31 ENCOUNTER — APPOINTMENT (OUTPATIENT)
Dept: GASTROENTEROLOGY | Facility: CLINIC | Age: 78
End: 2023-10-31
Payer: MEDICARE

## 2023-10-31 PROCEDURE — 99447 NTRPROF PH1/NTRNET/EHR 11-20: CPT

## 2023-11-06 ENCOUNTER — APPOINTMENT (OUTPATIENT)
Dept: ENDOCRINOLOGY | Facility: CLINIC | Age: 78
End: 2023-11-06
Payer: MEDICARE

## 2023-11-06 VITALS — DIASTOLIC BLOOD PRESSURE: 66 MMHG | HEART RATE: 63 BPM | SYSTOLIC BLOOD PRESSURE: 126 MMHG

## 2023-11-06 LAB — GLUCOSE BLDC GLUCOMTR-MCNC: 138

## 2023-11-06 PROCEDURE — 99213 OFFICE O/P EST LOW 20 MIN: CPT | Mod: 25

## 2023-11-06 PROCEDURE — 82962 GLUCOSE BLOOD TEST: CPT

## 2023-11-08 ENCOUNTER — NON-APPOINTMENT (OUTPATIENT)
Age: 78
End: 2023-11-08

## 2023-11-08 LAB
25(OH)D3 SERPL-MCNC: 21 NG/ML
ALBUMIN SERPL ELPH-MCNC: 4.1 G/DL
ALP BLD-CCNC: 106 U/L
ALT SERPL-CCNC: 20 U/L
ANION GAP SERPL CALC-SCNC: 19 MMOL/L
APPEARANCE: CLEAR
AST SERPL-CCNC: 33 U/L
BACTERIA: NEGATIVE /HPF
BASOPHILS # BLD AUTO: 0.04 K/UL
BASOPHILS NFR BLD AUTO: 0.6 %
BILIRUB SERPL-MCNC: 0.4 MG/DL
BILIRUBIN URINE: NEGATIVE
BLOOD URINE: NEGATIVE
BUN SERPL-MCNC: 23 MG/DL
CALCIUM SERPL-MCNC: 9.2 MG/DL
CAST: 5 /LPF
CHLORIDE SERPL-SCNC: 105 MMOL/L
CHOLEST SERPL-MCNC: 142 MG/DL
CO2 SERPL-SCNC: 15 MMOL/L
COARSE GRANULAR CASTS: PRESENT
COLOR: YELLOW
CREAT SERPL-MCNC: 1.06 MG/DL
CREAT SPEC-SCNC: 73 MG/DL
EGFR: 54 ML/MIN/1.73M2
EOSINOPHIL # BLD AUTO: 0.13 K/UL
EOSINOPHIL NFR BLD AUTO: 2 %
EPITHELIAL CELLS: 3 /HPF
GLUCOSE QUALITATIVE U: >=1000 MG/DL
GLUCOSE SERPL-MCNC: 146 MG/DL
HCT VFR BLD CALC: 36.1 %
HDLC SERPL-MCNC: 66 MG/DL
HGB BLD-MCNC: 11.2 G/DL
HYALINE CASTS: PRESENT
IMM GRANULOCYTES NFR BLD AUTO: 0.2 %
KETONES URINE: NEGATIVE MG/DL
LDLC SERPL CALC-MCNC: 52 MG/DL
LEUKOCYTE ESTERASE URINE: ABNORMAL
LYMPHOCYTES # BLD AUTO: 1.61 K/UL
LYMPHOCYTES NFR BLD AUTO: 24.4 %
MAN DIFF?: NORMAL
MCHC RBC-ENTMCNC: 27.9 PG
MCHC RBC-ENTMCNC: 31 GM/DL
MCV RBC AUTO: 89.8 FL
MICROALBUMIN 24H UR DL<=1MG/L-MCNC: 1.9 MG/DL
MICROALBUMIN/CREAT 24H UR-RTO: 26 MG/G
MICROSCOPIC-UA: NORMAL
MONOCYTES # BLD AUTO: 0.59 K/UL
MONOCYTES NFR BLD AUTO: 9 %
NEUTROPHILS # BLD AUTO: 4.21 K/UL
NEUTROPHILS NFR BLD AUTO: 63.8 %
NITRITE URINE: NEGATIVE
NONHDLC SERPL-MCNC: 76 MG/DL
PH URINE: 5.5
PLATELET # BLD AUTO: 284 K/UL
POTASSIUM SERPL-SCNC: 4.8 MMOL/L
PROT SERPL-MCNC: 7.1 G/DL
PROTEIN URINE: NORMAL MG/DL
RBC # BLD: 4.02 M/UL
RBC # FLD: 16.2 %
RED BLOOD CELLS URINE: 0 /HPF
REVIEW: NORMAL
SODIUM SERPL-SCNC: 139 MMOL/L
SPECIFIC GRAVITY URINE: 1.02
TRIGL SERPL-MCNC: 149 MG/DL
TSH SERPL-ACNC: 2.29 UIU/ML
UROBILINOGEN URINE: 0.2 MG/DL
WBC # FLD AUTO: 6.59 K/UL
WHITE BLOOD CELLS URINE: 30 /HPF

## 2023-12-10 ENCOUNTER — NON-APPOINTMENT (OUTPATIENT)
Age: 78
End: 2023-12-10

## 2023-12-18 ENCOUNTER — APPOINTMENT (OUTPATIENT)
Dept: ENDOCRINOLOGY | Facility: CLINIC | Age: 78
End: 2023-12-18
Payer: MEDICARE

## 2023-12-18 PROCEDURE — 99442: CPT | Mod: 95

## 2023-12-18 RX ORDER — SITAGLIPTIN 100 MG/1
100 TABLET, FILM COATED ORAL
Qty: 90 | Refills: 3 | Status: ACTIVE | COMMUNITY
Start: 2023-12-18

## 2023-12-18 RX ORDER — DAPAGLIFLOZIN 10 MG/1
10 TABLET, FILM COATED ORAL
Qty: 90 | Refills: 3 | Status: ACTIVE | COMMUNITY
Start: 2023-12-11 | End: 1900-01-01

## 2023-12-18 NOTE — ASSESSMENT
[Carbohydrate Consistent Diet] : carbohydrate consistent diet [Self Monitoring of Blood Glucose] : self monitoring of blood glucose [FreeTextEntry1] : Diabetes - control is not clear, however by CGM report discussion there is an improvement. To address AM hyperglycemia - instructed to take Farxiga with 4 am breakfast - mechanism of action explained. Sarah phenomenon discussed. Plan to repeat labs at the next in-person visit in 2 fany. Refills sent.

## 2023-12-18 NOTE — HISTORY OF PRESENT ILLNESS
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 Pt is not sure which meds she takes for diabetes - as "it's all new"; She has been in a rehab after discharge, and thinks would stay there for at least another month, untill her apartment is ready for her. Phone number in rehab - [phone] and [phone] I called and spoke to RN - Sharee; Pt is managed with Januvia 100 mg/day and metformin 1 g bid; Admelog on scale before meals - "last administered a long time ago". POC A1C - 6.7% POC BG - 134 mg/dl PMG includes Colon and breast Ca; both >20 years ago; Hiatal hernia. Pt needs PCP. Accompanied by Wilner Salinas - the superintendant of the building the pt reside; his ph# [phone]  9/27/21 MK came for f/u. has been at home for a few months. States eats differently from rehab - more carbs. POC A1C - 6% POC BG - 94 mg/dl Optho - due Recent lab results from Dr. Cody's office discussed - pt was not sure if she had taken vit D prescribed in July. Got a leg prosthesis - is satisfied with it. No new complains.  2/28/22 MK Came fo f/u on DM management. Feels fine; has questions about d-se process, and possible change in meds; Would like to discuss diet. POC Bg - 111 mg/dl POC A1C - 6.5% Treated with metformin and Januvia. Plans to see a neurologist to discuss peripheral neuropathy. Plans to get a new prosthesis for her right leg.   10/24/22  F/U on DM Feels fine, but states started to eat a lot of carbs - ice-cream, bagels, etc. Still on Januvia only. POC A1C - 7.0 Got new Prosthesis, plans to go to PT - looking for an outpatient program. Feels fine. No new complains, has questions about diet. Interested in Getting CGM.  12/20/22  Pt came for f/u on DM management. POC A1C - 7.6%  mg/dl Pt states it was difficult to modify diet, as she is under stress because of GYN problems. Has strong intent to decrease sweets. No other changes.  5/23/23  Type 2 DM - needs clearance for surgery -  Uterine ca; surgery is planned for mid June. POC A1C - 8.3% POC BG - 134 mg/dl. Currently on Januvia 100 mg  for DM. Does not SMBG. came with a friend - nursing student.  6/12/23  pt came for f/u before her surgery. Hysterectomy as well as hernia repair is scheduled for 6/14 at [facility]. Her CGM Sharita 3 did not work with android, however, there was a week of the readings on her phone - post-prandial hyperglycemia. pt started farxiga a few days ago. Has questions about food, medications, glycemic control; POC Bg - 138 mg/dl.  8/7/23  Came for instructions on use of Dexcom G7. Bought sensors; needs reader to be set up, and instructions on use. Currently on farxiga POC A1C 8% POC BG - 141 mg/dl Interested in nutritional councelling.  10/12/23  Telephone visit. Pt states her BG runs 15-=130-s; Takes Farxiga only. States BG was much better when she was on Januvia 100 mg/dl. States has some left from before. UTI is gone.  11/6/23  F/U on DM Dexcom [place] reports reviewed - BG ~ 200-s Wants to modify diet, but realizez it might not be enough. POC BG - 138 mg/dl recent A1C from 3 mo ago - 8% No new complains. No other changes.  12/18/23  Pt was not able to connect via video - telephone visit. Feels stable. Uses CGM, but is not connected the reader to evelyn. Her assistant read the report from the reader: TIR - 51%  0% low BG ~ 180 mg/dl Highest BG from 5 am to 9 am.  pt states she wakes up at 4 am and has breakfast. takes repaglinide 0.5 mg with lunch only.

## 2023-12-20 ENCOUNTER — RX RENEWAL (OUTPATIENT)
Age: 78
End: 2023-12-20

## 2023-12-20 RX ORDER — SITAGLIPTIN 100 MG/1
100 TABLET, FILM COATED ORAL
Qty: 90 | Refills: 3 | Status: ACTIVE | COMMUNITY
Start: 2021-04-06 | End: 1900-01-01

## 2024-02-06 ENCOUNTER — APPOINTMENT (OUTPATIENT)
Dept: ENDOCRINOLOGY | Facility: CLINIC | Age: 79
End: 2024-02-06

## 2024-03-07 ENCOUNTER — APPOINTMENT (OUTPATIENT)
Dept: INTERNAL MEDICINE | Facility: CLINIC | Age: 79
End: 2024-03-07
Payer: MEDICARE

## 2024-03-07 PROCEDURE — G2211 COMPLEX E/M VISIT ADD ON: CPT

## 2024-03-07 PROCEDURE — 99213 OFFICE O/P EST LOW 20 MIN: CPT

## 2024-03-07 RX ORDER — TRIAMCINOLONE ACETONIDE 1 MG/G
0.1 CREAM TOPICAL TWICE DAILY
Qty: 1 | Refills: 0 | Status: ACTIVE | COMMUNITY
Start: 2024-03-07 | End: 1900-01-01

## 2024-03-07 NOTE — ADDENDUM
[FreeTextEntry1] : Pt participated in a telehealth encounter today  Provider: Linda Valdez PA-C Provider Location: [address] [place] [address] Patient Location: Home

## 2024-03-07 NOTE — PHYSICAL EXAM
[No Acute Distress] : no acute distress [Normal Outer Ear/Nose] : the outer ears and nose were normal in appearance [No Respiratory Distress] : no respiratory distress  [Normal] : affect was normal and insight and judgment were intact [de-identified] : Left medial thigh circumfrential mild erythema without scaling.

## 2024-03-07 NOTE — HISTORY OF PRESENT ILLNESS
[FreeTextEntry8] : Pt has telehealth visit today for evaluation of new skin finding x 1.5 weeks  skin changes: - 1.5 weeks ago lesion of L thigh - was pruritic, not painful - has gotten better since onset but still there - pt is concerned it is a fungal infection - Pt sent pictures to office email - no fever, chills, fatigue, pain, new irritants, warmth, vesicles, scaling  .

## 2024-03-19 ENCOUNTER — APPOINTMENT (OUTPATIENT)
Dept: DERMATOLOGY | Facility: CLINIC | Age: 79
End: 2024-03-19
Payer: MEDICARE

## 2024-03-19 DIAGNOSIS — L30.9 DERMATITIS, UNSPECIFIED: ICD-10-CM

## 2024-03-19 PROCEDURE — 99204 OFFICE O/P NEW MOD 45 MIN: CPT

## 2024-03-19 RX ORDER — FLUOCINONIDE 0.05 MG/G
0.05 OINTMENT TOPICAL
Qty: 1 | Refills: 2 | Status: ACTIVE | COMMUNITY
Start: 2024-03-19 | End: 1900-01-01

## 2024-03-19 NOTE — PHYSICAL EXAM
[Alert] : alert [Oriented x 3] : ~L oriented x 3 [Well Nourished] : well nourished [Conjunctiva Non-injected] : conjunctiva non-injected [No Visual Lymphadenopathy] : no visual  lymphadenopathy [No Clubbing] : no clubbing [No Bromhidrosis] : no bromhidrosis [No Edema] : no edema [FreeTextEntry3] : L medial knee - erythematous scaly ill defined plaque  [No Chromhidrosis] : no chromhidrosis

## 2024-03-19 NOTE — ASSESSMENT
[FreeTextEntry1] : #Eczematous plaque  - left medial knee Clinical differentials include asteototic dermatitis vs. contact dermatitis vs. nummular dermatitis. Low suspicion for infection at this point, although she is s/p PO keflex. -start fluocinonide 0.05 oint bid for 2 weeks on and 2 weeks off as needed -moisturize daily with thick cream or emollient (examples provided: vanicream, aquaphor, la roche posay cicaplast elke b5) -OK to cover with non-stick bandage if needed to prevent friction RTC as needed if not improved after 2-3 weeks or if worsening   # Xerosis  Patient counseled about dry skin care and liberal application of emollient creams such as Vanicream at least daily after showering. Samples of vanicream provided. Dry skin care handout provided

## 2024-03-19 NOTE — HISTORY OF PRESENT ILLNESS
[FreeTextEntry1] : NPV- rash on leg [de-identified] : Tammie Mariscal is a 79yo F presenting with her partner for rash on leg.  -Start 2 weeks ago as itchy red plaque on left medial knee.  -Saw PCP telehealth on 3/7, was prescribed triam. Did not feel it helped so she went to urgent care a few days ago, was given PO keflex and antibiotic cream.  Rash seems to be improving. Currently using vaseline and keeping covered with nonstick dressing. Wears a brace on her right knee, (history of R BKA) which she has worn for years  Denies any new products including soaps or moisturizers.  Also would like recs for dry skin.   PMH: - no history of skin ca. as history of colon - endometrial CA s/o DENICE -DM2 c/b foot wound and right BKA

## 2024-03-20 ENCOUNTER — APPOINTMENT (OUTPATIENT)
Dept: ENDOCRINOLOGY | Facility: CLINIC | Age: 79
End: 2024-03-20
Payer: MEDICARE

## 2024-03-20 VITALS — DIASTOLIC BLOOD PRESSURE: 76 MMHG | SYSTOLIC BLOOD PRESSURE: 138 MMHG | HEART RATE: 72 BPM

## 2024-03-20 DIAGNOSIS — E83.52 HYPERCALCEMIA: ICD-10-CM

## 2024-03-20 LAB
GLUCOSE BLDC GLUCOMTR-MCNC: 116
HBA1C MFR BLD HPLC: 7.3

## 2024-03-20 PROCEDURE — 83036 HEMOGLOBIN GLYCOSYLATED A1C: CPT | Mod: QW

## 2024-03-20 PROCEDURE — 99213 OFFICE O/P EST LOW 20 MIN: CPT | Mod: 25

## 2024-03-20 PROCEDURE — 95251 CONT GLUC MNTR ANALYSIS I&R: CPT

## 2024-03-20 PROCEDURE — 82962 GLUCOSE BLOOD TEST: CPT

## 2024-03-20 NOTE — PHYSICAL EXAM
[Alert] : alert [Obese] : obese [No Acute Distress] : no acute distress [Well Developed] : well developed [Normal Sclera/Conjunctiva] : normal sclera/conjunctiva [No Lid Lag] : no lid lag [No Proptosis] : no proptosis [No Accessory Muscle Use] : no accessory muscle use [No Respiratory Distress] : no respiratory distress [Normal Rate and Effort] : normal respiratory rate and effort [Normal Rate] : heart rate was normal [No Clubbing, Cyanosis] : no clubbing  or cyanosis of the fingernails [No Stigmata of Cushings Syndrome] : no stigmata of Cushings Syndrome [No Involuntary Movements] : no involuntary movements were seen [No Rash] : no rash [No Joint Swelling] : no joint swelling seen [Acanthosis Nigricans] : no acanthosis nigricans [No Tremors] : no tremors [Oriented x3] : oriented to person, place, and time [de-identified] : pt is in wheelchair

## 2024-03-20 NOTE — HISTORY OF PRESENT ILLNESS
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 Pt is not sure which meds she takes for diabetes - as "it's all new"; She has been in a rehab after discharge, and thinks would stay there for at least another month, untill her apartment is ready for her. Phone number in rehab - [phone] and [phone] I called and spoke to RN - Sharee; Pt is managed with Januvia 100 mg/day and metformin 1 g bid; Admelog on scale before meals - "last administered a long time ago". POC A1C - 6.7% POC BG - 134 mg/dl PMG includes Colon and breast Ca; both >20 years ago; Hiatal hernia. Pt needs PCP. Accompanied by Wilner Salinas - the superintendant of the building the pt reside; his ph# [phone]  9/27/21 MK came for f/u. has been at home for a few months. States eats differently from rehab - more carbs. POC A1C - 6% POC BG - 94 mg/dl Optho - due Recent lab results from Dr. Cody's office discussed - pt was not sure if she had taken vit D prescribed in July. Got a leg prosthesis - is satisfied with it. No new complains.  2/28/22 MK Came fo f/u on DM management. Feels fine; has questions about d-se process, and possible change in meds; Would like to discuss diet. POC Bg - 111 mg/dl POC A1C - 6.5% Treated with metformin and Januvia. Plans to see a neurologist to discuss peripheral neuropathy. Plans to get a new prosthesis for her right leg.   10/24/22  F/U on DM Feels fine, but states started to eat a lot of carbs - ice-cream, bagels, etc. Still on Januvia only. POC A1C - 7.0 Got new Prosthesis, plans to go to PT - looking for an outpatient program. Feels fine. No new complains, has questions about diet. Interested in Getting CGM.  12/20/22  Pt came for f/u on DM management. POC A1C - 7.6%  mg/dl Pt states it was difficult to modify diet, as she is under stress because of GYN problems. Has strong intent to decrease sweets. No other changes.  5/23/23  Type 2 DM - needs clearance for surgery -  Uterine ca; surgery is planned for mid June. POC A1C - 8.3% POC BG - 134 mg/dl. Currently on Januvia 100 mg  for DM. Does not SMBG. came with a friend - nursing student.  6/12/23  pt came for f/u before her surgery. Hysterectomy as well as hernia repair is scheduled for 6/14 at [facility]. Her CGM Sharita 3 did not work with android, however, there was a week of the readings on her phone - post-prandial hyperglycemia. pt started farxiga a few days ago. Has questions about food, medications, glycemic control; POC Bg - 138 mg/dl.  8/7/23  Came for instructions on use of Dexcom G7. Bought sensors; needs reader to be set up, and instructions on use. Currently on farxiga POC A1C 8% POC BG - 141 mg/dl Interested in nutritional councelling.  10/12/23  Telephone visit. Pt states her BG runs 15-=130-s; Takes Farxiga only. States BG was much better when she was on Januvia 100 mg/dl. States has some left from before. UTI is gone.  11/6/23  F/U on DM Dexcom [place] reports reviewed - BG ~ 200-s Wants to modify diet, but realizez it might not be enough. POC BG - 138 mg/dl recent A1C from 3 mo ago - 8% No new complains. No other changes.  12/18/23  Pt was not able to connect via video - telephone visit. Feels stable. Uses CGM, but is not connected the reader to evelyn. Her assistant read the report from the reader: TIR - 51%  0% low BG ~ 180 mg/dl Highest BG from 5 am to 9 am.  pt states she wakes up at 4 am and has breakfast. takes repaglinide 0.5 mg with lunch only.   3/20/24  F/U on DM and weight management. States feels fine. Uses Dexcom with reader - reports reviewed - TIR 50%; with no hypoglycemia.. States Takes Januvia occasionally, if BG at night is high, and if she eats sweets. Is motivated to modify diet; start exercising. Ophtho - last visit ~ 2 years ago. No other changes or complains.

## 2024-03-20 NOTE — ASSESSMENT
[Importance of Diet and Exercise] : importance of diet and exercise to improve glycemic control, achieve weight loss and improve cardiovascular health [Self Monitoring of Blood Glucose] : self monitoring of blood glucose [Retinopathy Screening] : Patient was referred to ophthalmology for retinopathy screening [FreeTextEntry1] : Diabetes - medications action and SE reinforced - explained reasons for taking Januvia regularly,, and repaglinide, based on meal and BG. Glycemic goals discussed; Chair exercise - recommended. GLP-1 -suggested - pt declined. Referred to ophthalmologist. Labs ordered. F/U in 3 -4 mo or sooner PRN.

## 2024-03-28 ENCOUNTER — APPOINTMENT (OUTPATIENT)
Dept: INTERNAL MEDICINE | Facility: CLINIC | Age: 79
End: 2024-03-28

## 2024-04-15 ENCOUNTER — RX RENEWAL (OUTPATIENT)
Age: 79
End: 2024-04-15

## 2024-04-15 RX ORDER — REPAGLINIDE 0.5 MG/1
0.5 TABLET ORAL 3 TIMES DAILY
Qty: 90 | Refills: 3 | Status: ACTIVE | COMMUNITY
Start: 2023-11-06 | End: 1900-01-01

## 2024-05-09 ENCOUNTER — APPOINTMENT (OUTPATIENT)
Dept: HEART AND VASCULAR | Facility: CLINIC | Age: 79
End: 2024-05-09
Payer: MEDICARE

## 2024-05-09 VITALS
WEIGHT: 160 LBS | TEMPERATURE: 97.6 F | DIASTOLIC BLOOD PRESSURE: 78 MMHG | BODY MASS INDEX: 29.44 KG/M2 | OXYGEN SATURATION: 97 % | SYSTOLIC BLOOD PRESSURE: 140 MMHG | HEART RATE: 75 BPM | HEIGHT: 62 IN

## 2024-05-09 DIAGNOSIS — R09.89 OTHER SPECIFIED SYMPTOMS AND SIGNS INVOLVING THE CIRCULATORY AND RESPIRATORY SYSTEMS: ICD-10-CM

## 2024-05-09 DIAGNOSIS — I45.2 BIFASCICULAR BLOCK: ICD-10-CM

## 2024-05-09 DIAGNOSIS — E11.9 TYPE 2 DIABETES MELLITUS W/OUT COMPLICATIONS: ICD-10-CM

## 2024-05-09 DIAGNOSIS — R60.0 LOCALIZED EDEMA: ICD-10-CM

## 2024-05-09 DIAGNOSIS — C54.1 MALIGNANT NEOPLASM OF ENDOMETRIUM: ICD-10-CM

## 2024-05-09 DIAGNOSIS — Z91.89 OTHER SPECIFIED PERSONAL RISK FACTORS, NOT ELSEWHERE CLASSIFIED: ICD-10-CM

## 2024-05-09 DIAGNOSIS — E78.5 HYPERLIPIDEMIA, UNSPECIFIED: ICD-10-CM

## 2024-05-09 DIAGNOSIS — Z86.718 PERSONAL HISTORY OF OTHER VENOUS THROMBOSIS AND EMBOLISM: ICD-10-CM

## 2024-05-09 DIAGNOSIS — Z99.3 DEPENDENCE ON WHEELCHAIR: ICD-10-CM

## 2024-05-09 DIAGNOSIS — Z89.519 ACQUIRED ABSENCE OF UNSPECIFIED LEG BELOW KNEE: ICD-10-CM

## 2024-05-09 PROCEDURE — 99214 OFFICE O/P EST MOD 30 MIN: CPT | Mod: 25

## 2024-05-09 PROCEDURE — 93970 EXTREMITY STUDY: CPT

## 2024-05-09 PROCEDURE — 93000 ELECTROCARDIOGRAM COMPLETE: CPT

## 2024-05-10 ENCOUNTER — TRANSCRIPTION ENCOUNTER (OUTPATIENT)
Age: 79
End: 2024-05-10

## 2024-05-10 LAB
25(OH)D3 SERPL-MCNC: 28.4 NG/ML
ALBUMIN SERPL ELPH-MCNC: 4.3 G/DL
ALP BLD-CCNC: 102 U/L
ALT SERPL-CCNC: 24 U/L
ANION GAP SERPL CALC-SCNC: 13 MMOL/L
AST SERPL-CCNC: 23 U/L
BILIRUB SERPL-MCNC: 0.5 MG/DL
BUN SERPL-MCNC: 18 MG/DL
CALCIUM SERPL-MCNC: 9.4 MG/DL
CALCIUM SERPL-MCNC: 9.4 MG/DL
CHLORIDE SERPL-SCNC: 105 MMOL/L
CHOLEST SERPL-MCNC: 155 MG/DL
CO2 SERPL-SCNC: 22 MMOL/L
CREAT SERPL-MCNC: 1.3 MG/DL
EGFR: 42 ML/MIN/1.73M2
ESTIMATED AVERAGE GLUCOSE: 166 MG/DL
GLUCOSE SERPL-MCNC: 99 MG/DL
HBA1C MFR BLD HPLC: 7.4 %
HDLC SERPL-MCNC: 69 MG/DL
LDLC SERPL CALC-MCNC: 67 MG/DL
NONHDLC SERPL-MCNC: 86 MG/DL
PARATHYROID HORMONE INTACT: 49 PG/ML
POTASSIUM SERPL-SCNC: 4.5 MMOL/L
PROT SERPL-MCNC: 7.4 G/DL
SODIUM SERPL-SCNC: 140 MMOL/L
TRIGL SERPL-MCNC: 112 MG/DL
TSH SERPL-ACNC: 2.02 UIU/ML

## 2024-05-22 ENCOUNTER — APPOINTMENT (OUTPATIENT)
Dept: OPHTHALMOLOGY | Facility: CLINIC | Age: 79
End: 2024-05-22

## 2024-06-07 NOTE — REVIEW OF SYSTEMS
Spoke to patient. He will arrive for procedure 6/10/2024 at 8:30 am. He is going to drive himself here. He is aware he cannot drive home. He must have a  to drive him home. He has a . Will pick his car up 6/11/2024.  
[All other systems negative] : All other systems negative

## 2024-06-19 ENCOUNTER — APPOINTMENT (OUTPATIENT)
Dept: OPHTHALMOLOGY | Facility: CLINIC | Age: 79
End: 2024-06-19

## 2024-06-27 ENCOUNTER — NON-APPOINTMENT (OUTPATIENT)
Age: 79
End: 2024-06-27

## 2024-06-28 ENCOUNTER — RX RENEWAL (OUTPATIENT)
Age: 79
End: 2024-06-28

## 2024-07-23 ENCOUNTER — APPOINTMENT (OUTPATIENT)
Dept: ENDOCRINOLOGY | Facility: CLINIC | Age: 79
End: 2024-07-23

## 2024-07-23 VITALS — SYSTOLIC BLOOD PRESSURE: 119 MMHG | HEART RATE: 66 BPM | DIASTOLIC BLOOD PRESSURE: 64 MMHG

## 2024-07-23 LAB
GLUCOSE BLDC GLUCOMTR-MCNC: 136
HBA1C MFR BLD HPLC: 8.1

## 2024-07-23 PROCEDURE — 83036 HEMOGLOBIN GLYCOSYLATED A1C: CPT | Mod: QW

## 2024-07-23 PROCEDURE — 82962 GLUCOSE BLOOD TEST: CPT

## 2024-07-23 PROCEDURE — 99213 OFFICE O/P EST LOW 20 MIN: CPT

## 2024-07-23 PROCEDURE — 95251 CONT GLUC MNTR ANALYSIS I&R: CPT

## 2024-07-23 NOTE — HISTORY OF PRESENT ILLNESS
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 Pt is not sure which meds she takes for diabetes - as "it's all new"; She has been in a rehab after discharge, and thinks would stay there for at least another month, untill her apartment is ready for her. Phone number in rehab - [phone] and [phone] I called and spoke to RN - Sharee; Pt is managed with Januvia 100 mg/day and metformin 1 g bid; Admelog on scale before meals - "last administered a long time ago". POC A1C - 6.7% POC BG - 134 mg/dl PMG includes Colon and breast Ca; both >20 years ago; Hiatal hernia. Pt needs PCP. Accompanied by Wilner Salinas - the superintendant of the building the pt reside; his ph# [phone]  9/27/21 MK came for f/u. has been at home for a few months. States eats differently from rehab - more carbs. POC A1C - 6% POC BG - 94 mg/dl Optho - due Recent lab results from Dr. Cody's office discussed - pt was not sure if she had taken vit D prescribed in July. Got a leg prosthesis - is satisfied with it. No new complains.  2/28/22 MK Came fo f/u on DM management. Feels fine; has questions about d-se process, and possible change in meds; Would like to discuss diet. POC Bg - 111 mg/dl POC A1C - 6.5% Treated with metformin and Januvia. Plans to see a neurologist to discuss peripheral neuropathy. Plans to get a new prosthesis for her right leg.   10/24/22  F/U on DM Feels fine, but states started to eat a lot of carbs - ice-cream, bagels, etc. Still on Januvia only. POC A1C - 7.0 Got new Prosthesis, plans to go to PT - looking for an outpatient program. Feels fine. No new complains, has questions about diet. Interested in Getting CGM.  12/20/22  Pt came for f/u on DM management. POC A1C - 7.6%  mg/dl Pt states it was difficult to modify diet, as she is under stress because of GYN problems. Has strong intent to decrease sweets. No other changes.  5/23/23  Type 2 DM - needs clearance for surgery -  Uterine ca; surgery is planned for mid June. POC A1C - 8.3% POC BG - 134 mg/dl. Currently on Januvia 100 mg  for DM. Does not SMBG. came with a friend - nursing student.  6/12/23  pt came for f/u before her surgery. Hysterectomy as well as hernia repair is scheduled for 6/14 at [place]. Her CGM Sharita 3 did not work with android, however, there was a week of the readings on her phone - post-prandial hyperglycemia. pt started farxiga a few days ago. Has questions about food, medications, glycemic control; POC Bg - 138 mg/dl.  8/7/23  Came for instructions on use of Dexcom G7. Bought sensors; needs reader to be set up, and instructions on use. Currently on farxiga POC A1C 8% POC BG - 141 mg/dl Interested in nutritional councelling.  10/12/23  Telephone visit. Pt states her BG runs 15-=130-s; Takes Farxiga only. States BG was much better when she was on Januvia 100 mg/dl. States has some left from before. UTI is gone.  11/6/23  F/U on DM Dexcom [place] reports reviewed - BG ~ 200-s Wants to modify diet, but realizez it might not be enough. POC BG - 138 mg/dl recent A1C from 3 mo ago - 8% No new complains. No other changes.  12/18/23  Pt was not able to connect via video - telephone visit. Feels stable. Uses CGM, but is not connected the reader to evelyn. Her assistant read the report from the reader: TIR - 51%  0% low BG ~ 180 mg/dl Highest BG from 5 am to 9 am.  pt states she wakes up at 4 am and has breakfast. takes repaglinide 0.5 mg with lunch only.   3/20/24  F/U on DM and weight management. States feels fine. Uses Dexcom with reader - reports reviewed - TIR 50%; with no hypoglycemia.. States Takes Januvia occasionally, if BG at night is high, and if she eats sweets. Is motivated to modify diet; start exercising. Ophtho - last visit ~ 2 years ago. No other changes or complains.  7/23/24 MK F/U on DM Feels fine. CGM downloaded - patterns explained to pt. TIR - 35% BG ~ 192 mg/dl Does not always take repaglinide with evening meal. POC A1C - 8.1% POC BG - 136 mg/dl Ophtho - due No other changes or complains.

## 2024-07-23 NOTE — ASSESSMENT
[Long Term Vascular Complications] : long term vascular complications of diabetes [Carbohydrate Consistent Diet] : carbohydrate consistent diet [Self Monitoring of Blood Glucose] : self monitoring of blood glucose [FreeTextEntry1] : Diabetes - control got worse. Will increase repaglinide to 1 mg for Breakfast and lunch and start 0.5 for evening meal. Continue Januvia. Will repeat labs in 3 mo and f/u once done TEB with me. Referred to merary.

## 2024-07-23 NOTE — PHYSICAL EXAM
[Alert] : alert [Well Nourished] : well nourished [No Acute Distress] : no acute distress [Well Developed] : well developed [Normal Sclera/Conjunctiva] : normal sclera/conjunctiva [No Proptosis] : no proptosis [No Lid Lag] : no lid lag [No Respiratory Distress] : no respiratory distress [No Accessory Muscle Use] : no accessory muscle use [Normal Rate and Effort] : normal respiratory rate and effort [Normal Rate] : heart rate was normal [No Stigmata of Cushings Syndrome] : no stigmata of Cushings Syndrome [No Involuntary Movements] : no involuntary movements were seen [No Tremors] : no tremors [Oriented x3] : oriented to person, place, and time [de-identified] : in a wheelchair

## 2024-07-25 ENCOUNTER — RX RENEWAL (OUTPATIENT)
Age: 79
End: 2024-07-25

## 2024-08-14 ENCOUNTER — APPOINTMENT (OUTPATIENT)
Dept: OPHTHALMOLOGY | Facility: CLINIC | Age: 79
End: 2024-08-14
Payer: MEDICARE

## 2024-08-14 ENCOUNTER — NON-APPOINTMENT (OUTPATIENT)
Age: 79
End: 2024-08-14

## 2024-08-14 PROCEDURE — 92014 COMPRE OPH EXAM EST PT 1/>: CPT

## 2024-09-16 ENCOUNTER — APPOINTMENT (OUTPATIENT)
Dept: HEART AND VASCULAR | Facility: CLINIC | Age: 79
End: 2024-09-16
Payer: MEDICARE

## 2024-09-16 VITALS
HEIGHT: 62 IN | HEART RATE: 64 BPM | SYSTOLIC BLOOD PRESSURE: 125 MMHG | WEIGHT: 165 LBS | OXYGEN SATURATION: 94 % | BODY MASS INDEX: 30.36 KG/M2 | DIASTOLIC BLOOD PRESSURE: 74 MMHG

## 2024-09-16 DIAGNOSIS — G62.9 POLYNEUROPATHY, UNSPECIFIED: ICD-10-CM

## 2024-09-16 DIAGNOSIS — Z91.89 OTHER SPECIFIED PERSONAL RISK FACTORS, NOT ELSEWHERE CLASSIFIED: ICD-10-CM

## 2024-09-16 DIAGNOSIS — R09.89 OTHER SPECIFIED SYMPTOMS AND SIGNS INVOLVING THE CIRCULATORY AND RESPIRATORY SYSTEMS: ICD-10-CM

## 2024-09-16 DIAGNOSIS — Z89.519 ACQUIRED ABSENCE OF UNSPECIFIED LEG BELOW KNEE: ICD-10-CM

## 2024-09-16 DIAGNOSIS — R60.0 LOCALIZED EDEMA: ICD-10-CM

## 2024-09-16 DIAGNOSIS — E78.5 HYPERLIPIDEMIA, UNSPECIFIED: ICD-10-CM

## 2024-09-16 DIAGNOSIS — I35.1 NONRHEUMATIC AORTIC (VALVE) INSUFFICIENCY: ICD-10-CM

## 2024-09-16 DIAGNOSIS — I45.2 BIFASCICULAR BLOCK: ICD-10-CM

## 2024-09-16 DIAGNOSIS — Z86.718 PERSONAL HISTORY OF OTHER VENOUS THROMBOSIS AND EMBOLISM: ICD-10-CM

## 2024-09-16 DIAGNOSIS — E11.9 TYPE 2 DIABETES MELLITUS W/OUT COMPLICATIONS: ICD-10-CM

## 2024-09-16 PROCEDURE — 93000 ELECTROCARDIOGRAM COMPLETE: CPT

## 2024-09-16 PROCEDURE — 93306 TTE W/DOPPLER COMPLETE: CPT

## 2024-09-16 PROCEDURE — 99214 OFFICE O/P EST MOD 30 MIN: CPT | Mod: 25

## 2024-10-16 ENCOUNTER — MED ADMIN CHARGE (OUTPATIENT)
Age: 79
End: 2024-10-16

## 2024-10-16 ENCOUNTER — APPOINTMENT (OUTPATIENT)
Dept: INTERNAL MEDICINE | Facility: CLINIC | Age: 79
End: 2024-10-16
Payer: MEDICARE

## 2024-10-16 VITALS
TEMPERATURE: 98 F | SYSTOLIC BLOOD PRESSURE: 132 MMHG | HEIGHT: 62 IN | HEART RATE: 69 BPM | RESPIRATION RATE: 16 BRPM | OXYGEN SATURATION: 97 % | DIASTOLIC BLOOD PRESSURE: 74 MMHG

## 2024-10-16 DIAGNOSIS — R32 UNSPECIFIED URINARY INCONTINENCE: ICD-10-CM

## 2024-10-16 DIAGNOSIS — Z89.519 ACQUIRED ABSENCE OF UNSPECIFIED LEG BELOW KNEE: ICD-10-CM

## 2024-10-16 DIAGNOSIS — R60.0 LOCALIZED EDEMA: ICD-10-CM

## 2024-10-16 DIAGNOSIS — N28.1 CYST OF KIDNEY, ACQUIRED: ICD-10-CM

## 2024-10-16 DIAGNOSIS — K86.2 CYST OF PANCREAS: ICD-10-CM

## 2024-10-16 DIAGNOSIS — E11.9 TYPE 2 DIABETES MELLITUS W/OUT COMPLICATIONS: ICD-10-CM

## 2024-10-16 DIAGNOSIS — R21 RASH AND OTHER NONSPECIFIC SKIN ERUPTION: ICD-10-CM

## 2024-10-16 DIAGNOSIS — Z01.818 ENCOUNTER FOR OTHER PREPROCEDURAL EXAMINATION: ICD-10-CM

## 2024-10-16 DIAGNOSIS — L08.9 LOCAL INFECTION OF THE SKIN AND SUBCUTANEOUS TISSUE, UNSPECIFIED: ICD-10-CM

## 2024-10-16 DIAGNOSIS — Z23 ENCOUNTER FOR IMMUNIZATION: ICD-10-CM

## 2024-10-16 PROCEDURE — 99214 OFFICE O/P EST MOD 30 MIN: CPT

## 2024-10-16 PROCEDURE — G0008: CPT

## 2024-10-16 PROCEDURE — 90662 IIV NO PRSV INCREASED AG IM: CPT

## 2024-10-16 PROCEDURE — G2211 COMPLEX E/M VISIT ADD ON: CPT

## 2024-10-16 PROCEDURE — 36415 COLL VENOUS BLD VENIPUNCTURE: CPT

## 2024-10-18 NOTE — ED ADULT TRIAGE NOTE - PRO INTERPRETER NEED 2
Is This A New Presentation, Or A Follow-Up?: Skin Lesion How Severe Is Your Skin Lesion?: mild Has Your Skin Lesion Been Treated?: not been treated English

## 2024-10-21 ENCOUNTER — TRANSCRIPTION ENCOUNTER (OUTPATIENT)
Age: 79
End: 2024-10-21

## 2024-10-21 LAB
ALBUMIN SERPL ELPH-MCNC: 4.1 G/DL
ALP BLD-CCNC: 84 U/L
ALT SERPL-CCNC: 22 U/L
ANION GAP SERPL CALC-SCNC: 14 MMOL/L
APPEARANCE: ABNORMAL
AST SERPL-CCNC: 25 U/L
BACTERIA UR CULT: NORMAL
BACTERIA: ABNORMAL /HPF
BASOPHILS # BLD AUTO: 0.05 K/UL
BASOPHILS NFR BLD AUTO: 0.7 %
BILIRUB SERPL-MCNC: 0.5 MG/DL
BILIRUBIN URINE: NEGATIVE
BLOOD URINE: NEGATIVE
BUN SERPL-MCNC: 20 MG/DL
CALCIUM SERPL-MCNC: 9.5 MG/DL
CAST: NORMAL /LPF
CHLORIDE SERPL-SCNC: 106 MMOL/L
CHOLEST SERPL-MCNC: 157 MG/DL
CO2 SERPL-SCNC: 20 MMOL/L
COLOR: NORMAL
CREAT SERPL-MCNC: 1.29 MG/DL
EGFR: 42 ML/MIN/1.73M2
EOSINOPHIL # BLD AUTO: 0.13 K/UL
EOSINOPHIL NFR BLD AUTO: 1.8 %
EPITHELIAL CELLS: 31 /HPF
ESTIMATED AVERAGE GLUCOSE: 177 MG/DL
GLUCOSE QUALITATIVE U: >=1000 MG/DL
GLUCOSE SERPL-MCNC: 118 MG/DL
HBA1C MFR BLD HPLC: 7.8 %
HCT VFR BLD CALC: 35 %
HDLC SERPL-MCNC: 62 MG/DL
HGB BLD-MCNC: 10.7 G/DL
IMM GRANULOCYTES NFR BLD AUTO: 0.3 %
KETONES URINE: NEGATIVE MG/DL
LDLC SERPL CALC-MCNC: 69 MG/DL
LEUKOCYTE ESTERASE URINE: ABNORMAL
LYMPHOCYTES # BLD AUTO: 2.06 K/UL
LYMPHOCYTES NFR BLD AUTO: 29.2 %
MAN DIFF?: NORMAL
MCHC RBC-ENTMCNC: 26.8 PG
MCHC RBC-ENTMCNC: 30.6 GM/DL
MCV RBC AUTO: 87.5 FL
MICROSCOPIC-UA: NORMAL
MONOCYTES # BLD AUTO: 0.67 K/UL
MONOCYTES NFR BLD AUTO: 9.5 %
NEUTROPHILS # BLD AUTO: 4.12 K/UL
NEUTROPHILS NFR BLD AUTO: 58.5 %
NITRITE URINE: NEGATIVE
NONHDLC SERPL-MCNC: 95 MG/DL
PH URINE: 5.5
PLATELET # BLD AUTO: 276 K/UL
POTASSIUM SERPL-SCNC: 4.1 MMOL/L
PROT SERPL-MCNC: 7 G/DL
PROTEIN URINE: 30 MG/DL
RBC # BLD: 4 M/UL
RBC # FLD: 17.1 %
RED BLOOD CELLS URINE: NORMAL /HPF
REVIEW: NORMAL
SODIUM SERPL-SCNC: 140 MMOL/L
SPECIFIC GRAVITY URINE: >1.03
TRIGL SERPL-MCNC: 151 MG/DL
UROBILINOGEN URINE: 0.2 MG/DL
WBC # FLD AUTO: 7.05 K/UL
WHITE BLOOD CELLS URINE: 95 /HPF
YEAST-LIKE CELLS: PRESENT

## 2024-10-22 ENCOUNTER — APPOINTMENT (OUTPATIENT)
Dept: ENDOCRINOLOGY | Facility: CLINIC | Age: 79
End: 2024-10-22

## 2024-10-22 PROCEDURE — 99212 OFFICE O/P EST SF 10 MIN: CPT

## 2024-10-30 RX ORDER — LANCING DEVICE/LANCETS
KIT MISCELLANEOUS
Qty: 1 | Refills: 3 | Status: ACTIVE | COMMUNITY
Start: 2024-10-22 | End: 1900-01-01

## 2024-10-30 RX ORDER — BLOOD SUGAR DIAGNOSTIC
STRIP MISCELLANEOUS DAILY
Qty: 1 | Refills: 3 | Status: ACTIVE | COMMUNITY
Start: 2024-10-22 | End: 1900-01-01

## 2024-10-30 RX ORDER — BLOOD-GLUCOSE METER
W/DEVICE EACH MISCELLANEOUS
Qty: 1 | Refills: 0 | Status: ACTIVE | COMMUNITY
Start: 2024-10-22 | End: 1900-01-01

## 2024-11-05 ENCOUNTER — APPOINTMENT (OUTPATIENT)
Dept: UROGYNECOLOGY | Facility: CLINIC | Age: 79
End: 2024-11-05

## 2024-11-26 ENCOUNTER — APPOINTMENT (OUTPATIENT)
Dept: MAMMOGRAPHY | Facility: HOSPITAL | Age: 79
End: 2024-11-26

## 2024-12-17 ENCOUNTER — APPOINTMENT (OUTPATIENT)
Dept: MAMMOGRAPHY | Facility: HOSPITAL | Age: 79
End: 2024-12-17

## 2024-12-18 ENCOUNTER — RX RENEWAL (OUTPATIENT)
Age: 79
End: 2024-12-18

## 2024-12-26 ENCOUNTER — RX RENEWAL (OUTPATIENT)
Age: 79
End: 2024-12-26

## 2025-01-29 ENCOUNTER — APPOINTMENT (OUTPATIENT)
Dept: UROGYNECOLOGY | Facility: CLINIC | Age: 80
End: 2025-01-29

## 2025-01-29 VITALS
DIASTOLIC BLOOD PRESSURE: 68 MMHG | HEART RATE: 64 BPM | SYSTOLIC BLOOD PRESSURE: 144 MMHG | BODY MASS INDEX: 31.28 KG/M2 | HEIGHT: 62 IN | OXYGEN SATURATION: 96 % | WEIGHT: 170 LBS

## 2025-01-29 DIAGNOSIS — N32.81 OVERACTIVE BLADDER: ICD-10-CM

## 2025-01-29 DIAGNOSIS — N39.41 URGE INCONTINENCE: ICD-10-CM

## 2025-01-29 PROCEDURE — 99459 PELVIC EXAMINATION: CPT

## 2025-01-29 PROCEDURE — 99204 OFFICE O/P NEW MOD 45 MIN: CPT | Mod: 25

## 2025-01-29 PROCEDURE — 99214 OFFICE O/P EST MOD 30 MIN: CPT | Mod: 25

## 2025-01-29 PROCEDURE — 51701 INSERT BLADDER CATHETER: CPT

## 2025-01-31 LAB — BACTERIA UR CULT: NORMAL

## 2025-02-06 ENCOUNTER — TRANSCRIPTION ENCOUNTER (OUTPATIENT)
Age: 80
End: 2025-02-06

## 2025-02-12 ENCOUNTER — APPOINTMENT (OUTPATIENT)
Dept: MAMMOGRAPHY | Facility: CLINIC | Age: 80
End: 2025-02-12
Payer: MEDICARE

## 2025-02-12 ENCOUNTER — RESULT REVIEW (OUTPATIENT)
Age: 80
End: 2025-02-12

## 2025-02-12 PROCEDURE — 77063 BREAST TOMOSYNTHESIS BI: CPT | Mod: XU

## 2025-02-12 PROCEDURE — 77067 SCR MAMMO BI INCL CAD: CPT | Mod: XU

## 2025-02-12 PROCEDURE — 77065 DX MAMMO INCL CAD UNI: CPT | Mod: GG,RT

## 2025-03-17 ENCOUNTER — APPOINTMENT (OUTPATIENT)
Dept: HEART AND VASCULAR | Facility: CLINIC | Age: 80
End: 2025-03-17

## 2025-03-18 ENCOUNTER — APPOINTMENT (OUTPATIENT)
Dept: INTERNAL MEDICINE | Facility: CLINIC | Age: 80
End: 2025-03-18
Payer: MEDICARE

## 2025-03-18 VITALS
HEART RATE: 74 BPM | SYSTOLIC BLOOD PRESSURE: 127 MMHG | TEMPERATURE: 98 F | OXYGEN SATURATION: 95 % | DIASTOLIC BLOOD PRESSURE: 71 MMHG

## 2025-03-18 DIAGNOSIS — E11.9 TYPE 2 DIABETES MELLITUS W/OUT COMPLICATIONS: ICD-10-CM

## 2025-03-18 DIAGNOSIS — R92.0 MAMMOGRAPHIC MICROCALCIFICATION FOUND ON DIAGNOSTIC IMAGING OF BREAST: ICD-10-CM

## 2025-03-18 DIAGNOSIS — R32 UNSPECIFIED URINARY INCONTINENCE: ICD-10-CM

## 2025-03-18 DIAGNOSIS — N32.81 OVERACTIVE BLADDER: ICD-10-CM

## 2025-03-18 DIAGNOSIS — E78.5 HYPERLIPIDEMIA, UNSPECIFIED: ICD-10-CM

## 2025-03-18 PROCEDURE — G2211 COMPLEX E/M VISIT ADD ON: CPT

## 2025-03-18 PROCEDURE — 36415 COLL VENOUS BLD VENIPUNCTURE: CPT

## 2025-03-18 PROCEDURE — 99214 OFFICE O/P EST MOD 30 MIN: CPT

## 2025-03-18 RX ORDER — TROSPIUM CHLORIDE 20 MG/1
20 TABLET, FILM COATED ORAL TWICE DAILY
Qty: 60 | Refills: 0 | Status: ACTIVE | COMMUNITY
Start: 2025-03-18 | End: 1900-01-01

## 2025-03-21 ENCOUNTER — TRANSCRIPTION ENCOUNTER (OUTPATIENT)
Age: 80
End: 2025-03-21

## 2025-03-21 LAB
ALBUMIN SERPL ELPH-MCNC: 4.2 G/DL
ALP BLD-CCNC: 83 U/L
ALT SERPL-CCNC: 30 U/L
ANION GAP SERPL CALC-SCNC: 17 MMOL/L
AST SERPL-CCNC: 33 U/L
BILIRUB SERPL-MCNC: 0.6 MG/DL
BUN SERPL-MCNC: 24 MG/DL
CALCIUM SERPL-MCNC: 9.8 MG/DL
CHLORIDE SERPL-SCNC: 101 MMOL/L
CHOLEST SERPL-MCNC: 209 MG/DL
CO2 SERPL-SCNC: 20 MMOL/L
CREAT SERPL-MCNC: 1.12 MG/DL
EGFRCR SERPLBLD CKD-EPI 2021: 50 ML/MIN/1.73M2
ESTIMATED AVERAGE GLUCOSE: 206 MG/DL
GLUCOSE SERPL-MCNC: 147 MG/DL
HBA1C MFR BLD HPLC: 8.8 %
HDLC SERPL-MCNC: 57 MG/DL
LDLC SERPL-MCNC: 115 MG/DL
NONHDLC SERPL-MCNC: 152 MG/DL
POTASSIUM SERPL-SCNC: 4.4 MMOL/L
PROT SERPL-MCNC: 7.5 G/DL
SODIUM SERPL-SCNC: 138 MMOL/L
TRIGL SERPL-MCNC: 217 MG/DL

## 2025-04-15 ENCOUNTER — NON-APPOINTMENT (OUTPATIENT)
Age: 80
End: 2025-04-15

## 2025-04-24 ENCOUNTER — RX RENEWAL (OUTPATIENT)
Age: 80
End: 2025-04-24

## 2025-06-17 ENCOUNTER — APPOINTMENT (OUTPATIENT)
Dept: HEART AND VASCULAR | Facility: CLINIC | Age: 80
End: 2025-06-17
Payer: MEDICARE

## 2025-06-17 ENCOUNTER — NON-APPOINTMENT (OUTPATIENT)
Age: 80
End: 2025-06-17

## 2025-06-17 VITALS
WEIGHT: 160 LBS | HEIGHT: 62 IN | BODY MASS INDEX: 29.44 KG/M2 | SYSTOLIC BLOOD PRESSURE: 127 MMHG | TEMPERATURE: 97.6 F | HEART RATE: 61 BPM | DIASTOLIC BLOOD PRESSURE: 75 MMHG | OXYGEN SATURATION: 95 %

## 2025-06-17 PROCEDURE — 93000 ELECTROCARDIOGRAM COMPLETE: CPT

## 2025-06-17 PROCEDURE — 99214 OFFICE O/P EST MOD 30 MIN: CPT | Mod: 25

## 2025-07-08 ENCOUNTER — RX RENEWAL (OUTPATIENT)
Age: 80
End: 2025-07-08

## 2025-07-17 ENCOUNTER — NON-APPOINTMENT (OUTPATIENT)
Age: 80
End: 2025-07-17

## 2025-07-22 ENCOUNTER — APPOINTMENT (OUTPATIENT)
Dept: CT IMAGING | Facility: HOSPITAL | Age: 80
End: 2025-07-22

## 2025-08-04 ENCOUNTER — RX RENEWAL (OUTPATIENT)
Age: 80
End: 2025-08-04

## 2025-08-09 ENCOUNTER — RX RENEWAL (OUTPATIENT)
Age: 80
End: 2025-08-09